# Patient Record
Sex: MALE
[De-identification: names, ages, dates, MRNs, and addresses within clinical notes are randomized per-mention and may not be internally consistent; named-entity substitution may affect disease eponyms.]

---

## 2017-08-07 ENCOUNTER — CHARTING TRANS (OUTPATIENT)
Dept: OTHER | Age: 67
End: 2017-08-07

## 2017-08-07 ENCOUNTER — LAB SERVICES (OUTPATIENT)
Dept: OTHER | Age: 67
End: 2017-08-07

## 2017-08-07 LAB — AT REST: NORMAL

## 2017-08-21 ENCOUNTER — OFFICE VISIT (OUTPATIENT)
Dept: OTOLARYNGOLOGY | Facility: CLINIC | Age: 67
End: 2017-08-21

## 2017-08-21 ENCOUNTER — OFFICE VISIT (OUTPATIENT)
Dept: AUDIOLOGY | Facility: CLINIC | Age: 67
End: 2017-08-21

## 2017-08-21 VITALS
HEIGHT: 68 IN | WEIGHT: 170 LBS | DIASTOLIC BLOOD PRESSURE: 84 MMHG | SYSTOLIC BLOOD PRESSURE: 168 MMHG | BODY MASS INDEX: 25.76 KG/M2 | TEMPERATURE: 97 F

## 2017-08-21 DIAGNOSIS — H93.12 TINNITUS OF LEFT EAR: Primary | ICD-10-CM

## 2017-08-21 DIAGNOSIS — H90.3 SENSORINEURAL HEARING LOSS, BILATERAL: Primary | ICD-10-CM

## 2017-08-21 PROCEDURE — 99212 OFFICE O/P EST SF 10 MIN: CPT | Performed by: OTOLARYNGOLOGY

## 2017-08-21 PROCEDURE — 99203 OFFICE O/P NEW LOW 30 MIN: CPT | Performed by: OTOLARYNGOLOGY

## 2017-08-21 PROCEDURE — 92557 COMPREHENSIVE HEARING TEST: CPT | Performed by: AUDIOLOGIST

## 2017-08-21 PROCEDURE — 92567 TYMPANOMETRY: CPT | Performed by: AUDIOLOGIST

## 2017-08-21 RX ORDER — ATORVASTATIN CALCIUM 10 MG/1
10 TABLET, FILM COATED ORAL NIGHTLY
COMMUNITY
End: 2019-01-03 | Stop reason: DRUGHIGH

## 2017-08-21 RX ORDER — VALSARTAN AND HYDROCHLOROTHIAZIDE 160; 12.5 MG/1; MG/1
1 TABLET, FILM COATED ORAL DAILY
COMMUNITY
End: 2019-01-03

## 2017-08-21 RX ORDER — METOPROLOL SUCCINATE 25 MG/1
25 TABLET, EXTENDED RELEASE ORAL DAILY
COMMUNITY
End: 2019-01-03 | Stop reason: DRUGHIGH

## 2017-08-21 NOTE — PROGRESS NOTES
AUDIOGRAM     Nate Rabago was referred for testing by Martha Rosales  [id number]      Otoscopic Inspection:  Right ear:  No cerumen  Left ear:  No cerumen    Audiometric Test Results:   Audiometric thresholds indicated a mild flat sensorineu

## 2017-08-22 NOTE — PATIENT INSTRUCTIONS
Tinnitus (Ringing in the Ears)     Treatment may include maskers and hearing aids. Tinnitus is the term for a noise in your ear not caused by an outside sound. The noise might be a ringing, clicking, hiss, or roar.  It can vary in pitch and may be sof © 8086-6997 [address], [address] [place]. All rights reserved. This information is not intended as a substitute for professional medical care. Always follow your healthcare professional's instructions.

## 2017-08-22 NOTE — PROGRESS NOTES
Yuliet Cordoba is a 78year old male.  Patient presents with:  Ear Problem: left ear tinnitis abbut month half on/off  Throat Problem: irritated had flu 3 weeks ago never got better     HPI:   He has been feeling a ringing intermittently in the left ear for Cranial nerves II through XII grossly intact. Neck Exam Normal Inspection - Normal. Palpation - Normal. Parotid gland - Normal. Thyroid gland - Normal.   Psychiatric Normal Orientation - Oriented to time, place, person & situation.  Appropriate mood and a

## 2018-11-03 VITALS
BODY MASS INDEX: 25.76 KG/M2 | SYSTOLIC BLOOD PRESSURE: 150 MMHG | TEMPERATURE: 99.3 F | HEART RATE: 76 BPM | RESPIRATION RATE: 20 BRPM | HEIGHT: 68 IN | WEIGHT: 170 LBS | DIASTOLIC BLOOD PRESSURE: 82 MMHG

## 2019-01-03 ENCOUNTER — OFFICE VISIT (OUTPATIENT)
Dept: INTERNAL MEDICINE CLINIC | Facility: CLINIC | Age: 69
End: 2019-01-03
Payer: MEDICARE

## 2019-01-03 VITALS
RESPIRATION RATE: 20 BRPM | WEIGHT: 185 LBS | HEIGHT: 66.25 IN | BODY MASS INDEX: 29.73 KG/M2 | HEART RATE: 58 BPM | DIASTOLIC BLOOD PRESSURE: 81 MMHG | SYSTOLIC BLOOD PRESSURE: 183 MMHG | TEMPERATURE: 98 F

## 2019-01-03 DIAGNOSIS — E78.5 HYPERLIPIDEMIA, UNSPECIFIED HYPERLIPIDEMIA TYPE: Primary | ICD-10-CM

## 2019-01-03 DIAGNOSIS — I10 ESSENTIAL HYPERTENSION: ICD-10-CM

## 2019-01-03 DIAGNOSIS — F32.A DEPRESSION, UNSPECIFIED DEPRESSION TYPE: ICD-10-CM

## 2019-01-03 DIAGNOSIS — F41.9 ANXIETY: ICD-10-CM

## 2019-01-03 DIAGNOSIS — Z95.1 S/P CABG X 5: ICD-10-CM

## 2019-01-03 DIAGNOSIS — R60.9 EDEMA, UNSPECIFIED TYPE: ICD-10-CM

## 2019-01-03 DIAGNOSIS — E11.9 TYPE 2 DIABETES MELLITUS WITHOUT COMPLICATION, WITHOUT LONG-TERM CURRENT USE OF INSULIN (HCC): ICD-10-CM

## 2019-01-03 PROBLEM — Z76.89 ENCOUNTER TO ESTABLISH CARE: Status: ACTIVE | Noted: 2019-01-03

## 2019-01-03 PROCEDURE — 99205 OFFICE O/P NEW HI 60 MIN: CPT | Performed by: INTERNAL MEDICINE

## 2019-01-03 PROCEDURE — G0463 HOSPITAL OUTPT CLINIC VISIT: HCPCS | Performed by: INTERNAL MEDICINE

## 2019-01-03 RX ORDER — ATORVASTATIN CALCIUM 40 MG/1
40 TABLET, FILM COATED ORAL NIGHTLY
COMMUNITY
End: 2019-06-13

## 2019-01-03 RX ORDER — ESCITALOPRAM OXALATE 10 MG/1
10 TABLET ORAL DAILY
COMMUNITY
End: 2019-01-03

## 2019-01-03 RX ORDER — METOPROLOL SUCCINATE 50 MG/1
50 TABLET, EXTENDED RELEASE ORAL DAILY
COMMUNITY
End: 2019-09-30

## 2019-01-03 RX ORDER — ESCITALOPRAM OXALATE 10 MG/1
10 TABLET ORAL DAILY
Qty: 90 TABLET | Refills: 1 | Status: SHIPPED | OUTPATIENT
Start: 2019-01-03 | End: 2019-05-28

## 2019-01-03 RX ORDER — LORAZEPAM 1 MG/1
1 TABLET ORAL NIGHTLY
COMMUNITY
End: 2019-01-03

## 2019-01-03 RX ORDER — OMEGA-3-ACID ETHYL ESTERS 1 G/1
1 CAPSULE, LIQUID FILLED ORAL 3 TIMES DAILY
COMMUNITY
End: 2019-01-29

## 2019-01-03 RX ORDER — LORAZEPAM 1 MG/1
1 TABLET ORAL NIGHTLY
Qty: 30 TABLET | Refills: 1 | Status: SHIPPED | OUTPATIENT
Start: 2019-01-03 | End: 2019-04-11

## 2019-01-03 RX ORDER — VALSARTAN AND HYDROCHLOROTHIAZIDE 160; 12.5 MG/1; MG/1
1 TABLET, FILM COATED ORAL DAILY
Qty: 90 TABLET | Refills: 1 | Status: CANCELLED | OUTPATIENT
Start: 2019-01-03

## 2019-01-03 RX ORDER — VALSARTAN AND HYDROCHLOROTHIAZIDE 160; 25 MG/1; MG/1
1 TABLET ORAL DAILY
Qty: 90 TABLET | Refills: 1 | Status: SHIPPED | OUTPATIENT
Start: 2019-01-03 | End: 2019-01-08

## 2019-01-03 RX ORDER — GLIMEPIRIDE 1 MG/1
1 TABLET ORAL 2 TIMES DAILY
COMMUNITY
End: 2019-04-15

## 2019-01-03 NOTE — PATIENT INSTRUCTIONS
Problem List Items Addressed This Visit        Unprioritized    Anxiety     Consider reducing the dose of lorazepam to 1 mg–half a tablet daily and will consider further reduction after the next office visit         Relevant Medications    LORazepam 1 MG O Other Relevant Orders    CARD ECHO 2D DOPPLER (CPT=93306)    Hyperlipidemia - Primary     Lipid panel and liver function tests have been stable on atorvastatin at 40 mg daily but is due for recheck labs which have been ordered today and will follow-up i

## 2019-01-03 NOTE — ASSESSMENT & PLAN NOTE
Blood pressure (!) 183/81, pulse 58, temperature 98 °F (36.7 °C), temperature source Oral, resp. rate 20, height 5' 6.25\" (1.683 m), weight 185 lb (83.9 kg). Blood pressure remains elevated even upon recheck.   Advised to increase valsartan hydrochlorothi

## 2019-01-03 NOTE — ASSESSMENT & PLAN NOTE
Patient has been on Lexapro at 10 mg 1 tablet daily which he has tolerated well. Continue the same dose of medications and follow-up for any further needs.

## 2019-01-03 NOTE — ASSESSMENT & PLAN NOTE
Blood sugars have been stable at home on metformin 500 mg 2 times daily and glimepiride 1 mg 2 times daily. He has tolerated medications without low sugar reactions.   He is due for recheck labs which have been ordered today and will follow-up after comple

## 2019-01-03 NOTE — ASSESSMENT & PLAN NOTE
Consider reducing the dose of lorazepam to 1 mg–half a tablet daily and will consider further reduction after the next office visit

## 2019-01-03 NOTE — PROGRESS NOTES
HPI:    Patient ID: Roberto Salazar is a 80year old male. New patient, transfer of care. Earlier physician-Dr. Karina Lee. Last seen by Dr. Cyn Braswell - 1 month back.     PMH    Status post 5 vessel CABG about 10 years back at [facility] per Dr. Fernanda Butts followed. I am advising him to stay and do a followup, and he can return to see me in approximately 8 weeks to see if his CBC has significantly changed. I will see him periodically and keep you posted. He has an issue about his insurance and doing CBC. night) since onset. The problem is controlled. Associated symptoms include anxiety. Pertinent negatives include no malaise/fatigue, orthopnea, palpitations, peripheral edema or shortness of breath. There are no associated agents to hypertension.  Risk facto by mouth nightly. Disp: 30 tablet Rfl: 1   Valsartan-Hydrochlorothiazide 160-25 MG Oral Tab Take 1 tablet by mouth daily. Disp: 90 tablet Rfl: 1     Allergies:No Known Allergies      01/03/19  1550   BP: (!) 183/81   Pulse: 58   Resp: 20   Temp: 98 °F (36. daily.  Heart rate is quite low and hence reduce the metoprolol to 25 mg once daily. Recheck labs prior to the next office visit. Follow-up in about 4 weeks. Reduce salt in the diet and drink plenty of fluids.          Relevant Medications    Metoprolol tolerated well. Continue the same dose of medications and follow-up for any further needs.          Relevant Medications    escitalopram 10 MG Oral Tab    S/P CABG x 5      Other Visit Diagnoses     Edema, unspecified type        Relevant Orders    CARD EC

## 2019-01-03 NOTE — ASSESSMENT & PLAN NOTE
New patient–establishment of care. Multiple medical issues associated with diabetes, history of CABG, hyperlipidemia, hypertension, depression and anxiety. Requested old records to be brought for the next office visit.   Labs completed from the earlier ph

## 2019-01-03 NOTE — ASSESSMENT & PLAN NOTE
Lipid panel and liver function tests have been stable on atorvastatin at 40 mg daily but is due for recheck labs which have been ordered today and will follow-up in the next 3-4 weeks.

## 2019-01-05 ENCOUNTER — LAB ENCOUNTER (OUTPATIENT)
Dept: LAB | Age: 69
End: 2019-01-05
Attending: INTERNAL MEDICINE
Payer: COMMERCIAL

## 2019-01-05 DIAGNOSIS — E11.9 TYPE 2 DIABETES MELLITUS WITHOUT COMPLICATION, WITHOUT LONG-TERM CURRENT USE OF INSULIN (HCC): ICD-10-CM

## 2019-01-05 LAB
ALBUMIN SERPL BCP-MCNC: 3.9 G/DL (ref 3.5–4.8)
ALBUMIN/GLOB SERPL: 1.4 {RATIO} (ref 1–2)
ALP SERPL-CCNC: 42 U/L (ref 32–100)
ALT SERPL-CCNC: 39 U/L (ref 17–63)
ANION GAP SERPL CALC-SCNC: 10 MMOL/L (ref 0–18)
AST SERPL-CCNC: 28 U/L (ref 15–41)
BACTERIA UR QL AUTO: NEGATIVE /HPF
BASOPHILS # BLD: 0 K/UL (ref 0–0.2)
BASOPHILS NFR BLD: 1 %
BILIRUB SERPL-MCNC: 1.1 MG/DL (ref 0.3–1.2)
BILIRUB UR QL: NEGATIVE
BUN SERPL-MCNC: 9 MG/DL (ref 8–20)
BUN/CREAT SERPL: 10.3 (ref 10–20)
CALCIUM SERPL-MCNC: 8.9 MG/DL (ref 8.5–10.5)
CHLORIDE SERPL-SCNC: 102 MMOL/L (ref 95–110)
CHOLEST SERPL-MCNC: 113 MG/DL (ref 110–200)
CLARITY UR: CLEAR
CO2 SERPL-SCNC: 25 MMOL/L (ref 22–32)
COLOR UR: YELLOW
CREAT SERPL-MCNC: 0.87 MG/DL (ref 0.5–1.5)
CREAT UR-MCNC: 179.8 MG/DL
EOSINOPHIL # BLD: 0.2 K/UL (ref 0–0.7)
EOSINOPHIL NFR BLD: 5 %
ERYTHROCYTE [DISTWIDTH] IN BLOOD BY AUTOMATED COUNT: 14.7 % (ref 11–15)
GLOBULIN PLAS-MCNC: 2.7 G/DL (ref 2.5–3.7)
GLUCOSE SERPL-MCNC: 128 MG/DL (ref 70–99)
GLUCOSE UR-MCNC: NEGATIVE MG/DL
HCT VFR BLD AUTO: 37.5 % (ref 41–52)
HDLC SERPL-MCNC: 45 MG/DL
HGB BLD-MCNC: 12.2 G/DL (ref 13.5–17.5)
KETONES UR-MCNC: NEGATIVE MG/DL
LDLC SERPL CALC-MCNC: 36 MG/DL (ref 0–99)
LEUKOCYTE ESTERASE UR QL STRIP.AUTO: NEGATIVE
LYMPHOCYTES # BLD: 1.6 K/UL (ref 1–4)
LYMPHOCYTES NFR BLD: 48 %
MCH RBC QN AUTO: 29.7 PG (ref 27–32)
MCHC RBC AUTO-ENTMCNC: 32.5 G/DL (ref 32–37)
MCV RBC AUTO: 91.3 FL (ref 80–100)
MICROALBUMIN UR-MCNC: 5.9 MG/DL (ref 0–1.8)
MICROALBUMIN/CREAT UR: 32.8 MG/G{CREAT} (ref 0–20)
MONOCYTES # BLD: 0.4 K/UL (ref 0–1)
MONOCYTES NFR BLD: 12 %
NEUTROPHILS # BLD AUTO: 1.2 K/UL (ref 1.8–7.7)
NEUTROPHILS NFR BLD: 35 %
NITRITE UR QL STRIP.AUTO: NEGATIVE
NONHDLC SERPL-MCNC: 68 MG/DL
OSMOLALITY UR CALC.SUM OF ELEC: 284 MOSM/KG (ref 275–295)
PATIENT FASTING: YES
PH UR: 6 [PH] (ref 5–8)
PLATELET # BLD AUTO: 139 K/UL (ref 140–400)
PMV BLD AUTO: 9.5 FL (ref 7.4–10.3)
POTASSIUM SERPL-SCNC: 3.8 MMOL/L (ref 3.3–5.1)
PROT SERPL-MCNC: 6.6 G/DL (ref 5.9–8.4)
PROT UR-MCNC: 30 MG/DL
RBC # BLD AUTO: 4.1 M/UL (ref 4.5–5.9)
RBC #/AREA URNS AUTO: 13 /HPF
SODIUM SERPL-SCNC: 137 MMOL/L (ref 136–144)
SP GR UR STRIP: 1.02 (ref 1–1.03)
TRIGL SERPL-MCNC: 159 MG/DL (ref 1–149)
TSH SERPL-ACNC: 1.85 UIU/ML (ref 0.45–5.33)
UROBILINOGEN UR STRIP-ACNC: <2
VIT B12 SERPL-MCNC: 520 PG/ML (ref 181–914)
VIT C UR-MCNC: 40 MG/DL
WBC # BLD AUTO: 3.4 K/UL (ref 4–11)
WBC #/AREA URNS AUTO: 1 /HPF

## 2019-01-05 PROCEDURE — 82043 UR ALBUMIN QUANTITATIVE: CPT

## 2019-01-05 PROCEDURE — 36415 COLL VENOUS BLD VENIPUNCTURE: CPT

## 2019-01-05 PROCEDURE — 81001 URINALYSIS AUTO W/SCOPE: CPT

## 2019-01-05 PROCEDURE — 80053 COMPREHEN METABOLIC PANEL: CPT

## 2019-01-05 PROCEDURE — 80061 LIPID PANEL: CPT

## 2019-01-05 PROCEDURE — 85025 COMPLETE CBC W/AUTO DIFF WBC: CPT

## 2019-01-05 PROCEDURE — 84443 ASSAY THYROID STIM HORMONE: CPT

## 2019-01-05 PROCEDURE — 82306 VITAMIN D 25 HYDROXY: CPT

## 2019-01-05 PROCEDURE — 82570 ASSAY OF URINE CREATININE: CPT

## 2019-01-05 PROCEDURE — 82607 VITAMIN B-12: CPT

## 2019-01-07 ENCOUNTER — TELEPHONE (OUTPATIENT)
Dept: INTERNAL MEDICINE CLINIC | Facility: CLINIC | Age: 69
End: 2019-01-07

## 2019-01-07 LAB — 25(OH)D3 SERPL-MCNC: 30.8 NG/ML (ref 30–100)

## 2019-01-07 NOTE — TELEPHONE ENCOUNTER
Pt's wife booked appt for 1/8/19 at 810am with PJ at the [facility]  Pt's BP has been getting in the 200 range  Pt was not with wife at the time of the call

## 2019-01-07 NOTE — TELEPHONE ENCOUNTER
Pt states his BP has been elevated, last reading 220/90. Pt has been checking his BP every few hours. Pt last OV 1/3/19. Pt states he has been taking Valsartan-HCTZ 160-25 mg and Metoprolol 25 mg as advised.  Pt states he doesn't add salt to his foods, advi

## 2019-01-08 ENCOUNTER — OFFICE VISIT (OUTPATIENT)
Dept: INTERNAL MEDICINE CLINIC | Facility: CLINIC | Age: 69
End: 2019-01-08
Payer: MEDICARE

## 2019-01-08 VITALS
SYSTOLIC BLOOD PRESSURE: 198 MMHG | DIASTOLIC BLOOD PRESSURE: 75 MMHG | TEMPERATURE: 98 F | HEIGHT: 66 IN | BODY MASS INDEX: 28.28 KG/M2 | HEART RATE: 63 BPM | WEIGHT: 176 LBS

## 2019-01-08 DIAGNOSIS — I10 ESSENTIAL HYPERTENSION: Primary | ICD-10-CM

## 2019-01-08 PROCEDURE — G0463 HOSPITAL OUTPT CLINIC VISIT: HCPCS | Performed by: INTERNAL MEDICINE

## 2019-01-08 PROCEDURE — 99214 OFFICE O/P EST MOD 30 MIN: CPT | Performed by: INTERNAL MEDICINE

## 2019-01-08 RX ORDER — AMLODIPINE BESYLATE 5 MG/1
5 TABLET ORAL DAILY
Qty: 90 TABLET | Refills: 0 | Status: SHIPPED | OUTPATIENT
Start: 2019-01-08 | End: 2019-03-09

## 2019-01-08 RX ORDER — VALSARTAN AND HYDROCHLOROTHIAZIDE 320; 25 MG/1; MG/1
1 TABLET, FILM COATED ORAL DAILY
Qty: 90 TABLET | Refills: 0 | Status: SHIPPED | OUTPATIENT
Start: 2019-01-08 | End: 2019-06-14

## 2019-01-08 NOTE — ASSESSMENT & PLAN NOTE
Blood pressure uncontrolled. Blood pressure on repeat-manual 200/94. He is asymptomatic. Reviewed labs from 1/3/2018. Kidney function normal.  No signs or symptoms of endorgan damage.   Cautioned the patient about the risks involved with uncontrolled se

## 2019-01-08 NOTE — PROGRESS NOTES
Chelo Beltrán is a 80year old male. Patient presents with:  Hypertension  Headache      HPI:     HPI   Patient is here for follow-up of hypertension. He was seen by PCP on 1/3/2019.   He has history of CABG 10 years ago, hypertension, hyperlipidemia, non Take 81 mg by mouth daily. Disp:  Rfl:    Multiple Vitamins-Minerals (MULTIVITAMIN ADULT OR) Take by mouth. Disp:  Rfl:    COENZYME Q-10 OR Take by mouth daily.  Disp:  Rfl:    MetFORMIN HCl 1000 MG Oral Tab Take 1 tablet (1,000 mg total) by mouth 2 (two) t Endo/Heme/Allergies: Negative for environmental allergies. Psychiatric/Behavioral: Positive for depression. The patient is not nervous/anxious and does not have insomnia.           EXAM:   BP (!) 198/75 (BP Location: Right arm)   Pulse 63   Temp 98.2 °F intake. Plan: Gradually and steadily to lower the blood pressure. Goal 20% reduction in blood pressure every 24 hours. Goal blood pressure less than 130/80 eventually  Adding amlodipine 5 mg.   Patient to contact the office if the blood pressure remains

## 2019-01-11 ENCOUNTER — HOSPITAL ENCOUNTER (OUTPATIENT)
Dept: CARDIOLOGY CLINIC | Age: 69
Discharge: HOME | End: 2019-01-11
Attending: INTERNAL MEDICINE
Payer: MEDICARE

## 2019-01-11 DIAGNOSIS — R60.9 EDEMA, UNSPECIFIED TYPE: ICD-10-CM

## 2019-01-11 DIAGNOSIS — I10 ESSENTIAL HYPERTENSION: ICD-10-CM

## 2019-01-11 DIAGNOSIS — E78.5 HYPERLIPIDEMIA, UNSPECIFIED HYPERLIPIDEMIA TYPE: ICD-10-CM

## 2019-01-11 PROCEDURE — 93306 TTE W/DOPPLER COMPLETE: CPT | Performed by: INTERNAL MEDICINE

## 2019-01-15 ENCOUNTER — TELEPHONE (OUTPATIENT)
Dept: INTERNAL MEDICINE CLINIC | Facility: CLINIC | Age: 69
End: 2019-01-15

## 2019-01-15 NOTE — TELEPHONE ENCOUNTER
Patient is looking for results of the echo cardiogram completed on 1/11/19  OK to send via [address]

## 2019-01-17 ENCOUNTER — TELEPHONE (OUTPATIENT)
Dept: OTHER | Age: 69
End: 2019-01-17

## 2019-01-17 NOTE — TELEPHONE ENCOUNTER
Spoke with patient who reports the insurance needs Dr. Ernie Robles to sign a form that verifies he is a diabetic. Patient confirmed he will drop off the form today 1/17/19 at the office. Message routed to provider and the staff for review.     Please respond to

## 2019-01-21 ENCOUNTER — TELEPHONE (OUTPATIENT)
Dept: INTERNAL MEDICINE CLINIC | Facility: CLINIC | Age: 69
End: 2019-01-21

## 2019-01-21 NOTE — TELEPHONE ENCOUNTER
Pt is calling to request a  Referral for a stress test  had told him to perform. Pt states he needs a referral to schedule an appy for test      Please advise thank you.

## 2019-01-25 ENCOUNTER — OFFICE VISIT (OUTPATIENT)
Dept: HEMATOLOGY/ONCOLOGY | Facility: HOSPITAL | Age: 69
End: 2019-01-25
Attending: INTERNAL MEDICINE
Payer: COMMERCIAL

## 2019-01-25 VITALS
DIASTOLIC BLOOD PRESSURE: 72 MMHG | OXYGEN SATURATION: 97 % | TEMPERATURE: 98 F | SYSTOLIC BLOOD PRESSURE: 169 MMHG | HEIGHT: 66 IN | RESPIRATION RATE: 16 BRPM | BODY MASS INDEX: 28.61 KG/M2 | WEIGHT: 178 LBS | HEART RATE: 59 BPM

## 2019-01-25 DIAGNOSIS — D70.8 OTHER NEUTROPENIA (HCC): Primary | ICD-10-CM

## 2019-01-25 DIAGNOSIS — D69.6 THROMBOCYTOPENIA (HCC): ICD-10-CM

## 2019-01-25 DIAGNOSIS — D64.9 ANEMIA, UNSPECIFIED TYPE: ICD-10-CM

## 2019-01-25 PROCEDURE — 99203 OFFICE O/P NEW LOW 30 MIN: CPT | Performed by: INTERNAL MEDICINE

## 2019-01-25 NOTE — PROGRESS NOTES
JUSTIN Bloom is a 80year old male here for evaluation of Other neutropenia (hcc)  (primary encounter diagnosis)  Thrombocytopenia (hcc)  Anemia, unspecified type    Patient has been noted to have persistent leukopenia since 2008 when he had a C Gastrointestinal: Negative for abdominal pain, blood in stool, diarrhea and nausea. Genitourinary: Negative for frequency, hematuria and urgency. Neurological: Negative for dizziness and light-headedness. Hematological: Negative for adenopathy.  Bru Highest education level: Not on file    Social Needs      Financial resource strain: Not on file      Food insecurity - worry: Not on file      Food insecurity - inability: Not on file      Transportation needs - medical: Not on file      Transportation is warm. Psychiatric: He has a normal mood and affect. Vitals reviewed.           ASSESSMENT/PLAN:   Other neutropenia (hcc)  (primary encounter diagnosis)  Thrombocytopenia (hcc)  Anemia, unspecified type    Patient with long-standing chronic mild panc hour(s)).      Component      Latest Ref Rng & Units 1/5/2019   WBC      4.0 - 11.0 K/UL 3.4 (L)   RBC      4.50 - 5.90 M/UL 4.10 (L)   Hemoglobin      13.5 - 17.5 g/dL 12.2 (L)   Hematocrit      41.0 - 52.0 % 37.5 (L)   MCV      80.0 - 100.0 fL 91.3   [facility] FERRITIN 16   Comment:        * * FERRITIN REFERENCE VALUES * *      ADULTS        GEOMETRIC    95% REFERENCE                    MEAN NG/ML   LIMITS NG/ML  NORMAL MALES           94           NORMAL FEMALES         46           IRON DEFICIENC However,  the dysplastic changes seen in megakaryocytes are  considered worrisome for a myelodysplastic syndrome.  A correlation with  pending cytogenetic study findings is recommended for a complete evaluation.   Moreover, reactive causes of dysplasia (e.g he agrees with the  interpretation.   .      Primary Pathologist: Felicia Mathew MD  **Electronically Signed Out**  Felicia Mathew MD    ADDENDUM     Date Ordered:     10/4/2011     Status:  Signed Out      Date Complete:     10/4/2011      Date R Comment                **Report Electronically Signed**  Felipe Ann MD          Operation  Bone marrow biopsy and aspiration of LPIC        Clinical History  Leukopenia, unspecified anemia, thrombopenia  .   HEMOGRAM:  WBC =     3.6  RBC =

## 2019-01-29 NOTE — TELEPHONE ENCOUNTER
Current Outpatient Medications:  FENOFIBRATE TABLET AND GEMFIBROZIL TABLET    Per pharmacy, on behalf of yor patient, we are requesting that you select the [facility] preferred lower cost alternative for the above medicines as an approved change from [address] [place] [address], provided that you deem this clinically appropriate. This change could potentially have a cost saving for your patient.

## 2019-01-30 RX ORDER — OMEGA-3-ACID ETHYL ESTERS 1 G/1
1 CAPSULE, LIQUID FILLED ORAL 3 TIMES DAILY
Qty: 270 CAPSULE | Refills: 1 | Status: SHIPPED | OUTPATIENT
Start: 2019-01-30 | End: 2019-11-26

## 2019-02-05 ENCOUNTER — OFFICE VISIT (OUTPATIENT)
Dept: INTERNAL MEDICINE CLINIC | Facility: CLINIC | Age: 69
End: 2019-02-05
Payer: MEDICARE

## 2019-02-05 VITALS
SYSTOLIC BLOOD PRESSURE: 140 MMHG | HEIGHT: 66 IN | HEART RATE: 63 BPM | RESPIRATION RATE: 16 BRPM | BODY MASS INDEX: 28.28 KG/M2 | WEIGHT: 176 LBS | DIASTOLIC BLOOD PRESSURE: 90 MMHG

## 2019-02-05 DIAGNOSIS — E78.5 HYPERLIPIDEMIA, UNSPECIFIED HYPERLIPIDEMIA TYPE: ICD-10-CM

## 2019-02-05 DIAGNOSIS — D61.818 PANCYTOPENIA (HCC): ICD-10-CM

## 2019-02-05 DIAGNOSIS — I10 ESSENTIAL HYPERTENSION: Primary | ICD-10-CM

## 2019-02-05 DIAGNOSIS — D70.8 OTHER NEUTROPENIA (HCC): ICD-10-CM

## 2019-02-05 PROCEDURE — G0463 HOSPITAL OUTPT CLINIC VISIT: HCPCS | Performed by: INTERNAL MEDICINE

## 2019-02-05 PROCEDURE — 99214 OFFICE O/P EST MOD 30 MIN: CPT | Performed by: INTERNAL MEDICINE

## 2019-02-06 NOTE — ASSESSMENT & PLAN NOTE
Blood pressure looks elevated even upon recheck–140/90. Current medications are valsartan hydrochlorothiazide 320/25 1 tablet daily, amlodipine 5 mg daily and metoprolol 50 mg daily. He has tolerated his medications well.   He does not watch the salt in h

## 2019-02-06 NOTE — PROGRESS NOTES
HPI:    Patient ID: Randy Mcgowan is a 80year old male. Study Conclusions  1. Left ventricle: The cavity size was normal. There was mild     hypertrophy of the septum. Systolic function was normal. The     estimated ejection fraction was 60-65%.  Wall m the both these agents that has been reported, particularly a pancytopenia with the Lexapro of incidence less than 1%. Lexapro dose was recently decreased.   Discussed with the patient that this medication could be the culprit, however, the patient is asymp Diabetes   He presents for his follow-up diabetic visit. He has type 2 diabetes mellitus. His disease course has been stable. Hypoglycemia symptoms include nervousness/anxiousness (depression stable). There are no diabetic associated symptoms.  There are Medications:  Omega-3-acid Ethyl Esters 1 g Oral Cap Take 1 capsule (1 g total) by mouth 3 (three) times daily. Disp: 270 capsule Rfl: 1   AmLODIPine Besylate 5 MG Oral Tab Take 1 tablet (5 mg total) by mouth daily.  Disp: 90 tablet Rfl: 0   Valsartan-Hydro Musculoskeletal: Normal range of motion. Left shoulder: He exhibits no tenderness, no bony tenderness, no pain and no spasm. Lymphadenopathy:     He has no cervical adenopathy. Neurological: He is alert and oriented to person, place, and time. Differential W Platelet [E]      Meds This Visit:  Requested Prescriptions      No prescriptions requested or ordered in this encounter       Imaging & Referrals:  CARDIO - INTERNAL  CARD TREADMILL STRESS, ADULT (HUJ=[id number])       QR#[id number]

## 2019-02-06 NOTE — ASSESSMENT & PLAN NOTE
Asymptomatic mild pancytopenia. Patient has been evaluated by hematology–no intervention at this time. Advised to abstain from all alcohol as this can cause worsening pancytopenia. Recheck labs in about 3-4 weeks. Follow-up appointment at that time .

## 2019-02-06 NOTE — ASSESSMENT & PLAN NOTE
Lipid panel and liver function test have been stable on atorvastatin–40 mg daily. Follow-up labs in about 3 months.

## 2019-02-06 NOTE — PATIENT INSTRUCTIONS
Problem List Items Addressed This Visit        Unprioritized    Essential hypertension - Primary     Blood pressure looks elevated even upon recheck–140/90.   Current medications are valsartan hydrochlorothiazide 320/25 1 tablet daily, amlodipine 5 mg daily

## 2019-02-18 ENCOUNTER — NURSE ONLY (OUTPATIENT)
Dept: INTERNAL MEDICINE CLINIC | Facility: CLINIC | Age: 69
End: 2019-02-18
Payer: MEDICARE

## 2019-02-18 VITALS — DIASTOLIC BLOOD PRESSURE: 74 MMHG | SYSTOLIC BLOOD PRESSURE: 139 MMHG | HEART RATE: 59 BPM

## 2019-02-18 DIAGNOSIS — I10 ESSENTIAL HYPERTENSION: Primary | ICD-10-CM

## 2019-02-18 PROCEDURE — G0463 HOSPITAL OUTPT CLINIC VISIT: HCPCS | Performed by: INTERNAL MEDICINE

## 2019-02-18 NOTE — PROGRESS NOTES
Patient is here for blood pressure check. Name and  verified. Blood pressure 139/74 P-59. Dr. Boubacar Ching notified.

## 2019-03-09 RX ORDER — AMLODIPINE BESYLATE 5 MG/1
5 TABLET ORAL DAILY
Qty: 90 TABLET | Refills: 0 | Status: SHIPPED | OUTPATIENT
Start: 2019-03-09 | End: 2019-07-02

## 2019-03-10 NOTE — TELEPHONE ENCOUNTER
Hypertensive Medications  Protocol Criteria:  · Appointment scheduled in the past 6 months or in the next 3 months  · BMP or CMP in the past 12 months  · Creatinine result < 2  Recent Outpatient Visits            2 weeks ago Essential hypertension    Elu Sig: Take 1 tablet (5 mg total) by mouth daily. Last Office Visit with PCP: 1/8/2019  Last Blood Pressures:  BP Readings from Last 2 Encounters:  02/18/19 : 139/74  02/05/19 : 140/90    LR 1-8-19 # 90      Med refilled per protocol.

## 2019-03-15 ENCOUNTER — TELEPHONE (OUTPATIENT)
Dept: CASE MANAGEMENT | Age: 69
End: 2019-03-15

## 2019-03-15 NOTE — TELEPHONE ENCOUNTER
Patient is eligible for a 2019 Annual Medicare Health Assessment. Left message to call back [phone].

## 2019-03-18 ENCOUNTER — HOSPITAL ENCOUNTER (OUTPATIENT)
Dept: CV DIAGNOSTICS | Facility: HOSPITAL | Age: 69
Discharge: HOME | End: 2019-03-18
Attending: INTERNAL MEDICINE
Payer: MEDICARE

## 2019-03-18 DIAGNOSIS — I10 ESSENTIAL HYPERTENSION: ICD-10-CM

## 2019-03-18 DIAGNOSIS — E78.5 HYPERLIPIDEMIA, UNSPECIFIED HYPERLIPIDEMIA TYPE: ICD-10-CM

## 2019-03-18 PROCEDURE — 93018 CV STRESS TEST I&R ONLY: CPT | Performed by: INTERNAL MEDICINE

## 2019-03-18 PROCEDURE — 93016 CV STRESS TEST SUPVJ ONLY: CPT | Performed by: INTERNAL MEDICINE

## 2019-03-18 PROCEDURE — 93017 CV STRESS TEST TRACING ONLY: CPT | Performed by: INTERNAL MEDICINE

## 2019-03-18 NOTE — IMAGING NOTE
Patient here for outpatient regular GXT ordered by Dr. Ester Oscar. Baseline ECG abnormal. Asymptomatic. No comparison ECGs. Baseline ECG reviewed by Dr. Cecille Wilson, cardiology go-to for today. Orders received to proceed with test as ordered.   Vadim Dumont RN

## 2019-03-18 NOTE — IMAGING NOTE
See previous nursing note. Patient here for regular exercise GXT ordered by Dr. Sanchez Stoner. Exercised 9minutes, ECG changes noted during test. Asymptomatic during test and remains asymptomatic.  Reviewed stress ECG with Dr. Puma Steinberg, orders received, patient may

## 2019-03-21 ENCOUNTER — HOSPITAL ENCOUNTER (OUTPATIENT)
Dept: CV DIAGNOSTICS | Facility: HOSPITAL | Age: 69
Discharge: HOME | End: 2019-03-21
Attending: INTERNAL MEDICINE
Payer: MEDICARE

## 2019-03-21 ENCOUNTER — HOSPITAL ENCOUNTER (OUTPATIENT)
Dept: NUCLEAR MEDICINE | Facility: HOSPITAL | Age: 69
Discharge: HOME | End: 2019-03-21
Attending: INTERNAL MEDICINE
Payer: MEDICARE

## 2019-03-21 DIAGNOSIS — R94.31 ABNORMAL ECG DURING EXERCISE STRESS TEST: ICD-10-CM

## 2019-03-21 PROCEDURE — 93017 CV STRESS TEST TRACING ONLY: CPT | Performed by: INTERNAL MEDICINE

## 2019-03-21 PROCEDURE — 93016 CV STRESS TEST SUPVJ ONLY: CPT | Performed by: INTERNAL MEDICINE

## 2019-03-21 PROCEDURE — 78452 HT MUSCLE IMAGE SPECT MULT: CPT | Performed by: INTERNAL MEDICINE

## 2019-03-21 PROCEDURE — 93018 CV STRESS TEST I&R ONLY: CPT | Performed by: INTERNAL MEDICINE

## 2019-03-21 RX ORDER — 0.9 % SODIUM CHLORIDE 0.9 %
VIAL (ML) INJECTION
Status: DISCONTINUED
Start: 2019-03-21 | End: 2019-03-21 | Stop reason: WASHOUT

## 2019-03-21 RX ORDER — 0.9 % SODIUM CHLORIDE 0.9 %
VIAL (ML) INJECTION
Status: COMPLETED
Start: 2019-03-21 | End: 2019-03-21

## 2019-03-21 RX ADMIN — 0.9 % SODIUM CHLORIDE 10 ML: 0.9 % VIAL (ML) INJECTION at 14:20:00

## 2019-03-28 ENCOUNTER — OFFICE VISIT (OUTPATIENT)
Dept: CARDIOLOGY CLINIC | Facility: CLINIC | Age: 69
End: 2019-03-28
Payer: MEDICARE

## 2019-03-28 VITALS
RESPIRATION RATE: 20 BRPM | SYSTOLIC BLOOD PRESSURE: 152 MMHG | HEART RATE: 64 BPM | DIASTOLIC BLOOD PRESSURE: 76 MMHG | BODY MASS INDEX: 29 KG/M2 | WEIGHT: 178 LBS

## 2019-03-28 DIAGNOSIS — E78.00 PURE HYPERCHOLESTEROLEMIA: ICD-10-CM

## 2019-03-28 DIAGNOSIS — I25.10 CORONARY ARTERY DISEASE INVOLVING NATIVE CORONARY ARTERY OF NATIVE HEART WITHOUT ANGINA PECTORIS: Primary | ICD-10-CM

## 2019-03-28 DIAGNOSIS — I10 ESSENTIAL HYPERTENSION: ICD-10-CM

## 2019-03-28 PROCEDURE — G0463 HOSPITAL OUTPT CLINIC VISIT: HCPCS | Performed by: INTERNAL MEDICINE

## 2019-03-28 PROCEDURE — 99204 OFFICE O/P NEW MOD 45 MIN: CPT | Performed by: INTERNAL MEDICINE

## 2019-03-28 NOTE — PATIENT INSTRUCTIONS
Monitor and record resting blood pressure and pulse after 5 minutes of rest for 1 week and call with results.   When check blood pressure check 3 readings and throw away the first 1 and average the last 2  Continue same medicines  Watch weight,diet and work

## 2019-03-28 NOTE — H&P
Hamilton Perera is a 77year old male. HPI:   This is a pleasant 77year old male with coronary disease and known bypass times 510 years ago who now presents for cardiac assessment and follow-up.   We are asked by  Take 1 tablet (10 mg total) by mouth daily. (Patient taking differently: Take 10 mg by mouth daily. As needed. ) Disp: 90 tablet Rfl: 1   LORazepam 1 MG Oral Tab Take 1 tablet (1 mg total) by mouth nightly.  Disp: 30 tablet Rfl: 1     Family History:  Famil history of coronary disease with bypass 10 years ago and is presently feeling well with no symptoms.   With normal Lexiscan stress test and no symptoms I believe his EKG findings are a false positive and will continue medical therapy and risk factor modific

## 2019-04-11 DIAGNOSIS — F41.9 ANXIETY: ICD-10-CM

## 2019-04-13 NOTE — TELEPHONE ENCOUNTER
Controlled medication pending for review. If approved needs to be called in or faxed by on-site staff.     Last Rx: 3/1/19  LOV: 2/5/19

## 2019-04-15 RX ORDER — GLIMEPIRIDE 1 MG/1
1 TABLET ORAL 2 TIMES DAILY
Qty: 180 TABLET | Refills: 1 | Status: SHIPPED | OUTPATIENT
Start: 2019-04-15 | End: 2019-10-28

## 2019-04-15 RX ORDER — LORAZEPAM 1 MG/1
TABLET ORAL
Qty: 90 TABLET | Refills: 1 | Status: SHIPPED | OUTPATIENT
Start: 2019-04-15 | End: 2019-10-16

## 2019-04-15 NOTE — TELEPHONE ENCOUNTER
Per pt, he needs also Glimepiride. Current Outpatient Medications:                    glimepiride 1 MG Oral Tab Take 1 mg by mouth 2 (two) times daily.  Disp:  Rfl:

## 2019-05-28 DIAGNOSIS — E11.9 TYPE 2 DIABETES MELLITUS WITHOUT COMPLICATION, WITHOUT LONG-TERM CURRENT USE OF INSULIN (HCC): ICD-10-CM

## 2019-05-28 DIAGNOSIS — F32.A DEPRESSION, UNSPECIFIED DEPRESSION TYPE: ICD-10-CM

## 2019-05-30 RX ORDER — VALSARTAN AND HYDROCHLOROTHIAZIDE 160; 25 MG/1; MG/1
TABLET ORAL
Qty: 90 TABLET | Refills: 1 | OUTPATIENT
Start: 2019-05-30

## 2019-05-30 RX ORDER — ESCITALOPRAM OXALATE 10 MG/1
TABLET ORAL
Qty: 90 TABLET | Refills: 1 | Status: SHIPPED | OUTPATIENT
Start: 2019-05-30 | End: 2019-07-23

## 2019-05-30 NOTE — TELEPHONE ENCOUNTER
Please review; protocol failed for Metformin. All other requests passed per triage protocol.     Hypertensive Medications  Protocol Criteria:  · Appointment scheduled in the past 6 months or in the next 3 months  · BMP or CMP in the past 12 months  · Cr

## 2019-06-13 NOTE — TELEPHONE ENCOUNTER
Pt is requesting refills. Valsartan-Hydrochlorothiazide 320-25 MG Oral Tab Take 1 tablet by mouth daily. Disp: 90 tablet Rfl: 0   atorvastatin 40 MG Oral Tab Take 40 mg by mouth nightly.  Disp:  Rfl:

## 2019-06-15 NOTE — TELEPHONE ENCOUNTER
Rx for atorvastatin listed as historical.   LR valsartan hctz 1-8-19 # 90         Hypertensive Medications  Protocol Criteria:  · Appointment scheduled in the past 6 months or in the next 3 months  · BMP or CMP in the past 12 months  · Creatinine result < coronary artery of native heart without angina pectoris    [facility] Cardiology Bee Marin MD    Office Visit    3 months ago Essential hypertension    [address] [facility] [place], [address]    Nurse Only    4 months ago [place]

## 2019-06-16 RX ORDER — ATORVASTATIN CALCIUM 40 MG/1
40 TABLET, FILM COATED ORAL NIGHTLY
Qty: 90 TABLET | Refills: 1 | Status: SHIPPED | OUTPATIENT
Start: 2019-06-16 | End: 2020-01-02

## 2019-06-16 RX ORDER — VALSARTAN AND HYDROCHLOROTHIAZIDE 320; 25 MG/1; MG/1
1 TABLET, FILM COATED ORAL DAILY
Qty: 90 TABLET | Refills: 1 | Status: SHIPPED | OUTPATIENT
Start: 2019-06-16 | End: 2020-01-13

## 2019-07-02 RX ORDER — AMLODIPINE BESYLATE 5 MG/1
5 TABLET ORAL DAILY
Qty: 90 TABLET | Refills: 1 | Status: SHIPPED | OUTPATIENT
Start: 2019-07-02 | End: 2019-07-23

## 2019-07-02 NOTE — TELEPHONE ENCOUNTER
Refill passed per [facility] protocol.   Hypertensive Medications  Protocol Criteria:  · Appointment scheduled in the past 6 months or in the next 3 months  · BMP or CMP in the past 12 months  · Creatinine result < 2  Recent Outpatient Visits

## 2019-07-16 ENCOUNTER — MA CHART PREP (OUTPATIENT)
Dept: FAMILY MEDICINE CLINIC | Facility: CLINIC | Age: 69
End: 2019-07-16

## 2019-07-16 PROBLEM — R80.9 MICROALBUMINURIA DUE TO TYPE 2 DIABETES MELLITUS (HCC): Status: ACTIVE | Noted: 2019-07-16

## 2019-07-16 PROBLEM — R80.9 MICROALBUMINURIA DUE TO TYPE 2 DIABETES MELLITUS  (HCC): Status: ACTIVE | Noted: 2019-07-16

## 2019-07-16 PROBLEM — E11.29 MICROALBUMINURIA DUE TO TYPE 2 DIABETES MELLITUS: Status: ACTIVE | Noted: 2019-07-16

## 2019-07-16 PROBLEM — E11.29 MICROALBUMINURIA DUE TO TYPE 2 DIABETES MELLITUS (HCC): Status: ACTIVE | Noted: 2019-07-16

## 2019-07-16 PROBLEM — I51.89 GRADE I DIASTOLIC DYSFUNCTION: Status: ACTIVE | Noted: 2019-07-16

## 2019-07-16 PROBLEM — E11.9 TYPE 2 DIABETES MELLITUS, WITHOUT LONG-TERM CURRENT USE OF INSULIN (HCC): Status: ACTIVE | Noted: 2019-01-03

## 2019-07-16 PROBLEM — Z87.891 FORMER TOBACCO USE: Status: ACTIVE | Noted: 2019-07-16

## 2019-07-16 PROBLEM — R80.9 MICROALBUMINURIA DUE TO TYPE 2 DIABETES MELLITUS: Status: ACTIVE | Noted: 2019-07-16

## 2019-07-16 PROBLEM — R94.39 ABNORMAL STRESS TEST: Status: ACTIVE | Noted: 2019-07-16

## 2019-07-16 PROBLEM — E11.29 MICROALBUMINURIA DUE TO TYPE 2 DIABETES MELLITUS  (HCC): Status: ACTIVE | Noted: 2019-07-16

## 2019-07-23 ENCOUNTER — OFFICE VISIT (OUTPATIENT)
Dept: INTERNAL MEDICINE CLINIC | Facility: CLINIC | Age: 69
End: 2019-07-23
Payer: MEDICARE

## 2019-07-23 VITALS
HEIGHT: 66 IN | WEIGHT: 183.5 LBS | HEART RATE: 61 BPM | DIASTOLIC BLOOD PRESSURE: 72 MMHG | RESPIRATION RATE: 16 BRPM | SYSTOLIC BLOOD PRESSURE: 168 MMHG | BODY MASS INDEX: 29.49 KG/M2

## 2019-07-23 DIAGNOSIS — Z00.00 MEDICARE ANNUAL WELLNESS VISIT, INITIAL: Primary | ICD-10-CM

## 2019-07-23 DIAGNOSIS — D70.8 OTHER NEUTROPENIA (HCC): ICD-10-CM

## 2019-07-23 DIAGNOSIS — E11.9 TYPE 2 DIABETES MELLITUS WITHOUT COMPLICATION, WITHOUT LONG-TERM CURRENT USE OF INSULIN (HCC): ICD-10-CM

## 2019-07-23 DIAGNOSIS — N50.89 SCROTAL FULLNESS: ICD-10-CM

## 2019-07-23 DIAGNOSIS — E78.00 PURE HYPERCHOLESTEROLEMIA: ICD-10-CM

## 2019-07-23 DIAGNOSIS — Z00.00 ENCOUNTER FOR ANNUAL HEALTH EXAMINATION: ICD-10-CM

## 2019-07-23 DIAGNOSIS — E11.29 MICROALBUMINURIA DUE TO TYPE 2 DIABETES MELLITUS (HCC): ICD-10-CM

## 2019-07-23 DIAGNOSIS — Z23 NEED FOR VACCINATION: ICD-10-CM

## 2019-07-23 DIAGNOSIS — I10 ESSENTIAL HYPERTENSION: ICD-10-CM

## 2019-07-23 DIAGNOSIS — D64.9 ANEMIA, UNSPECIFIED TYPE: ICD-10-CM

## 2019-07-23 DIAGNOSIS — Z95.1 S/P CABG X 5: ICD-10-CM

## 2019-07-23 DIAGNOSIS — D69.6 THROMBOCYTOPENIA (HCC): ICD-10-CM

## 2019-07-23 DIAGNOSIS — R80.9 MICROALBUMINURIA DUE TO TYPE 2 DIABETES MELLITUS (HCC): ICD-10-CM

## 2019-07-23 DIAGNOSIS — Z12.5 PROSTATE CANCER SCREENING: ICD-10-CM

## 2019-07-23 DIAGNOSIS — I51.89 GRADE I DIASTOLIC DYSFUNCTION: ICD-10-CM

## 2019-07-23 DIAGNOSIS — Z87.891 FORMER TOBACCO USE: ICD-10-CM

## 2019-07-23 DIAGNOSIS — F32.A MILD DEPRESSIVE DISORDER: ICD-10-CM

## 2019-07-23 DIAGNOSIS — H90.3 SENSORINEURAL HEARING LOSS, BILATERAL: ICD-10-CM

## 2019-07-23 PROBLEM — R94.39 ABNORMAL STRESS TEST: Status: RESOLVED | Noted: 2019-07-16 | Resolved: 2019-07-23

## 2019-07-23 PROBLEM — I25.10 CORONARY ARTERY DISEASE INVOLVING NATIVE CORONARY ARTERY OF NATIVE HEART WITHOUT ANGINA PECTORIS: Status: RESOLVED | Noted: 2019-03-28 | Resolved: 2019-07-23

## 2019-07-23 PROBLEM — F41.9 ANXIETY: Status: RESOLVED | Noted: 2019-01-03 | Resolved: 2019-07-23

## 2019-07-23 PROCEDURE — G0439 PPPS, SUBSEQ VISIT: HCPCS | Performed by: INTERNAL MEDICINE

## 2019-07-23 PROCEDURE — 96160 PT-FOCUSED HLTH RISK ASSMT: CPT | Performed by: INTERNAL MEDICINE

## 2019-07-23 PROCEDURE — G0009 ADMIN PNEUMOCOCCAL VACCINE: HCPCS | Performed by: INTERNAL MEDICINE

## 2019-07-23 PROCEDURE — 99397 PER PM REEVAL EST PAT 65+ YR: CPT | Performed by: INTERNAL MEDICINE

## 2019-07-23 PROCEDURE — 90670 PCV13 VACCINE IM: CPT | Performed by: INTERNAL MEDICINE

## 2019-07-23 RX ORDER — AMLODIPINE BESYLATE 10 MG/1
10 TABLET ORAL DAILY
Qty: 90 TABLET | Refills: 2 | Status: SHIPPED | OUTPATIENT
Start: 2019-07-23 | End: 2020-07-24

## 2019-07-23 RX ORDER — FLUOXETINE HYDROCHLORIDE 20 MG/1
20 CAPSULE ORAL DAILY
Qty: 90 CAPSULE | Refills: 1 | Status: SHIPPED | OUTPATIENT
Start: 2019-07-23 | End: 2021-04-29

## 2019-07-23 NOTE — ASSESSMENT & PLAN NOTE
Blood pressure (!) 168/72, pulse 61, resp. rate 16, height 5' 6\" (1.676 m), weight 183 lb 8 oz (83.2 kg). Patient is currently on amlodipine 5 mg once daily, valsartan hydrochlorothiazide 320/20 5-1 tablet daily and metoprolol 50 mg daily.   His echocardi

## 2019-07-23 NOTE — PROGRESS NOTES
HPI:   Ying Garcia is a 77year old male who presents for a Medicare Initial Annual Wellness visit (Once after 12 month Medicare anniversary) .     Annual Physical due on 07/23/2020        Fall/Risk Assessment   He has been screened for Falls and is low Patient Care Team: Patient Care Team:  Sue Mccoy MD as PCP - General (Internal Medicine)    Patient Active Problem List:     Sensorineural hearing loss, bilateral     Essential hypertension     Hyperlipidemia     Type 2 diabetes mellitus, without lo Oral Tab Take 1 tablet (1 mg total) by mouth 2 (two) times daily. Omega-3-acid Ethyl Esters 1 g Oral Cap Take 1 capsule (1 g total) by mouth 3 (three) times daily. Metoprolol Succinate ER 50 MG Oral Tablet 24 Hr Take 50 mg by mouth daily.    aspirin 81 encounter: 183 lb 8 oz (83.2 kg).     Medicare Hearing Assessment  (Required for AWV/SWV)    Hearing Screening    Screening Method:  Questionnaire  I have a problem hearing over the telephone:  No I have trouble following the conversations when two or more normal and breath sounds normal. No respiratory distress. He has no wheezes. He has no rales. Abdominal: Soft. Bowel sounds are normal. He exhibits no distension and no mass. There is no hepatosplenomegaly. There is no tenderness.  There is no rebound and weeks for a follow-up blood pressure check           Relevant Medications    amLODIPine Besylate 10 MG Oral Tab    Former tobacco use     1ppd x 35 years  CT low-dose screening study will be ordered         Relevant Orders    CT LUNG LD SCREENING(CPT= Dr. Martínez Mo. He has had a bone marrow biopsy in the past.  Change Lexapro to Prozac. Recheck labs as ordered and follow-up. He remains asymptomatic at this time.          S/P CABG x 5     Patient has been doing well after his coronary artery revascularizatio wellness visit, initial    Microalbuminuria due to type 2 diabetes mellitus (HCC)    Thrombocytopenia (HCC)    Other neutropenia (HCC)    S/P CABG x 5    Mild depressive disorder (HCC)    Type 2 diabetes mellitus without complication, without long-term cur provided for quick review of chart, separate sheet to patient  [address] Internal Lab or Procedure External Lab or Procedure   Diabetes Screening      HbgA1C   Annually No results found for: A1C    No flowsheet data fo TDaP Not covered by Medicare Part B) No vaccine history found This may be covered with your prescription benefits, but Medicare does not cover unless Medically needed    Zoster   Not covered by Medicare Part B No vaccine history found This may be covered w

## 2019-07-23 NOTE — ASSESSMENT & PLAN NOTE
Lipid panel and liver function test have been stable on atorvastatin 40 mg daily. He is advised to continue on the same dose of medication and recheck labs prior to the next office visit in 6 to 8 weeks.

## 2019-07-23 NOTE — ASSESSMENT & PLAN NOTE
Patient is on Lexapro at 10 mg daily and lorazepam which he takes occasionally for panic attacks. Given pancytopenia and evaluation per hematology recommendation was to change Lexapro if possible.   Will consider changing to Prozac 20 mg 1 tablet daily and

## 2019-07-23 NOTE — ASSESSMENT & PLAN NOTE
Enlarged scrotum with soft palpable. I am less which seems enlarged. Most likely hydrocele. Ultrasound of the scrotum has been requested and will follow-up after completed.

## 2019-07-23 NOTE — ASSESSMENT & PLAN NOTE
Sugars have been stable on metformin 1000 mg 2 times daily, glimepiride 1 mg 2 times daily. He has not had any significant low sugar reactions. He is overdue for labs which have been offered.   His blood pressures are elevated and he is already on valsart

## 2019-07-23 NOTE — ASSESSMENT & PLAN NOTE
Patient has been doing well after his coronary artery revascularization surgery, he has been tolerating exercise well. No shortness of breath or palpitation. No significant chest pains at this time. Patient has been followed up per cardiology.   Recent s

## 2019-07-23 NOTE — ASSESSMENT & PLAN NOTE
Chronic neutropenia, anemia and thrombocytopenia–pancytopenia off-and-on in varying degrees. Recently worse. Has been seen by Dr. Sharmila Daniel. He has had a bone marrow biopsy in the past.  Change Lexapro to Prozac. Recheck labs as ordered and follow-up.   He re

## 2019-07-23 NOTE — PATIENT INSTRUCTIONS
Problem List Items Addressed This Visit        Unprioritized    Anemia    Essential hypertension     Blood pressure (!) 168/72, pulse 61, resp. rate 16, height 5' 6\" (1.676 m), weight 183 lb 8 oz (83.2 kg).   Patient is currently on amlodipine 5 mg once da Microalbuminuria due to type 2 diabetes mellitus ([facility] [address]     Recheck labs have been ordered, follow-up after completion          Mild depressive disorder [facility])     Patient is on Lexapro at 10 mg daily and lorazepam which he takes occasionally for panic attac as directed. Follow-up evaluation after labs are completed. Eye exam has been completed per Dr. Coleman Kramer.   Foot exam was normal.         Relevant Orders    CBC WITH DIFFERENTIAL WITH PLATELET    COMP METABOLIC PANEL (14)    LIPID PANEL    ASSAY, THYROID STI Welcome to Medicare, and non-screening if indicated for medical reasons    Electrocardiogram date Routine EKG is not a screening covered service except at the Welcome to Medicare Visit    Abdominal aortic aneurysm screening (once between ages 73-68)  No re previous visit. Please get once after your 65th birthday    Hepatitis B for Moderate/High Risk No orders found for this or any previous visit.  Medium/high risk factors:   End-stage renal disease   Hemophiliacs who received Factor VIII or IX concentrates

## 2019-07-23 NOTE — ASSESSMENT & PLAN NOTE
Normal exam.  Labs as ordered. Skin check–normal, scattered nevi present, none abnormal at this time. No cervical, axillary, inguinal lymphadenopathy. Hernial orifices intact. Rectal exam normal,prostate normal to palpation. Small hemorrhoids present.

## 2019-07-30 ENCOUNTER — HOSPITAL ENCOUNTER (OUTPATIENT)
Dept: CT IMAGING | Facility: HOSPITAL | Age: 69
Discharge: HOME | End: 2019-07-30
Attending: INTERNAL MEDICINE
Payer: MEDICARE

## 2019-07-30 DIAGNOSIS — Z87.891 FORMER TOBACCO USE: ICD-10-CM

## 2019-08-10 ENCOUNTER — LAB ENCOUNTER (OUTPATIENT)
Dept: LAB | Age: 69
End: 2019-08-10
Attending: INTERNAL MEDICINE
Payer: MEDICARE

## 2019-08-10 DIAGNOSIS — Z12.5 PROSTATE CANCER SCREENING: ICD-10-CM

## 2019-08-10 DIAGNOSIS — D61.818 PANCYTOPENIA (HCC): ICD-10-CM

## 2019-08-10 DIAGNOSIS — E11.9 TYPE 2 DIABETES MELLITUS WITHOUT COMPLICATION, WITHOUT LONG-TERM CURRENT USE OF INSULIN (HCC): ICD-10-CM

## 2019-08-10 LAB
ALBUMIN SERPL-MCNC: 3.8 G/DL (ref 3.4–5)
ALBUMIN/GLOB SERPL: 1.2 {RATIO} (ref 1–2)
ALP LIVER SERPL-CCNC: 50 U/L (ref 45–117)
ALT SERPL-CCNC: 45 U/L (ref 16–61)
ANION GAP SERPL CALC-SCNC: 7 MMOL/L (ref 0–18)
AST SERPL-CCNC: 23 U/L (ref 15–37)
BACTERIA UR QL AUTO: NEGATIVE /HPF
BASOPHILS # BLD AUTO: 0.02 X10(3) UL (ref 0–0.2)
BASOPHILS NFR BLD AUTO: 0.5 %
BILIRUB SERPL-MCNC: 0.8 MG/DL (ref 0.1–2)
BILIRUB UR QL: NEGATIVE
BUN BLD-MCNC: 10 MG/DL (ref 7–18)
BUN/CREAT SERPL: 11.6 (ref 10–20)
CALCIUM BLD-MCNC: 8.7 MG/DL (ref 8.5–10.1)
CHLORIDE SERPL-SCNC: 104 MMOL/L (ref 98–112)
CHOLEST SMN-MCNC: 99 MG/DL (ref ?–200)
CLARITY UR: CLEAR
CO2 SERPL-SCNC: 29 MMOL/L (ref 21–32)
COLOR UR: YELLOW
COMPLEXED PSA SERPL-MCNC: 1.55 NG/ML (ref ?–4)
CREAT BLD-MCNC: 0.86 MG/DL (ref 0.7–1.3)
CREAT UR-SCNC: 281 MG/DL
DEPRECATED RDW RBC AUTO: 53.1 FL (ref 35.1–46.3)
EOSINOPHIL # BLD AUTO: 0.11 X10(3) UL (ref 0–0.7)
EOSINOPHIL NFR BLD AUTO: 2.7 %
ERYTHROCYTE [DISTWIDTH] IN BLOOD BY AUTOMATED COUNT: 15.7 % (ref 11–15)
EST. AVERAGE GLUCOSE BLD GHB EST-MCNC: 160 MG/DL (ref 68–126)
GLOBULIN PLAS-MCNC: 3.1 G/DL (ref 2.8–4.4)
GLUCOSE BLD-MCNC: 135 MG/DL (ref 70–99)
GLUCOSE UR-MCNC: NEGATIVE MG/DL
HBA1C MFR BLD HPLC: 7.2 % (ref ?–5.7)
HCT VFR BLD AUTO: 36.3 % (ref 39–53)
HDLC SERPL-MCNC: 41 MG/DL (ref 40–59)
HGB BLD-MCNC: 11.5 G/DL (ref 13–17.5)
IMM GRANULOCYTES # BLD AUTO: 0.01 X10(3) UL (ref 0–1)
IMM GRANULOCYTES NFR BLD: 0.2 %
KETONES UR-MCNC: NEGATIVE MG/DL
LDLC SERPL CALC-MCNC: 16 MG/DL (ref ?–100)
LEUKOCYTE ESTERASE UR QL STRIP.AUTO: NEGATIVE
LYMPHOCYTES # BLD AUTO: 1.4 X10(3) UL (ref 1–4)
LYMPHOCYTES NFR BLD AUTO: 34.7 %
M PROTEIN MFR SERPL ELPH: 6.9 G/DL (ref 6.4–8.2)
MCH RBC QN AUTO: 29.3 PG (ref 26–34)
MCHC RBC AUTO-ENTMCNC: 31.7 G/DL (ref 31–37)
MCV RBC AUTO: 92.4 FL (ref 80–100)
MICROALBUMIN UR-MCNC: 5.84 MG/DL
MICROALBUMIN/CREAT 24H UR-RTO: 20.8 UG/MG (ref ?–30)
MONOCYTES # BLD AUTO: 0.45 X10(3) UL (ref 0.1–1)
MONOCYTES NFR BLD AUTO: 11.2 %
NEUTROPHILS # BLD AUTO: 2.04 X10 (3) UL (ref 1.5–7.7)
NEUTROPHILS # BLD AUTO: 2.04 X10(3) UL (ref 1.5–7.7)
NEUTROPHILS NFR BLD AUTO: 50.7 %
NITRITE UR QL STRIP.AUTO: NEGATIVE
NONHDLC SERPL-MCNC: 58 MG/DL (ref ?–130)
OSMOLALITY SERPL CALC.SUM OF ELEC: 291 MOSM/KG (ref 275–295)
PATIENT FASTING: YES
PATIENT FASTING: YES
PH UR: 7 [PH] (ref 5–8)
PLATELET # BLD AUTO: 208 10(3)UL (ref 150–450)
POTASSIUM SERPL-SCNC: 4.1 MMOL/L (ref 3.5–5.1)
PROT UR-MCNC: 30 MG/DL
RBC # BLD AUTO: 3.93 X10(6)UL (ref 3.8–5.8)
RBC #/AREA URNS AUTO: 19 /HPF
SODIUM SERPL-SCNC: 140 MMOL/L (ref 136–145)
SP GR UR STRIP: 1.02 (ref 1–1.03)
TRIGL SERPL-MCNC: 208 MG/DL (ref 30–149)
TSI SER-ACNC: 1.63 MIU/ML (ref 0.36–3.74)
UROBILINOGEN UR STRIP-ACNC: <2
VIT B12 SERPL-MCNC: 395 PG/ML (ref 193–986)
VIT C UR-MCNC: NEGATIVE MG/DL
VLDLC SERPL CALC-MCNC: 42 MG/DL (ref 0–30)
WBC # BLD AUTO: 4 X10(3) UL (ref 4–11)
WBC #/AREA URNS AUTO: 1 /HPF

## 2019-08-10 PROCEDURE — 82043 UR ALBUMIN QUANTITATIVE: CPT

## 2019-08-10 PROCEDURE — 85025 COMPLETE CBC W/AUTO DIFF WBC: CPT

## 2019-08-10 PROCEDURE — 80061 LIPID PANEL: CPT

## 2019-08-10 PROCEDURE — 81001 URINALYSIS AUTO W/SCOPE: CPT

## 2019-08-10 PROCEDURE — 83036 HEMOGLOBIN GLYCOSYLATED A1C: CPT

## 2019-08-10 PROCEDURE — 82570 ASSAY OF URINE CREATININE: CPT

## 2019-08-10 PROCEDURE — 82607 VITAMIN B-12: CPT

## 2019-08-10 PROCEDURE — 84443 ASSAY THYROID STIM HORMONE: CPT

## 2019-08-10 PROCEDURE — 80053 COMPREHEN METABOLIC PANEL: CPT

## 2019-08-10 PROCEDURE — 36415 COLL VENOUS BLD VENIPUNCTURE: CPT

## 2019-08-14 ENCOUNTER — OFFICE VISIT (OUTPATIENT)
Dept: INTERNAL MEDICINE CLINIC | Facility: CLINIC | Age: 69
End: 2019-08-14
Payer: MEDICARE

## 2019-08-14 VITALS
RESPIRATION RATE: 16 BRPM | WEIGHT: 180 LBS | DIASTOLIC BLOOD PRESSURE: 74 MMHG | HEART RATE: 56 BPM | BODY MASS INDEX: 28.93 KG/M2 | HEIGHT: 66 IN | SYSTOLIC BLOOD PRESSURE: 157 MMHG

## 2019-08-14 DIAGNOSIS — D64.9 ANEMIA, UNSPECIFIED TYPE: Primary | ICD-10-CM

## 2019-08-14 PROCEDURE — 99214 OFFICE O/P EST MOD 30 MIN: CPT | Performed by: INTERNAL MEDICINE

## 2019-08-15 NOTE — PATIENT INSTRUCTIONS
Problem List Items Addressed This Visit        Unprioritized    Anemia - Primary     Normal [email or web address] yrs back. Family hx negetive for colon or stomach cancer.     No abdominal pain,appetite has been normal.  Constipation, diarrhea, blood in stools or

## 2019-08-15 NOTE — ASSESSMENT & PLAN NOTE
Normal Zach@i[employer]imple yrs back. Family hx negetive for colon or stomach cancer. No abdominal pain,appetite has been normal.  Constipation, diarrhea, blood in stools or black stools.     Wt Readings from Last 6 Encounters:  08/14/19 : 180 lb (81.6 kg)  07

## 2019-08-15 NOTE — PROGRESS NOTES
HPI:    Patient ID: Madina Leiva is a 77year old male. Per hematology -bm bx in 2011    Findings are as follows: A bone marrow aspiration and biopsy were done at left iliac crest on 09/22/2011.  The marrow findings show hypercellular marrow with mild d hematology in the past.  His workup was negative.   At that time it was recommended that the patient will be monitored periodically.     Given that this started after having his CABG, on review of the patient's medications, the medications that were started on 8/14/2019  9:16 AM   Diabetes test-hemoglobin A1c look stable at 7.2. The kidney functions, liver functions and electrolytes look normal.   The blood counts show persistent anemia-slightly lower than in the past, down from 12.2-11.5.    We will need to MEALS Disp: 180 tablet Rfl: 1   LORAZEPAM 1 MG Oral Tab TAKE 1 TABLET EVERY NIGHT Disp: 90 tablet Rfl: 1   glimepiride 1 MG Oral Tab Take 1 tablet (1 mg total) by mouth 2 (two) times daily.  Disp: 180 tablet Rfl: 1   Omega-3-acid Ethyl Esters 1 g Oral Cap T pain,appetite has been normal.  Constipation, diarrhea, blood in stools or black stools.     Wt Readings from Last 6 Encounters:  08/14/19 : 180 lb (81.6 kg)  07/23/19 : 183 lb 8 oz (83.2 kg)  03/28/19 : 178 lb (80.7 kg)  02/05/19 : 176 lb (79.8 kg)  01/25/

## 2019-08-20 ENCOUNTER — LAB ENCOUNTER (OUTPATIENT)
Dept: LAB | Age: 69
End: 2019-08-20
Attending: INTERNAL MEDICINE
Payer: MEDICARE

## 2019-08-20 DIAGNOSIS — D64.9 ANEMIA, UNSPECIFIED TYPE: ICD-10-CM

## 2019-08-20 LAB
BASOPHILS # BLD AUTO: 0.04 X10(3) UL (ref 0–0.2)
BASOPHILS NFR BLD AUTO: 1 %
DEPRECATED RDW RBC AUTO: 52.9 FL (ref 35.1–46.3)
EOSINOPHIL # BLD AUTO: 0.12 X10(3) UL (ref 0–0.7)
EOSINOPHIL NFR BLD AUTO: 3.1 %
ERYTHROCYTE [DISTWIDTH] IN BLOOD BY AUTOMATED COUNT: 15.7 % (ref 11–15)
HCT VFR BLD AUTO: 33.7 % (ref 39–53)
HGB BLD-MCNC: 10.6 G/DL (ref 13–17.5)
HGB RETIC QN AUTO: 29.2 PG (ref 28.2–36.6)
IMM GRANULOCYTES # BLD AUTO: 0.01 X10(3) UL (ref 0–1)
IMM GRANULOCYTES NFR BLD: 0.3 %
IMM RETICS NFR: 0.31 RATIO (ref 0.1–0.3)
IRON SERPL-MCNC: 101 UG/DL (ref 65–175)
LYMPHOCYTES # BLD AUTO: 1.82 X10(3) UL (ref 1–4)
LYMPHOCYTES NFR BLD AUTO: 46.3 %
MCH RBC QN AUTO: 29.2 PG (ref 26–34)
MCHC RBC AUTO-ENTMCNC: 31.5 G/DL (ref 31–37)
MCV RBC AUTO: 92.8 FL (ref 80–100)
MONOCYTES # BLD AUTO: 0.41 X10(3) UL (ref 0.1–1)
MONOCYTES NFR BLD AUTO: 10.4 %
NEUTROPHILS # BLD AUTO: 1.53 X10 (3) UL (ref 1.5–7.7)
NEUTROPHILS # BLD AUTO: 1.53 X10(3) UL (ref 1.5–7.7)
NEUTROPHILS NFR BLD AUTO: 38.9 %
PLATELET # BLD AUTO: 139 10(3)UL (ref 150–450)
RBC # BLD AUTO: 3.63 X10(6)UL (ref 3.8–5.8)
RETICS # AUTO: 90 X10(3) UL (ref 22.5–147.5)
RETICS/RBC NFR AUTO: 2.5 % (ref 0.5–2.5)
WBC # BLD AUTO: 3.9 X10(3) UL (ref 4–11)

## 2019-08-20 PROCEDURE — 85045 AUTOMATED RETICULOCYTE COUNT: CPT

## 2019-08-20 PROCEDURE — 85025 COMPLETE CBC W/AUTO DIFF WBC: CPT

## 2019-08-20 PROCEDURE — 83540 ASSAY OF IRON: CPT

## 2019-08-20 PROCEDURE — 36415 COLL VENOUS BLD VENIPUNCTURE: CPT

## 2019-08-26 NOTE — H&P
[facility], [facility] - Gastroenterology                                                                                                  Clinic History and Physical other day    Drug use: No       Medications (Active prior to today's visit):    Current Outpatient Medications:  amLODIPine Besylate 10 MG Oral Tab Take 1 tablet (10 mg total) by mouth daily.  Disp: 90 tablet Rfl: 2   FLUoxetine HCl 20 MG Oral Cap Take 1 ca Chelo Beltrán is a 77year old man with history of BMI 29, hypertension,  Hyperlipidemia, diabetes, prior tobacco use, coronary artery disease s/p CABG on aspirin here regarding anemia     The patient has a hemoglobin of 10.6 8/20/19, 11.5 8/10/19, and 1 discussed the risks, benefits, and alternatives (including CT colonography and stool testing like cologaurd) to colonoscopy with the patient [who demonstrated understanding], including but not limited to the risks of bleeding, infection, pain, perforation,

## 2019-08-27 ENCOUNTER — OFFICE VISIT (OUTPATIENT)
Dept: GASTROENTEROLOGY | Facility: CLINIC | Age: 69
End: 2019-08-27
Payer: MEDICARE

## 2019-08-27 ENCOUNTER — TELEPHONE (OUTPATIENT)
Dept: GASTROENTEROLOGY | Facility: CLINIC | Age: 69
End: 2019-08-27

## 2019-08-27 VITALS
WEIGHT: 178 LBS | DIASTOLIC BLOOD PRESSURE: 81 MMHG | SYSTOLIC BLOOD PRESSURE: 149 MMHG | HEIGHT: 66 IN | HEART RATE: 59 BPM | BODY MASS INDEX: 28.61 KG/M2

## 2019-08-27 DIAGNOSIS — Z12.12 SCREENING FOR COLORECTAL CANCER: ICD-10-CM

## 2019-08-27 DIAGNOSIS — K21.9 GASTROESOPHAGEAL REFLUX DISEASE, ESOPHAGITIS PRESENCE NOT SPECIFIED: ICD-10-CM

## 2019-08-27 DIAGNOSIS — D64.9 ANEMIA, UNSPECIFIED TYPE: ICD-10-CM

## 2019-08-27 DIAGNOSIS — D64.9 ANEMIA, UNSPECIFIED TYPE: Primary | ICD-10-CM

## 2019-08-27 DIAGNOSIS — Z12.11 SCREEN FOR COLON CANCER: Primary | ICD-10-CM

## 2019-08-27 DIAGNOSIS — Z12.11 SCREENING FOR COLORECTAL CANCER: ICD-10-CM

## 2019-08-27 PROCEDURE — 99204 OFFICE O/P NEW MOD 45 MIN: CPT | Performed by: INTERNAL MEDICINE

## 2019-08-27 NOTE — TELEPHONE ENCOUNTER
Scheduled for:  Colonoscpy 09370 EGD 19496  Provider Name:   Date:  10/28/19  Location:  [facility]  Sedation:  MAC  Time:  915am pt told to arrive at 815am  Prep:suprep  Meds/Allergies Reconciled?:  Physician reviewed  Diagnosis with codes:  Screen Z12.11;

## 2019-08-27 NOTE — PATIENT INSTRUCTIONS
1. Schedule colonoscopy and upper endoscopy with monitored anesthesia care (MAC) with Dr. Melany Sexton    2.  bowel prep from pharmacy ([facility])    3. Medication adjustment:       A.  Day BEFORE colonoscopy: HOLD metformin and glimepiride     B. Da

## 2019-09-13 NOTE — TELEPHONE ENCOUNTER
Pt needs a refill on Metoprolol Succinate ER 25 MG Oral Tablet 24 Hr. Pt stated that Dr told him to cut his medication. (50mg)    Thank you.

## 2019-09-15 RX ORDER — METOPROLOL SUCCINATE 25 MG/1
25 TABLET, EXTENDED RELEASE ORAL DAILY
Qty: 90 TABLET | Refills: 1 | Status: SHIPPED | OUTPATIENT
Start: 2019-09-15 | End: 2020-04-03

## 2019-09-30 ENCOUNTER — OFFICE VISIT (OUTPATIENT)
Dept: INTERNAL MEDICINE CLINIC | Facility: CLINIC | Age: 69
End: 2019-09-30
Payer: MEDICARE

## 2019-09-30 VITALS
DIASTOLIC BLOOD PRESSURE: 60 MMHG | HEART RATE: 68 BPM | HEIGHT: 66 IN | BODY MASS INDEX: 29.11 KG/M2 | WEIGHT: 181.13 LBS | SYSTOLIC BLOOD PRESSURE: 146 MMHG

## 2019-09-30 DIAGNOSIS — M25.561 ACUTE PAIN OF RIGHT KNEE: Primary | ICD-10-CM

## 2019-09-30 PROCEDURE — 99213 OFFICE O/P EST LOW 20 MIN: CPT | Performed by: INTERNAL MEDICINE

## 2019-09-30 RX ORDER — NAPROXEN 500 MG/1
500 TABLET ORAL 2 TIMES DAILY WITH MEALS
Qty: 15 TABLET | Refills: 0 | Status: SHIPPED | OUTPATIENT
Start: 2019-09-30 | End: 2019-11-07 | Stop reason: ALTCHOICE

## 2019-09-30 NOTE — PROGRESS NOTES
Sandra Ware is a 77year old male. Patient presents with:  Knee Pain: right knee    HPI:   For the past 1 week he has had persistent intermittent pain along his right medial knee.   Pain occurs when he stands up from a seated position, and also when he tu Medical History:   Diagnosis Date   • Anxiety    • Depression    • Diabetes [facility])    • Essential hypertension    • Hyperlipidemia      Past Surgical History:   Procedure Laterality Date   • BYPASS SURGERY  05/2009   • COLONOSCOPY     • OTHER SURGICAL HISTO

## 2019-09-30 NOTE — PATIENT INSTRUCTIONS
Please rest and apply ice to your right knee 3 times daily. Take naproxen 500 mg twice daily with meals. Call or return if not soon better. Follow-up with Dr. Juvenal Vincent soon.

## 2019-10-16 DIAGNOSIS — F41.9 ANXIETY: ICD-10-CM

## 2019-10-16 NOTE — TELEPHONE ENCOUNTER
Patient is requesting refill of medication LORAZEPAM 1 MG Oral Tab. Patient states he has a week left of medication. Please advise.

## 2019-10-17 NOTE — TELEPHONE ENCOUNTER
Controlled medication pending for review. Please change to phone in, fax, or print script if not being sent electronically.     Last Rx: 04/15/19  LOV: 09/30/19,08/14/19

## 2019-10-18 RX ORDER — LORAZEPAM 1 MG/1
TABLET ORAL
Qty: 90 TABLET | Refills: 1 | Status: SHIPPED | OUTPATIENT
Start: 2019-10-18 | End: 2020-02-17

## 2019-10-19 ENCOUNTER — MED REC SCAN ONLY (OUTPATIENT)
Dept: INTERNAL MEDICINE CLINIC | Facility: CLINIC | Age: 69
End: 2019-10-19

## 2019-10-28 ENCOUNTER — ANESTHESIA EVENT (OUTPATIENT)
Dept: ENDOSCOPY | Facility: HOSPITAL | Age: 69
End: 2019-10-28
Payer: MEDICARE

## 2019-10-28 ENCOUNTER — HOSPITAL ENCOUNTER (OUTPATIENT)
Facility: HOSPITAL | Age: 69
Setting detail: HOSPITAL OUTPATIENT SURGERY
Discharge: HOME | End: 2019-10-28
Attending: INTERNAL MEDICINE | Admitting: INTERNAL MEDICINE
Payer: MEDICARE

## 2019-10-28 ENCOUNTER — ANESTHESIA (OUTPATIENT)
Dept: ENDOSCOPY | Facility: HOSPITAL | Age: 69
End: 2019-10-28
Payer: MEDICARE

## 2019-10-28 VITALS
RESPIRATION RATE: 16 BRPM | WEIGHT: 177 LBS | OXYGEN SATURATION: 100 % | DIASTOLIC BLOOD PRESSURE: 62 MMHG | BODY MASS INDEX: 26.83 KG/M2 | HEART RATE: 68 BPM | HEIGHT: 68 IN | TEMPERATURE: 98 F | SYSTOLIC BLOOD PRESSURE: 99 MMHG

## 2019-10-28 DIAGNOSIS — K21.9 GASTROESOPHAGEAL REFLUX DISEASE, ESOPHAGITIS PRESENCE NOT SPECIFIED: ICD-10-CM

## 2019-10-28 DIAGNOSIS — Z12.11 SCREEN FOR COLON CANCER: ICD-10-CM

## 2019-10-28 DIAGNOSIS — D64.9 ANEMIA, UNSPECIFIED TYPE: ICD-10-CM

## 2019-10-28 PROCEDURE — 45380 COLONOSCOPY AND BIOPSY: CPT | Performed by: INTERNAL MEDICINE

## 2019-10-28 PROCEDURE — 0DBN8ZX EXCISION OF SIGMOID COLON, VIA NATURAL OR ARTIFICIAL OPENING ENDOSCOPIC, DIAGNOSTIC: ICD-10-PCS | Performed by: INTERNAL MEDICINE

## 2019-10-28 PROCEDURE — 0DJ08ZZ INSPECTION OF UPPER INTESTINAL TRACT, VIA NATURAL OR ARTIFICIAL OPENING ENDOSCOPIC: ICD-10-PCS | Performed by: INTERNAL MEDICINE

## 2019-10-28 PROCEDURE — 0DBM8ZX EXCISION OF DESCENDING COLON, VIA NATURAL OR ARTIFICIAL OPENING ENDOSCOPIC, DIAGNOSTIC: ICD-10-PCS | Performed by: INTERNAL MEDICINE

## 2019-10-28 PROCEDURE — 43235 EGD DIAGNOSTIC BRUSH WASH: CPT | Performed by: INTERNAL MEDICINE

## 2019-10-28 PROCEDURE — 0DBH8ZX EXCISION OF CECUM, VIA NATURAL OR ARTIFICIAL OPENING ENDOSCOPIC, DIAGNOSTIC: ICD-10-PCS | Performed by: INTERNAL MEDICINE

## 2019-10-28 PROCEDURE — 0DBP8ZX EXCISION OF RECTUM, VIA NATURAL OR ARTIFICIAL OPENING ENDOSCOPIC, DIAGNOSTIC: ICD-10-PCS | Performed by: INTERNAL MEDICINE

## 2019-10-28 PROCEDURE — 0DBL8ZX EXCISION OF TRANSVERSE COLON, VIA NATURAL OR ARTIFICIAL OPENING ENDOSCOPIC, DIAGNOSTIC: ICD-10-PCS | Performed by: INTERNAL MEDICINE

## 2019-10-28 PROCEDURE — 45385 COLONOSCOPY W/LESION REMOVAL: CPT | Performed by: INTERNAL MEDICINE

## 2019-10-28 RX ORDER — DEXTROSE MONOHYDRATE 25 G/50ML
50 INJECTION, SOLUTION INTRAVENOUS
Status: DISCONTINUED | OUTPATIENT
Start: 2019-10-28 | End: 2019-10-28

## 2019-10-28 RX ORDER — NALOXONE HYDROCHLORIDE 0.4 MG/ML
80 INJECTION, SOLUTION INTRAMUSCULAR; INTRAVENOUS; SUBCUTANEOUS AS NEEDED
Status: DISCONTINUED | OUTPATIENT
Start: 2019-10-28 | End: 2019-10-28

## 2019-10-28 RX ORDER — SODIUM CHLORIDE, SODIUM LACTATE, POTASSIUM CHLORIDE, CALCIUM CHLORIDE 600; 310; 30; 20 MG/100ML; MG/100ML; MG/100ML; MG/100ML
INJECTION, SOLUTION INTRAVENOUS CONTINUOUS
Status: DISCONTINUED | OUTPATIENT
Start: 2019-10-28 | End: 2019-10-28

## 2019-10-28 RX ORDER — LIDOCAINE HYDROCHLORIDE 10 MG/ML
INJECTION, SOLUTION EPIDURAL; INFILTRATION; INTRACAUDAL; PERINEURAL AS NEEDED
Status: DISCONTINUED | OUTPATIENT
Start: 2019-10-28 | End: 2019-10-28 | Stop reason: SURG

## 2019-10-28 RX ADMIN — SODIUM CHLORIDE, SODIUM LACTATE, POTASSIUM CHLORIDE, CALCIUM CHLORIDE: 600; 310; 30; 20 INJECTION, SOLUTION INTRAVENOUS at 09:33:00

## 2019-10-28 RX ADMIN — LIDOCAINE HYDROCHLORIDE 40 MG: 10 INJECTION, SOLUTION EPIDURAL; INFILTRATION; INTRACAUDAL; PERINEURAL at 08:46:00

## 2019-10-28 RX ADMIN — LIDOCAINE HYDROCHLORIDE 50 MG: 10 INJECTION, SOLUTION EPIDURAL; INFILTRATION; INTRACAUDAL; PERINEURAL at 08:45:00

## 2019-10-28 RX ADMIN — LIDOCAINE HYDROCHLORIDE 30 MG: 10 INJECTION, SOLUTION EPIDURAL; INFILTRATION; INTRACAUDAL; PERINEURAL at 08:47:00

## 2019-10-28 RX ADMIN — LIDOCAINE HYDROCHLORIDE 50 MG: 10 INJECTION, SOLUTION EPIDURAL; INFILTRATION; INTRACAUDAL; PERINEURAL at 08:43:00

## 2019-10-28 NOTE — ANESTHESIA PREPROCEDURE EVALUATION
Anesthesia PreOp Note    HPI:     Humble Ugarte is a 77year old male who presents for preoperative consultation requested by: Seema Jiménez MD    Date of Surgery: 10/28/2019    Procedure(s):  COLONOSCOPY  ESOPHAGOGASTRODUODENOSCOPY (EGD)  Indication SURGICAL HISTORY      deviated septum repair       LORazepam 1 MG Oral Tab, TAKE 1 TABLET EVERY NIGHT, Disp: 90 tablet, Rfl: 1  naproxen 500 MG Oral Tab, Take 1 tablet (500 mg total) by mouth 2 (two) times daily with meals. , Disp: 15 tablet, Rfl: 0  Metopr Occupational History      Not on file    Social Needs      Financial resource strain: Not on file      Food insecurity:        Worry: Not on file        Inability: Not on file      Transportation needs:        Medical: Not on file        Non-medical: Not o Vital Signs: Body mass index is 26.91 kg/m². height is 1.727 m (5' 8\") and weight is 80.3 kg (177 lb).     10/25/19  1257   Weight: 80.3 kg (177 lb)   Height: 1.727 m (5' 8\")        Anesthesia Evaluation     Patient summary reviewed and Nursing not

## 2019-10-28 NOTE — OPERATIVE REPORT
Esophagogastroduodenoscopy (EGD) & Colonoscopy Report    Rock Mossaudra VO 3/18/1950 Age 77year old   PCP Abby Kendrick MD Endoscopist Teri Fajardo MD     Date of procedure: 10/28/19    Procedure: EGD & Colonoscopy w/ biopsy and snare polypectomy used for insufflation). The cecum was identified by localizing the trifold, the appendix and the ileocecal valve. Withdrawal was begun with thorough washing and careful examination of the colonic walls and folds.  The ascending colon was viewed twice in the removed by cold snare polypectomy. Otherwise, normal mucosa and vascular pattern. Impression:  1. There were 9 small colon and rectal polyps removed  2. 2 mm non-bleeding ascending colon avm  3. Small non-bleeding hemorrhoids  4.  Otherwise, unremarkable

## 2019-10-28 NOTE — ANESTHESIA POSTPROCEDURE EVALUATION
Patient: Jazmine Bloom    Procedure Summary     Date:  10/28/19 Room / Location:  [address] [place] [address] ENDOSCOPY 01 / [address] [place] [address] ENDOSCOPY    Anesthesia Start:  5937 Anesthesia Stop:  4122    Procedures:       COLONOSCOPY (N/A )      ESOPHAGOGASTRODUODENOSCOPY (EGD) (N/A ) John Gama

## 2019-10-29 ENCOUNTER — TELEPHONE (OUTPATIENT)
Dept: GASTROENTEROLOGY | Facility: CLINIC | Age: 69
End: 2019-10-29

## 2019-10-29 ENCOUNTER — TELEPHONE (OUTPATIENT)
Dept: INTERNAL MEDICINE CLINIC | Facility: CLINIC | Age: 69
End: 2019-10-29

## 2019-10-29 DIAGNOSIS — M25.561 ACUTE PAIN OF RIGHT KNEE: Primary | ICD-10-CM

## 2019-10-29 NOTE — TELEPHONE ENCOUNTER
Called patient because left message on referral line. Spoke with patient and he stated waiting on referral for orthopedic surgeon    Informed him still pending approval and will call him when approved    He verbally understood.     Informed him take 3-5 bus

## 2019-10-29 NOTE — TELEPHONE ENCOUNTER
I mailed out colonoscopy/EGD results letter to pt  Updated health maintenance  Entered into 3 yr CLN recall  Recall colon in 3 years per.  Colon/EGD done 10/28/19    GI staff: please place recall for colonoscopy in 3 years

## 2019-10-29 NOTE — TELEPHONE ENCOUNTER
Patient calling stating he is scheduled to see Dr. Reny Bradley per Dr. Dunham Call recommendations on 11/7/19. Per patient, he was told by Dr. Sue Grace office that he needs a referral.     Patient requesting call back once referral is completed.      Dr. Jono Bob

## 2019-10-30 RX ORDER — GLIMEPIRIDE 1 MG/1
1 TABLET ORAL 2 TIMES DAILY
Qty: 180 TABLET | Refills: 1 | OUTPATIENT
Start: 2019-10-30

## 2019-10-30 RX ORDER — GLIMEPIRIDE 1 MG/1
TABLET ORAL
Qty: 180 TABLET | Refills: 1 | Status: SHIPPED | OUTPATIENT
Start: 2019-10-30 | End: 2020-04-22

## 2019-10-31 NOTE — TELEPHONE ENCOUNTER
Refill passed per [facility], [facility] protocol.   Diabetes Medications  Protocol Criteria:  · Appointment scheduled in the past 6 months or the next 3 months  · A1C < 7.5 in the past 6 months  · Creatinine in the past 12 months  · Creatinine result < 1.5   Rece

## 2019-11-02 NOTE — TELEPHONE ENCOUNTER
Patient has acknowledged [employer] message    Referral   Message [phone]   From  Evita Schulteident To  Javier Mijares and Delivered  11/1/2019 11:29 AM   Last Read in [employer]  11/1/2019  4:58 PM by Danyel Horton

## 2019-11-07 ENCOUNTER — OFFICE VISIT (OUTPATIENT)
Dept: ORTHOPEDICS CLINIC | Facility: CLINIC | Age: 69
End: 2019-11-07
Payer: MEDICARE

## 2019-11-07 ENCOUNTER — HOSPITAL ENCOUNTER (OUTPATIENT)
Dept: GENERAL RADIOLOGY | Facility: HOSPITAL | Age: 69
Discharge: HOME | End: 2019-11-07
Attending: ORTHOPAEDIC SURGERY
Payer: MEDICARE

## 2019-11-07 VITALS
DIASTOLIC BLOOD PRESSURE: 77 MMHG | SYSTOLIC BLOOD PRESSURE: 149 MMHG | HEART RATE: 60 BPM | HEIGHT: 68 IN | BODY MASS INDEX: 26.98 KG/M2 | WEIGHT: 178 LBS

## 2019-11-07 DIAGNOSIS — M25.561 RIGHT KNEE PAIN, UNSPECIFIED CHRONICITY: ICD-10-CM

## 2019-11-07 DIAGNOSIS — M17.11 PRIMARY OSTEOARTHRITIS OF RIGHT KNEE: Primary | ICD-10-CM

## 2019-11-07 PROCEDURE — 73564 X-RAY EXAM KNEE 4 OR MORE: CPT | Performed by: ORTHOPAEDIC SURGERY

## 2019-11-07 PROCEDURE — 20610 DRAIN/INJ JOINT/BURSA W/O US: CPT | Performed by: ORTHOPAEDIC SURGERY

## 2019-11-07 PROCEDURE — 99204 OFFICE O/P NEW MOD 45 MIN: CPT | Performed by: ORTHOPAEDIC SURGERY

## 2019-11-07 RX ORDER — TRIAMCINOLONE ACETONIDE 40 MG/ML
40 INJECTION, SUSPENSION INTRA-ARTICULAR; INTRAMUSCULAR ONCE
Status: COMPLETED | OUTPATIENT
Start: 2019-11-07 | End: 2019-11-07

## 2019-11-07 RX ORDER — NAPROXEN 500 MG/1
500 TABLET ORAL 2 TIMES DAILY WITH MEALS
Qty: 40 TABLET | Refills: 0 | Status: SHIPPED | OUTPATIENT
Start: 2019-11-07 | End: 2019-11-27

## 2019-11-07 NOTE — PROGRESS NOTES
Verbal order given by Dr. Nancy Potter to draw up 3 cc 0.5% marcaine, 2 cc 1% lidocaine, and 1 cc kenalog 40 for a right knee injection.

## 2019-11-12 ENCOUNTER — OFFICE VISIT (OUTPATIENT)
Dept: PHYSICAL THERAPY | Age: 69
End: 2019-11-12
Attending: ORTHOPAEDIC SURGERY
Payer: MEDICARE

## 2019-11-12 DIAGNOSIS — M17.11 PRIMARY OSTEOARTHRITIS OF RIGHT KNEE: ICD-10-CM

## 2019-11-12 PROCEDURE — 97161 PT EVAL LOW COMPLEX 20 MIN: CPT

## 2019-11-12 PROCEDURE — 97110 THERAPEUTIC EXERCISES: CPT

## 2019-11-12 NOTE — PROGRESS NOTES
P.T. EVALUATION:   Referring Physician: Dr. Jose Jefferson  Diagnosis: Primary osteoarthritis of right knee (M17.11)      Date of Onset: October 2019 Date of Service: 11/12/2019     PATIENT SUMMARY   Ray Brewer is a 77year old y/o male who presents to thera seconds, L 10 seconds    Gait: pt ambulates independently without an AD; slight decreased R hip and knee flexion noted    Today’s Treatment and Response:  Patient education provided on PT eval findings, treatment plan, goals, HEP  Patient received there ex

## 2019-11-13 ENCOUNTER — OFFICE VISIT (OUTPATIENT)
Dept: PHYSICAL THERAPY | Age: 69
End: 2019-11-13
Attending: ORTHOPAEDIC SURGERY
Payer: MEDICARE

## 2019-11-13 PROCEDURE — 97110 THERAPEUTIC EXERCISES: CPT

## 2019-11-13 NOTE — PROGRESS NOTES
Diagnosis:  Primary osteoarthritis of right knee (M17.11)        Next MD visit: none scheduled  Fall Risk: standard         Precautions: n/a          Medication Changes since last visit?: No    Subjective:  Pt reports 5/10 knee pain today.       Objective:

## 2019-11-19 ENCOUNTER — OFFICE VISIT (OUTPATIENT)
Dept: PHYSICAL THERAPY | Age: 69
End: 2019-11-19
Attending: ORTHOPAEDIC SURGERY
Payer: MEDICARE

## 2019-11-19 PROCEDURE — 97110 THERAPEUTIC EXERCISES: CPT

## 2019-11-19 NOTE — PROGRESS NOTES
Diagnosis:  Primary osteoarthritis of right knee (M17.11)        Next MD visit: 12/12/19  Fall Risk: standard         Precautions: n/a          Medication Changes since last visit?: No    Subjective:  Pt reports 5/10 medial knee pain today.       Objective: 1- Pt will be I with maintenance and progression of HEP  2- Pt will demo increase in R knee ROM to equal L to ease ability for pt to ambulate community distances  3- Pt will demo increase in RLE strength to 5/5 to ease ability for pt to negotiate stairs

## 2019-11-21 ENCOUNTER — OFFICE VISIT (OUTPATIENT)
Dept: PHYSICAL THERAPY | Age: 69
End: 2019-11-21
Attending: ORTHOPAEDIC SURGERY
Payer: MEDICARE

## 2019-11-21 PROCEDURE — 97110 THERAPEUTIC EXERCISES: CPT

## 2019-11-21 NOTE — PROGRESS NOTES
Diagnosis:  Primary osteoarthritis of right knee (M17.11)        Next MD visit: 12/12/19  Fall Risk: standard         Precautions: n/a          Medication Changes since last visit?: No    Subjective:  Pt reports 4/10 medial knee pain today.       Objective: holds  - supine R SLR with RTB above knees 2x10  - supine R SLR with 1.5# 2x10  - supine R heel slides 10x  - sidelying R/L clams with hips at 45 deg, at 90 deg with RTB above knees 1x15 ea  - sidelying R hip abduction with 1.5# 2x10  - supine bridges 2x10

## 2019-11-26 ENCOUNTER — OFFICE VISIT (OUTPATIENT)
Dept: INTERNAL MEDICINE CLINIC | Facility: CLINIC | Age: 69
End: 2019-11-26
Payer: MEDICARE

## 2019-11-26 VITALS
TEMPERATURE: 99 F | HEIGHT: 68 IN | SYSTOLIC BLOOD PRESSURE: 134 MMHG | RESPIRATION RATE: 20 BRPM | HEART RATE: 79 BPM | OXYGEN SATURATION: 97 % | BODY MASS INDEX: 26.39 KG/M2 | WEIGHT: 174.13 LBS | DIASTOLIC BLOOD PRESSURE: 78 MMHG

## 2019-11-26 DIAGNOSIS — D64.9 ANEMIA, UNSPECIFIED TYPE: ICD-10-CM

## 2019-11-26 DIAGNOSIS — D12.6 TUBULAR ADENOMA OF COLON: ICD-10-CM

## 2019-11-26 DIAGNOSIS — R91.1 LUNG NODULE < 6CM ON CT: ICD-10-CM

## 2019-11-26 DIAGNOSIS — J01.00 ACUTE NON-RECURRENT MAXILLARY SINUSITIS: ICD-10-CM

## 2019-11-26 DIAGNOSIS — E11.9 TYPE 2 DIABETES MELLITUS WITHOUT COMPLICATION, WITHOUT LONG-TERM CURRENT USE OF INSULIN (HCC): Primary | ICD-10-CM

## 2019-11-26 PROCEDURE — 99214 OFFICE O/P EST MOD 30 MIN: CPT | Performed by: INTERNAL MEDICINE

## 2019-11-26 RX ORDER — OMEGA-3-ACID ETHYL ESTERS 1 G/1
1 CAPSULE, LIQUID FILLED ORAL 3 TIMES DAILY
Qty: 270 CAPSULE | Refills: 1 | Status: SHIPPED | OUTPATIENT
Start: 2019-11-26 | End: 2020-04-30

## 2019-11-26 RX ORDER — AZITHROMYCIN 250 MG/1
TABLET, FILM COATED ORAL
Qty: 6 TABLET | Refills: 0 | Status: SHIPPED | OUTPATIENT
Start: 2019-11-26 | End: 2019-12-12 | Stop reason: ALTCHOICE

## 2019-11-27 ENCOUNTER — OFFICE VISIT (OUTPATIENT)
Dept: PHYSICAL THERAPY | Age: 69
End: 2019-11-27
Attending: ORTHOPAEDIC SURGERY
Payer: MEDICARE

## 2019-11-27 PROCEDURE — 97110 THERAPEUTIC EXERCISES: CPT

## 2019-11-27 NOTE — PROGRESS NOTES
Diagnosis:  Primary osteoarthritis of right knee (M17.11)        Next MD visit: 12/12/19  Fall Risk: standard         Precautions: n/a          Medication Changes since last visit?: No    Subjective:  Pt reports 5/10 right medial knee pain with prolonged d each  - shuttle machine 6 bands B knee extension with RTB above knee 3 x 10 (setting 2)  - shuttle machine 4 bands R knee extension with RTB above knee 3 x 10  - seated R knee flex with blue t-band 2 x 10 for 5 second holds   - standing B heel raises with

## 2019-12-04 ENCOUNTER — OFFICE VISIT (OUTPATIENT)
Dept: PHYSICAL THERAPY | Age: 69
End: 2019-12-04
Attending: ORTHOPAEDIC SURGERY
Payer: MEDICARE

## 2019-12-04 PROCEDURE — 97110 THERAPEUTIC EXERCISES: CPT

## 2019-12-04 NOTE — PROGRESS NOTES
Diagnosis:  Primary osteoarthritis of right knee (M17.11)        Next MD visit: 12/12/19  Fall Risk: standard         Precautions: n/a          Medication Changes since last visit?: No  Subjective:  Pt reports 2/10 right medial knee pain currently.  He stat above knees 5 second holds 2 x 10 ea  - shuttle machine 6 bands B knee extension with RTB above knee 3 x 10 (setting 2)  - shuttle machine 4 bands R knee extension with RTB above knee 3 x 10  - seated R knee flex with blue t-band 2 x 10 for 5 second holds with RTB above knees 1x15 ea  - sidelying R hip abduction with 1.5# 2x10  - supine bridges 2x10 5 sec holds  - supine SB bridges 2x10  - shuttle machine 6 bands B knee extension 2x10  - shuttle machine 4 bands R/L knee extension 1x10 each   Manual Therapy

## 2019-12-09 NOTE — ASSESSMENT & PLAN NOTE
Multiple tubular adenomas noted in the colon. No other abnormalities. Follow-up colonoscopy as per gastroenterology.

## 2019-12-09 NOTE — ASSESSMENT & PLAN NOTE
Nasal congestion and sinus pressure. Started on Claritin over-the-counter or Zyrtec 10 mg daily. Flonase 1 puff each nostril 2 times daily. Z-Esa if symptoms do not improve over the next few days.

## 2019-12-09 NOTE — ASSESSMENT & PLAN NOTE
Stable blood sugars on metformin 1000 mg twice daily, glimepiride 1 mg twice daily. No significant low sugar reactions. Blood pressures look stable at this time. Continue on valsartan with hydrochlorothiazide, amlodipine and metoprolol.   Cholesterol pan

## 2019-12-09 NOTE — PROGRESS NOTES
HPI:    Patient ID: Juliana Ashley is a 77year old male. Labs discussed    Status post colonoscopy, reports as follows. Final Diagnosis:     A. Transverse colon polyps (x2):   · Tubular adenoma, two fragments.     B.  Cecal polyps (x3):   · Tubular ad tablet 1   • LORazepam 1 MG Oral Tab TAKE 1 TABLET EVERY NIGHT 90 tablet 1   • Metoprolol Succinate ER 25 MG Oral Tablet 24 Hr Take 1 tablet (25 mg total) by mouth daily.  90 tablet 1   • amLODIPine Besylate 10 MG Oral Tab Take 1 tablet (10 mg total) by emily ASSESSMENT/PLAN:     Problem List Items Addressed This Visit        Unprioritized    Type 2 diabetes mellitus, without long-term current use of insulin ([facility] - Primary     Stable blood sugars on metformin 1000 mg twice daily, glimepiride 1 mg twice daily Esters 1 g Oral Cap 270 capsule 1     Sig: Take 1 capsule (1 g total) by mouth 3 (three) times daily. • azithromycin (ZITHROMAX Z-ERNESTINE) 250 MG Oral Tab 6 tablet 0     Sig: Take two tablets by mouth today, then one tablet daily.        Imaging & Referrals:

## 2019-12-09 NOTE — ASSESSMENT & PLAN NOTE
Patient has had a colonoscopy already. No blood in stools or black stools. Hemoglobin is stable. Follow-up labs have been ordered.

## 2019-12-11 ENCOUNTER — OFFICE VISIT (OUTPATIENT)
Dept: PHYSICAL THERAPY | Age: 69
End: 2019-12-11
Attending: ORTHOPAEDIC SURGERY
Payer: MEDICARE

## 2019-12-11 PROCEDURE — 97110 THERAPEUTIC EXERCISES: CPT

## 2019-12-11 NOTE — PROGRESS NOTES
Diagnosis:  Primary osteoarthritis of right knee (M17.11)        Next MD visit: 12/12/19  Fall Risk: standard         Precautions: n/a          Medication Changes since last visit?: No     Subjective:  Pt reports 2/10 right medial knee pain currently. holds  - supine SB bridges 2x10  - supine R/L SL bridge 1 x 10 ea  - supine R SLR with GTB above knees 3 x 10  - sidelying R/L clams with hips at 45 deg, at 90 deg with GTB above knees 5 second holds 2 x 10 ea  - shuttle machine 6 bands with RTB above knee knee 3 x 10  - seated R knee flex with blue t-band 2 x 10 for 5 second holds   - standing B heel raises with slantboard (level 3) 3 x 10   - sidestepping with RTB @ forefoot 2 x 25 feet each - reassess strength x 5 minutes   - supine R QS 2 x 10 for 5 sec min  Total Treatment Time: 45 min

## 2019-12-12 ENCOUNTER — OFFICE VISIT (OUTPATIENT)
Dept: ORTHOPEDICS CLINIC | Facility: CLINIC | Age: 69
End: 2019-12-12
Payer: MEDICARE

## 2019-12-12 VITALS — BODY MASS INDEX: 26.37 KG/M2 | WEIGHT: 174 LBS | HEIGHT: 68 IN

## 2019-12-12 DIAGNOSIS — S83.241D TEAR OF MEDIAL MENISCUS OF RIGHT KNEE, CURRENT, UNSPECIFIED TEAR TYPE, SUBSEQUENT ENCOUNTER: Primary | ICD-10-CM

## 2019-12-12 PROCEDURE — 99213 OFFICE O/P EST LOW 20 MIN: CPT | Performed by: ORTHOPAEDIC SURGERY

## 2019-12-12 NOTE — PROGRESS NOTES
NURSING INTAKE COMMENTS: Patient presents with:  Knee Pain: F/u right knee pain. Rec'd cortisone injection on 11/7 with some relief. Denies any swelling or numbness. Has been going to PT twice per week and has seen some improvement.       HPI: This 77 ye tablet 1   • atorvastatin 40 MG Oral Tab Take 1 tablet (40 mg total) by mouth nightly. 90 tablet 1   • METFORMIN HCL 1000 MG Oral Tab TAKE 1 TABLET TWICE DAILY WITH MEALS 180 tablet 1   • aspirin 81 MG Oral Tab Take 81 mg by mouth daily.      • Multiple Vit transfusion  ENDOCRINE: denies autoimmune disease, thyroid issues, or diabetes  ALLERGY: denies asthma, seasonal allergies    Physical Examination:    Ht 5' 8\" (1.727 m)   Wt 174 lb (78.9 kg)   BMI 26.46 kg/m²   Constitutional: appears well hydrated, aler

## 2019-12-16 ENCOUNTER — OFFICE VISIT (OUTPATIENT)
Dept: PHYSICAL THERAPY | Age: 69
End: 2019-12-16
Attending: ORTHOPAEDIC SURGERY
Payer: MEDICARE

## 2019-12-16 PROCEDURE — 97110 THERAPEUTIC EXERCISES: CPT

## 2019-12-16 NOTE — PROGRESS NOTES
Diagnosis:  Primary osteoarthritis of right knee (M17.11)        Next MD visit:  none  Fall Risk: standard         Precautions: n/a          Medication Changes since last visit?: No     Subjective:  Pt reports 2/10 right medial knee pain currently.  He stat bridges with hip adduction squeeze of ball 3 x 10 for 5 sec holds  - hooklying hip adduction with fitness [place] 3 x 10  - supine R/L SL bridge 2 x 10 ea  - supine R SLR with IR with GTB above knees 3 x 10  - supine R ankle EV with GTB 3 x 10 reps   - seat knee 3 x 10  - seated R knee flex with blue t-band 2 x 10 for 5 second holds   - standing B heel raises with slantboard (level 3) 3 x 10   - standing B gastroc stretch on slant board level 3 3x 10 sec holds  - standing R hip abduction/hip extension with GT 2x10  - shuttle machine 4 bands R/L knee extension 1x10 each   Manual Therapy         -    Therapeutic Activity         -   Modalities         - ice x 10 minutes at end of session        Assessment: Pt with no c/o increased pain during session.      Goals:

## 2019-12-18 ENCOUNTER — OFFICE VISIT (OUTPATIENT)
Dept: PHYSICAL THERAPY | Age: 69
End: 2019-12-18
Attending: ORTHOPAEDIC SURGERY
Payer: MEDICARE

## 2019-12-18 ENCOUNTER — TELEPHONE (OUTPATIENT)
Dept: ORTHOPEDICS CLINIC | Facility: CLINIC | Age: 69
End: 2019-12-18

## 2019-12-18 PROCEDURE — 97110 THERAPEUTIC EXERCISES: CPT

## 2019-12-18 NOTE — TELEPHONE ENCOUNTER
S/w pt. Let him know referral was pending approval with his insurance company. Let him know we would inform him once his insurance company reached a decision.

## 2019-12-18 NOTE — PROGRESS NOTES
Physical Therapy Discharge Summary  Diagnosis:  Primary osteoarthritis of right knee (M17.11)        Next MD visit:  none  Fall Risk: standard         Precautions: n/a          Medication Changes since last visit?: No   Assessment: Patient has been seen fo stretch on wall 5x 5 sec hold  - standing R heel raises 2x10  - standing R hip extension/flexion/hip abduction with GTB at ankles 3 x 10 ea - long sitting QS 10x 10 sec holds  - supine R SLR with IR 3 x 10 with 3# @ ankle  - supine bridges with RTB above k machine 6 bands with RTB above knees 3 x 10 (setting 2)  - shuttle machine 4 bands R knee extension 3 x 10  - seated R knee flex with blue t-band 2 x 10 for 5 second holds   - standing B heel raises with slantboard (level 3) 3 x 10   - standing B gastroc s minutes   - supine R QS 2 x 10 for 5 sec holds  - supine R SLR 2 x 10 each with hip neutral/IR/ER with 1.5# @ ankle  - supine bridges with GTB above knees 2 x 10 for 3 sec holds  - supine R SLR with RTB @ ankle 2 x 10 for 3 second holds   - sidelying R cla

## 2019-12-21 ENCOUNTER — LAB ENCOUNTER (OUTPATIENT)
Dept: LAB | Age: 69
End: 2019-12-21
Attending: INTERNAL MEDICINE
Payer: MEDICARE

## 2019-12-21 DIAGNOSIS — E11.9 TYPE 2 DIABETES MELLITUS WITHOUT COMPLICATION, WITHOUT LONG-TERM CURRENT USE OF INSULIN (HCC): ICD-10-CM

## 2019-12-21 PROCEDURE — 36415 COLL VENOUS BLD VENIPUNCTURE: CPT

## 2019-12-21 PROCEDURE — 84443 ASSAY THYROID STIM HORMONE: CPT

## 2019-12-21 PROCEDURE — 83036 HEMOGLOBIN GLYCOSYLATED A1C: CPT

## 2019-12-21 PROCEDURE — 85025 COMPLETE CBC W/AUTO DIFF WBC: CPT

## 2019-12-21 PROCEDURE — 80061 LIPID PANEL: CPT

## 2019-12-21 PROCEDURE — 82043 UR ALBUMIN QUANTITATIVE: CPT

## 2019-12-21 PROCEDURE — 82570 ASSAY OF URINE CREATININE: CPT

## 2019-12-21 PROCEDURE — 81001 URINALYSIS AUTO W/SCOPE: CPT

## 2019-12-21 PROCEDURE — 80053 COMPREHEN METABOLIC PANEL: CPT

## 2019-12-27 ENCOUNTER — HOSPITAL ENCOUNTER (OUTPATIENT)
Dept: MRI IMAGING | Age: 69
Discharge: HOME | End: 2019-12-27
Attending: ORTHOPAEDIC SURGERY
Payer: MEDICARE

## 2019-12-27 DIAGNOSIS — S83.241D TEAR OF MEDIAL MENISCUS OF RIGHT KNEE, CURRENT, UNSPECIFIED TEAR TYPE, SUBSEQUENT ENCOUNTER: ICD-10-CM

## 2019-12-27 PROCEDURE — 73721 MRI JNT OF LWR EXTRE W/O DYE: CPT | Performed by: ORTHOPAEDIC SURGERY

## 2020-01-02 ENCOUNTER — OFFICE VISIT (OUTPATIENT)
Dept: ORTHOPEDICS CLINIC | Facility: CLINIC | Age: 70
End: 2020-01-02
Payer: MEDICARE

## 2020-01-02 VITALS — HEIGHT: 68 IN | WEIGHT: 174 LBS | BODY MASS INDEX: 26.37 KG/M2

## 2020-01-02 DIAGNOSIS — S83.241D TEAR OF MEDIAL MENISCUS OF RIGHT KNEE, CURRENT, UNSPECIFIED TEAR TYPE, SUBSEQUENT ENCOUNTER: Primary | ICD-10-CM

## 2020-01-02 PROCEDURE — 99213 OFFICE O/P EST LOW 20 MIN: CPT | Performed by: ORTHOPAEDIC SURGERY

## 2020-01-02 RX ORDER — ATORVASTATIN CALCIUM 40 MG/1
40 TABLET, FILM COATED ORAL NIGHTLY
Qty: 90 TABLET | Refills: 1 | Status: SHIPPED | OUTPATIENT
Start: 2020-01-02 | End: 2020-07-02

## 2020-01-02 NOTE — PROGRESS NOTES
NURSING INTAKE COMMENTS: Patient presents with:  Test Results: Here to discuss MRI right knee results completed on 12/27/19      HPI: This 77year old male presents today with complaints of right knee MRI results.   He has ongoing pain over the medial knee Vitamins-Minerals (MULTIVITAMIN ADULT OR) Take by mouth. • COENZYME Q-10 OR Take by mouth daily.        No Known Allergies  Family History   Problem Relation Age of Onset   • Cancer Father 59        Lymph node CA   • Heart Disorder Mother         MI   • alert and responsive, no acute distress noted  Extremities: Right knee tender over the mid medial joint line as well as over the medial tibial plateau.   Neurological: Normal    Imaging:   Mri Knee, Right ([id number])    Result Date: 12/27/2019  PROCEDURE: M plateau edema. Edema in the superficial soft tissues with varicose veins. Varicose veins communicating with popliteal vein and anterior tibial veins. CONCLUSION:  1. Complex degenerative tear medial meniscus.  2. Medial compartment osteoarthritis procedure, unicompartmental arthroplasty may be indicated. Follow Up: Return for follow-up visit one week after surgery.     Lai Stark MD

## 2020-01-03 NOTE — TELEPHONE ENCOUNTER
Refill passed per [facility], [facility] protocol.   Cholesterol Medications  Protocol Criteria:  · Appointment scheduled in the past 12 months or in the next 3 months  · ALT & LDL on file in the past 12 months  · ALT result < 80  · LDL result <130   Recent Outpat

## 2020-01-13 RX ORDER — VALSARTAN AND HYDROCHLOROTHIAZIDE 320; 25 MG/1; MG/1
1 TABLET, FILM COATED ORAL DAILY
Qty: 90 TABLET | Refills: 1 | Status: SHIPPED | OUTPATIENT
Start: 2020-01-13 | End: 2020-10-12

## 2020-01-14 ENCOUNTER — TELEPHONE (OUTPATIENT)
Dept: OTHER | Age: 70
End: 2020-01-14

## 2020-01-14 DIAGNOSIS — Z01.00 ROUTINE EYE EXAM: Primary | ICD-10-CM

## 2020-01-14 NOTE — TELEPHONE ENCOUNTER
Refill passed per [facility] protocol.   Hypertensive Medications  Protocol Criteria:  · Appointment scheduled in the past 6 months or in the next 3 months  · BMP or CMP in the past 12 months  · Creatinine result < 2  Recent Outpatient Visits

## 2020-01-14 NOTE — TELEPHONE ENCOUNTER
Patient requesting a referral to Dr Svetlana Bearden Ophthalmologist for routine eye exam.  Please advise.      Please reply to pool: EM TRIAGE SUPPORT

## 2020-01-16 NOTE — TELEPHONE ENCOUNTER
Delivery Read From Message Type Attachments Subject   1/15/2020 12:51 PM JOHN Smith [facility] Patient Medical Advice Request  Referral

## 2020-01-20 ENCOUNTER — TELEPHONE (OUTPATIENT)
Dept: OTHER | Age: 70
End: 2020-01-20

## 2020-01-20 NOTE — TELEPHONE ENCOUNTER
Fax Failed called Dr. Fabián Greer office given alternate fax number [phone]  Fax confirmation received.

## 2020-01-20 NOTE — TELEPHONE ENCOUNTER
Dr. Luzma James office calling patient need copy of authorized referral.  Referral faxed to [phone]

## 2020-01-24 ENCOUNTER — TELEPHONE (OUTPATIENT)
Dept: INTERNAL MEDICINE CLINIC | Facility: CLINIC | Age: 70
End: 2020-01-24

## 2020-01-24 DIAGNOSIS — Z01.818 PREOP EXAMINATION: Primary | ICD-10-CM

## 2020-01-24 NOTE — TELEPHONE ENCOUNTER
Please advise on message. Should we just change to pre-op? I also pended EKG. Please sign and pt. Will get done before appmt.

## 2020-01-24 NOTE — TELEPHONE ENCOUNTER
Dr Corbin Coto, please advise. Patient is scheduled for his knee surgery 2/24/2020. He has appointment with you 2/17/2020 1:30 pm--does he need to see you for a separate pre-op appt or will the 2/17/20 appt work as pre-op?     He said the orthopedist wants hi

## 2020-01-25 NOTE — TELEPHONE ENCOUNTER
Please change 2/17 appointment to a preop appointment, I will do any other follow-up necessary. Okay to order all labs.   Please let patient know if labs and tests needed prior to the office visit

## 2020-01-27 NOTE — TELEPHONE ENCOUNTER
I called and pt. Was at work but wife knew about it and I told her to tell him about the labs and EKG.   She will let him know

## 2020-01-28 ENCOUNTER — APPOINTMENT (OUTPATIENT)
Dept: HEMATOLOGY/ONCOLOGY | Facility: HOSPITAL | Age: 70
End: 2020-01-28
Attending: INTERNAL MEDICINE
Payer: MEDICARE

## 2020-02-05 ENCOUNTER — LAB ENCOUNTER (OUTPATIENT)
Dept: LAB | Age: 70
End: 2020-02-05
Attending: INTERNAL MEDICINE
Payer: MEDICARE

## 2020-02-05 DIAGNOSIS — Z01.818 PREOP EXAMINATION: ICD-10-CM

## 2020-02-05 LAB
ALBUMIN SERPL-MCNC: 3.9 G/DL (ref 3.4–5)
ALBUMIN/GLOB SERPL: 1.2 {RATIO} (ref 1–2)
ALP LIVER SERPL-CCNC: 47 U/L (ref 45–117)
ALT SERPL-CCNC: 39 U/L (ref 16–61)
ANION GAP SERPL CALC-SCNC: 4 MMOL/L (ref 0–18)
AST SERPL-CCNC: 24 U/L (ref 15–37)
BASOPHILS # BLD AUTO: 0.04 X10(3) UL (ref 0–0.2)
BASOPHILS NFR BLD AUTO: 0.9 %
BILIRUB SERPL-MCNC: 0.5 MG/DL (ref 0.1–2)
BUN BLD-MCNC: 15 MG/DL (ref 7–18)
BUN/CREAT SERPL: 14.3 (ref 10–20)
CALCIUM BLD-MCNC: 9.3 MG/DL (ref 8.5–10.1)
CHLORIDE SERPL-SCNC: 100 MMOL/L (ref 98–112)
CO2 SERPL-SCNC: 32 MMOL/L (ref 21–32)
CREAT BLD-MCNC: 1.05 MG/DL (ref 0.7–1.3)
DEPRECATED RDW RBC AUTO: 53.1 FL (ref 35.1–46.3)
EOSINOPHIL # BLD AUTO: 0.09 X10(3) UL (ref 0–0.7)
EOSINOPHIL NFR BLD AUTO: 2 %
ERYTHROCYTE [DISTWIDTH] IN BLOOD BY AUTOMATED COUNT: 16.9 % (ref 11–15)
GLOBULIN PLAS-MCNC: 3.2 G/DL (ref 2.8–4.4)
GLUCOSE BLD-MCNC: 201 MG/DL (ref 70–99)
HCT VFR BLD AUTO: 28.3 % (ref 39–53)
HGB BLD-MCNC: 8.7 G/DL (ref 13–17.5)
IMM GRANULOCYTES # BLD AUTO: 0 X10(3) UL (ref 0–1)
IMM GRANULOCYTES NFR BLD: 0 %
LYMPHOCYTES # BLD AUTO: 2 X10(3) UL (ref 1–4)
LYMPHOCYTES NFR BLD AUTO: 45 %
M PROTEIN MFR SERPL ELPH: 7.1 G/DL (ref 6.4–8.2)
MCH RBC QN AUTO: 26.9 PG (ref 26–34)
MCHC RBC AUTO-ENTMCNC: 30.7 G/DL (ref 31–37)
MCV RBC AUTO: 87.6 FL (ref 80–100)
MONOCYTES # BLD AUTO: 0.43 X10(3) UL (ref 0.1–1)
MONOCYTES NFR BLD AUTO: 9.7 %
NEUTROPHILS # BLD AUTO: 1.88 X10 (3) UL (ref 1.5–7.7)
NEUTROPHILS # BLD AUTO: 1.88 X10(3) UL (ref 1.5–7.7)
NEUTROPHILS NFR BLD AUTO: 42.4 %
OSMOLALITY SERPL CALC.SUM OF ELEC: 289 MOSM/KG (ref 275–295)
PATIENT FASTING Y/N/NP: NO
PLATELET # BLD AUTO: 315 10(3)UL (ref 150–450)
POTASSIUM SERPL-SCNC: 3.8 MMOL/L (ref 3.5–5.1)
RBC # BLD AUTO: 3.23 X10(6)UL (ref 3.8–5.8)
SODIUM SERPL-SCNC: 136 MMOL/L (ref 136–145)
WBC # BLD AUTO: 4.4 X10(3) UL (ref 4–11)

## 2020-02-05 PROCEDURE — 85025 COMPLETE CBC W/AUTO DIFF WBC: CPT

## 2020-02-05 PROCEDURE — 80053 COMPREHEN METABOLIC PANEL: CPT

## 2020-02-05 PROCEDURE — 36415 COLL VENOUS BLD VENIPUNCTURE: CPT

## 2020-02-06 ENCOUNTER — LAB ENCOUNTER (OUTPATIENT)
Dept: LAB | Age: 70
End: 2020-02-06
Attending: INTERNAL MEDICINE
Payer: MEDICARE

## 2020-02-06 DIAGNOSIS — Z00.00 ROUTINE GENERAL MEDICAL EXAMINATION AT A HEALTH CARE FACILITY: Primary | ICD-10-CM

## 2020-02-06 PROCEDURE — 93010 ELECTROCARDIOGRAM REPORT: CPT | Performed by: INTERNAL MEDICINE

## 2020-02-06 PROCEDURE — 93005 ELECTROCARDIOGRAM TRACING: CPT

## 2020-02-12 ENCOUNTER — MED REC SCAN ONLY (OUTPATIENT)
Dept: ORTHOPEDICS CLINIC | Facility: CLINIC | Age: 70
End: 2020-02-12

## 2020-02-17 ENCOUNTER — LAB ENCOUNTER (OUTPATIENT)
Dept: LAB | Age: 70
End: 2020-02-17
Attending: INTERNAL MEDICINE
Payer: MEDICARE

## 2020-02-17 ENCOUNTER — OFFICE VISIT (OUTPATIENT)
Dept: INTERNAL MEDICINE CLINIC | Facility: CLINIC | Age: 70
End: 2020-02-17
Payer: MEDICARE

## 2020-02-17 VITALS
BODY MASS INDEX: 27.58 KG/M2 | WEIGHT: 182 LBS | HEART RATE: 61 BPM | DIASTOLIC BLOOD PRESSURE: 73 MMHG | SYSTOLIC BLOOD PRESSURE: 135 MMHG | RESPIRATION RATE: 18 BRPM | HEIGHT: 68 IN

## 2020-02-17 DIAGNOSIS — F41.9 ANXIETY: ICD-10-CM

## 2020-02-17 DIAGNOSIS — E11.9 TYPE 2 DIABETES MELLITUS WITHOUT COMPLICATION, WITHOUT LONG-TERM CURRENT USE OF INSULIN (HCC): ICD-10-CM

## 2020-02-17 DIAGNOSIS — Z01.818 PREOP GENERAL PHYSICAL EXAM: ICD-10-CM

## 2020-02-17 DIAGNOSIS — D64.9 ANEMIA, UNSPECIFIED TYPE: ICD-10-CM

## 2020-02-17 DIAGNOSIS — D12.6 TUBULAR ADENOMA OF COLON: Primary | ICD-10-CM

## 2020-02-17 LAB
BASOPHILS # BLD AUTO: 0.04 X10(3) UL (ref 0–0.2)
BASOPHILS NFR BLD AUTO: 0.9 %
DEPRECATED RDW RBC AUTO: 51 FL (ref 35.1–46.3)
EOSINOPHIL # BLD AUTO: 0.08 X10(3) UL (ref 0–0.7)
EOSINOPHIL NFR BLD AUTO: 1.8 %
ERYTHROCYTE [DISTWIDTH] IN BLOOD BY AUTOMATED COUNT: 16.7 % (ref 11–15)
HCT VFR BLD AUTO: 28.4 % (ref 39–53)
HGB BLD-MCNC: 8.8 G/DL (ref 13–17.5)
HGB RETIC QN AUTO: 23.9 PG (ref 28.2–36.6)
IMM GRANULOCYTES # BLD AUTO: 0.01 X10(3) UL (ref 0–1)
IMM GRANULOCYTES NFR BLD: 0.2 %
IMM RETICS NFR: 0.39 RATIO (ref 0.1–0.3)
IRON SATURATION: 6 % (ref 20–50)
IRON SERPL-MCNC: 35 UG/DL (ref 65–175)
LYMPHOCYTES # BLD AUTO: 2.01 X10(3) UL (ref 1–4)
LYMPHOCYTES NFR BLD AUTO: 46.3 %
MCH RBC QN AUTO: 26.4 PG (ref 26–34)
MCHC RBC AUTO-ENTMCNC: 31 G/DL (ref 31–37)
MCV RBC AUTO: 85.3 FL (ref 80–100)
MONOCYTES # BLD AUTO: 0.52 X10(3) UL (ref 0.1–1)
MONOCYTES NFR BLD AUTO: 12 %
NEUTROPHILS # BLD AUTO: 1.68 X10 (3) UL (ref 1.5–7.7)
NEUTROPHILS # BLD AUTO: 1.68 X10(3) UL (ref 1.5–7.7)
NEUTROPHILS NFR BLD AUTO: 38.8 %
PLATELET # BLD AUTO: 296 10(3)UL (ref 150–450)
PLATELET MORPHOLOGY: NORMAL
RBC # BLD AUTO: 3.33 X10(6)UL (ref 3.8–5.8)
RETICS # AUTO: 116.9 X10(3) UL (ref 22.5–147.5)
RETICS/RBC NFR AUTO: 3.5 % (ref 0.5–2.5)
TOTAL IRON BINDING CAPACITY: 627 UG/DL (ref 240–450)
TRANSFERRIN SERPL-MCNC: 421 MG/DL (ref 200–360)
WBC # BLD AUTO: 4.3 X10(3) UL (ref 4–11)

## 2020-02-17 PROCEDURE — 84466 ASSAY OF TRANSFERRIN: CPT

## 2020-02-17 PROCEDURE — 83540 ASSAY OF IRON: CPT

## 2020-02-17 PROCEDURE — 36415 COLL VENOUS BLD VENIPUNCTURE: CPT

## 2020-02-17 PROCEDURE — 99214 OFFICE O/P EST MOD 30 MIN: CPT | Performed by: INTERNAL MEDICINE

## 2020-02-17 PROCEDURE — 85025 COMPLETE CBC W/AUTO DIFF WBC: CPT

## 2020-02-17 PROCEDURE — 85045 AUTOMATED RETICULOCYTE COUNT: CPT

## 2020-02-17 RX ORDER — LORAZEPAM 1 MG/1
TABLET ORAL
Qty: 90 TABLET | Refills: 1 | Status: SHIPPED | OUTPATIENT
Start: 2020-02-17 | End: 2020-04-20

## 2020-02-17 NOTE — PATIENT INSTRUCTIONS
Problem List Items Addressed This Visit        Unprioritized    Anemia    Relevant Orders    RETICULOCYTE COUNT    IRON AND TIBC    CBC WITH DIFFERENTIAL WITH PLATELET    Tubular adenoma of colon - Primary    Type 2 diabetes mellitus, without long-term cur

## 2020-02-17 NOTE — ASSESSMENT & PLAN NOTE
Normal exam.    Patient is medically cleared for surgery     Labs, EKG–normal excepting for anemia which will be monitored and further work-up has been initiated.     Stop all ibuprofen, aspirin, over-the-counter cough and cold medications, vitamins as well

## 2020-02-17 NOTE — PROGRESS NOTES
HPI:    Patient ID: Rock Segura is a 77year old male.     Last egd /colonoscopy 10/2019    Date of procedure: 10/28/19     Procedure: EGD & Colonoscopy w/ biopsy and snare polypectomy     Pre-operative diagnosis: GERD, anemia, colorectal cancer screening trifold, the appendix and the ileocecal valve. Withdrawal was begun with thorough washing and careful examination of the colonic walls and folds. The ascending colon was viewed twice in the forward view. Photodocumentation was obtained.  The bowel prep was mucosa and vascular pattern.     Impression:  1. There were 9 small colon and rectal polyps removed  2. 2 mm non-bleeding ascending colon avm  3. Small non-bleeding hemorrhoids  4. Otherwise, unremarkable upper endoscopy and colonoscopy     Recommend:  1. gradually improving (stable on metoprolol and valsartan/hctz,tolerated well. hx of cad but no chest pain or son,no le swelling,does snore at night) since onset. The problem is controlled. Associated symptoms include anxiety.  Pertinent negatives include no m (valsartan). He does not see a podiatrist.Eye exam is not current. Review of Systems   Constitutional: Positive for fatigue. Negative for malaise/fatigue. HENT: Negative. Eyes: Negative. Respiratory: Negative.   Negative for shortness of breat External ear normal.   Left Ear: External ear normal.   Nose: Nose normal.   Mouth/Throat: Oropharynx is clear and moist. No oropharyngeal exudate. Eyes: Pupils are equal, round, and reactive to light.  Conjunctivae and EOM are normal.   Neck: Normal rang

## 2020-02-17 NOTE — PROGRESS NOTES
Humble Ugarte is a 77year old male who presents for a pre-operative physical exam.   Humble Ugarte is scheduled for a right knee arthroscopy, debridement, partial medial meniscectomy and sub-chondroplasty of the tibia procedure at [facility] 1 tablet by mouth daily. 90 tablet 1   • atorvastatin 40 MG Oral Tab Take 1 tablet (40 mg total) by mouth nightly. 90 tablet 1   • Omega-3-acid Ethyl Esters 1 g Oral Cap Take 1 capsule (1 g total) by mouth 3 (three) times daily.  270 capsule 1   • GLIMEPIRI Pulmonary:      Effort: Pulmonary effort is normal.      Breath sounds: Normal breath sounds. Abdominal:      General: Bowel sounds are normal.      Palpations: Abdomen is soft. Musculoskeletal: Normal range of motion.          General: Swelling (righ CBC WITH DIFFERENTIAL WITH PLATELET    HEMOGLOBIN A1C    LIPID PANEL    MICROALB/CREAT RATIO, RANDOM URINE    URINALYSIS, ROUTINE      Other Visit Diagnoses     Anxiety        Relevant Medications    LORazepam 1 MG Oral Tab            Plan   Orders Placed

## 2020-02-18 ENCOUNTER — TELEPHONE (OUTPATIENT)
Dept: ORTHOPEDICS CLINIC | Facility: CLINIC | Age: 70
End: 2020-02-18

## 2020-02-18 NOTE — TELEPHONE ENCOUNTER
Spoke to pts wife. ANITA verified on file. Informed her that pt would need to make a 1 week post op visit. She verbalized understanding and will have pt call back to schedule. *when pt calls back pls schedule him a 1 week post op visit.   Sx on 2/24

## 2020-02-19 NOTE — H&P
ORTHO SURGERY H&P  Juliana Ashley is a 77year old male. MRN is [id number]. CC: Right knee pain    HPI: Mr. Jarod Rodriguez is a 77year old male who presents to the office complaining of right knee pain. He has had right knee pain since October of 2019.  There wa Not on file    Lifestyle      Physical activity:        Days per week: Not on file        Minutes per session: Not on file      Stress: Not on file    Relationships      Social connections:        Talks on phone: Not on file        Gets together: Not on fi Multiple Vitamins-Minerals (MULTIVITAMIN ADULT OR) Take by mouth. • COENZYME Q-10 OR Take by mouth daily. Review Of Systems:     Musculoskeletal: See HPI. All other systems reviewed and negative.  Denies chest pain, shortness of br CARTILAGE:         Mild articular cartilage degeneration without a discrete defect. SUBCHONDRAL BONE:            Intact. PATELLOFEMORAL COMPARTMENT ARTICULAR CARTILAGE     Mild irregular articular cartilage thinning medial femoral trochlea.   A superficial Leydi Campbell MD on 12/27/2019 at 15:01         X-rays of the right knee were obtained in the office on 11/7/19. They demonstrate mild degenerative change of the medial compartment and moderate degenerative change of the patellofemoral joint.  There is calcificat

## 2020-02-21 ENCOUNTER — TELEPHONE (OUTPATIENT)
Dept: INTERNAL MEDICINE CLINIC | Facility: CLINIC | Age: 70
End: 2020-02-21

## 2020-02-21 NOTE — TELEPHONE ENCOUNTER
Called Dr Sean Garcia office and spoke with [facility] KIARA MARRUFO, states that she did not see any Clearance letter but only the note from Dr Ignacio Solis from last visit 2/17/20 that patient is good surgical candidate.     Dr Romero=your note from 2/17/20, is that consider pre

## 2020-02-21 NOTE — TELEPHONE ENCOUNTER
Patient is asking if he is ok to do the procedure on Monday 2/24/20. Advised that will check with the office and will let him know. This nurse cannot find if surgery clearance already approve or generated.   [facility] message to Zana Bell LPN to check the

## 2020-02-24 ENCOUNTER — ANESTHESIA (OUTPATIENT)
Dept: SURGERY | Facility: HOSPITAL | Age: 70
End: 2020-02-24
Payer: MEDICARE

## 2020-02-24 ENCOUNTER — HOSPITAL ENCOUNTER (OUTPATIENT)
Facility: HOSPITAL | Age: 70
Setting detail: HOSPITAL OUTPATIENT SURGERY
Discharge: HOME | End: 2020-02-24
Attending: ORTHOPAEDIC SURGERY | Admitting: ORTHOPAEDIC SURGERY
Payer: MEDICARE

## 2020-02-24 ENCOUNTER — ANESTHESIA EVENT (OUTPATIENT)
Dept: SURGERY | Facility: HOSPITAL | Age: 70
End: 2020-02-24
Payer: MEDICARE

## 2020-02-24 ENCOUNTER — APPOINTMENT (OUTPATIENT)
Dept: GENERAL RADIOLOGY | Facility: HOSPITAL | Age: 70
End: 2020-02-24
Attending: ORTHOPAEDIC SURGERY
Payer: MEDICARE

## 2020-02-24 VITALS
RESPIRATION RATE: 14 BRPM | OXYGEN SATURATION: 100 % | TEMPERATURE: 97 F | SYSTOLIC BLOOD PRESSURE: 132 MMHG | BODY MASS INDEX: 26.73 KG/M2 | WEIGHT: 176.38 LBS | HEART RATE: 65 BPM | HEIGHT: 68 IN | DIASTOLIC BLOOD PRESSURE: 63 MMHG

## 2020-02-24 PROCEDURE — 0QSG4ZZ REPOSITION RIGHT TIBIA, PERCUTANEOUS ENDOSCOPIC APPROACH: ICD-10-PCS | Performed by: ORTHOPAEDIC SURGERY

## 2020-02-24 PROCEDURE — 76000 FLUOROSCOPY <1 HR PHYS/QHP: CPT | Performed by: ORTHOPAEDIC SURGERY

## 2020-02-24 PROCEDURE — 0QUG4KZ SUPPLEMENT RIGHT TIBIA WITH NONAUTOLOGOUS TISSUE SUBSTITUTE, PERCUTANEOUS ENDOSCOPIC APPROACH: ICD-10-PCS | Performed by: ORTHOPAEDIC SURGERY

## 2020-02-24 PROCEDURE — 82962 GLUCOSE BLOOD TEST: CPT

## 2020-02-24 PROCEDURE — 0SBC4ZZ EXCISION OF RIGHT KNEE JOINT, PERCUTANEOUS ENDOSCOPIC APPROACH: ICD-10-PCS | Performed by: ORTHOPAEDIC SURGERY

## 2020-02-24 DEVICE — IMPLANTABLE DEVICE: Type: IMPLANTABLE DEVICE | Site: KNEE | Status: FUNCTIONAL

## 2020-02-24 RX ORDER — PROCHLORPERAZINE EDISYLATE 5 MG/ML
5 INJECTION INTRAMUSCULAR; INTRAVENOUS ONCE AS NEEDED
Status: DISCONTINUED | OUTPATIENT
Start: 2020-02-24 | End: 2020-02-24

## 2020-02-24 RX ORDER — SODIUM CHLORIDE, SODIUM LACTATE, POTASSIUM CHLORIDE, CALCIUM CHLORIDE 600; 310; 30; 20 MG/100ML; MG/100ML; MG/100ML; MG/100ML
INJECTION, SOLUTION INTRAVENOUS CONTINUOUS
Status: DISCONTINUED | OUTPATIENT
Start: 2020-02-24 | End: 2020-02-24

## 2020-02-24 RX ORDER — HYDROCODONE BITARTRATE AND ACETAMINOPHEN 5; 325 MG/1; MG/1
1 TABLET ORAL EVERY 4 HOURS PRN
Status: DISCONTINUED | OUTPATIENT
Start: 2020-02-24 | End: 2020-02-24

## 2020-02-24 RX ORDER — LIDOCAINE HYDROCHLORIDE 10 MG/ML
INJECTION, SOLUTION EPIDURAL; INFILTRATION; INTRACAUDAL; PERINEURAL AS NEEDED
Status: DISCONTINUED | OUTPATIENT
Start: 2020-02-24 | End: 2020-02-24 | Stop reason: SURG

## 2020-02-24 RX ORDER — HYDROMORPHONE HYDROCHLORIDE 1 MG/ML
0.4 INJECTION, SOLUTION INTRAMUSCULAR; INTRAVENOUS; SUBCUTANEOUS EVERY 5 MIN PRN
Status: DISCONTINUED | OUTPATIENT
Start: 2020-02-24 | End: 2020-02-24

## 2020-02-24 RX ORDER — CEFAZOLIN SODIUM/WATER 2 G/20 ML
2 SYRINGE (ML) INTRAVENOUS ONCE
Status: COMPLETED | OUTPATIENT
Start: 2020-02-24 | End: 2020-02-24

## 2020-02-24 RX ORDER — FAMOTIDINE 20 MG/1
20 TABLET ORAL ONCE
Status: DISCONTINUED | OUTPATIENT
Start: 2020-02-24 | End: 2020-02-24 | Stop reason: HOSPADM

## 2020-02-24 RX ORDER — ONDANSETRON 2 MG/ML
INJECTION INTRAMUSCULAR; INTRAVENOUS AS NEEDED
Status: DISCONTINUED | OUTPATIENT
Start: 2020-02-24 | End: 2020-02-24 | Stop reason: SURG

## 2020-02-24 RX ORDER — HALOPERIDOL 5 MG/ML
0.25 INJECTION INTRAMUSCULAR ONCE AS NEEDED
Status: DISCONTINUED | OUTPATIENT
Start: 2020-02-24 | End: 2020-02-24

## 2020-02-24 RX ORDER — HYDROMORPHONE HYDROCHLORIDE 1 MG/ML
0.6 INJECTION, SOLUTION INTRAMUSCULAR; INTRAVENOUS; SUBCUTANEOUS EVERY 5 MIN PRN
Status: DISCONTINUED | OUTPATIENT
Start: 2020-02-24 | End: 2020-02-24

## 2020-02-24 RX ORDER — MIDAZOLAM HYDROCHLORIDE 1 MG/ML
INJECTION INTRAMUSCULAR; INTRAVENOUS AS NEEDED
Status: DISCONTINUED | OUTPATIENT
Start: 2020-02-24 | End: 2020-02-24 | Stop reason: SURG

## 2020-02-24 RX ORDER — NALOXONE HYDROCHLORIDE 0.4 MG/ML
80 INJECTION, SOLUTION INTRAMUSCULAR; INTRAVENOUS; SUBCUTANEOUS AS NEEDED
Status: DISCONTINUED | OUTPATIENT
Start: 2020-02-24 | End: 2020-02-24

## 2020-02-24 RX ORDER — DEXTROSE MONOHYDRATE 25 G/50ML
50 INJECTION, SOLUTION INTRAVENOUS
Status: DISCONTINUED | OUTPATIENT
Start: 2020-02-24 | End: 2020-02-24

## 2020-02-24 RX ORDER — ONDANSETRON 2 MG/ML
4 INJECTION INTRAMUSCULAR; INTRAVENOUS ONCE AS NEEDED
Status: DISCONTINUED | OUTPATIENT
Start: 2020-02-24 | End: 2020-02-24

## 2020-02-24 RX ORDER — HYDROCODONE BITARTRATE AND ACETAMINOPHEN 5; 325 MG/1; MG/1
1 TABLET ORAL AS NEEDED
Status: DISCONTINUED | OUTPATIENT
Start: 2020-02-24 | End: 2020-02-24

## 2020-02-24 RX ORDER — MORPHINE SULFATE 4 MG/ML
2 INJECTION, SOLUTION INTRAMUSCULAR; INTRAVENOUS EVERY 10 MIN PRN
Status: DISCONTINUED | OUTPATIENT
Start: 2020-02-24 | End: 2020-02-24

## 2020-02-24 RX ORDER — ACETAMINOPHEN 500 MG
1000 TABLET ORAL ONCE
Status: COMPLETED | OUTPATIENT
Start: 2020-02-24 | End: 2020-02-24

## 2020-02-24 RX ORDER — METOPROLOL TARTRATE 5 MG/5ML
2.5 INJECTION INTRAVENOUS ONCE
Status: DISCONTINUED | OUTPATIENT
Start: 2020-02-24 | End: 2020-02-24

## 2020-02-24 RX ORDER — HYDROMORPHONE HYDROCHLORIDE 1 MG/ML
0.2 INJECTION, SOLUTION INTRAMUSCULAR; INTRAVENOUS; SUBCUTANEOUS EVERY 5 MIN PRN
Status: DISCONTINUED | OUTPATIENT
Start: 2020-02-24 | End: 2020-02-24

## 2020-02-24 RX ORDER — OXYCODONE HCL 10 MG/1
10 TABLET, FILM COATED, EXTENDED RELEASE ORAL ONCE
Status: COMPLETED | OUTPATIENT
Start: 2020-02-24 | End: 2020-02-24

## 2020-02-24 RX ORDER — HYDROCODONE BITARTRATE AND ACETAMINOPHEN 5; 325 MG/1; MG/1
2 TABLET ORAL AS NEEDED
Status: DISCONTINUED | OUTPATIENT
Start: 2020-02-24 | End: 2020-02-24

## 2020-02-24 RX ORDER — MORPHINE SULFATE 4 MG/ML
4 INJECTION, SOLUTION INTRAMUSCULAR; INTRAVENOUS EVERY 10 MIN PRN
Status: DISCONTINUED | OUTPATIENT
Start: 2020-02-24 | End: 2020-02-24

## 2020-02-24 RX ORDER — MORPHINE SULFATE 10 MG/ML
6 INJECTION, SOLUTION INTRAMUSCULAR; INTRAVENOUS EVERY 10 MIN PRN
Status: DISCONTINUED | OUTPATIENT
Start: 2020-02-24 | End: 2020-02-24

## 2020-02-24 RX ORDER — HYDROCODONE BITARTRATE AND ACETAMINOPHEN 5; 325 MG/1; MG/1
1 TABLET ORAL EVERY 4 HOURS PRN
Qty: 20 TABLET | Refills: 0 | Status: SHIPPED | OUTPATIENT
Start: 2020-02-24 | End: 2020-03-26 | Stop reason: ALTCHOICE

## 2020-02-24 RX ORDER — BUPIVACAINE HYDROCHLORIDE AND EPINEPHRINE 5; 5 MG/ML; UG/ML
INJECTION, SOLUTION PERINEURAL AS NEEDED
Status: DISCONTINUED | OUTPATIENT
Start: 2020-02-24 | End: 2020-02-24 | Stop reason: HOSPADM

## 2020-02-24 RX ADMIN — LIDOCAINE HYDROCHLORIDE 50 MG: 10 INJECTION, SOLUTION EPIDURAL; INFILTRATION; INTRACAUDAL; PERINEURAL at 07:35:00

## 2020-02-24 RX ADMIN — SODIUM CHLORIDE, SODIUM LACTATE, POTASSIUM CHLORIDE, CALCIUM CHLORIDE: 600; 310; 30; 20 INJECTION, SOLUTION INTRAVENOUS at 08:46:00

## 2020-02-24 RX ADMIN — ONDANSETRON 4 MG: 2 INJECTION INTRAMUSCULAR; INTRAVENOUS at 08:11:00

## 2020-02-24 RX ADMIN — CEFAZOLIN SODIUM/WATER 2 G: 2 G/20 ML SYRINGE (ML) INTRAVENOUS at 07:37:00

## 2020-02-24 RX ADMIN — MIDAZOLAM HYDROCHLORIDE 2 MG: 1 INJECTION INTRAMUSCULAR; INTRAVENOUS at 07:31:00

## 2020-02-24 NOTE — ANESTHESIA POSTPROCEDURE EVALUATION
Patient: Yuliet Huntley    Procedure Summary     Date:  02/24/20 Room / Location:  [facility] OR  / [facility] OR    Anesthesia Start:  0249 Anesthesia Stop:  2985    Procedure:  KNEE ARTHROSCOPY (Right Knee) Diagnosis:  (right knee medial meniscus tear, medi

## 2020-02-24 NOTE — OPERATIVE REPORT
Operative Note    Patient Name: Yelena Pole    Preoperative Diagnosis: right knee medial meniscus tear, medial tibial plateau bone marrow lesion    Postoperative Diagnosis: right knee medial meniscus tear, medial tibial plateau bone marrow lesion, chondr

## 2020-02-24 NOTE — ANESTHESIA PROCEDURE NOTES
Airway  Date/Time: 2/24/2020 7:35 AM  Urgency: Elective    Airway not difficult    General Information and Staff    Patient location during procedure: OR  Anesthesiologist: Ammy Santoyo MD  Resident/CRNA: Yvette Schulz CRNA  Performed: MARLEEN Quintero

## 2020-02-24 NOTE — ANESTHESIA PREPROCEDURE EVALUATION
Anesthesia PreOp Note    HPI:     Randy Mcgowan is a 77year old male who presents for preoperative consultation requested by: Yohannes James MD    Date of Surgery: 2/24/2020    Procedure(s):  KNEE ARTHROSCOPY  Indication: right knee medial meniscus te reflux    • Essential hypertension    • High blood pressure    • High cholesterol    • History of blood transfusion     12 YRS AGO, NO REACTIONS   • Hyperlipidemia    • Visual impairment        Past Surgical History:   Procedure Laterality Date   • BYPASS Take by mouth daily. , Disp: , Rfl: , 2/17/2020 at Unknown time      ceFAZolin sodium (ANCEF/KEFZOL) 2 GM/20ML premix IV syringe 2 g, 2 g, Intravenous, Once, Adeline Mcclelland [place]  oxyCODONE HCl ER (OXYCONTIN) 12 hr tab 10 mg, 10 mg, Oral, Once, Jackqulyn Bumpers, file        Gets together: Not on file        Attends Congregation service: Not on file        Active member of club or organization: Not on file        Attends meetings of clubs or organizations: Not on file        Relationship status: Not on file      Intim Anesthesia Plan:   ASA:  3  Plan:   General  Airway:  LMA  Post-op Pain Management: IV analgesics      I have informed Jazmine Suarezer and/or legal guardian or family member of the nature of the anesthetic plan, benefits, risks including possible dental da

## 2020-02-24 NOTE — OPERATIVE REPORT
[facility]    PATIENT'S NAME: Ivania Toddbrandon   ATTENDING PHYSICIAN: Narendra Krishnan MD   OPERATING PHYSICIAN: Narendra Krishnan MD   PATIENT ACCOUNT#:   [id number]    LOCATION:  [address]  MEDICAL RECORD #:   [id number]       DATE tracking was central.  2.   Notch. The ACL and PCL were unremarkable. 3.   Medial compartment. The medial femoral condyle had diffuse grade 2 degenerative change with fibrillation throughout the weightbearing surface.   The medial tibial plateau had dege then used to cauterize the free edge of the resection to prevent propagation of the tear. The radiofrequency wand and motorized shaver were also used to debride fibrillated articular cartilage from the medial femoral condyle.   The knee was suction irrigat

## 2020-03-05 ENCOUNTER — OFFICE VISIT (OUTPATIENT)
Dept: ORTHOPEDICS CLINIC | Facility: CLINIC | Age: 70
End: 2020-03-05
Payer: MEDICARE

## 2020-03-05 DIAGNOSIS — Z98.890 POST-OPERATIVE STATE: Primary | ICD-10-CM

## 2020-03-05 DIAGNOSIS — M23.8X1 CHONDRAL DEFECT OF CONDYLE OF RIGHT FEMUR: ICD-10-CM

## 2020-03-05 DIAGNOSIS — S83.241D OTHER TEAR OF MEDIAL MENISCUS OF RIGHT KNEE, UNSPECIFIED WHETHER OLD OR CURRENT TEAR, SUBSEQUENT ENCOUNTER: ICD-10-CM

## 2020-03-05 PROCEDURE — 99024 POSTOP FOLLOW-UP VISIT: CPT | Performed by: ORTHOPAEDIC SURGERY

## 2020-03-05 NOTE — PROGRESS NOTES
NURSING INTAKE COMMENTS: Patient presents with:  Post-Op: s/p right knee partial  MMT -- Sx on 02/24/20. Ambulating with cane. Rates pain 3/10. Denies any fever or calf  pain.      HPI: Mr. Phoenix is a 29-year-old male who presents to the office today for a tablet 1   • metFORMIN HCl 1000 MG Oral Tab TAKE 1 TABLET TWICE DAILY WITH MEALS 180 tablet 2   • Valsartan-hydroCHLOROthiazide 320-25 MG Oral Tab Take 1 tablet by mouth daily.  90 tablet 1   • atorvastatin 40 MG Oral Tab Take 1 tablet (40 mg total) by mout Denies fevers, chills, and night sweats   RESPIRATORY: Denies shortness of breath. CARDIOVASCULAR: Denies chest pain. MUSCULOSKELETAL: See HPI. NEURO: Denies numbness.     Physical Examination:      Physical exam demonstrates a well-nourished male in no continued ice treatments to the right knee for pain and swelling control. He may continue with use of the Tylenol as needed for pain control. I suggested use of Advil once a day for swelling control.  I recommended outpatient physical therapy for the right

## 2020-03-06 ENCOUNTER — TELEPHONE (OUTPATIENT)
Dept: CASE MANAGEMENT | Age: 70
End: 2020-03-06

## 2020-03-06 NOTE — TELEPHONE ENCOUNTER
Patient is eligible for a 2020 Medicare Advantage Supervisit. Left message to call back [phone].

## 2020-03-12 ENCOUNTER — TELEPHONE (OUTPATIENT)
Dept: GASTROENTEROLOGY | Facility: CLINIC | Age: 70
End: 2020-03-12

## 2020-03-12 ENCOUNTER — OFFICE VISIT (OUTPATIENT)
Dept: PHYSICAL THERAPY | Age: 70
End: 2020-03-12
Attending: ORTHOPAEDIC SURGERY
Payer: MEDICARE

## 2020-03-12 DIAGNOSIS — D50.9 IRON DEFICIENCY ANEMIA, UNSPECIFIED IRON DEFICIENCY ANEMIA TYPE: Primary | ICD-10-CM

## 2020-03-12 PROCEDURE — 97110 THERAPEUTIC EXERCISES: CPT

## 2020-03-12 PROCEDURE — 97161 PT EVAL LOW COMPLEX 20 MIN: CPT

## 2020-03-12 NOTE — TELEPHONE ENCOUNTER
Please place this patient on my schedule for March 24 @ 11 AM with arrival at 10:45 AM. If melena/hematochezia/acute GI issues, please let us know. Thank you - will d/w him about capsule.     Will order celiac serologies - he can complete these before the OV thanks

## 2020-03-12 NOTE — PROGRESS NOTES
LOWER EXTREMITY EVALUATION:   Referring Physician: Dr. Mireille Serrano   Diagnosis: Post-operative state (G14.138)    Date of Service: 3/12/2020     PATIENT SUMMARY   Osvaldo Greenberg is a 77year old y/o male who presents to therapy today with complaints of R me (postoperative nausea and vomiting), Pseudocholinesterase deficiency, Renal disorder, Seizure disorder ([address] Sleep apnea, Stroke ([address] or Type 1 diabetes mellitus ([address]       Aguila Garcia presents to physical therapy evaluation with primary c/o R wide GLENNA ( R knee varus) decrease step length and stance phase, loss heel-toe pattem   Balance: SLS: R 6 seconds     Today’s Treatment and Response:   Pt education was provided on exam findings, treatment diagnosis, treatment plan, expectations, and progno or sign and return this letter via fax as soon as possible to [phone].  If you have any questions, please contact me at Dept: [phone]    Sincerely,  Electronically signed by therapist: Kristen Morris, PT,DPT,OCS  Physician's certification requi

## 2020-03-12 NOTE — TELEPHONE ENCOUNTER
Since there is iron deficiency and from my review of the priro procedures at the end of October, it would seem that the next study he should have is a capsule. I'm not sure from the chart why he needs a \"repeat upper gi study\" unless there is some symptoms that I'm not seeing. It might be best to see him non-urgently to review any new symptoms and discuss any sources of potential blood loss and then if none set up a capsule at that appointment. If you don't have any appointments in the next 2-3 weeks, I can probably fit him in somewhere. Thanks for your help!     Kim Sheehan MD  [address] - Gastroenterology  3/12/2020  9:14 AM

## 2020-03-12 NOTE — TELEPHONE ENCOUNTER
Discussed below with patient: Confirmed appointment with patient and given detailed directions to the [facility]. Patient voiced understanding that he is to get lab work prior to appointment and aware that orders in system for these labs. Patient aware that he is to arrive by 1045am on March 24 for appointment.

## 2020-03-18 ENCOUNTER — TELEPHONE (OUTPATIENT)
Dept: PHYSICAL THERAPY | Age: 70
End: 2020-03-18

## 2020-03-18 NOTE — TELEPHONE ENCOUNTER
Contacted pt to discuss department's initiative to protect all non-essential and high risk patients from COVID-19 exposure. Pt unavailable, left information with the patient's wife. Explained that the clinic is cancelling visits for the next two weeks.  At

## 2020-03-19 ENCOUNTER — APPOINTMENT (OUTPATIENT)
Dept: PHYSICAL THERAPY | Age: 70
End: 2020-03-19
Attending: ORTHOPAEDIC SURGERY
Payer: MEDICARE

## 2020-03-20 ENCOUNTER — APPOINTMENT (OUTPATIENT)
Dept: PHYSICAL THERAPY | Age: 70
End: 2020-03-20
Attending: ORTHOPAEDIC SURGERY
Payer: MEDICARE

## 2020-03-21 ENCOUNTER — LAB ENCOUNTER (OUTPATIENT)
Dept: LAB | Age: 70
End: 2020-03-21
Attending: INTERNAL MEDICINE
Payer: MEDICARE

## 2020-03-21 DIAGNOSIS — E11.9 TYPE 2 DIABETES MELLITUS WITHOUT COMPLICATION, WITHOUT LONG-TERM CURRENT USE OF INSULIN (HCC): ICD-10-CM

## 2020-03-21 DIAGNOSIS — D50.9 IRON DEFICIENCY ANEMIA, UNSPECIFIED IRON DEFICIENCY ANEMIA TYPE: ICD-10-CM

## 2020-03-21 LAB
ALBUMIN SERPL-MCNC: 3.8 G/DL (ref 3.4–5)
ALBUMIN/GLOB SERPL: 1.1 {RATIO} (ref 1–2)
ALP LIVER SERPL-CCNC: 59 U/L (ref 45–117)
ALT SERPL-CCNC: 39 U/L (ref 16–61)
ANION GAP SERPL CALC-SCNC: 5 MMOL/L (ref 0–18)
AST SERPL-CCNC: 21 U/L (ref 15–37)
BACTERIA UR QL AUTO: NEGATIVE /HPF
BASOPHILS # BLD AUTO: 0.04 X10(3) UL (ref 0–0.2)
BASOPHILS NFR BLD AUTO: 0.9 %
BILIRUB SERPL-MCNC: 0.6 MG/DL (ref 0.1–2)
BILIRUB UR QL: NEGATIVE
BUN BLD-MCNC: 10 MG/DL (ref 7–18)
BUN/CREAT SERPL: 10.6 (ref 10–20)
CALCIUM BLD-MCNC: 9.2 MG/DL (ref 8.5–10.1)
CHLORIDE SERPL-SCNC: 104 MMOL/L (ref 98–112)
CHOLEST SMN-MCNC: 139 MG/DL (ref ?–200)
CLARITY UR: CLEAR
CO2 SERPL-SCNC: 29 MMOL/L (ref 21–32)
COLOR UR: YELLOW
CREAT BLD-MCNC: 0.94 MG/DL (ref 0.7–1.3)
CREAT UR-SCNC: 360 MG/DL
DEPRECATED RDW RBC AUTO: 52.4 FL (ref 35.1–46.3)
EOSINOPHIL # BLD AUTO: 0.11 X10(3) UL (ref 0–0.7)
EOSINOPHIL NFR BLD AUTO: 2.6 %
ERYTHROCYTE [DISTWIDTH] IN BLOOD BY AUTOMATED COUNT: 17.4 % (ref 11–15)
EST. AVERAGE GLUCOSE BLD GHB EST-MCNC: 169 MG/DL (ref 68–126)
GLOBULIN PLAS-MCNC: 3.5 G/DL (ref 2.8–4.4)
GLUCOSE BLD-MCNC: 144 MG/DL (ref 70–99)
GLUCOSE UR-MCNC: NEGATIVE MG/DL
HBA1C MFR BLD HPLC: 7.5 % (ref ?–5.7)
HCT VFR BLD AUTO: 30.3 % (ref 39–53)
HDLC SERPL-MCNC: 52 MG/DL (ref 40–59)
HGB BLD-MCNC: 9.2 G/DL (ref 13–17.5)
IGA SERPL-MCNC: 180 MG/DL (ref 70–312)
IMM GRANULOCYTES # BLD AUTO: 0.01 X10(3) UL (ref 0–1)
IMM GRANULOCYTES NFR BLD: 0.2 %
KETONES UR-MCNC: NEGATIVE MG/DL
LDLC SERPL CALC-MCNC: 55 MG/DL (ref ?–100)
LEUKOCYTE ESTERASE UR QL STRIP.AUTO: NEGATIVE
LYMPHOCYTES # BLD AUTO: 1.79 X10(3) UL (ref 1–4)
LYMPHOCYTES NFR BLD AUTO: 41.8 %
M PROTEIN MFR SERPL ELPH: 7.3 G/DL (ref 6.4–8.2)
MCH RBC QN AUTO: 25.4 PG (ref 26–34)
MCHC RBC AUTO-ENTMCNC: 30.4 G/DL (ref 31–37)
MCV RBC AUTO: 83.7 FL (ref 80–100)
MICROALBUMIN UR-MCNC: 12.6 MG/DL
MICROALBUMIN/CREAT 24H UR-RTO: 35 UG/MG (ref ?–30)
MONOCYTES # BLD AUTO: 0.48 X10(3) UL (ref 0.1–1)
MONOCYTES NFR BLD AUTO: 11.2 %
NEUTROPHILS # BLD AUTO: 1.85 X10 (3) UL (ref 1.5–7.7)
NEUTROPHILS # BLD AUTO: 1.85 X10(3) UL (ref 1.5–7.7)
NEUTROPHILS NFR BLD AUTO: 43.3 %
NITRITE UR QL STRIP.AUTO: NEGATIVE
NONHDLC SERPL-MCNC: 87 MG/DL (ref ?–130)
OSMOLALITY SERPL CALC.SUM OF ELEC: 288 MOSM/KG (ref 275–295)
PATIENT FASTING Y/N/NP: YES
PATIENT FASTING Y/N/NP: YES
PH UR: 6 [PH] (ref 5–8)
PLATELET # BLD AUTO: 268 10(3)UL (ref 150–450)
POTASSIUM SERPL-SCNC: 4.5 MMOL/L (ref 3.5–5.1)
PROT UR-MCNC: 30 MG/DL
RBC # BLD AUTO: 3.62 X10(6)UL (ref 3.8–5.8)
RBC #/AREA URNS AUTO: 14 /HPF
SODIUM SERPL-SCNC: 138 MMOL/L (ref 136–145)
SP GR UR STRIP: 1.02 (ref 1–1.03)
TRIGL SERPL-MCNC: 158 MG/DL (ref 30–149)
TSI SER-ACNC: 2.29 MIU/ML (ref 0.36–3.74)
UROBILINOGEN UR STRIP-ACNC: <2
VLDLC SERPL CALC-MCNC: 32 MG/DL (ref 0–30)
WBC # BLD AUTO: 4.3 X10(3) UL (ref 4–11)
WBC #/AREA URNS AUTO: 1 /HPF

## 2020-03-21 PROCEDURE — 82570 ASSAY OF URINE CREATININE: CPT

## 2020-03-21 PROCEDURE — 84443 ASSAY THYROID STIM HORMONE: CPT

## 2020-03-21 PROCEDURE — 83516 IMMUNOASSAY NONANTIBODY: CPT

## 2020-03-21 PROCEDURE — 80053 COMPREHEN METABOLIC PANEL: CPT

## 2020-03-21 PROCEDURE — 83036 HEMOGLOBIN GLYCOSYLATED A1C: CPT

## 2020-03-21 PROCEDURE — 85025 COMPLETE CBC W/AUTO DIFF WBC: CPT

## 2020-03-21 PROCEDURE — 81001 URINALYSIS AUTO W/SCOPE: CPT

## 2020-03-21 PROCEDURE — 82043 UR ALBUMIN QUANTITATIVE: CPT

## 2020-03-21 PROCEDURE — 82784 ASSAY IGA/IGD/IGG/IGM EACH: CPT

## 2020-03-21 PROCEDURE — 80061 LIPID PANEL: CPT

## 2020-03-21 PROCEDURE — 36415 COLL VENOUS BLD VENIPUNCTURE: CPT

## 2020-03-23 ENCOUNTER — TELEPHONE (OUTPATIENT)
Dept: GASTROENTEROLOGY | Facility: CLINIC | Age: 70
End: 2020-03-23

## 2020-03-23 DIAGNOSIS — D50.9 IRON DEFICIENCY ANEMIA, UNSPECIFIED IRON DEFICIENCY ANEMIA TYPE: Primary | ICD-10-CM

## 2020-03-23 LAB — TTG IGA SER-ACNC: 0.3 U/ML (ref ?–7)

## 2020-03-23 PROCEDURE — 99442 PHONE E/M BY PHYS 11-20 MIN: CPT | Performed by: PHYSICIAN ASSISTANT

## 2020-03-23 NOTE — TELEPHONE ENCOUNTER
Noted, patient's appt tomorrow with Heraclio Matter has now been cancelled. Routed to GI scheduling for procedure mentioned below.

## 2020-03-23 NOTE — TELEPHONE ENCOUNTER
Virtual/Telephone Check-In    Valiant Read verbally consents to a Virtual/Telephone Check-In service on 03/23/20. Patient understands and accepts financial responsibility for any deductible, co-insurance and/or co-pays associated with this service.

## 2020-03-24 NOTE — TELEPHONE ENCOUNTER
CBLM to schedule procedure.    (5/8/20 at 7:30 am is available - ok per Lola Pereyra)    Please transfer to Marta Fish at ext [phone] for scheduling. Transfer to ext [phone] on 3/24/20 only!

## 2020-03-25 ENCOUNTER — TELEPHONE (OUTPATIENT)
Dept: PHYSICAL THERAPY | Age: 70
End: 2020-03-25

## 2020-03-25 NOTE — TELEPHONE ENCOUNTER
Contacted pt to discuss department's initiative to protect all non-essential and high risk patients from COVID-19 exposure. Pt unavailable, LM. Informed patient that clinic closed until 4/13/20, cancelling his next 4 appointments.  Pt will be rescheduled in

## 2020-03-26 ENCOUNTER — OFFICE VISIT (OUTPATIENT)
Dept: ORTHOPEDICS CLINIC | Facility: CLINIC | Age: 70
End: 2020-03-26
Payer: MEDICARE

## 2020-03-26 DIAGNOSIS — S83.241D OTHER TEAR OF MEDIAL MENISCUS OF RIGHT KNEE, UNSPECIFIED WHETHER OLD OR CURRENT TEAR, SUBSEQUENT ENCOUNTER: Primary | ICD-10-CM

## 2020-03-26 PROCEDURE — 99024 POSTOP FOLLOW-UP VISIT: CPT | Performed by: ORTHOPAEDIC SURGERY

## 2020-03-26 NOTE — PROGRESS NOTES
NURSING INTAKE COMMENTS: Patient presents with:  Post-Op: 2nd post op visit from right knee arthroscopy. Has only gone to one physical therapy session b/c they cancelled his other session due to the coronavirus.   But he has been doing home exercises that Valsartan-hydroCHLOROthiazide 320-25 MG Oral Tab Take 1 tablet by mouth daily. 90 tablet 1   • atorvastatin 40 MG Oral Tab Take 1 tablet (40 mg total) by mouth nightly.  90 tablet 1   • Omega-3-acid Ethyl Esters 1 g Oral Cap Take 1 capsule (1 g total) by mo nausea, vomiting, diarrhea, constipation, hematochezia, worsening heartburn or stomach ulcers  : denies dysuria, hematuria, incontinence, increased frequency, urgency, difficulty urinating  MUSCULOSKELETAL: denies musculoskeletal complaints other than in being.    Follow Up: Return in about 4 weeks (around 4/23/2020).     Yulissa Koo MD

## 2020-03-27 ENCOUNTER — APPOINTMENT (OUTPATIENT)
Dept: PHYSICAL THERAPY | Age: 70
End: 2020-03-27
Attending: ORTHOPAEDIC SURGERY
Payer: MEDICARE

## 2020-04-01 ENCOUNTER — APPOINTMENT (OUTPATIENT)
Dept: PHYSICAL THERAPY | Age: 70
End: 2020-04-01
Attending: ORTHOPAEDIC SURGERY
Payer: MEDICARE

## 2020-04-02 ENCOUNTER — APPOINTMENT (OUTPATIENT)
Dept: PHYSICAL THERAPY | Age: 70
End: 2020-04-02
Attending: ORTHOPAEDIC SURGERY
Payer: MEDICARE

## 2020-04-03 ENCOUNTER — APPOINTMENT (OUTPATIENT)
Dept: PHYSICAL THERAPY | Age: 70
End: 2020-04-03
Attending: ORTHOPAEDIC SURGERY
Payer: MEDICARE

## 2020-04-03 RX ORDER — METOPROLOL SUCCINATE 25 MG/1
25 TABLET, EXTENDED RELEASE ORAL DAILY
Qty: 90 TABLET | Refills: 1 | Status: SHIPPED | OUTPATIENT
Start: 2020-04-03 | End: 2020-10-05

## 2020-04-07 ENCOUNTER — TELEPHONE (OUTPATIENT)
Dept: PHYSICAL THERAPY | Age: 70
End: 2020-04-07

## 2020-04-07 NOTE — TELEPHONE ENCOUNTER
Pt unavailable, LM. Contacted pt to explain that due to continued COVID-19 outbreak, non-essential outpatient rehab patient care has been delayed until 5/4/20. Confirmed next visit to be 5/6/20. Rescheduled the patient for the rest of May 2020.  Pt informed

## 2020-04-08 ENCOUNTER — APPOINTMENT (OUTPATIENT)
Dept: PHYSICAL THERAPY | Age: 70
End: 2020-04-08
Attending: ORTHOPAEDIC SURGERY
Payer: MEDICARE

## 2020-04-09 NOTE — TELEPHONE ENCOUNTER
Spoke with Jimena Rubio regarding scheduling pt's VCE. She stated that only \"essential\" cases are being added on right now. She has a meeting tomorrow with some physicians regarding essential vs non-essential procedures/surgeries.     She stated that

## 2020-04-10 ENCOUNTER — APPOINTMENT (OUTPATIENT)
Dept: PHYSICAL THERAPY | Age: 70
End: 2020-04-10
Attending: ORTHOPAEDIC SURGERY
Payer: MEDICARE

## 2020-04-15 ENCOUNTER — APPOINTMENT (OUTPATIENT)
Dept: PHYSICAL THERAPY | Age: 70
End: 2020-04-15
Attending: ORTHOPAEDIC SURGERY
Payer: MEDICARE

## 2020-04-17 ENCOUNTER — APPOINTMENT (OUTPATIENT)
Dept: PHYSICAL THERAPY | Age: 70
End: 2020-04-17
Attending: ORTHOPAEDIC SURGERY
Payer: MEDICARE

## 2020-04-20 ENCOUNTER — TELEPHONE (OUTPATIENT)
Dept: INTERNAL MEDICINE CLINIC | Facility: CLINIC | Age: 70
End: 2020-04-20

## 2020-04-20 DIAGNOSIS — F41.9 ANXIETY: ICD-10-CM

## 2020-04-20 RX ORDER — LORAZEPAM 1 MG/1
TABLET ORAL
Qty: 90 TABLET | Refills: 1 | Status: SHIPPED | OUTPATIENT
Start: 2020-04-20 | End: 2020-08-31

## 2020-04-20 NOTE — TELEPHONE ENCOUNTER
Prescription refill request:    The current prescription for your patient has no refills remaining or is about to .   If you want to continue therapy for your patient,as indicated above, please prepare and fax a valid signed written prescription to th

## 2020-04-22 ENCOUNTER — APPOINTMENT (OUTPATIENT)
Dept: PHYSICAL THERAPY | Age: 70
End: 2020-04-22
Attending: ORTHOPAEDIC SURGERY
Payer: MEDICARE

## 2020-04-22 RX ORDER — GLIMEPIRIDE 1 MG/1
TABLET ORAL
Qty: 180 TABLET | Refills: 1 | Status: SHIPPED | OUTPATIENT
Start: 2020-04-22 | End: 2020-10-26

## 2020-04-23 ENCOUNTER — VIRTUAL PHONE E/M (OUTPATIENT)
Dept: ORTHOPEDICS CLINIC | Facility: CLINIC | Age: 70
End: 2020-04-23
Payer: MEDICARE

## 2020-04-23 DIAGNOSIS — S83.241D OTHER TEAR OF MEDIAL MENISCUS OF RIGHT KNEE, UNSPECIFIED WHETHER OLD OR CURRENT TEAR, SUBSEQUENT ENCOUNTER: Primary | ICD-10-CM

## 2020-04-23 PROCEDURE — 99024 POSTOP FOLLOW-UP VISIT: CPT | Performed by: ORTHOPAEDIC SURGERY

## 2020-04-23 NOTE — PROGRESS NOTES
Virtual Telephone Check-In    Danyel Horton verbally consents to a Virtual/Telephone Check-In visit on 04/23/20. Patient understands and accepts financial responsibility for any deductible, co-insurance and/or co-pays associated with this service.     Du

## 2020-04-24 ENCOUNTER — APPOINTMENT (OUTPATIENT)
Dept: PHYSICAL THERAPY | Age: 70
End: 2020-04-24
Attending: ORTHOPAEDIC SURGERY
Payer: MEDICARE

## 2020-04-29 ENCOUNTER — APPOINTMENT (OUTPATIENT)
Dept: PHYSICAL THERAPY | Age: 70
End: 2020-04-29
Attending: ORTHOPAEDIC SURGERY
Payer: MEDICARE

## 2020-05-01 ENCOUNTER — TELEPHONE (OUTPATIENT)
Dept: PHYSICAL THERAPY | Age: 70
End: 2020-05-01

## 2020-05-01 ENCOUNTER — APPOINTMENT (OUTPATIENT)
Dept: PHYSICAL THERAPY | Age: 70
End: 2020-05-01
Attending: ORTHOPAEDIC SURGERY
Payer: MEDICARE

## 2020-05-01 RX ORDER — OMEGA-3-ACID ETHYL ESTERS 1 G/1
1 CAPSULE, LIQUID FILLED ORAL 3 TIMES DAILY
Qty: 270 CAPSULE | Refills: 1 | Status: SHIPPED | OUTPATIENT
Start: 2020-05-01 | End: 2021-01-29

## 2020-05-01 NOTE — TELEPHONE ENCOUNTER
Pt unavailable, LM. Informed patient clinic reopening mid May. Called to confirm cancelling all appts until May 20, to return for treatment.  Requested the patient return call to confirm receipt and POC

## 2020-05-05 ENCOUNTER — TELEPHONE (OUTPATIENT)
Dept: GASTROENTEROLOGY | Facility: CLINIC | Age: 70
End: 2020-05-05

## 2020-05-05 NOTE — TELEPHONE ENCOUNTER
Overdue reminder letter mailed.      Labs:   CBC WITH DIFFERENTIAL WITH PLATELET    FERRITIN    IRON AND TIBC

## 2020-05-06 ENCOUNTER — APPOINTMENT (OUTPATIENT)
Dept: PHYSICAL THERAPY | Age: 70
End: 2020-05-06
Attending: INTERNAL MEDICINE
Payer: MEDICARE

## 2020-05-08 ENCOUNTER — APPOINTMENT (OUTPATIENT)
Dept: PHYSICAL THERAPY | Age: 70
End: 2020-05-08
Attending: INTERNAL MEDICINE
Payer: MEDICARE

## 2020-05-11 ENCOUNTER — TELEPHONE (OUTPATIENT)
Dept: INTERNAL MEDICINE CLINIC | Facility: CLINIC | Age: 70
End: 2020-05-11

## 2020-05-11 ENCOUNTER — VIRTUAL PHONE E/M (OUTPATIENT)
Dept: INTERNAL MEDICINE CLINIC | Facility: CLINIC | Age: 70
End: 2020-05-11

## 2020-05-11 VITALS
DIASTOLIC BLOOD PRESSURE: 79 MMHG | BODY MASS INDEX: 26 KG/M2 | SYSTOLIC BLOOD PRESSURE: 137 MMHG | HEART RATE: 68 BPM | WEIGHT: 174 LBS

## 2020-05-11 DIAGNOSIS — E11.9 TYPE 2 DIABETES MELLITUS WITHOUT COMPLICATION, WITHOUT LONG-TERM CURRENT USE OF INSULIN (HCC): ICD-10-CM

## 2020-05-11 DIAGNOSIS — E78.00 PURE HYPERCHOLESTEROLEMIA: ICD-10-CM

## 2020-05-11 DIAGNOSIS — R80.9 MICROALBUMINURIA DUE TO TYPE 2 DIABETES MELLITUS (HCC): Primary | ICD-10-CM

## 2020-05-11 DIAGNOSIS — I10 ESSENTIAL HYPERTENSION: ICD-10-CM

## 2020-05-11 DIAGNOSIS — D64.9 ANEMIA, UNSPECIFIED TYPE: ICD-10-CM

## 2020-05-11 DIAGNOSIS — E11.29 MICROALBUMINURIA DUE TO TYPE 2 DIABETES MELLITUS (HCC): Primary | ICD-10-CM

## 2020-05-11 PROCEDURE — 99214 OFFICE O/P EST MOD 30 MIN: CPT | Performed by: INTERNAL MEDICINE

## 2020-05-11 NOTE — TELEPHONE ENCOUNTER
Spoke with the patient who reports he had a pending telephone visit scheduled for today 5/11/20 at 1pm, but he never received a call. Message routed to provider for review.

## 2020-05-11 NOTE — ASSESSMENT & PLAN NOTE
Patient has had ongoing anemia. Follow-up labs ordered but not completed. Reiterated the need to complete this ASAP. No blood in stools or black stools. He was referred to gastroenterology, appointments have been postponed due to the [place] pandemic.

## 2020-05-11 NOTE — PROGRESS NOTES
HPI:    Patient ID: Mari Chao is a 72year old male. Written by Melany Velazquez MD on 4/7/2020  8:42 PM   Test for diabetes looks much improved-hemoglobin A1c is at 7.5.    Thyroid function test look normal.   The urine test including the protein in t treatments include angiotensin blockers, diuretics, beta blockers and lifestyle changes. The current treatment provides moderate improvement. Compliance problems include exercise and diet. There is no history of kidney disease, heart failure or PVD.    Azucena aggravates the symptoms. He has tried nothing for the symptoms. The treatment provided no relief. Review of Systems   Constitutional: Negative. Negative for malaise/fatigue. HENT: Negative. Eyes: Negative. Respiratory: Negative.   Negative fo well-nourished. No distress. HENT:   Nose: Mucosal edema and rhinorrhea present. Right sinus exhibits no maxillary sinus tenderness and no frontal sinus tenderness. Left sinus exhibits no maxillary sinus tenderness and no frontal sinus tenderness.    Eyes hydrochlorothiazide, amlodipine and metoprolol. Lipid panel has been stable on atorvastatin. Renal functions look stable. Eye exam has been completed per Dr. Alejandro Dong.   Foot exam in the office has been normal.  He does not have any calluses, skin breakdown

## 2020-05-11 NOTE — ASSESSMENT & PLAN NOTE
Blood sugars have been stable on metformin thousand milligrams twice daily, glimepiride 1 mg twice daily. He has not had any significant low sugar reactions. Blood pressures are stable on valsartan hydrochlorothiazide, amlodipine and metoprolol.   Lipid p

## 2020-05-11 NOTE — PATIENT INSTRUCTIONS
Problem List Items Addressed This Visit        Unprioritized    Anemia     Patient has had ongoing anemia. Follow-up labs ordered but not completed. Reiterated the need to complete this ASAP. No blood in stools or black stools.   He was referred to gastr MICROALB/CREAT RATIO, RANDOM URINE    URINALYSIS, ROUTINE

## 2020-05-11 NOTE — ASSESSMENT & PLAN NOTE
Lipid panel and liver function test have been stable on atorvastatin at 40 mg daily. He is advised to continue on the same dose of medication and recheck labs as directed.

## 2020-05-11 NOTE — ASSESSMENT & PLAN NOTE
Blood pressure 137/79, pulse 68, weight 174 lb (78.9 kg). Blood pressures per home monitor look stable. Continue on amlodipine 5 mg daily, valsartan hydrochlorothiazide 320/20 5-1 tablet daily, metoprolol 50 mg daily.   He does have mild grade 1 diasto

## 2020-05-13 ENCOUNTER — APPOINTMENT (OUTPATIENT)
Dept: PHYSICAL THERAPY | Age: 70
End: 2020-05-13
Attending: INTERNAL MEDICINE
Payer: MEDICARE

## 2020-05-15 ENCOUNTER — APPOINTMENT (OUTPATIENT)
Dept: PHYSICAL THERAPY | Age: 70
End: 2020-05-15
Attending: ORTHOPAEDIC SURGERY
Payer: MEDICARE

## 2020-05-16 ENCOUNTER — LAB ENCOUNTER (OUTPATIENT)
Dept: LAB | Age: 70
End: 2020-05-16
Attending: PHYSICIAN ASSISTANT
Payer: MEDICARE

## 2020-05-16 DIAGNOSIS — D50.9 IRON DEFICIENCY ANEMIA, UNSPECIFIED IRON DEFICIENCY ANEMIA TYPE: ICD-10-CM

## 2020-05-16 PROCEDURE — 83540 ASSAY OF IRON: CPT

## 2020-05-16 PROCEDURE — 36415 COLL VENOUS BLD VENIPUNCTURE: CPT

## 2020-05-16 PROCEDURE — 82728 ASSAY OF FERRITIN: CPT

## 2020-05-16 PROCEDURE — 85025 COMPLETE CBC W/AUTO DIFF WBC: CPT

## 2020-05-16 PROCEDURE — 84466 ASSAY OF TRANSFERRIN: CPT

## 2020-05-18 ENCOUNTER — HOSPITAL ENCOUNTER (OUTPATIENT)
Dept: CT IMAGING | Facility: HOSPITAL | Age: 70
Discharge: HOME | End: 2020-05-18
Attending: INTERNAL MEDICINE
Payer: MEDICARE

## 2020-05-18 ENCOUNTER — TELEPHONE (OUTPATIENT)
Dept: GASTROENTEROLOGY | Facility: CLINIC | Age: 70
End: 2020-05-18

## 2020-05-18 DIAGNOSIS — R91.1 LUNG NODULE < 6CM ON CT: ICD-10-CM

## 2020-05-18 DIAGNOSIS — D50.9 IRON DEFICIENCY ANEMIA, UNSPECIFIED IRON DEFICIENCY ANEMIA TYPE: Primary | ICD-10-CM

## 2020-05-18 PROCEDURE — 71250 CT THORAX DX C-: CPT | Performed by: INTERNAL MEDICINE

## 2020-05-20 ENCOUNTER — OFFICE VISIT (OUTPATIENT)
Dept: PHYSICAL THERAPY | Age: 70
End: 2020-05-20
Attending: ORTHOPAEDIC SURGERY
Payer: MEDICARE

## 2020-05-20 PROCEDURE — 97110 THERAPEUTIC EXERCISES: CPT

## 2020-05-20 NOTE — PROGRESS NOTES
Dx: Post-operative state (Z98.890)          Insurance (Authorized # of Visits):  Jostin OMALLEY O           Authorizing Physician: Dr. Hubert Brown  Next MD visit: none scheduled  Fall Risk: standard         Precautions: n/a             Subjective: Pt reports ex:  Reassessment  Bridging with his add 10 x 5 cts  Sdly hip abd 2 x 10  Sdly clam shell 2 x 10  Prone knee bends 20x  Prone hip extension 20x  Standing SL kicks RTB (flex/abd/ext) 20x  SciFit Distance 8 x 10 min                                  HEP: (rev

## 2020-05-22 ENCOUNTER — OFFICE VISIT (OUTPATIENT)
Dept: PHYSICAL THERAPY | Age: 70
End: 2020-05-22
Attending: ORTHOPAEDIC SURGERY
Payer: MEDICARE

## 2020-05-22 PROCEDURE — 97110 THERAPEUTIC EXERCISES: CPT

## 2020-05-22 NOTE — PROGRESS NOTES
Dx: Post-operative state (Z98.890)          Insurance (Authorized # of Visits):  Jostin OMALLEY O           Authorizing Physician: Dr. Melonie Krishnan  Next MD visit: none scheduled  Fall Risk: standard         Precautions: n/a             Subjective: Pt reports 20x  Prone hip extension 20x  Gastroc stretch incline 5 x 20 cts  R/L lunges 10x 5 cts  Standing SL kicks RTB (flex/abd/ext) 20x  SciFit Distance 8 x 10 min                            HEP: (review); bridging, sdly hip abd; clam shell; prone hip ext; SL kic

## 2020-05-27 ENCOUNTER — OFFICE VISIT (OUTPATIENT)
Dept: ORTHOPEDICS CLINIC | Facility: CLINIC | Age: 70
End: 2020-05-27
Payer: MEDICARE

## 2020-05-27 ENCOUNTER — OFFICE VISIT (OUTPATIENT)
Dept: PHYSICAL THERAPY | Age: 70
End: 2020-05-27
Attending: ORTHOPAEDIC SURGERY
Payer: MEDICARE

## 2020-05-27 DIAGNOSIS — S83.241D OTHER TEAR OF MEDIAL MENISCUS OF RIGHT KNEE, UNSPECIFIED WHETHER OLD OR CURRENT TEAR, SUBSEQUENT ENCOUNTER: Primary | ICD-10-CM

## 2020-05-27 PROCEDURE — 99213 OFFICE O/P EST LOW 20 MIN: CPT | Performed by: ORTHOPAEDIC SURGERY

## 2020-05-27 PROCEDURE — 97110 THERAPEUTIC EXERCISES: CPT

## 2020-05-27 NOTE — PROGRESS NOTES
NURSING INTAKE COMMENTS: Patient presents with:   Follow - Up: regarding right knee arthroscopy, partial medial meniscectomy on 2/24/2020, c/o 3/10 right knee pain, pt is doing physical therapy, pt denies swelling, numbness, tingling, fever, or chills DAILY 180 tablet 1   • LORazepam 1 MG Oral Tab TAKE 1 TABLET EVERY NIGHT 90 tablet 1   • Metoprolol Succinate ER 25 MG Oral Tablet 24 Hr Take 1 tablet (25 mg total) by mouth daily.  90 tablet 1   • metFORMIN HCl 1000 MG Oral Tab TAKE 1 TABLET TWICE DAILY WI abdominal pain, nausea, vomiting, diarrhea, constipation, hematochezia, worsening heartburn or stomach ulcers  : denies dysuria, hematuria, incontinence, increased frequency, urgency, difficulty urinating  MUSCULOSKELETAL: denies musculoskeletal complain coronary artery bypass. Aortic valve calcification. MEDIASTINUM/JAY: Normal.  No mass or adenopathy. PULMONARY ARTERIES: Normal caliber main pulmonary artery. AORTA: No aneurysm. Atherosclerotic calcification.  LUNGS/PLEURA: Stable ground-glass attenuat meniscus of right knee, unspecified whether old or current tear, subsequent encounter        Assessment: Right knee degenerative tear medial meniscus with bone marrow lesion medial tibial plateau, improving    Plan: I advised continued ice and oral anti-in

## 2020-05-27 NOTE — PROGRESS NOTES
Dx: Post-operative state (Z98.890)          Insurance (Authorized # of Visits):  Jostin OMALLEY O           Authorizing Physician: Dr. Naya Jeter MD visit: 5/27/20  Fall Risk: standard         Precautions: n/a             Subjective: Pt reports doing balance, endurance, and functional mechanics   Date: 5/20/2020  TX#: 2 Date: 5/22/20                TX#: 3 Date: 5/27/20                 TX#: 4 Date:                 TX#: 5/ Date:    Tx#: 6/   There ex:  Reassessment  Bridging with his add 10 x 5 cts  Sdly

## 2020-05-29 ENCOUNTER — OFFICE VISIT (OUTPATIENT)
Dept: PHYSICAL THERAPY | Age: 70
End: 2020-05-29
Attending: ORTHOPAEDIC SURGERY
Payer: MEDICARE

## 2020-05-29 PROCEDURE — 97110 THERAPEUTIC EXERCISES: CPT

## 2020-05-29 NOTE — PROGRESS NOTES
DISCHARGE SUMMARY  Dx: Post-operative state (Z98.890)          Insurance (Authorized # of Visits):  Jostin OMALLEY O           Authorizing Physician: Dr. Elana Jeter MD visit: 5/27/20  Fall Risk: standard         Precautions: n/a             Subjective Plan: review HEP and d/c   Date: 5/20/2020  TX#: 2 Date: 5/22/20                TX#: 3 Date: 5/27/20                 TX#: 4 Date: 5/29/20                 TX#: 5   There ex:  Reassessment  Bridging with his add 10 x 5 cts  Sdly hip abd 2 x 10  Sdly clam

## 2020-06-24 NOTE — TELEPHONE ENCOUNTER
[address] [facility]! Spoke with pt to schedule his VCE. He states that he does not want to reschedule at this time. He is seeing his PCP next month Jose Kunz) & will discuss with her. He will call back when he is ready to reschedule. TE closed.

## 2020-06-25 NOTE — TELEPHONE ENCOUNTER
Dr. Romy Genao. .. See below for information regarding this patients VCE. Patient does not want to pursue the procedure at this time.  Thank you

## 2020-07-03 RX ORDER — ATORVASTATIN CALCIUM 40 MG/1
40 TABLET, FILM COATED ORAL NIGHTLY
Qty: 90 TABLET | Refills: 1 | Status: SHIPPED | OUTPATIENT
Start: 2020-07-03 | End: 2020-10-05

## 2020-07-19 ENCOUNTER — MA CHART PREP (OUTPATIENT)
Dept: FAMILY MEDICINE CLINIC | Facility: CLINIC | Age: 70
End: 2020-07-19

## 2020-07-19 PROBLEM — I35.9 AORTIC VALVE CALCIFICATION: Status: ACTIVE | Noted: 2020-07-19

## 2020-07-19 PROBLEM — I70.0 ARTERIOSCLEROSIS OF AORTA: Status: ACTIVE | Noted: 2020-07-19

## 2020-07-19 PROBLEM — I70.0 ARTERIOSCLEROSIS OF AORTA (HCC): Status: ACTIVE | Noted: 2020-07-19

## 2020-07-22 ENCOUNTER — LAB ENCOUNTER (OUTPATIENT)
Dept: LAB | Age: 70
End: 2020-07-22
Attending: INTERNAL MEDICINE
Payer: MEDICARE

## 2020-07-22 DIAGNOSIS — D50.9 IRON DEFICIENCY ANEMIA, UNSPECIFIED IRON DEFICIENCY ANEMIA TYPE: ICD-10-CM

## 2020-07-22 DIAGNOSIS — E11.9 TYPE 2 DIABETES MELLITUS WITHOUT COMPLICATION, WITHOUT LONG-TERM CURRENT USE OF INSULIN (HCC): ICD-10-CM

## 2020-07-22 LAB
ALBUMIN SERPL-MCNC: 3.6 G/DL (ref 3.4–5)
ALBUMIN/GLOB SERPL: 1 {RATIO} (ref 1–2)
ALP LIVER SERPL-CCNC: 54 U/L (ref 45–117)
ALT SERPL-CCNC: 53 U/L (ref 16–61)
ANION GAP SERPL CALC-SCNC: 6 MMOL/L (ref 0–18)
AST SERPL-CCNC: 26 U/L (ref 15–37)
BACTERIA UR QL AUTO: NEGATIVE /HPF
BASOPHILS # BLD AUTO: 0.03 X10(3) UL (ref 0–0.2)
BASOPHILS NFR BLD AUTO: 0.9 %
BILIRUB SERPL-MCNC: 0.9 MG/DL (ref 0.1–2)
BILIRUB UR QL: NEGATIVE
BUN BLD-MCNC: 10 MG/DL (ref 7–18)
BUN/CREAT SERPL: 11.5 (ref 10–20)
CALCIUM BLD-MCNC: 8.9 MG/DL (ref 8.5–10.1)
CHLORIDE SERPL-SCNC: 102 MMOL/L (ref 98–112)
CHOLEST SMN-MCNC: 108 MG/DL (ref ?–200)
CLARITY UR: CLEAR
CO2 SERPL-SCNC: 30 MMOL/L (ref 21–32)
COLOR UR: YELLOW
CREAT BLD-MCNC: 0.87 MG/DL (ref 0.7–1.3)
CREAT UR-SCNC: 296 MG/DL
DEPRECATED HBV CORE AB SER IA-ACNC: 35.2 NG/ML (ref 30–530)
DEPRECATED RDW RBC AUTO: 48.2 FL (ref 35.1–46.3)
EOSINOPHIL # BLD AUTO: 0.1 X10(3) UL (ref 0–0.7)
EOSINOPHIL NFR BLD AUTO: 3.1 %
ERYTHROCYTE [DISTWIDTH] IN BLOOD BY AUTOMATED COUNT: 15.2 % (ref 11–15)
EST. AVERAGE GLUCOSE BLD GHB EST-MCNC: 183 MG/DL (ref 68–126)
GLOBULIN PLAS-MCNC: 3.5 G/DL (ref 2.8–4.4)
GLUCOSE BLD-MCNC: 142 MG/DL (ref 70–99)
GLUCOSE UR-MCNC: NEGATIVE MG/DL
HBA1C MFR BLD HPLC: 8 % (ref ?–5.7)
HCT VFR BLD AUTO: 36.9 % (ref 39–53)
HDLC SERPL-MCNC: 45 MG/DL (ref 40–59)
HGB BLD-MCNC: 12 G/DL (ref 13–17.5)
IMM GRANULOCYTES # BLD AUTO: 0 X10(3) UL (ref 0–1)
IMM GRANULOCYTES NFR BLD: 0 %
IRON SERPL-MCNC: 132 UG/DL (ref 65–175)
KETONES UR-MCNC: NEGATIVE MG/DL
LDLC SERPL CALC-MCNC: 30 MG/DL (ref ?–100)
LEUKOCYTE ESTERASE UR QL STRIP.AUTO: NEGATIVE
LYMPHOCYTES # BLD AUTO: 1.68 X10(3) UL (ref 1–4)
LYMPHOCYTES NFR BLD AUTO: 52.5 %
M PROTEIN MFR SERPL ELPH: 7.1 G/DL (ref 6.4–8.2)
MCH RBC QN AUTO: 28.6 PG (ref 26–34)
MCHC RBC AUTO-ENTMCNC: 32.5 G/DL (ref 31–37)
MCV RBC AUTO: 88.1 FL (ref 80–100)
MICROALBUMIN UR-MCNC: 7.21 MG/DL
MICROALBUMIN/CREAT 24H UR-RTO: 24.4 UG/MG (ref ?–30)
MONOCYTES # BLD AUTO: 0.41 X10(3) UL (ref 0.1–1)
MONOCYTES NFR BLD AUTO: 12.8 %
NEUTROPHILS # BLD AUTO: 0.98 X10 (3) UL (ref 1.5–7.7)
NEUTROPHILS # BLD AUTO: 0.98 X10(3) UL (ref 1.5–7.7)
NEUTROPHILS NFR BLD AUTO: 30.7 %
NITRITE UR QL STRIP.AUTO: NEGATIVE
NONHDLC SERPL-MCNC: 63 MG/DL (ref ?–130)
OSMOLALITY SERPL CALC.SUM OF ELEC: 287 MOSM/KG (ref 275–295)
PATIENT FASTING Y/N/NP: YES
PATIENT FASTING Y/N/NP: YES
PH UR: 6 [PH] (ref 5–8)
PLATELET # BLD AUTO: 191 10(3)UL (ref 150–450)
POTASSIUM SERPL-SCNC: 3.7 MMOL/L (ref 3.5–5.1)
PROT UR-MCNC: 100 MG/DL
RBC # BLD AUTO: 4.19 X10(6)UL (ref 3.8–5.8)
RBC #/AREA URNS AUTO: 7 /HPF
SODIUM SERPL-SCNC: 138 MMOL/L (ref 136–145)
SP GR UR STRIP: 1.02 (ref 1–1.03)
TRIGL SERPL-MCNC: 163 MG/DL (ref 30–149)
UROBILINOGEN UR STRIP-ACNC: <2
VLDLC SERPL CALC-MCNC: 33 MG/DL (ref 0–30)
WBC # BLD AUTO: 3.2 X10(3) UL (ref 4–11)
WBC #/AREA URNS AUTO: 1 /HPF

## 2020-07-22 PROCEDURE — 81001 URINALYSIS AUTO W/SCOPE: CPT

## 2020-07-22 PROCEDURE — 80053 COMPREHEN METABOLIC PANEL: CPT

## 2020-07-22 PROCEDURE — 85025 COMPLETE CBC W/AUTO DIFF WBC: CPT

## 2020-07-22 PROCEDURE — 83540 ASSAY OF IRON: CPT

## 2020-07-22 PROCEDURE — 80061 LIPID PANEL: CPT

## 2020-07-22 PROCEDURE — 83036 HEMOGLOBIN GLYCOSYLATED A1C: CPT

## 2020-07-22 PROCEDURE — 85060 BLOOD SMEAR INTERPRETATION: CPT

## 2020-07-22 PROCEDURE — 82043 UR ALBUMIN QUANTITATIVE: CPT

## 2020-07-22 PROCEDURE — 82570 ASSAY OF URINE CREATININE: CPT

## 2020-07-22 PROCEDURE — 82728 ASSAY OF FERRITIN: CPT

## 2020-07-22 PROCEDURE — 36415 COLL VENOUS BLD VENIPUNCTURE: CPT

## 2020-07-25 RX ORDER — AMLODIPINE BESYLATE 10 MG/1
10 TABLET ORAL DAILY
Qty: 90 TABLET | Refills: 2 | Status: SHIPPED | OUTPATIENT
Start: 2020-07-25 | End: 2021-04-29

## 2020-07-30 ENCOUNTER — OFFICE VISIT (OUTPATIENT)
Dept: INTERNAL MEDICINE CLINIC | Facility: CLINIC | Age: 70
End: 2020-07-30
Payer: MEDICARE

## 2020-07-30 VITALS
RESPIRATION RATE: 16 BRPM | HEART RATE: 58 BPM | BODY MASS INDEX: 27.28 KG/M2 | WEIGHT: 180 LBS | DIASTOLIC BLOOD PRESSURE: 68 MMHG | SYSTOLIC BLOOD PRESSURE: 134 MMHG | HEIGHT: 68 IN

## 2020-07-30 DIAGNOSIS — Z11.59 NEED FOR HEPATITIS C SCREENING TEST: ICD-10-CM

## 2020-07-30 DIAGNOSIS — E11.29 MICROALBUMINURIA DUE TO TYPE 2 DIABETES MELLITUS (HCC): ICD-10-CM

## 2020-07-30 DIAGNOSIS — I70.0 ARTERIOSCLEROSIS OF AORTA (HCC): Primary | ICD-10-CM

## 2020-07-30 DIAGNOSIS — D61.818 PANCYTOPENIA (HCC): ICD-10-CM

## 2020-07-30 DIAGNOSIS — E78.00 PURE HYPERCHOLESTEROLEMIA: ICD-10-CM

## 2020-07-30 DIAGNOSIS — E11.9 TYPE 2 DIABETES MELLITUS WITHOUT COMPLICATION, WITHOUT LONG-TERM CURRENT USE OF INSULIN (HCC): ICD-10-CM

## 2020-07-30 DIAGNOSIS — I51.89 GRADE I DIASTOLIC DYSFUNCTION: ICD-10-CM

## 2020-07-30 DIAGNOSIS — R80.9 MICROALBUMINURIA DUE TO TYPE 2 DIABETES MELLITUS (HCC): ICD-10-CM

## 2020-07-30 DIAGNOSIS — D12.6 TUBULAR ADENOMA OF COLON: ICD-10-CM

## 2020-07-30 DIAGNOSIS — Z00.00 ENCOUNTER FOR ANNUAL HEALTH EXAMINATION: ICD-10-CM

## 2020-07-30 DIAGNOSIS — F32.A MILD DEPRESSIVE DISORDER: ICD-10-CM

## 2020-07-30 DIAGNOSIS — Z87.891 FORMER TOBACCO USE: ICD-10-CM

## 2020-07-30 DIAGNOSIS — Z12.5 PROSTATE CANCER SCREENING: ICD-10-CM

## 2020-07-30 DIAGNOSIS — D69.6 THROMBOCYTOPENIA (HCC): ICD-10-CM

## 2020-07-30 DIAGNOSIS — K21.9 GASTROESOPHAGEAL REFLUX DISEASE WITHOUT ESOPHAGITIS: ICD-10-CM

## 2020-07-30 DIAGNOSIS — H90.3 SENSORINEURAL HEARING LOSS, BILATERAL: ICD-10-CM

## 2020-07-30 DIAGNOSIS — Z23 NEED FOR VACCINATION: ICD-10-CM

## 2020-07-30 DIAGNOSIS — I10 ESSENTIAL HYPERTENSION: ICD-10-CM

## 2020-07-30 DIAGNOSIS — Z00.00 MEDICARE ANNUAL WELLNESS VISIT, INITIAL: ICD-10-CM

## 2020-07-30 DIAGNOSIS — Z95.1 S/P CABG X 5: ICD-10-CM

## 2020-07-30 PROBLEM — Z01.818 PREOP GENERAL PHYSICAL EXAM: Status: RESOLVED | Noted: 2020-02-17 | Resolved: 2020-07-30

## 2020-07-30 PROBLEM — N50.89 SCROTAL FULLNESS: Status: RESOLVED | Noted: 2019-07-23 | Resolved: 2020-07-30

## 2020-07-30 PROBLEM — J01.00 ACUTE NON-RECURRENT MAXILLARY SINUSITIS: Status: RESOLVED | Noted: 2019-11-26 | Resolved: 2020-07-30

## 2020-07-30 PROCEDURE — 3008F BODY MASS INDEX DOCD: CPT | Performed by: INTERNAL MEDICINE

## 2020-07-30 PROCEDURE — 96160 PT-FOCUSED HLTH RISK ASSMT: CPT | Performed by: INTERNAL MEDICINE

## 2020-07-30 PROCEDURE — G0439 PPPS, SUBSEQ VISIT: HCPCS | Performed by: INTERNAL MEDICINE

## 2020-07-30 PROCEDURE — 3078F DIAST BP <80 MM HG: CPT | Performed by: INTERNAL MEDICINE

## 2020-07-30 PROCEDURE — 90732 PPSV23 VACC 2 YRS+ SUBQ/IM: CPT | Performed by: INTERNAL MEDICINE

## 2020-07-30 PROCEDURE — G0009 ADMIN PNEUMOCOCCAL VACCINE: HCPCS | Performed by: INTERNAL MEDICINE

## 2020-07-30 PROCEDURE — 3075F SYST BP GE 130 - 139MM HG: CPT | Performed by: INTERNAL MEDICINE

## 2020-07-30 PROCEDURE — 99397 PER PM REEVAL EST PAT 65+ YR: CPT | Performed by: INTERNAL MEDICINE

## 2020-07-30 NOTE — ASSESSMENT & PLAN NOTE
Blood sugars have been stable on metformin 1000 mg twice daily and glimepiride 1 mg twice daily. His hemoglobin A1c however is higher and hence he is advised to increase the glimepiride to 1 mg - 1-1/2 tablets in the morning and 1 mg in the evening.   New

## 2020-07-30 NOTE — ASSESSMENT & PLAN NOTE
Lipid panel and liver function test have been stable on atorvastatin 40 mg daily. He is doing well at this time.   Continue on the same dose of medication, recheck labs and follow-up

## 2020-07-30 NOTE — ASSESSMENT & PLAN NOTE
Asymptomatic longstanding intermittent pancytopenia with bearing declines in the neutrophil counts, platelets as well as his hemoglobin. He has been seen by Dr. Beau Andrews as well as gastroenterology. He has had upper and lower GI evaluations completed. .  Capsu

## 2020-07-30 NOTE — ASSESSMENT & PLAN NOTE
Normal exam.  Labs as ordered. Skin check–normal  No cervical, axillary, inguinal lymphadenopathy. Hernial orifices intact. Rectal exam normal,prostate normal to palpation.   PSA due on 08/10/2021    Small hemorrhoids present.  guaic negetive brown stool

## 2020-07-30 NOTE — ASSESSMENT & PLAN NOTE
Asymptomatic grade 1 diastolic dysfunction mild aortic stenosis. Lower extremity edema at this time hence repeat echocardiogram as ordered.

## 2020-07-30 NOTE — ASSESSMENT & PLAN NOTE
Colonoscopy due on 10/28/2022  Last colonoscopy with multiple tubular adenomas. Follow-up as directed. Rectal exam today with brown stools, guaiac negative. No change in bowel movements, abdominal pain, nausea or vomiting at this time.

## 2020-07-30 NOTE — ASSESSMENT & PLAN NOTE
Atherosclerosis of the aorta–asymptomatic. No aneurysms or dissection noted. Cholesterol panel and blood pressures have been well controlled. Continue to monitor.   He has quit smoking

## 2020-07-30 NOTE — ASSESSMENT & PLAN NOTE
Blood pressure 134/68, pulse 58, resp. rate 16, height 5' 8\" (1.727 m), weight 180 lb (81.6 kg). Blood pressures tend to fluctuate. He does have a grade 1 diastolic dysfunction with mild to moderate aortic regurgitation on his echo.   Follow-up echo has

## 2020-07-30 NOTE — ASSESSMENT & PLAN NOTE
Patient has been doing well since his CABG. His last regadenoson stress test was normal in 2019. No chest pain or shortness of breath but has had some chronic lower extremity edema.   Reviewed his echocardiogram.  Mild aortic stenosis and diastolic dysfun

## 2020-07-30 NOTE — ASSESSMENT & PLAN NOTE
Gastroesophageal reflux disease. Much improved on diet control. Has had EGD and colonoscopy–no significant abnormalities.

## 2020-07-30 NOTE — ASSESSMENT & PLAN NOTE
Intermittent thrombocytopenia/pancytopenia. Currently improved. No bleeding complications at this time. Continue to monitor.

## 2020-07-30 NOTE — ASSESSMENT & PLAN NOTE
35-pack-year smoking history. CT low-dose screening study completed in May 2020 looks stable. Repeat in 1 year.    =====  CONCLUSION:   1.  Persistent predominantly ground-glass attenuation nodules in right greater than left lungs with the largest in the

## 2020-07-30 NOTE — PATIENT INSTRUCTIONS
Problem List Items Addressed This Visit        Unprioritized    Arteriosclerosis of aorta ([facility] [address] - Primary     Atherosclerosis of the aorta–asymptomatic. No aneurysms or dissection noted. Cholesterol panel and blood pressures have been well controlled.   C stable on atorvastatin 40 mg daily. He is doing well at this time. Continue on the same dose of medication, recheck labs and follow-up         Medicare annual wellness visit, initial     Normal exam.  Labs as ordered.   Skin check–normal  No cervical, axi Reviewed his echocardiogram.  Mild aortic stenosis and diastolic dysfunction. Will consider repeating an echocardiogram at this time. Sensorineural hearing loss, bilateral     Chronic hearing loss, doing well at this time.   Continue to monitor Encounter for annual health examination        Need for vaccination        Relevant Orders    PNEUMOCOCCAL IMM (PNEUMOVAX) (Completed)    Need for hepatitis C screening test        Relevant Orders    HCV ANTIBODY    Prostate cancer screening        Relevan ages 73-68)  No results found for this or any previous visit.  Limited to patients who meet one of the following criteria:   • Men who are 73-68 years old and have smoked more than 100 cigarettes in their lifetime   • Anyone with a family history    Colorec Hemophiliacs who received Factor VIII or IX concentrates   Clients of institutions for the mentally retarded   Persons who live in the same house as a HepB virus carrier   Homosexual men   Illicit injectable drug abusers     Tetanus Toxoid- Only covered

## 2020-07-30 NOTE — PROGRESS NOTES
HPI:   Davion Phan is a 72year old male who presents for a Medicare Subsequent Annual Wellness visit (Pt already had Initial Annual Wellness).     Annual Physical due on 07/30/2021      Total white cell count remains low at 3.2 and total neutrophil cou advance directives standard forms performed Face to Face with patient and Family/surrogate (if present), and forms available to patient in AVS  16+ minutes was spent with all Advanced Care Planning elements today (up to 30 minutes for CPT 49476) 07/22/2020    TSH 2.290 03/21/2020    CREATSERUM 0.87 07/22/2020     (H) 07/22/2020        CBC  (most recent labs)   Lab Results   Component Value Date    WBC 3.2 (L) 07/22/2020    HGB 12.0 (L) 07/22/2020    .0 07/22/2020        ALLERGIES: smoking history. He has never used smokeless tobacco. He reports current alcohol use. He reports that he does not use drugs.      REVIEW OF SYSTEMS:   Review of Systems   GENERAL: feels well otherwise  SKIN: denies any unusual skin lesions  EYES: denies enid clearly):  No People get annoyed because I misunderstand what they say:  No   I misunderstand what others are saying and make inappropriate responses:  No I avoid social activities because I cannot hear well and fear I will reply improperly:  No   Family m Psychiatric: He has a normal mood and affect.  His behavior is normal. Judgment and thought content normal.         Vaccination History     Immunization History   Administered Date(s) Administered   • FLU VACC High Dose 72 YRS & Older PRSV Free (76678) 71 Mild hepatic steatosis without significant change. 3. Post coronary artery bypass. 4. Aortic valve calcification. Gastroesophageal reflux disease     Gastroesophageal reflux disease. Much improved on diet control.   Has had EGD and longstanding intermittent pancytopenia with bearing declines in the neutrophil counts, platelets as well as his hemoglobin. He has been seen by Dr. Zuly Tolbert as well as gastroenterology. He has had upper and lower GI evaluations completed. .  Capsule evaluation have any chest pain, palpitations, shortness of breath or kidney dysfunction at this time. He does have some lower extremity edema however, no pain redness noted. Eye exam has been completed per Dr. Mallory Bowman.   Foot exam in the office normal–no calluses, ski (CPT=93306); Future    Sensorineural hearing loss, bilateral    Prostate cancer screening  -     PSA SCREEN; Future         Diet assessment: good     PLAN:  The patient indicates understanding of these issues and agrees to the plan.   Reinforced healthy die AA>50, > 65 Data entered on: 2/17/2020   Last Dilated Eye Exam 2/12/2020       Prostate Cancer Screening      PSA  Annually PSA due on 08/10/2021  Update Health Maintenance if applicable     Immunizations (Update Immunization Activity if applicable 07/22/2020 24.4        LDL  Annually LDL Cholesterol (mg/dL)   Date Value   07/22/2020 30    No flowsheet data found. Dilated Eye exam  Annually Data entered on: 2/17/2020   Last Dilated Eye Exam 2/12/2020     No flowsheet data found.

## 2020-07-30 NOTE — ASSESSMENT & PLAN NOTE
Patient's Lexapro was changed to Prozac at 20 mg daily which he has tolerated well. Concerns raised by hematology about pancytopenia relating to his Lexapro.   Will advised to continue on the same dose of medication as he has done well with it and follow-u

## 2020-07-31 ENCOUNTER — TELEPHONE (OUTPATIENT)
Dept: GASTROENTEROLOGY | Facility: CLINIC | Age: 70
End: 2020-07-31

## 2020-07-31 DIAGNOSIS — D50.9 IRON DEFICIENCY ANEMIA, UNSPECIFIED IRON DEFICIENCY ANEMIA TYPE: Primary | ICD-10-CM

## 2020-07-31 NOTE — TELEPHONE ENCOUNTER
Dr. Hebert Chick-    Please refer to PB TE from 3/23/2020. Pt is requesting to schedule VCE. Is it ok to proceed w/ scheduling? Please advise, thank you.

## 2020-07-31 NOTE — TELEPHONE ENCOUNTER
Yes. Can schedule capsule as Pirya indicated for iron deficiency anemia.     Thanks    Ghada Carrasco MD  [facility], [facility] - Gastroenterology  7/31/2020  1:49 PM

## 2020-08-01 NOTE — TELEPHONE ENCOUNTER
Yumiko--    This patient is scheduled which I found that he takes Metformin and Glimepiride and will be on clear liquids starting at 1700 the day before.  Please advise on DM orders, thank you

## 2020-08-01 NOTE — TELEPHONE ENCOUNTER
Scheduled for:  Video Capsule [id number]  Provider Name:  Dr. Melany Sexton  Date:  8/6/20  Location:    [facility]  Sedation:  None  Time:  0730 (pt is aware to arrive at 0630)   Prep:  Magnesium Citrate, sent via [facility]/Allergies Reconciled?:  Physician reviewed

## 2020-08-04 NOTE — TELEPHONE ENCOUNTER
Pt contacted and reviewed Dr. Pugh Call DM med orders below. Pt repeated to demonstrate understanding and did not have further questions or concerns at this time.

## 2020-08-04 NOTE — TELEPHONE ENCOUNTER
Appreciate clarifying the orders. I have been out of the office since last week, please check provider availability for high priorities, [place]. Thank you    No further orders on my behalf. Thanks for taking care of this, Elsy Malik.

## 2020-08-04 NOTE — TELEPHONE ENCOUNTER
I would recommend he hold the oral diabetic pills the day before the procedure and the morning of the procedure.      Sim Collins MD  [facility], [facility] - Gastroenterology  8/4/2020  9:01 AM

## 2020-08-04 NOTE — TELEPHONE ENCOUNTER
Dr. Amauri Lorenzo-    Please advise on  message below. OK to inform pt to hold metformin and glimepiride the day of the procedure? Or would you before hold the day before and the day of? Thank you.

## 2020-08-06 ENCOUNTER — HOSPITAL ENCOUNTER (OUTPATIENT)
Facility: HOSPITAL | Age: 70
Setting detail: HOSPITAL OUTPATIENT SURGERY
Discharge: HOME | End: 2020-08-06
Attending: INTERNAL MEDICINE | Admitting: INTERNAL MEDICINE
Payer: MEDICARE

## 2020-08-06 ENCOUNTER — TELEPHONE (OUTPATIENT)
Dept: GASTROENTEROLOGY | Facility: CLINIC | Age: 70
End: 2020-08-06

## 2020-08-06 VITALS
HEART RATE: 60 BPM | WEIGHT: 170 LBS | RESPIRATION RATE: 14 BRPM | OXYGEN SATURATION: 97 % | HEIGHT: 68 IN | SYSTOLIC BLOOD PRESSURE: 132 MMHG | DIASTOLIC BLOOD PRESSURE: 74 MMHG | BODY MASS INDEX: 25.76 KG/M2 | TEMPERATURE: 98 F

## 2020-08-06 DIAGNOSIS — D50.9 IRON DEFICIENCY ANEMIA, UNSPECIFIED IRON DEFICIENCY ANEMIA TYPE: ICD-10-CM

## 2020-08-06 LAB — GLUCOSE BLDC GLUCOMTR-MCNC: 161 MG/DL (ref 70–99)

## 2020-08-06 PROCEDURE — 0DJ07ZZ INSPECTION OF UPPER INTESTINAL TRACT, VIA NATURAL OR ARTIFICIAL OPENING: ICD-10-PCS | Performed by: INTERNAL MEDICINE

## 2020-08-06 RX ORDER — MELATONIN
325
COMMUNITY

## 2020-08-06 NOTE — TELEPHONE ENCOUNTER
Spoke to Nickolas ([phone]) and provided additional clinical information. He provided authorization for VCE CPT 54753, authorization number: [id number], valid from 8/4/2020-9/5/2020. Referral updated.

## 2020-08-06 NOTE — TELEPHONE ENCOUNTER
Floyd/Phillip PAYAN dept requesting additional clinical information regarding PA for capsule endoscopy.

## 2020-08-12 PROCEDURE — 91110 GI TRC IMG INTRAL ESOPH-ILE: CPT | Performed by: INTERNAL MEDICINE

## 2020-08-12 NOTE — OPERATIVE REPORT
VIDEO CAPSULE ENDOSCOPY REPORT    Patient: Andrea Resendez   : 3/18/1950    Procedure: small bowel capsule endoscopy    Procedure date: 2020    Pre-operative diagnosis: iron deficiency anemia    Post-operative diagnosis: small intestinal angiectasias

## 2020-08-31 DIAGNOSIS — F41.9 ANXIETY: ICD-10-CM

## 2020-09-01 RX ORDER — LORAZEPAM 1 MG/1
TABLET ORAL
Qty: 90 TABLET | Refills: 1 | Status: SHIPPED | OUTPATIENT
Start: 2020-09-01 | End: 2020-12-23

## 2020-10-07 RX ORDER — METOPROLOL SUCCINATE 25 MG/1
25 TABLET, EXTENDED RELEASE ORAL DAILY
Qty: 90 TABLET | Refills: 1 | Status: SHIPPED | OUTPATIENT
Start: 2020-10-07 | End: 2021-04-08

## 2020-10-07 RX ORDER — ATORVASTATIN CALCIUM 40 MG/1
40 TABLET, FILM COATED ORAL NIGHTLY
Qty: 90 TABLET | Refills: 1 | Status: SHIPPED | OUTPATIENT
Start: 2020-10-07 | End: 2020-12-27

## 2020-10-08 ENCOUNTER — HOSPITAL ENCOUNTER (OUTPATIENT)
Dept: CV DIAGNOSTICS | Facility: HOSPITAL | Age: 70
Discharge: HOME | End: 2020-10-08
Attending: INTERNAL MEDICINE
Payer: MEDICARE

## 2020-10-08 DIAGNOSIS — I10 ESSENTIAL HYPERTENSION: ICD-10-CM

## 2020-10-08 DIAGNOSIS — I51.89 GRADE I DIASTOLIC DYSFUNCTION: ICD-10-CM

## 2020-10-08 PROCEDURE — 93306 TTE W/DOPPLER COMPLETE: CPT | Performed by: INTERNAL MEDICINE

## 2020-10-13 RX ORDER — VALSARTAN AND HYDROCHLOROTHIAZIDE 320; 25 MG/1; MG/1
1 TABLET, FILM COATED ORAL DAILY
Qty: 90 TABLET | Refills: 1 | Status: SHIPPED | OUTPATIENT
Start: 2020-10-13 | End: 2021-04-17

## 2020-10-30 RX ORDER — GLIMEPIRIDE 1 MG/1
1 TABLET ORAL 2 TIMES DAILY
Qty: 180 TABLET | Refills: 1 | Status: SHIPPED | OUTPATIENT
Start: 2020-10-30 | End: 2020-11-03

## 2020-11-03 RX ORDER — GLIMEPIRIDE 1 MG/1
1 TABLET ORAL 2 TIMES DAILY
Qty: 180 TABLET | Refills: 1 | OUTPATIENT
Start: 2020-11-03

## 2020-11-03 RX ORDER — GLIMEPIRIDE 1 MG/1
1 TABLET ORAL 2 TIMES DAILY
Qty: 180 TABLET | Refills: 1 | Status: SHIPPED | OUTPATIENT
Start: 2020-11-03 | End: 2020-12-02

## 2020-11-13 ENCOUNTER — LAB ENCOUNTER (OUTPATIENT)
Dept: LAB | Age: 70
End: 2020-11-13
Attending: INTERNAL MEDICINE
Payer: MEDICARE

## 2020-11-13 DIAGNOSIS — E11.9 TYPE 2 DIABETES MELLITUS WITHOUT COMPLICATION, WITHOUT LONG-TERM CURRENT USE OF INSULIN (HCC): ICD-10-CM

## 2020-11-13 DIAGNOSIS — Z12.5 PROSTATE CANCER SCREENING: ICD-10-CM

## 2020-11-13 DIAGNOSIS — Z11.59 NEED FOR HEPATITIS C SCREENING TEST: ICD-10-CM

## 2020-11-13 PROCEDURE — 86803 HEPATITIS C AB TEST: CPT

## 2020-11-13 PROCEDURE — 85025 COMPLETE CBC W/AUTO DIFF WBC: CPT

## 2020-11-13 PROCEDURE — 82043 UR ALBUMIN QUANTITATIVE: CPT

## 2020-11-13 PROCEDURE — 83036 HEMOGLOBIN GLYCOSYLATED A1C: CPT

## 2020-11-13 PROCEDURE — 82570 ASSAY OF URINE CREATININE: CPT

## 2020-11-13 PROCEDURE — 36415 COLL VENOUS BLD VENIPUNCTURE: CPT

## 2020-11-13 PROCEDURE — 80061 LIPID PANEL: CPT

## 2020-11-13 PROCEDURE — 81001 URINALYSIS AUTO W/SCOPE: CPT

## 2020-11-13 PROCEDURE — 80053 COMPREHEN METABOLIC PANEL: CPT

## 2020-12-02 ENCOUNTER — OFFICE VISIT (OUTPATIENT)
Dept: INTERNAL MEDICINE CLINIC | Facility: CLINIC | Age: 70
End: 2020-12-02
Payer: MEDICARE

## 2020-12-02 VITALS
TEMPERATURE: 97 F | RESPIRATION RATE: 18 BRPM | WEIGHT: 181.69 LBS | HEIGHT: 68 IN | SYSTOLIC BLOOD PRESSURE: 131 MMHG | HEART RATE: 64 BPM | DIASTOLIC BLOOD PRESSURE: 73 MMHG | BODY MASS INDEX: 27.54 KG/M2

## 2020-12-02 DIAGNOSIS — E11.9 TYPE 2 DIABETES MELLITUS WITHOUT COMPLICATION, WITHOUT LONG-TERM CURRENT USE OF INSULIN (HCC): Primary | ICD-10-CM

## 2020-12-02 DIAGNOSIS — I10 ESSENTIAL HYPERTENSION: ICD-10-CM

## 2020-12-02 DIAGNOSIS — E78.00 PURE HYPERCHOLESTEROLEMIA: ICD-10-CM

## 2020-12-02 DIAGNOSIS — D64.9 ANEMIA, UNSPECIFIED TYPE: ICD-10-CM

## 2020-12-02 DIAGNOSIS — I35.1 NONRHEUMATIC AORTIC VALVE INSUFFICIENCY: ICD-10-CM

## 2020-12-02 DIAGNOSIS — M17.11 PRIMARY OSTEOARTHRITIS OF RIGHT KNEE: ICD-10-CM

## 2020-12-02 PROBLEM — I35.9 AORTIC VALVE CALCIFICATION: Status: RESOLVED | Noted: 2020-07-19 | Resolved: 2020-12-02

## 2020-12-02 PROCEDURE — 99214 OFFICE O/P EST MOD 30 MIN: CPT | Performed by: INTERNAL MEDICINE

## 2020-12-02 PROCEDURE — 3008F BODY MASS INDEX DOCD: CPT | Performed by: INTERNAL MEDICINE

## 2020-12-02 PROCEDURE — 3075F SYST BP GE 130 - 139MM HG: CPT | Performed by: INTERNAL MEDICINE

## 2020-12-02 PROCEDURE — 3078F DIAST BP <80 MM HG: CPT | Performed by: INTERNAL MEDICINE

## 2020-12-02 RX ORDER — GLIMEPIRIDE 1 MG/1
TABLET ORAL
Qty: 225 TABLET | Refills: 1 | Status: SHIPPED | OUTPATIENT
Start: 2020-12-02 | End: 2021-04-17

## 2020-12-02 NOTE — ASSESSMENT & PLAN NOTE
Lipid panel and liver functions are stable on atorvastatin at 40 mg daily. Continue on the same dose of medication and follow-up with labs as directed in 4 months.

## 2020-12-02 NOTE — ASSESSMENT & PLAN NOTE
Echocardiogram–October 2020 discussed. Left ventricular diastolic dysfunction persists. Mild to moderate aortic regurgitation but pulmonary pressures look normal.  Blood pressures are well controlled. No lightheadedness or dizziness. Repeat in 1 year.

## 2020-12-02 NOTE — ASSESSMENT & PLAN NOTE
History of chronic anemia. Has completed GI work-up as well as hematology evaluation. Currently on iron supplements and hemoglobin seems improved. Platelet counts look normal.  Persistent mild neutropenia which will be monitored.   Recheck labs as direct

## 2020-12-02 NOTE — PATIENT INSTRUCTIONS
Problem List Items Addressed This Visit        Unprioritized    Anemia     History of chronic anemia. Has completed GI work-up as well as hematology evaluation. Currently on iron supplements and hemoglobin seems improved.   Platelet counts look normal.  P tablet with dinner and continue on Metformin 1000 mg twice daily. Blood pressures have been stable on valsartan hydrochlorothiazide, and amlodipine and metoprolol. Renal functions look stable. Minimal proteinuria persists.   Lipid panel stable on atorvas

## 2020-12-02 NOTE — PROGRESS NOTES
HPI:    Patient ID: Trang Knutson is a 72year old male. Echo    Study Conclusions   1. Left ventricle: The cavity size was normal. Wall thickness was      increased in a pattern of mild LVH.  Systolic function was      normal. The estimated ejection fra type 2 diabetes mellitus. His disease course has been stable. Hypoglycemia symptoms include nervousness/anxiousness (depression stable). There are no diabetic associated symptoms. There are no hypoglycemic complications. Symptoms are stable.  There are no d for palpitations and orthopnea. Gastrointestinal: Negative. Negative for abdominal pain and anorexia. Genitourinary: Negative. Musculoskeletal: Negative. Negative for arthralgias. Skin: Negative. Neurological: Negative.     Psychiatric/Behavio clear and moist. No oropharyngeal exudate. Eyes: Pupils are equal, round, and reactive to light. Conjunctivae and EOM are normal.   Neck: Normal range of motion. Neck supple. No JVD present. No thyromegaly present.    Cardiovascular: Normal rate, regular persists. Lipid panel stable on atorvastatin. Liver function tests look stable. Foot exam is normal.  Eye exam has been up-to-date.            Relevant Medications    glimepiride 1 MG Oral Tab    Other Relevant Orders    TSH W REFLEX TO FREE T4    COMP M & Referrals:  ORTHOPEDIC - INTERNAL  OPHTHALMOLOGY - INTERNAL       RQ#[id number]

## 2020-12-02 NOTE — ASSESSMENT & PLAN NOTE
Blood sugars are marginally improved. Continue on glimepiride but increase the dose to 1-1/2 tablet in a.m. and 1 tablet with dinner and continue on Metformin 1000 mg twice daily.   Blood pressures have been stable on valsartan hydrochlorothiazide, and aml

## 2020-12-02 NOTE — ASSESSMENT & PLAN NOTE
Patient is status post arthroscopic surgery with improvement in pain but recently worsening pain and stiffness. May take Tylenol 500 mg 2-3 times a day. Follow-up with orthopedics as directed.   Dr. Lisa Morris, [phone] for an evaluation

## 2020-12-02 NOTE — ASSESSMENT & PLAN NOTE
Blood pressure 131/73, pulse 64, temperature 97.1 °F (36.2 °C), temperature source Tympanic, resp. rate 18, height 5' 8\" (1.727 m), weight 181 lb 11.2 oz (82.4 kg). Blood pressures seem stable.   Mild to moderate aortic regurgitation noted on his echoc

## 2020-12-23 DIAGNOSIS — F41.9 ANXIETY: ICD-10-CM

## 2020-12-24 RX ORDER — LORAZEPAM 1 MG/1
TABLET ORAL
Qty: 90 TABLET | Refills: 1 | Status: SHIPPED | OUTPATIENT
Start: 2020-12-24 | End: 2021-04-29

## 2020-12-27 DIAGNOSIS — E11.9 TYPE 2 DIABETES MELLITUS WITHOUT COMPLICATION, WITHOUT LONG-TERM CURRENT USE OF INSULIN (HCC): ICD-10-CM

## 2020-12-28 RX ORDER — ATORVASTATIN CALCIUM 40 MG/1
40 TABLET, FILM COATED ORAL NIGHTLY
Qty: 90 TABLET | Refills: 1 | Status: SHIPPED | OUTPATIENT
Start: 2020-12-28 | End: 2021-06-11

## 2021-01-29 RX ORDER — OMEGA-3-ACID ETHYL ESTERS 1 G/1
1 CAPSULE, LIQUID FILLED ORAL 3 TIMES DAILY
Qty: 270 CAPSULE | Refills: 1 | Status: SHIPPED | OUTPATIENT
Start: 2021-01-29 | End: 2021-06-04

## 2021-02-03 DIAGNOSIS — Z23 NEED FOR VACCINATION: ICD-10-CM

## 2021-02-04 ENCOUNTER — OFFICE VISIT (OUTPATIENT)
Dept: ORTHOPEDICS CLINIC | Facility: CLINIC | Age: 71
End: 2021-02-04
Payer: MEDICARE

## 2021-02-04 ENCOUNTER — HOSPITAL ENCOUNTER (OUTPATIENT)
Dept: GENERAL RADIOLOGY | Facility: HOSPITAL | Age: 71
Discharge: HOME | End: 2021-02-04
Attending: ORTHOPAEDIC SURGERY
Payer: MEDICARE

## 2021-02-04 VITALS — DIASTOLIC BLOOD PRESSURE: 68 MMHG | SYSTOLIC BLOOD PRESSURE: 135 MMHG | HEART RATE: 53 BPM

## 2021-02-04 DIAGNOSIS — M25.561 RIGHT KNEE PAIN, UNSPECIFIED CHRONICITY: ICD-10-CM

## 2021-02-04 DIAGNOSIS — M25.561 RIGHT KNEE PAIN, UNSPECIFIED CHRONICITY: Primary | ICD-10-CM

## 2021-02-04 DIAGNOSIS — M17.11 PRIMARY OSTEOARTHRITIS OF RIGHT KNEE: ICD-10-CM

## 2021-02-04 PROCEDURE — 3078F DIAST BP <80 MM HG: CPT | Performed by: PHYSICIAN ASSISTANT

## 2021-02-04 PROCEDURE — 20610 DRAIN/INJ JOINT/BURSA W/O US: CPT | Performed by: ORTHOPAEDIC SURGERY

## 2021-02-04 PROCEDURE — 99213 OFFICE O/P EST LOW 20 MIN: CPT | Performed by: ORTHOPAEDIC SURGERY

## 2021-02-04 PROCEDURE — 73564 X-RAY EXAM KNEE 4 OR MORE: CPT | Performed by: ORTHOPAEDIC SURGERY

## 2021-02-04 PROCEDURE — 3075F SYST BP GE 130 - 139MM HG: CPT | Performed by: PHYSICIAN ASSISTANT

## 2021-02-04 RX ORDER — TRIAMCINOLONE ACETONIDE 40 MG/ML
40 INJECTION, SUSPENSION INTRA-ARTICULAR; INTRAMUSCULAR ONCE
Status: COMPLETED | OUTPATIENT
Start: 2021-02-04 | End: 2021-02-04

## 2021-02-04 NOTE — PROGRESS NOTES
NURSING INTAKE COMMENTS: Patient presents with: Injection: Patient would like to have a cortisone injection in the right knee. Patient states pain 7/10, patient does notice some numbness, no tingling and no fevers.  Patient did receive his first Covid vacc WITH MEALS 180 tablet 2   • atorvastatin 40 MG Oral Tab Take 1 tablet (40 mg total) by mouth nightly.  90 tablet 1   • LORazepam 1 MG Oral Tab TAKE 1 TABLET EVERY NIGHT 90 tablet 1   • glimepiride 1 MG Oral Tab 1 1/2 tabs with breakfast and 1 tab with dinne new shortness of breath, cough, asthma, wheezing  CARDIOVASCULAR: denies chest pain, leg cramps with exertion, palpitations, leg swelling  GI: denies abdominal pain, nausea, vomiting, diarrhea, constipation, hematochezia, worsening heartburn or stomach ulc Results   Component Value Date     (H) 11/13/2020    BUN 13 11/13/2020    CREATSERUM 0.95 11/13/2020    GFRNAA 81 11/13/2020    GFRAA 94 11/13/2020        Assessment and Plan:  Diagnoses and all orders for this visit:    Right knee pain, unspecified

## 2021-03-11 ENCOUNTER — TELEPHONE (OUTPATIENT)
Dept: CASE MANAGEMENT | Age: 71
End: 2021-03-11

## 2021-03-11 NOTE — TELEPHONE ENCOUNTER
Patient is eligible for a 2021 Medicare Annual Wellness visit. Left message to call back [phone].

## 2021-04-09 ENCOUNTER — LAB ENCOUNTER (OUTPATIENT)
Dept: LAB | Age: 71
End: 2021-04-09
Attending: FAMILY MEDICINE
Payer: MEDICARE

## 2021-04-09 DIAGNOSIS — E11.9 TYPE 2 DIABETES MELLITUS WITHOUT COMPLICATION, WITHOUT LONG-TERM CURRENT USE OF INSULIN (HCC): ICD-10-CM

## 2021-04-09 PROCEDURE — 36415 COLL VENOUS BLD VENIPUNCTURE: CPT

## 2021-04-09 PROCEDURE — 3061F NEG MICROALBUMINURIA REV: CPT | Performed by: INTERNAL MEDICINE

## 2021-04-09 PROCEDURE — 83036 HEMOGLOBIN GLYCOSYLATED A1C: CPT

## 2021-04-09 PROCEDURE — 82043 UR ALBUMIN QUANTITATIVE: CPT

## 2021-04-09 PROCEDURE — 82570 ASSAY OF URINE CREATININE: CPT

## 2021-04-09 PROCEDURE — 84443 ASSAY THYROID STIM HORMONE: CPT

## 2021-04-09 PROCEDURE — 81001 URINALYSIS AUTO W/SCOPE: CPT

## 2021-04-09 PROCEDURE — 85025 COMPLETE CBC W/AUTO DIFF WBC: CPT

## 2021-04-09 PROCEDURE — 80061 LIPID PANEL: CPT

## 2021-04-09 PROCEDURE — 80053 COMPREHEN METABOLIC PANEL: CPT

## 2021-04-09 PROCEDURE — 3052F HG A1C>EQUAL 8.0%<EQUAL 9.0%: CPT | Performed by: INTERNAL MEDICINE

## 2021-04-10 RX ORDER — METOPROLOL SUCCINATE 25 MG/1
25 TABLET, EXTENDED RELEASE ORAL DAILY
Qty: 90 TABLET | Refills: 1 | Status: SHIPPED | OUTPATIENT
Start: 2021-04-10 | End: 2021-10-09

## 2021-04-13 ENCOUNTER — OFFICE VISIT (OUTPATIENT)
Dept: OPHTHALMOLOGY | Facility: CLINIC | Age: 71
End: 2021-04-13
Payer: MEDICARE

## 2021-04-13 DIAGNOSIS — H25.13 AGE-RELATED NUCLEAR CATARACT OF BOTH EYES: ICD-10-CM

## 2021-04-13 DIAGNOSIS — H43.392 FLOATER, VITREOUS, LEFT: ICD-10-CM

## 2021-04-13 DIAGNOSIS — E11.9 TYPE 2 DIABETES MELLITUS WITHOUT RETINOPATHY (HCC): Primary | ICD-10-CM

## 2021-04-13 PROCEDURE — 92015 DETERMINE REFRACTIVE STATE: CPT | Performed by: OPHTHALMOLOGY

## 2021-04-13 PROCEDURE — 92004 COMPRE OPH EXAM NEW PT 1/>: CPT | Performed by: OPHTHALMOLOGY

## 2021-04-13 NOTE — PROGRESS NOTES
Madina Leiva is a 80year old male.     HPI:     HPI     Consult      Additional comments: Consult per Dr. Jeri Ford              Diabetic Eye Exam      Additional comments: Pt has been a diabetic for 12 years       Pt's diabetes is currently controlled by pi Alcohol/week: 3.0 standard drinks      Types: 3 Standard drinks or equivalent per week      Comment: 2-3 drinks/week    Drug use: No      Medications:  Current Outpatient Medications   Medication Sig Dispense Refill   • Metoprolol Succinate ER 25 MG Oral T Visual Fields       Left Right     Full Full          Extraocular Movement       Right Left     Full, Ortho Full, Ortho          Neuro/Psych     Oriented x3: Yes          Dilation     Both eyes: 1.0% Mydriacyl and 2.5% Keyon Synephrine @ 3:36 PM of both eyes  Discussed mild cataracts in both eyes that are not affecting vision and are not surgical at this time. New glasses today; update as needed. Discussed that new prescription is only a slight change.        Floater, vitreous, left   There is

## 2021-04-13 NOTE — PATIENT INSTRUCTIONS
Type 2 diabetes mellitus without retinopathy ([facility] [address]  Diabetes type II: no background of retinopathy, no signs of neovascularization noted. Discussed ocular and systemic benefits of blood sugar control.   Diagnosis and treatment discussed in detail with elyssa [id number] The [facility] 4037. All rights reserved. This information is not intended as a substitute for professional medical care. Always follow your healthcare professional's instructions. What Are Flashes and Floaters?   Have you ever seen f the retina. Old blood vessels can leak and cause swelling. These vessels can damage parts of the retina. This can cause blurry, distorted vision.     What causes diabetic retinopathy? Diabetes is the cause of this eye disease.  Over time, diabetes weakens

## 2021-04-17 DIAGNOSIS — E11.9 TYPE 2 DIABETES MELLITUS WITHOUT COMPLICATION, WITHOUT LONG-TERM CURRENT USE OF INSULIN (HCC): ICD-10-CM

## 2021-04-17 RX ORDER — VALSARTAN AND HYDROCHLOROTHIAZIDE 320; 25 MG/1; MG/1
1 TABLET, FILM COATED ORAL DAILY
Qty: 90 TABLET | Refills: 1 | Status: SHIPPED | OUTPATIENT
Start: 2021-04-17 | End: 2021-10-09

## 2021-04-17 RX ORDER — GLIMEPIRIDE 1 MG/1
TABLET ORAL
Qty: 225 TABLET | Refills: 1 | Status: SHIPPED | OUTPATIENT
Start: 2021-04-17

## 2021-04-29 ENCOUNTER — OFFICE VISIT (OUTPATIENT)
Dept: INTERNAL MEDICINE CLINIC | Facility: CLINIC | Age: 71
End: 2021-04-29
Payer: MEDICARE

## 2021-04-29 VITALS
DIASTOLIC BLOOD PRESSURE: 74 MMHG | WEIGHT: 178 LBS | SYSTOLIC BLOOD PRESSURE: 138 MMHG | TEMPERATURE: 98 F | HEART RATE: 58 BPM | HEIGHT: 68 IN | BODY MASS INDEX: 26.98 KG/M2 | RESPIRATION RATE: 16 BRPM

## 2021-04-29 DIAGNOSIS — D61.818 PANCYTOPENIA (HCC): ICD-10-CM

## 2021-04-29 DIAGNOSIS — Z00.00 MEDICARE ANNUAL WELLNESS VISIT, INITIAL: ICD-10-CM

## 2021-04-29 DIAGNOSIS — R91.1 LUNG NODULE SEEN ON IMAGING STUDY: ICD-10-CM

## 2021-04-29 DIAGNOSIS — E11.65 TYPE 2 DIABETES MELLITUS WITH HYPERGLYCEMIA, WITHOUT LONG-TERM CURRENT USE OF INSULIN (HCC): Chronic | ICD-10-CM

## 2021-04-29 DIAGNOSIS — I70.0 ARTERIOSCLEROSIS OF AORTA (HCC): ICD-10-CM

## 2021-04-29 DIAGNOSIS — F41.9 ANXIETY: ICD-10-CM

## 2021-04-29 DIAGNOSIS — H90.3 SENSORINEURAL HEARING LOSS, BILATERAL: ICD-10-CM

## 2021-04-29 DIAGNOSIS — Z95.1 S/P CABG X 5: ICD-10-CM

## 2021-04-29 DIAGNOSIS — M17.11 PRIMARY OSTEOARTHRITIS OF RIGHT KNEE: ICD-10-CM

## 2021-04-29 DIAGNOSIS — I51.89 GRADE I DIASTOLIC DYSFUNCTION: ICD-10-CM

## 2021-04-29 DIAGNOSIS — E78.00 PURE HYPERCHOLESTEROLEMIA: ICD-10-CM

## 2021-04-29 DIAGNOSIS — K21.9 GASTROESOPHAGEAL REFLUX DISEASE WITHOUT ESOPHAGITIS: ICD-10-CM

## 2021-04-29 DIAGNOSIS — D12.6 TUBULAR ADENOMA OF COLON: ICD-10-CM

## 2021-04-29 DIAGNOSIS — D69.6 THROMBOCYTOPENIA (HCC): ICD-10-CM

## 2021-04-29 DIAGNOSIS — I35.1 NONRHEUMATIC AORTIC VALVE INSUFFICIENCY: ICD-10-CM

## 2021-04-29 DIAGNOSIS — E11.29 MICROALBUMINURIA DUE TO TYPE 2 DIABETES MELLITUS (HCC): ICD-10-CM

## 2021-04-29 DIAGNOSIS — R80.9 MICROALBUMINURIA DUE TO TYPE 2 DIABETES MELLITUS (HCC): ICD-10-CM

## 2021-04-29 DIAGNOSIS — F32.A MILD DEPRESSIVE DISORDER: ICD-10-CM

## 2021-04-29 DIAGNOSIS — Z87.891 FORMER TOBACCO USE: ICD-10-CM

## 2021-04-29 DIAGNOSIS — Z00.00 ENCOUNTER FOR ANNUAL HEALTH EXAMINATION: Primary | ICD-10-CM

## 2021-04-29 DIAGNOSIS — I10 ESSENTIAL HYPERTENSION: ICD-10-CM

## 2021-04-29 PROBLEM — D70.8 OTHER NEUTROPENIA: Status: RESOLVED | Noted: 2019-01-25 | Resolved: 2021-04-29

## 2021-04-29 PROBLEM — D70.8 OTHER NEUTROPENIA (HCC): Status: RESOLVED | Noted: 2019-01-25 | Resolved: 2021-04-29

## 2021-04-29 PROCEDURE — 99397 PER PM REEVAL EST PAT 65+ YR: CPT | Performed by: INTERNAL MEDICINE

## 2021-04-29 PROCEDURE — 96160 PT-FOCUSED HLTH RISK ASSMT: CPT | Performed by: INTERNAL MEDICINE

## 2021-04-29 PROCEDURE — G0439 PPPS, SUBSEQ VISIT: HCPCS | Performed by: INTERNAL MEDICINE

## 2021-04-29 PROCEDURE — 3078F DIAST BP <80 MM HG: CPT | Performed by: INTERNAL MEDICINE

## 2021-04-29 PROCEDURE — 3008F BODY MASS INDEX DOCD: CPT | Performed by: INTERNAL MEDICINE

## 2021-04-29 PROCEDURE — 3075F SYST BP GE 130 - 139MM HG: CPT | Performed by: INTERNAL MEDICINE

## 2021-04-29 NOTE — ASSESSMENT & PLAN NOTE
History of anemia as well as multiple tubular adenomas. Last colonoscopy about 2 years back. Repeat colonoscopy due in October 2022. Rectal exam today–brown stools, guaiac negative.   He has not noticed any blood in his stools, change in bowel habits or

## 2021-04-29 NOTE — ASSESSMENT & PLAN NOTE
Asymptomatic atherosclerosis of the aorta without dissection or aneurysms. Lipid panel and blood pressures have been stable. He has quit smoking at this time. Continue to monitor.

## 2021-04-29 NOTE — ASSESSMENT & PLAN NOTE
Persistent anemia, intermittent thrombocytopenia and neutropenia. He has not had any bleeding complications. GI work-up has been negative. He has seen Dr. Silva Celaya for follow-up. 1 year follow-up evaluation has been recommended.   He is advised to

## 2021-04-29 NOTE — ASSESSMENT & PLAN NOTE
Patient has been doing well since his CABG x5 vessel about 10 years back. Last prednisone stress test–normal in 2019. He is asymptomatic at this time, no chest pain, shortness of breath or palpitations.   We will follow-up with his echocardiogram which di

## 2021-04-29 NOTE — ASSESSMENT & PLAN NOTE
Last echocardiogram in October 2020 with left ventricular diastolic dysfunction, mild to moderate aortic regurgitation. Pulmonary pressures were normal , otherwise asymptomatic. Repeat echocardiogram has been requested, to complete in October 2021.

## 2021-04-29 NOTE — ASSESSMENT & PLAN NOTE
35-pack-year smoking history. Last CT scan in May 2020 showed a lung nodule that needs to be followed up in 1 year. Repeat CT scan has been requested.

## 2021-04-29 NOTE — ASSESSMENT & PLAN NOTE
Persistent anemia, intermittent thrombocytopenia and neutropenia. Patient remains asymptomatic. He has not had any bleeding complications. GI work-up has been negative. He has seen Dr. Shelby Rubin for follow-up.   1 year follow-up evaluation has been

## 2021-04-29 NOTE — ASSESSMENT & PLAN NOTE
Blood pressure 138/74, pulse 58, temperature 97.7 °F (36.5 °C), temperature source Tympanic, resp. rate 16, height 5' 8\" (1.727 m), weight 178 lb (80.7 kg).   Stable blood pressure, well controlled at this time on amlodipine 5 mg daily, valsartan hydrochlo

## 2021-04-29 NOTE — ASSESSMENT & PLAN NOTE
Patient status post arthroscopic surgery for worsening pain and stiffness. He is on Tylenol which he takes as needed. Due to worsening pain–may consider a steroid injection.   Consider follow-up with orthopedics–Dr. José Antonio Marvin if [id number]–[phone] for an

## 2021-04-29 NOTE — PROGRESS NOTES
HPI:   Osvaldo Greenberg is a 80year old male who presents for a Medicare Subsequent Annual Wellness visit (Pt already had Initial Annual Wellness).       Annual Physical due on 04/29/2022        Fall/Risk Assessment abnormal    He has been screened for Fall elements today (up to 30 minutes for CPT [phone])       He smoked tobacco in the past but quit greater than 12 months ago.   Social History    Tobacco Use      Smoking status: Former Smoker        Packs/day: 0.50        Years: 25.00        Pack years: 12.5 recent labs)   Lab Results   Component Value Date    WBC 3.4 (L) 04/09/2021    HGB 11.8 (L) 04/09/2021    .0 04/09/2021        ALLERGIES:   He has No Known Allergies.     CURRENT MEDICATIONS:   empagliflozin (JARDIANCE) 10 MG Oral Tab, Take 1 tablet smoking history. He has never used smokeless tobacco. He reports current alcohol use of about 3.0 standard drinks of alcohol per week. He reports that he does not use drugs.      REVIEW OF SYSTEMS:   Review of Systems   GENERAL: feels well otherwise  SKIN: place of Yazdanism: No   Many people I talk to seem to mumble (or don't speak clearly): No People get annoyed because I misunderstand what they say: No   I misunderstand what others are saying and make inappropriate responses: No I avoid social activities be motion and neck supple. Right lower leg: No edema. Left lower leg: No edema. Lymphadenopathy:      Cervical: No cervical adenopathy. Skin:     General: Skin is warm and dry. Findings: No rash.    Neurological:      General: No focal defic hydrochlorothiazide 320/25-1 tablet daily and metoprolol 50 mg daily. Kidney functions look stable. He does not have any chest pain, palpitations, shortness of breath or lower extremity edema. Renal functions look stable.   Recheck labs and follow-up as apart.           RESOLVED: Microalbuminuria due to type 2 diabetes mellitus (HCC)    Relevant Medications    empagliflozin (JARDIANCE) 10 MG Oral Tab    Mild depressive disorder (HCC)     Mild depressive disorder–stable on Prozac.   Refills have been provid complications. GI work-up has been negative. He has seen Dr. Raj Donato for follow-up. 1 year follow-up evaluation has been recommended. He is advised to schedule an appointment–referral provided.          Tubular adenoma of colon     History of ane hearing loss, bilateral    S/P CABG x 5    Essential hypertension  -     CARD ECHO 2D DOPPLER (CPT=93306);  Future    Gastroesophageal reflux disease without esophagitis    Former tobacco use    Grade I diastolic dysfunction  -     CARD ECHO 2D DOPPLER (CPT Date Value   04/09/2021 158 (H)          Cardiovascular Disease Screening     LDL Annually LDL Cholesterol (mg/dL)   Date Value   04/09/2021 30        EKG - w/ Initial Preventative Physical Exam only, or if medically necessary Electrocardiogram date02/06 Internal Lab or Procedure External Lab or Procedure      Annual Monitoring of Persistent     Medications (ACE/ARB, digoxin diuretics, anticonvulsants.)    Potassium  Annually Potassium (mmol/L)   Date Value   04/09/2021 3.5    No flowsheet data found.     C

## 2021-04-29 NOTE — PATIENT INSTRUCTIONS
Problem List Items Addressed This Visit        Unprioritized    Arteriosclerosis of aorta ([facility] [address]     Asymptomatic atherosclerosis of the aorta without dissection or aneurysms. Lipid panel and blood pressures have been stable.   He has quit smoking at this t brown stools. PSA due on 11/13/2022  Colonoscopy due on 10/28/2022      Immunizations–.   Immunization History   Administered Date(s) Administered   • Covid-19 Vaccine Pfizer 30 mcg/0.3 ml 02/03/2021, 02/24/2021   • FLU VAC High Dose 65 YRS & Older PRSV Fr years back. Last prednisone stress test–normal in 2019. He is asymptomatic at this time, no chest pain, shortness of breath or palpitations. We will follow-up with his echocardiogram which did show aortic regurgitation and diastolic dysfunction. empagliflozin (JARDIANCE) 10 MG Oral Tab      Other Visit Diagnoses     Encounter for annual health examination    -  Primary    Lung nodule seen on imaging study        Relevant Orders    CT CHEST(CONTRAST ONLY) (CPT=71260)         Jenice Mcburney [facility] Limited to patients who meet one of the following criteria:   • Men who are 73-68 years old and have smoked more than 100 cigarettes in their lifetime   • Anyone with a family history    Colorectal Cancer Screening Covered up to Age 76     Colonoscopy Scre Clients of institutions for the mentally retarded   Persons who live in the same house as a HepB virus carrier   Homosexual men   Illicit injectable drug abusers     Tetanus Toxoid- Only covered with a cut with metal- TD and TDaP Not covered by [facility]

## 2021-04-29 NOTE — ASSESSMENT & PLAN NOTE
Chronic mild hearing loss, he does not want to hearing aid at this time. Continue to monitor. No tinnitus or vertigo.

## 2021-04-29 NOTE — ASSESSMENT & PLAN NOTE
Lipid panel and liver function test have been stable on atorvastatin 40 mg daily. Continue on same dose of medication and recheck labs as noted.

## 2021-04-29 NOTE — ASSESSMENT & PLAN NOTE
Asymptomatic grade 1 diastolic dysfunction with mild to moderate aortic regurgitation. Follow-up echocardiogram has been ordered. He is asymptomatic at this time.

## 2021-04-29 NOTE — ASSESSMENT & PLAN NOTE
Normal exam.  Labs as ordered. Skin check–normal  No cervical, axillary, inguinal lymphadenopathy. Hernial orifices intact. Rectal exam normal,prostate normal to palpation. Small hemorrhoids present.  guaic negetive brown stools.   PSA due on 11/13/2022

## 2021-04-29 NOTE — ASSESSMENT & PLAN NOTE
Blood sugars are elevated, hemoglobin A1c is higher than what it was at 8.6. Currently on glimepiride 1 mg 1-1/2 tablet in a.m. and 1 tablet with dinner and continue on Metformin 1000 mg twice daily. Patient is advised to start on Jardiance 10 mg daily.

## 2021-05-02 RX ORDER — AMLODIPINE BESYLATE 10 MG/1
10 TABLET ORAL DAILY
Qty: 90 TABLET | Refills: 2 | Status: SHIPPED | OUTPATIENT
Start: 2021-05-02 | End: 2021-12-30

## 2021-05-02 RX ORDER — LORAZEPAM 1 MG/1
TABLET ORAL
Qty: 90 TABLET | Refills: 1 | Status: SHIPPED | OUTPATIENT
Start: 2021-05-02 | End: 2021-11-05

## 2021-06-02 ENCOUNTER — OFFICE VISIT (OUTPATIENT)
Dept: INTERNAL MEDICINE CLINIC | Facility: CLINIC | Age: 71
End: 2021-06-02
Payer: MEDICARE

## 2021-06-02 VITALS
SYSTOLIC BLOOD PRESSURE: 134 MMHG | BODY MASS INDEX: 27.58 KG/M2 | HEIGHT: 68 IN | DIASTOLIC BLOOD PRESSURE: 68 MMHG | RESPIRATION RATE: 16 BRPM | HEART RATE: 63 BPM | WEIGHT: 182 LBS

## 2021-06-02 DIAGNOSIS — E11.65 TYPE 2 DIABETES MELLITUS WITH HYPERGLYCEMIA, WITHOUT LONG-TERM CURRENT USE OF INSULIN (HCC): Primary | Chronic | ICD-10-CM

## 2021-06-02 PROCEDURE — 99214 OFFICE O/P EST MOD 30 MIN: CPT | Performed by: INTERNAL MEDICINE

## 2021-06-02 PROCEDURE — 3008F BODY MASS INDEX DOCD: CPT | Performed by: INTERNAL MEDICINE

## 2021-06-02 PROCEDURE — 3078F DIAST BP <80 MM HG: CPT | Performed by: INTERNAL MEDICINE

## 2021-06-02 PROCEDURE — 3075F SYST BP GE 130 - 139MM HG: CPT | Performed by: INTERNAL MEDICINE

## 2021-06-02 NOTE — PROGRESS NOTES
HPI:    Patient ID: Davion Phan is a 80year old male. Patient was started on Jardiance at his last office visit. He could not tolerate the medication due to urinary discomfort and penile swelling and hence discontinued it.   He took it up till May 25 since onset. The problem is controlled. Associated symptoms include anxiety. Pertinent negatives include no malaise/fatigue, orthopnea, palpitations or peripheral edema. There are no associated agents to hypertension.  Risk factors for coronary artery disea 325 mg by mouth daily with breakfast.     • aspirin 81 MG Oral Tab Take 81 mg by mouth daily. • Multiple Vitamins-Minerals (MULTIVITAMIN ADULT OR) Take by mouth. • COENZYME Q-10 OR Take by mouth daily.      • atorvastatin 40 MG Oral Tab Take 1 table last visit on April 29, 2021, his hemoglobin was at 8.6 and he was on glimepiride 1 mg 1-1/2 tablet with breakfast and 1 tablet with dinner and Metformin 1000 mg twice daily.   Also started on Jardiance 10 mg daily but he could not tolerate it after about 3

## 2021-06-02 NOTE — ASSESSMENT & PLAN NOTE
At patient's last visit on April 29, 2021, his hemoglobin was at 8.6 and he was on glimepiride 1 mg 1-1/2 tablet with breakfast and 1 tablet with dinner and Metformin 1000 mg twice daily.   Also started on Jardiance 10 mg daily but he could not tolerate it

## 2021-06-02 NOTE — PATIENT INSTRUCTIONS
Problem List Items Addressed This Visit        Unprioritized    Type 2 diabetes mellitus with hyperglycemia, without long-term current use of insulin ([facility] [address] - Primary (Chronic)     At patient's last visit on April 29, 2021, his hemoglobin was at 8.6 and he

## 2021-06-04 ENCOUNTER — HOSPITAL ENCOUNTER (OUTPATIENT)
Dept: CT IMAGING | Facility: HOSPITAL | Age: 71
Discharge: HOME | End: 2021-06-04
Attending: INTERNAL MEDICINE
Payer: MEDICARE

## 2021-06-04 DIAGNOSIS — R91.1 LUNG NODULE SEEN ON IMAGING STUDY: ICD-10-CM

## 2021-06-04 PROCEDURE — 82565 ASSAY OF CREATININE: CPT

## 2021-06-04 PROCEDURE — 71260 CT THORAX DX C+: CPT | Performed by: INTERNAL MEDICINE

## 2021-06-04 RX ORDER — OMEGA-3-ACID ETHYL ESTERS 1 G/1
1 CAPSULE, LIQUID FILLED ORAL 3 TIMES DAILY
Qty: 270 CAPSULE | Refills: 1 | Status: SHIPPED | OUTPATIENT
Start: 2021-06-04

## 2021-06-11 RX ORDER — ATORVASTATIN CALCIUM 40 MG/1
40 TABLET, FILM COATED ORAL NIGHTLY
Qty: 90 TABLET | Refills: 1 | Status: SHIPPED | OUTPATIENT
Start: 2021-06-11 | End: 2021-12-30

## 2021-06-15 ENCOUNTER — HOSPITAL ENCOUNTER (OUTPATIENT)
Dept: CV DIAGNOSTICS | Age: 71
Discharge: HOME | End: 2021-06-15
Attending: INTERNAL MEDICINE
Payer: MEDICARE

## 2021-06-15 DIAGNOSIS — I35.1 NONRHEUMATIC AORTIC VALVE INSUFFICIENCY: ICD-10-CM

## 2021-06-15 DIAGNOSIS — I51.89 GRADE I DIASTOLIC DYSFUNCTION: ICD-10-CM

## 2021-06-15 DIAGNOSIS — I10 ESSENTIAL HYPERTENSION: ICD-10-CM

## 2021-06-15 PROCEDURE — 93306 TTE W/DOPPLER COMPLETE: CPT | Performed by: INTERNAL MEDICINE

## 2021-09-01 ENCOUNTER — LAB ENCOUNTER (OUTPATIENT)
Dept: LAB | Age: 71
End: 2021-09-01
Attending: INTERNAL MEDICINE
Payer: MEDICARE

## 2021-09-01 DIAGNOSIS — E11.65 TYPE 2 DIABETES MELLITUS WITH HYPERGLYCEMIA, WITHOUT LONG-TERM CURRENT USE OF INSULIN (HCC): Chronic | ICD-10-CM

## 2021-09-01 LAB
ALBUMIN SERPL-MCNC: 3.7 G/DL (ref 3.4–5)
ALBUMIN/GLOB SERPL: 1.1 {RATIO} (ref 1–2)
ALP LIVER SERPL-CCNC: 42 U/L
ALT SERPL-CCNC: 59 U/L
ANION GAP SERPL CALC-SCNC: 6 MMOL/L (ref 0–18)
AST SERPL-CCNC: 23 U/L (ref 15–37)
BILIRUB SERPL-MCNC: 0.9 MG/DL (ref 0.1–2)
BUN BLD-MCNC: 11 MG/DL (ref 7–18)
BUN/CREAT SERPL: 14.1 (ref 10–20)
CALCIUM BLD-MCNC: 9.3 MG/DL (ref 8.5–10.1)
CHLORIDE SERPL-SCNC: 100 MMOL/L (ref 98–112)
CHOLEST SMN-MCNC: 114 MG/DL (ref ?–200)
CO2 SERPL-SCNC: 30 MMOL/L (ref 21–32)
CREAT BLD-MCNC: 0.78 MG/DL
EST. AVERAGE GLUCOSE BLD GHB EST-MCNC: 157 MG/DL (ref 68–126)
GLOBULIN PLAS-MCNC: 3.3 G/DL (ref 2.8–4.4)
GLUCOSE BLD-MCNC: 135 MG/DL (ref 70–99)
HBA1C MFR BLD HPLC: 7.1 % (ref ?–5.7)
HDLC SERPL-MCNC: 53 MG/DL (ref 40–59)
LDLC SERPL CALC-MCNC: 35 MG/DL (ref ?–100)
M PROTEIN MFR SERPL ELPH: 7 G/DL (ref 6.4–8.2)
NONHDLC SERPL-MCNC: 61 MG/DL (ref ?–130)
OSMOLALITY SERPL CALC.SUM OF ELEC: 283 MOSM/KG (ref 275–295)
PATIENT FASTING Y/N/NP: YES
PATIENT FASTING Y/N/NP: YES
POTASSIUM SERPL-SCNC: 3.8 MMOL/L (ref 3.5–5.1)
SODIUM SERPL-SCNC: 136 MMOL/L (ref 136–145)
TRIGL SERPL-MCNC: 156 MG/DL (ref 30–149)
VLDLC SERPL CALC-MCNC: 21 MG/DL (ref 0–30)

## 2021-09-01 PROCEDURE — 83036 HEMOGLOBIN GLYCOSYLATED A1C: CPT

## 2021-09-01 PROCEDURE — 80061 LIPID PANEL: CPT

## 2021-09-01 PROCEDURE — 36415 COLL VENOUS BLD VENIPUNCTURE: CPT

## 2021-09-01 PROCEDURE — 80053 COMPREHEN METABOLIC PANEL: CPT

## 2021-09-01 PROCEDURE — 3051F HG A1C>EQUAL 7.0%<8.0%: CPT | Performed by: INTERNAL MEDICINE

## 2021-09-15 ENCOUNTER — OFFICE VISIT (OUTPATIENT)
Dept: INTERNAL MEDICINE CLINIC | Facility: CLINIC | Age: 71
End: 2021-09-15
Payer: MEDICARE

## 2021-09-15 VITALS
HEART RATE: 61 BPM | WEIGHT: 181 LBS | HEIGHT: 68 IN | SYSTOLIC BLOOD PRESSURE: 110 MMHG | RESPIRATION RATE: 16 BRPM | BODY MASS INDEX: 27.43 KG/M2 | DIASTOLIC BLOOD PRESSURE: 60 MMHG | TEMPERATURE: 98 F

## 2021-09-15 DIAGNOSIS — I35.1 NONRHEUMATIC AORTIC VALVE INSUFFICIENCY: ICD-10-CM

## 2021-09-15 DIAGNOSIS — E78.00 PURE HYPERCHOLESTEROLEMIA: ICD-10-CM

## 2021-09-15 DIAGNOSIS — E11.65 TYPE 2 DIABETES MELLITUS WITH HYPERGLYCEMIA, WITHOUT LONG-TERM CURRENT USE OF INSULIN (HCC): Primary | Chronic | ICD-10-CM

## 2021-09-15 DIAGNOSIS — D61.818 PANCYTOPENIA (HCC): ICD-10-CM

## 2021-09-15 DIAGNOSIS — Z12.5 PROSTATE CANCER SCREENING: ICD-10-CM

## 2021-09-15 PROCEDURE — 3008F BODY MASS INDEX DOCD: CPT | Performed by: INTERNAL MEDICINE

## 2021-09-15 PROCEDURE — 3074F SYST BP LT 130 MM HG: CPT | Performed by: INTERNAL MEDICINE

## 2021-09-15 PROCEDURE — 3078F DIAST BP <80 MM HG: CPT | Performed by: INTERNAL MEDICINE

## 2021-09-15 PROCEDURE — 99214 OFFICE O/P EST MOD 30 MIN: CPT | Performed by: INTERNAL MEDICINE

## 2021-09-15 NOTE — PATIENT INSTRUCTIONS
Problem List Items Addressed This Visit        Unprioritized    Hyperlipidemia     Lipid panel and liver function test have been stable on atorvastatin at 40 mg daily. He has tolerated his medications well. Recheck labs as ordered.          Nonrheumatic a

## 2021-09-15 NOTE — ASSESSMENT & PLAN NOTE
Lipid panel and liver function test have been stable on atorvastatin at 40 mg daily. He has tolerated his medications well. Recheck labs as ordered.

## 2021-09-15 NOTE — ASSESSMENT & PLAN NOTE
Patient with persistent mild anemia, neutropenia. He has been asymptomatic. He was followed up by hematology and has been advised to follow-up in 1 year. Advised to repeat labs prior to the next office visit.

## 2021-09-15 NOTE — PROGRESS NOTES
HPI:    Patient ID: Brielle Mckinney is a 80year old male. Test for diabetes-hemoglobin A1c looks great at 7.1.  This is a significant improvement from the past.  Cholesterol panel-much improved.   Kidney function test, liver function test, electrolytes, c malaise/fatigue, orthopnea, palpitations or peripheral edema. There are no associated agents to hypertension. Risk factors for coronary artery disease include sedentary lifestyle, obesity, smoking/tobacco exposure and dyslipidemia.  Past treatments include (65 FE) MG Oral Tab EC Take 325 mg by mouth daily with breakfast.     • aspirin 81 MG Oral Tab Take 81 mg by mouth daily. • Multiple Vitamins-Minerals (MULTIVITAMIN ADULT OR) Take by mouth. • COENZYME Q-10 OR Take by mouth daily.        Allergies:No focal deficit present. Mental Status: He is alert and oriented to person, place, and time. Cranial Nerves: No cranial nerve deficit. Sensory: No sensory deficit. Motor: No weakness.       Coordination: Coordination normal.      Gait: Desi Kick completed. Foot exam has been normal.  No skin breakdown or calluses.            Relevant Orders    COMP METABOLIC PANEL (14)    HEMOGLOBIN A1C    LIPID PANEL    MICROALB/CREAT RATIO, RANDOM URINE    URINALYSIS, ROUTINE    TSH W REFLEX TO FREE T4      Othe

## 2021-09-15 NOTE — ASSESSMENT & PLAN NOTE
Diabetes much improved. Hemoglobin A1c is at 7.1. He is currently on glimepiride, Metformin, Januvia. He was given a trial of Jardiance but had to discontinue due to penile swelling, discomfort. Lipid panel looks great.   Well-controlled on atorvastatin

## 2021-09-15 NOTE — ASSESSMENT & PLAN NOTE
Diabetes much improved. Hemoglobin A1c is at 7.1. He is currently on glimepiride, Metformin, Januvia. He was given a trial of Jardiance but had to discontinue due to penile swelling, discomfort. Lipid panel looks great.   Blood pressures well controlled

## 2021-09-15 NOTE — ASSESSMENT & PLAN NOTE
2D echo Doppler of the heart completed in June 2021 showed moderate aortic regurgitation with pulmonary pressures about 25. Patient is asymptomatic. Blood pressures are well controlled.   No chest pain, palpitations, shortness of breath or lower extremity

## 2021-10-09 DIAGNOSIS — E11.9 TYPE 2 DIABETES MELLITUS WITHOUT COMPLICATION, WITHOUT LONG-TERM CURRENT USE OF INSULIN (HCC): ICD-10-CM

## 2021-10-09 RX ORDER — METOPROLOL SUCCINATE 25 MG/1
25 TABLET, EXTENDED RELEASE ORAL DAILY
Qty: 90 TABLET | Refills: 1 | Status: SHIPPED | OUTPATIENT
Start: 2021-10-09 | End: 2022-03-28

## 2021-10-09 RX ORDER — VALSARTAN AND HYDROCHLOROTHIAZIDE 320; 25 MG/1; MG/1
1 TABLET, FILM COATED ORAL DAILY
Qty: 90 TABLET | Refills: 1 | Status: SHIPPED | OUTPATIENT
Start: 2021-10-09 | End: 2022-03-02

## 2021-10-09 NOTE — TELEPHONE ENCOUNTER
Refill passed per [employer] [facility] protocol. Requested Prescriptions   Pending Prescriptions Disp Refills    Valsartan-hydroCHLOROthiazide 320-25 MG Oral Tab 90 tablet 1     Sig: Take 1 tablet by mouth daily.         Hypertensive Medications Protocol Passed - 10/9/2021 11:16 AM        Passed - CMP or BMP in past 12 months        Passed - Appointment in past 6 or next 3 months        Passed - GFR Non- > 50     Lab Results   Component Value Date    GFRNAA 91 09/01/2021                       Future Appointments         Provider Department Appt Notes    In 3 months Mohini Doe MD [employer] [facility], [address] blood test result            Recent Outpatient Visits              3 weeks ago Type 2 diabetes mellitus with hyperglycemia, without long-term current use of insulin [facility])    Pedro Farooq MD    Office Visit    4 months ago Type 2 diabetes mellitus with hyperglycemia, without long-term current use of insulin [facility])    [facility], [address], Pedro Flower MD    Office Visit    5 months ago Encounter for annual health examination    Pedro Farooq MD    Office Visit    5 months ago Type 2 diabetes mellitus without retinopathy [facility])    [facility] Nabila Washington MD    Office Visit    8 months ago Right knee pain, unspecified chronicity    [facility] for Margarita Nielson, Verónica Swain MD    Office Visit

## 2021-10-09 NOTE — TELEPHONE ENCOUNTER
Refill passed per [employer] [facility] protocol.     Requested Prescriptions   Pending Prescriptions Disp Refills    metFORMIN HCl 1000 MG Oral Tab 180 tablet 2     Sig: TAKE 1 TABLET TWICE DAILY WITH MEALS        Diabetes Medication Protocol Passed - 10/9/2021  9:02 AM        Passed - Last A1C < 7.5 and within past 6 months     Lab Results   Component Value Date    A1C 7.1 (H) 09/01/2021               Passed - Appointment in past 6 or next 3 months        Passed - GFR Non- > 50     Lab Results   Component Value Date    GFRNAA 91 09/01/2021                 Passed - GFR in the past 12 months              Future Appointments         Provider Department Appt Notes    In 3 months Daniel iWlder MD [place] [employer] [place], [facility], [address] blood test result            Recent Outpatient Visits              3 weeks ago Type 2 diabetes mellitus with hyperglycemia, without long-term current use of insulin [facility])    [address], Nishant Bass MD    Office Visit    4 months ago Type 2 diabetes mellitus with hyperglycemia, without long-term current use of insulin [facility])    [facility], [address] [place] [address],[address], Nishant Bass MD    Office Visit    5 months ago Encounter for annual health examination    [address] [facility] [address], Nishant Bass MD    Office Visit    5 months ago Type 2 diabetes mellitus without retinopathy [facility])    [facility] Belle Roberts MD    Office Visit    8 months ago Right knee pain, unspecified chronicity    [facility] for Emmanuel Oseguera, Guy Lubin MD    Office Visit

## 2021-10-09 NOTE — TELEPHONE ENCOUNTER
Refill passed per [place] [employer] [facility] protocol. Requested Prescriptions   Pending Prescriptions Disp Refills    metoprolol succinate 25 MG Oral Tablet 24 Hr 90 tablet 1     Sig: Take 1 tablet (25 mg total) by mouth daily.         Hypertensive Medications Protocol Passed - 10/9/2021  9:01 AM        Passed - CMP or BMP in past 12 months        Passed - Appointment in past 6 or next 3 months        Passed - GFR Non- > 50     Lab Results   Component Value Date    GFRNAA 91 09/01/2021                       Future Appointments         Provider Department Appt Notes    In 3 months Delroy Machado MD [facility], [address] blood test result            Recent Outpatient Visits              3 weeks ago Type 2 diabetes mellitus with hyperglycemia, without long-term current use of insulin [facility])    [address] Jag Bernal MD    Office Visit    4 months ago Type 2 diabetes mellitus with hyperglycemia, without long-term current use of insulin [facility])    [facility], [place], Jag Jackson MD    Office Visit    5 months ago Encounter for annual health examination    [address]al MD    Office Visit    5 months ago Type 2 diabetes mellitus without retinopathy [facility])    [facility] Braxton Escalante MD    Office Visit    8 months ago Right knee pain, unspecified chronicity    [facility] for Landy Stringer MD    Office Visit

## 2021-11-04 DIAGNOSIS — F41.9 ANXIETY: ICD-10-CM

## 2021-11-05 DIAGNOSIS — F41.9 ANXIETY: ICD-10-CM

## 2021-11-05 RX ORDER — LORAZEPAM 1 MG/1
TABLET ORAL
Qty: 90 TABLET | Refills: 0 | Status: SHIPPED | OUTPATIENT
Start: 2021-11-05 | End: 2022-01-27

## 2021-11-05 RX ORDER — LORAZEPAM 1 MG/1
TABLET ORAL
Qty: 90 TABLET | Refills: 1 | OUTPATIENT
Start: 2021-11-05

## 2021-11-06 RX ORDER — LORAZEPAM 1 MG/1
TABLET ORAL
Qty: 90 TABLET | Refills: 0 | OUTPATIENT
Start: 2021-11-06

## 2021-11-10 NOTE — TELEPHONE ENCOUNTER
Refill passed per [place] [employer] [facility] protocol. Requested Prescriptions   Pending Prescriptions Disp Refills    SITagliptin Phosphate 100 MG Oral Tab 30 tablet 5     Sig: Take 1 tablet (100 mg total) by mouth daily.         Diabetes Medication Protocol Passed - 11/10/2021  8:40 AM        Passed - Last A1C < 7.5 and within past 6 months     Lab Results   Component Value Date    A1C 7.1 (H) 09/01/2021               Passed - Appointment in past 6 or next 3 months        Passed - GFR Non- > 50     Lab Results   Component Value Date    GFRNAA 91 09/01/2021                 Passed - GFR in the past 12 months               Future Appointments         Provider Department Appt Notes    In 2 months Cirilo Horne MD [facility], [facility], [address] blood test result            Recent Outpatient Visits              1 month ago Type 2 diabetes mellitus with hyperglycemia, without long-term current use of insulin [facility])    Gregg Monsivais MD    Office Visit    5 months ago Type 2 diabetes mellitus with hyperglycemia, without long-term current use of insulin [facility])    [facility], [address] [place] [address] Rd,[address], Gregg Dacosta MD    Office Visit    6 months ago Encounter for annual health examination    Gregg Monsivais MD    Office Visit    7 months ago Type 2 diabetes mellitus without retinopathy [facility])    [facility] Karson Crawley MD    Office Visit    9 months ago Right knee pain, unspecified chronicity    [facility] for Jimena Barber, Jerald Castellano MD    Office Visit

## 2021-12-28 RX ORDER — LANCETS 33 GAUGE
1 EACH MISCELLANEOUS 2 TIMES DAILY
COMMUNITY
End: 2021-12-28

## 2021-12-28 RX ORDER — BLOOD-GLUCOSE CONTROL, LOW
1 EACH MISCELLANEOUS AS NEEDED
COMMUNITY
End: 2021-12-28

## 2021-12-28 NOTE — TELEPHONE ENCOUNTER
Patient requesting new meter and testing supplies . I called the patient and confirmed he did need this , the pharmacy contacted us .   While speaking to the patient he requested a refill on Atorvastatin 40 mg #90 x 1 to be sent to the mail order pharmacy a

## 2021-12-28 NOTE — TELEPHONE ENCOUNTER
Pharmacy requesting diabetic supplies    TRUE METRIX BLOOD GLUCOSE METER    [employer] TRUE METRIX TEST STRIPS    TRUEPLUS 33G LANCETS     TRUE METRIX LEVEL 1 CONTROL SOLUTION

## 2021-12-30 RX ORDER — ATORVASTATIN CALCIUM 40 MG/1
40 TABLET, FILM COATED ORAL NIGHTLY
Qty: 90 TABLET | Refills: 1 | Status: SHIPPED | OUTPATIENT
Start: 2021-12-30

## 2021-12-30 RX ORDER — AMLODIPINE BESYLATE 10 MG/1
10 TABLET ORAL DAILY
Qty: 90 TABLET | Refills: 2 | Status: SHIPPED | OUTPATIENT
Start: 2021-12-30

## 2021-12-30 RX ORDER — BLOOD-GLUCOSE CONTROL, LOW
1 EACH MISCELLANEOUS AS NEEDED
Qty: 1 EACH | Refills: 0 | Status: SHIPPED | OUTPATIENT
Start: 2021-12-30

## 2021-12-30 RX ORDER — ATORVASTATIN CALCIUM 40 MG/1
40 TABLET, FILM COATED ORAL NIGHTLY
Qty: 90 TABLET | Refills: 1 | OUTPATIENT
Start: 2021-12-30

## 2021-12-30 RX ORDER — LANCETS 33 GAUGE
1 EACH MISCELLANEOUS 2 TIMES DAILY
Qty: 200 EACH | Refills: 3 | Status: SHIPPED | OUTPATIENT
Start: 2021-12-30

## 2022-01-15 ENCOUNTER — LAB ENCOUNTER (OUTPATIENT)
Dept: LAB | Age: 72
End: 2022-01-15
Attending: INTERNAL MEDICINE
Payer: MEDICARE

## 2022-01-15 DIAGNOSIS — Z12.5 PROSTATE CANCER SCREENING: ICD-10-CM

## 2022-01-15 DIAGNOSIS — D61.818 PANCYTOPENIA (HCC): ICD-10-CM

## 2022-01-15 DIAGNOSIS — E11.65 TYPE 2 DIABETES MELLITUS WITH HYPERGLYCEMIA, WITHOUT LONG-TERM CURRENT USE OF INSULIN (HCC): Chronic | ICD-10-CM

## 2022-01-15 LAB
ALBUMIN SERPL-MCNC: 4.1 G/DL (ref 3.4–5)
ALBUMIN/GLOB SERPL: 1.5 {RATIO} (ref 1–2)
ALP LIVER SERPL-CCNC: 47 U/L
ALT SERPL-CCNC: 65 U/L
ANION GAP SERPL CALC-SCNC: 4 MMOL/L (ref 0–18)
AST SERPL-CCNC: 30 U/L (ref 15–37)
BASOPHILS # BLD AUTO: 0.04 X10(3) UL (ref 0–0.2)
BASOPHILS NFR BLD AUTO: 1 %
BILIRUB SERPL-MCNC: 0.9 MG/DL (ref 0.1–2)
BILIRUB UR QL: NEGATIVE
BUN BLD-MCNC: 12 MG/DL (ref 7–18)
BUN/CREAT SERPL: 14.3 (ref 10–20)
CALCIUM BLD-MCNC: 9.6 MG/DL (ref 8.5–10.1)
CHLORIDE SERPL-SCNC: 99 MMOL/L (ref 98–112)
CHOLEST SERPL-MCNC: 105 MG/DL (ref ?–200)
CLARITY UR: CLEAR
CO2 SERPL-SCNC: 32 MMOL/L (ref 21–32)
COLOR UR: YELLOW
COMPLEXED PSA SERPL-MCNC: 2.92 NG/ML (ref ?–4)
CREAT BLD-MCNC: 0.84 MG/DL
CREAT UR-SCNC: 265 MG/DL
DEPRECATED RDW RBC AUTO: 49 FL (ref 35.1–46.3)
EOSINOPHIL # BLD AUTO: 0.16 X10(3) UL (ref 0–0.7)
EOSINOPHIL NFR BLD AUTO: 4 %
ERYTHROCYTE [DISTWIDTH] IN BLOOD BY AUTOMATED COUNT: 14.6 % (ref 11–15)
EST. AVERAGE GLUCOSE BLD GHB EST-MCNC: 157 MG/DL (ref 68–126)
FASTING PATIENT LIPID ANSWER: YES
FASTING STATUS PATIENT QL REPORTED: YES
GLOBULIN PLAS-MCNC: 2.8 G/DL (ref 2.8–4.4)
GLUCOSE BLD-MCNC: 132 MG/DL (ref 70–99)
GLUCOSE UR-MCNC: NEGATIVE MG/DL
HBA1C MFR BLD: 7.1 % (ref ?–5.7)
HCT VFR BLD AUTO: 37.4 %
HDLC SERPL-MCNC: 42 MG/DL (ref 40–59)
HGB BLD-MCNC: 11.8 G/DL
IMM GRANULOCYTES # BLD AUTO: 0.01 X10(3) UL (ref 0–1)
IMM GRANULOCYTES NFR BLD: 0.3 %
KETONES UR-MCNC: NEGATIVE MG/DL
LDLC SERPL CALC-MCNC: 34 MG/DL (ref ?–100)
LEUKOCYTE ESTERASE UR QL STRIP.AUTO: NEGATIVE
LYMPHOCYTES # BLD AUTO: 1.89 X10(3) UL (ref 1–4)
LYMPHOCYTES NFR BLD AUTO: 47.5 %
MCH RBC QN AUTO: 28.9 PG (ref 26–34)
MCHC RBC AUTO-ENTMCNC: 31.6 G/DL (ref 31–37)
MCV RBC AUTO: 91.7 FL
MICROALBUMIN UR-MCNC: 4.15 MG/DL
MICROALBUMIN/CREAT 24H UR-RTO: 15.7 UG/MG (ref ?–30)
MONOCYTES # BLD AUTO: 0.43 X10(3) UL (ref 0.1–1)
MONOCYTES NFR BLD AUTO: 10.8 %
NEUTROPHILS # BLD AUTO: 1.45 X10 (3) UL (ref 1.5–7.7)
NEUTROPHILS # BLD AUTO: 1.45 X10(3) UL (ref 1.5–7.7)
NEUTROPHILS NFR BLD AUTO: 36.4 %
NITRITE UR QL STRIP.AUTO: NEGATIVE
NONHDLC SERPL-MCNC: 63 MG/DL (ref ?–130)
OSMOLALITY SERPL CALC.SUM OF ELEC: 282 MOSM/KG (ref 275–295)
PH UR: 6 [PH] (ref 5–8)
PLATELET # BLD AUTO: 193 10(3)UL (ref 150–450)
POTASSIUM SERPL-SCNC: 3.7 MMOL/L (ref 3.5–5.1)
PROT SERPL-MCNC: 6.9 G/DL (ref 6.4–8.2)
PROT UR-MCNC: 30 MG/DL
RBC # BLD AUTO: 4.08 X10(6)UL
RBC #/AREA URNS AUTO: >10 /HPF
SODIUM SERPL-SCNC: 135 MMOL/L (ref 136–145)
SP GR UR STRIP: 1.02 (ref 1–1.03)
TRIGL SERPL-MCNC: 180 MG/DL (ref 30–149)
TSI SER-ACNC: 1.58 MIU/ML (ref 0.36–3.74)
UROBILINOGEN UR STRIP-ACNC: <2
VLDLC SERPL CALC-MCNC: 24 MG/DL (ref 0–30)
WBC # BLD AUTO: 4 X10(3) UL (ref 4–11)

## 2022-01-15 PROCEDURE — 82570 ASSAY OF URINE CREATININE: CPT

## 2022-01-15 PROCEDURE — 36415 COLL VENOUS BLD VENIPUNCTURE: CPT

## 2022-01-15 PROCEDURE — 81001 URINALYSIS AUTO W/SCOPE: CPT

## 2022-01-15 PROCEDURE — 82043 UR ALBUMIN QUANTITATIVE: CPT

## 2022-01-15 PROCEDURE — 3051F HG A1C>EQUAL 7.0%<8.0%: CPT | Performed by: INTERNAL MEDICINE

## 2022-01-15 PROCEDURE — 3061F NEG MICROALBUMINURIA REV: CPT | Performed by: INTERNAL MEDICINE

## 2022-01-15 PROCEDURE — 83036 HEMOGLOBIN GLYCOSYLATED A1C: CPT

## 2022-01-15 PROCEDURE — 80061 LIPID PANEL: CPT

## 2022-01-15 PROCEDURE — 85025 COMPLETE CBC W/AUTO DIFF WBC: CPT

## 2022-01-15 PROCEDURE — 84443 ASSAY THYROID STIM HORMONE: CPT

## 2022-01-15 PROCEDURE — 80053 COMPREHEN METABOLIC PANEL: CPT

## 2022-01-22 ENCOUNTER — HOSPITAL ENCOUNTER (OUTPATIENT)
Dept: CT IMAGING | Facility: HOSPITAL | Age: 72
Discharge: HOME | End: 2022-01-22
Attending: INTERNAL MEDICINE
Payer: MEDICARE

## 2022-01-22 DIAGNOSIS — R31.29 MICROSCOPIC HEMATURIA: ICD-10-CM

## 2022-01-22 PROCEDURE — 74176 CT ABD & PELVIS W/O CONTRAST: CPT | Performed by: INTERNAL MEDICINE

## 2022-01-26 ENCOUNTER — OFFICE VISIT (OUTPATIENT)
Dept: INTERNAL MEDICINE CLINIC | Facility: CLINIC | Age: 72
End: 2022-01-26
Payer: MEDICARE

## 2022-01-26 VITALS
RESPIRATION RATE: 16 BRPM | TEMPERATURE: 97 F | BODY MASS INDEX: 26.98 KG/M2 | HEIGHT: 68 IN | WEIGHT: 178 LBS | SYSTOLIC BLOOD PRESSURE: 124 MMHG | HEART RATE: 66 BPM | DIASTOLIC BLOOD PRESSURE: 62 MMHG

## 2022-01-26 DIAGNOSIS — R31.29 MICROSCOPIC HEMATURIA: ICD-10-CM

## 2022-01-26 DIAGNOSIS — E11.65 TYPE 2 DIABETES MELLITUS WITH HYPERGLYCEMIA, WITHOUT LONG-TERM CURRENT USE OF INSULIN (HCC): Primary | Chronic | ICD-10-CM

## 2022-01-26 DIAGNOSIS — K76.89 LIVER DYSFUNCTION: ICD-10-CM

## 2022-01-26 DIAGNOSIS — D61.818 PANCYTOPENIA (HCC): ICD-10-CM

## 2022-01-26 DIAGNOSIS — I10 ESSENTIAL HYPERTENSION: ICD-10-CM

## 2022-01-26 DIAGNOSIS — E78.00 PURE HYPERCHOLESTEROLEMIA: ICD-10-CM

## 2022-01-26 PROCEDURE — 3078F DIAST BP <80 MM HG: CPT | Performed by: INTERNAL MEDICINE

## 2022-01-26 PROCEDURE — 3074F SYST BP LT 130 MM HG: CPT | Performed by: INTERNAL MEDICINE

## 2022-01-26 PROCEDURE — 99214 OFFICE O/P EST MOD 30 MIN: CPT | Performed by: INTERNAL MEDICINE

## 2022-01-26 PROCEDURE — 3008F BODY MASS INDEX DOCD: CPT | Performed by: INTERNAL MEDICINE

## 2022-01-26 NOTE — ASSESSMENT & PLAN NOTE
Blood pressure 124/62, pulse 66, temperature 96.7 °F (35.9 °C), temperature source Temporal, resp. rate 16, height 5' 8\" (1.727 m), weight 178 lb (80.7 kg).   Blood pressures look stable, well controlled on amlodipine 5 mg daily, valsartan hydrochlorothiaz

## 2022-01-26 NOTE — PATIENT INSTRUCTIONS
Problem List Items Addressed This Visit        Unprioritized    Essential hypertension     Blood pressure 124/62, pulse 66, temperature 96.7 °F (35.9 °C), temperature source Temporal, resp. rate 16, height 5' 8\" (1.727 m), weight 178 lb (80.7 kg).   Blood much improved, hemoglobin A1c at 7.1. Continue on glimepiride, Metformin, Januvia. Unable to tolerate Jardiance due to penile swelling and discomfort and hence discontinued. Lipid panel has been stable on atorvastatin.   Liver function tests have been mo

## 2022-01-26 NOTE — ASSESSMENT & PLAN NOTE
Diabetes much improved, hemoglobin A1c at 7.1. Continue on glimepiride, Metformin, Januvia. Unable to tolerate Jardiance due to penile swelling and discomfort and hence discontinued. Lipid panel has been stable on atorvastatin.   Liver function tests hav

## 2022-01-26 NOTE — ASSESSMENT & PLAN NOTE
Patient with persistent mild anemia, neutropenia. Asymptomatic at this time. Counts have improved. He has been followed up per hematology as well as gastroenterology.   Colonoscopy due on 10/28/2022  He did not have any abdominal pain, constipation or di

## 2022-01-26 NOTE — ASSESSMENT & PLAN NOTE
Microscopic hematuria without any abdominal pain, renal angle tenderness or history of kidney stone. CT kidney stone protocol shows a enlarged prostate with bladder changes associated. No palpable abdominal tenderness today.       Follow-up with urology f

## 2022-01-26 NOTE — PROGRESS NOTES
HPI:    Patient ID: Margarita Lala is a 80year old male.     CT abd and pelvis    Impression  CONCLUSION:       No radiopaque renal calculus or hydronephrosis.       Bladder wall thickening likely secondary from an enlarged prostate.       Hepatic steatosis hypertension, sedentary lifestyle, family history, dyslipidemia and tobacco exposure (quit smoking 10 yrs back-1/2 ppd x 40 yrs). Current diabetic treatment includes diet and oral agent (dual therapy) (Metformin and glimepiride. ).  He is following a diabeti Genitourinary: Negative. Musculoskeletal: Negative. Skin: Negative. Allergic/Immunologic: Negative. Neurological: Negative. Hematological: Negative. Psychiatric/Behavioral: Negative. Nervous/anxious: depression stable.              Franklin Jauregui is 27.06 kg/m². PHYSICAL EXAM:   Physical Exam  Vitals and nursing note reviewed. Constitutional:       General: He is not in acute distress. Appearance: Normal appearance. He is well-developed and normal weight. He is not ill-appearing.    HENT: ASSESSMENT/PLAN:     Problem List Items Addressed This Visit        Unprioritized    Essential hypertension     Blood pressure 124/62, pulse 66, temperature 96.7 °F (35.9 °C), temperature source Temporal, resp.  rate 16, height 5' 8\" (1.727 m), weight 17 metoprolol. Eye exam is up-to-date. Foot exam is normal without any or Neuropathy. Microscopic hematuria     Microscopic hematuria without any abdominal pain, renal angle tenderness or history of kidney stone.   CT kidney stone protocol shows a

## 2022-01-26 NOTE — ASSESSMENT & PLAN NOTE
Lipid panel has been stable on atorvastatin at 40 mg daily. Liver function testing slightly deranged. He has been advised to continue to hold all alcohol. He usually drinks about 2-3 times a week about 2 to 3 packs/day.   Will reassess labs in about 4 we

## 2022-01-27 DIAGNOSIS — F41.9 ANXIETY: ICD-10-CM

## 2022-01-27 RX ORDER — LORAZEPAM 1 MG/1
TABLET ORAL
Qty: 90 TABLET | Refills: 0 | Status: SHIPPED | OUTPATIENT
Start: 2022-01-27

## 2022-01-27 NOTE — TELEPHONE ENCOUNTER
Please review; protocol failed/no protocol    Requested Prescriptions   Pending Prescriptions Disp Refills    LORazepam 1 MG Oral Tab 90 tablet 0     Sig: TAKE 1 TABLET BY MOUTH EVERY NIGHT        There is no refill protocol information for this order

## 2022-02-10 ENCOUNTER — TELEPHONE (OUTPATIENT)
Dept: INTERNAL MEDICINE CLINIC | Facility: CLINIC | Age: 72
End: 2022-02-10

## 2022-02-10 NOTE — TELEPHONE ENCOUNTER
Dr Ivet Vasquez- patient called states that he was supposed to be prescribed triamcinolone cream for rash on his leg, that he discussed with you at 1/26/22 office visit. States cream was not sent in, pended order. Please sign if correct. Thank you.

## 2022-03-02 RX ORDER — OMEGA-3-ACID ETHYL ESTERS 1 G/1
1 CAPSULE, LIQUID FILLED ORAL 3 TIMES DAILY
Qty: 270 CAPSULE | Refills: 1 | Status: SHIPPED | OUTPATIENT
Start: 2022-03-02

## 2022-03-02 RX ORDER — VALSARTAN AND HYDROCHLOROTHIAZIDE 320; 25 MG/1; MG/1
1 TABLET, FILM COATED ORAL DAILY
Qty: 90 TABLET | Refills: 1 | Status: SHIPPED | OUTPATIENT
Start: 2022-03-02 | End: 2023-02-25

## 2022-03-02 NOTE — TELEPHONE ENCOUNTER
Please review; protocol failed/No Protcol    Requested Prescriptions   Pending Prescriptions Disp Refills    omega-3-acid ethyl esters 1 g Oral Cap 270 capsule 1     Sig: Take 1 capsule (1 g total) by mouth 3 (three) times daily. Cholesterol Medication Protocol Passed - 3/2/2022  8:21 AM        Passed - ALT in past 12 months        Passed - LDL in past 12 months        Passed - Last ALT < 80       Lab Results   Component Value Date    ALT 65 (H) 01/15/2022             Passed - Last LDL < 130     Lab Results   Component Value Date    LDL 34 01/15/2022               Passed - Appointment in past 12 or next 3 months          Signed Prescriptions Disp Refills    Valsartan-hydroCHLOROthiazide 320-25 MG Oral Tab 90 tablet 1     Sig: Take 1 tablet by mouth daily.         Hypertensive Medications Protocol Passed - 3/2/2022  8:21 AM        Passed - CMP or BMP in past 12 months        Passed - Appointment in past 6 or next 3 months        Passed - GFR Non- > 50     Lab Results   Component Value Date    GFRNAA 89 01/15/2022                    metFORMIN HCl 1000 MG Oral Tab 180 tablet 1     Sig: TAKE 1 TABLET TWICE DAILY WITH MEALS        Diabetes Medication Protocol Passed - 3/2/2022  8:21 AM        Passed - Last A1C < 7.5 and within past 6 months     Lab Results   Component Value Date    A1C 7.1 (H) 01/15/2022               Passed - Appointment in past 6 or next 3 months        Passed - GFR Non- > 50     Lab Results   Component Value Date    GFRNAA 89 01/15/2022                 Passed - GFR in the past 12 months              Recent Outpatient Visits              1 month ago Type 2 diabetes mellitus with hyperglycemia, without long-term current use of insulin [facility])    [place] [employer], [facility], [address], Ericka Garcia MD    Office Visit    5 months ago Type 2 diabetes mellitus with hyperglycemia, without long-term current use of insulin [facility])    [place] [employer], [facility], [address] Alessandra Camp MD    Office Visit    9 months ago Type 2 diabetes mellitus with hyperglycemia, without long-term current use of insulin [facility])    [facility], [address], Valentina Rasmussen MD    Office Visit    10 months ago Encounter for annual health examination    Severa Schmidt, [employer], MD    Office Visit    10 months ago Type 2 diabetes mellitus without retinopathy [facility])    [facility] Flavia Gale MD    Office Visit            Future Appointments         Provider Department Appt Notes    In 5 days Chad Valdez MD [facility], [address], [place] Microscopic hematuria, need ref, policy informed    In 1 week Alessandra Camp MD [facility], [facility], [address], [facility] [address] [place] [address] [phone]

## 2022-03-02 NOTE — TELEPHONE ENCOUNTER
Refill passed per [employer] protocol. Requested Prescriptions   Pending Prescriptions Disp Refills    omega-3-acid ethyl esters 1 g Oral Cap 270 capsule 1     Sig: Take 1 capsule (1 g total) by mouth 3 (three) times daily. Cholesterol Medication Protocol Passed - 3/2/2022  8:21 AM        Passed - ALT in past 12 months        Passed - LDL in past 12 months        Passed - Last ALT < 80       Lab Results   Component Value Date    ALT 65 (H) 01/15/2022             Passed - Last LDL < 130     Lab Results   Component Value Date    LDL 34 01/15/2022               Passed - Appointment in past 12 or next 3 months           Valsartan-hydroCHLOROthiazide 320-25 MG Oral Tab 90 tablet 1     Sig: Take 1 tablet by mouth daily.         Hypertensive Medications Protocol Passed - 3/2/2022  8:21 AM        Passed - CMP or BMP in past 12 months        Passed - Appointment in past 6 or next 3 months        Passed - GFR Non- > 50     Lab Results   Component Value Date    GFRNAA 89 01/15/2022                    metFORMIN HCl 1000 MG Oral Tab 180 tablet 1     Sig: TAKE 1 TABLET TWICE DAILY WITH MEALS        Diabetes Medication Protocol Passed - 3/2/2022  8:21 AM        Passed - Last A1C < 7.5 and within past 6 months     Lab Results   Component Value Date    A1C 7.1 (H) 01/15/2022               Passed - Appointment in past 6 or next 3 months        Passed - GFR Non- > 50     Lab Results   Component Value Date    GFRNAA 89 01/15/2022                 Passed - GFR in the past 12 months              Future Appointments         Provider Department Appt Notes    In 5 days Mario Jordan MD [facility], [address], [place] Microscopic hematuria, need ref, policy informed    In 1 week Ginette Ren MD [employer], [facility], [address], [facility] [address] [place] [address] [phone]            Recent Outpatient Visits              1 month ago Type 2 diabetes mellitus with hyperglycemia, without long-term current use of insulin [facility])    [address], Kate Buchanan MD    Office Visit    5 months ago Type 2 diabetes mellitus with hyperglycemia, without long-term current use of insulin [facility])    [address], [address], Kate Buchanan MD    Office Visit    9 months ago Type 2 diabetes mellitus with hyperglycemia, without long-term current use of insulin [facility])    [address], [address], Kate Buchnaan MD    Office Visit    10 months ago Encounter for [employer], [address], Kate Buchanan MD    Office Visit    10 months ago Type 2 diabetes mellitus without retinopathy [facility])    [facility] Rigoberto Whittington MD    Office Visit

## 2022-03-03 ENCOUNTER — LAB ENCOUNTER (OUTPATIENT)
Dept: LAB | Age: 72
End: 2022-03-03
Attending: INTERNAL MEDICINE
Payer: MEDICARE

## 2022-03-03 DIAGNOSIS — K76.89 LIVER DYSFUNCTION: ICD-10-CM

## 2022-03-03 LAB
ALBUMIN SERPL-MCNC: 3.6 G/DL (ref 3.4–5)
ALP LIVER SERPL-CCNC: 52 U/L
ALT SERPL-CCNC: 54 U/L
AST SERPL-CCNC: 26 U/L (ref 15–37)
BILIRUB DIRECT SERPL-MCNC: 0.2 MG/DL (ref 0–0.2)
BILIRUB SERPL-MCNC: 0.4 MG/DL (ref 0.1–2)
PROT SERPL-MCNC: 6.8 G/DL (ref 6.4–8.2)

## 2022-03-03 PROCEDURE — 80076 HEPATIC FUNCTION PANEL: CPT

## 2022-03-03 PROCEDURE — 36415 COLL VENOUS BLD VENIPUNCTURE: CPT

## 2022-03-07 ENCOUNTER — OFFICE VISIT (OUTPATIENT)
Dept: SURGERY | Facility: CLINIC | Age: 72
End: 2022-03-07
Payer: MEDICARE

## 2022-03-07 VITALS
WEIGHT: 178 LBS | SYSTOLIC BLOOD PRESSURE: 127 MMHG | BODY MASS INDEX: 27 KG/M2 | HEART RATE: 61 BPM | DIASTOLIC BLOOD PRESSURE: 77 MMHG

## 2022-03-07 DIAGNOSIS — R31.29 MICROSCOPIC HEMATURIA: Primary | ICD-10-CM

## 2022-03-07 LAB
APPEARANCE: CLEAR
BILIRUBIN: NEGATIVE
GLUCOSE (URINE DIPSTICK): NEGATIVE MG/DL
KETONES (URINE DIPSTICK): NEGATIVE MG/DL
LEUKOCYTES: NEGATIVE
MULTISTIX LOT#: ABNORMAL NUMERIC
NITRITE, URINE: NEGATIVE
PH, URINE: 6 (ref 4.5–8)
PROTEIN (URINE DIPSTICK): NEGATIVE MG/DL
SPECIFIC GRAVITY: 1.01 (ref 1–1.03)
URINE-COLOR: YELLOW
UROBILINOGEN,SEMI-QN: 0.2 MG/DL (ref 0–1.9)

## 2022-03-07 PROCEDURE — 81003 URINALYSIS AUTO W/O SCOPE: CPT | Performed by: UROLOGY

## 2022-03-07 PROCEDURE — 3074F SYST BP LT 130 MM HG: CPT | Performed by: UROLOGY

## 2022-03-07 PROCEDURE — 3078F DIAST BP <80 MM HG: CPT | Performed by: UROLOGY

## 2022-03-07 PROCEDURE — 99204 OFFICE O/P NEW MOD 45 MIN: CPT | Performed by: UROLOGY

## 2022-03-08 LAB — NON GYNE INTERPRETATION: NEGATIVE

## 2022-03-15 ENCOUNTER — HOSPITAL ENCOUNTER (OUTPATIENT)
Dept: CT IMAGING | Facility: HOSPITAL | Age: 72
Discharge: HOME | End: 2022-03-15
Attending: UROLOGY
Payer: MEDICARE

## 2022-03-15 DIAGNOSIS — R31.29 MICROSCOPIC HEMATURIA: ICD-10-CM

## 2022-03-15 LAB — CREAT BLD-MCNC: 0.8 MG/DL

## 2022-03-15 PROCEDURE — 74178 CT ABD&PLV WO CNTR FLWD CNTR: CPT | Performed by: UROLOGY

## 2022-03-15 PROCEDURE — 82565 ASSAY OF CREATININE: CPT

## 2022-03-15 PROCEDURE — 76377 3D RENDER W/INTRP POSTPROCES: CPT | Performed by: UROLOGY

## 2022-03-28 PROBLEM — R31.21 ASYMPTOMATIC MICROSCOPIC HEMATURIA: Status: ACTIVE | Noted: 2022-01-26

## 2022-04-08 ENCOUNTER — TELEPHONE (OUTPATIENT)
Dept: SURGERY | Facility: CLINIC | Age: 72
End: 2022-04-08

## 2022-04-14 ENCOUNTER — PROCEDURE (OUTPATIENT)
Dept: SURGERY | Facility: CLINIC | Age: 72
End: 2022-04-14
Payer: MEDICARE

## 2022-04-14 VITALS
HEART RATE: 56 BPM | DIASTOLIC BLOOD PRESSURE: 78 MMHG | BODY MASS INDEX: 27 KG/M2 | SYSTOLIC BLOOD PRESSURE: 166 MMHG | WEIGHT: 175 LBS

## 2022-04-14 DIAGNOSIS — R31.21 ASYMPTOMATIC MICROSCOPIC HEMATURIA: Primary | ICD-10-CM

## 2022-04-14 PROCEDURE — 3074F SYST BP LT 130 MM HG: CPT | Performed by: UROLOGY

## 2022-04-14 PROCEDURE — 99213 OFFICE O/P EST LOW 20 MIN: CPT | Performed by: UROLOGY

## 2022-04-14 PROCEDURE — 52000 CYSTOURETHROSCOPY: CPT | Performed by: UROLOGY

## 2022-04-14 PROCEDURE — 3078F DIAST BP <80 MM HG: CPT | Performed by: UROLOGY

## 2022-04-14 PROCEDURE — 3077F SYST BP >= 140 MM HG: CPT | Performed by: UROLOGY

## 2022-04-14 RX ORDER — CIPROFLOXACIN 500 MG/1
500 TABLET, FILM COATED ORAL ONCE
Status: COMPLETED | OUTPATIENT
Start: 2022-04-14 | End: 2022-04-14

## 2022-04-14 RX ADMIN — CIPROFLOXACIN 500 MG: 500 TABLET, FILM COATED ORAL at 15:38:00

## 2022-05-04 ENCOUNTER — APPOINTMENT (OUTPATIENT)
Dept: NUCLEAR MEDICINE | Facility: HOSPITAL | Age: 72
End: 2022-05-04
Attending: STUDENT IN AN ORGANIZED HEALTH CARE EDUCATION/TRAINING PROGRAM
Payer: MEDICARE

## 2022-05-04 ENCOUNTER — APPOINTMENT (OUTPATIENT)
Dept: GENERAL RADIOLOGY | Facility: HOSPITAL | Age: 72
End: 2022-05-04
Attending: EMERGENCY MEDICINE
Payer: MEDICARE

## 2022-05-04 ENCOUNTER — APPOINTMENT (OUTPATIENT)
Dept: CV DIAGNOSTICS | Facility: HOSPITAL | Age: 72
End: 2022-05-04
Attending: STUDENT IN AN ORGANIZED HEALTH CARE EDUCATION/TRAINING PROGRAM
Payer: MEDICARE

## 2022-05-04 ENCOUNTER — HOSPITAL ENCOUNTER (OUTPATIENT)
Facility: HOSPITAL | Age: 72
Setting detail: OBSERVATION
Discharge: HOME | End: 2022-05-04
Attending: EMERGENCY MEDICINE | Admitting: HOSPITALIST
Payer: MEDICARE

## 2022-05-04 VITALS
SYSTOLIC BLOOD PRESSURE: 149 MMHG | RESPIRATION RATE: 20 BRPM | HEART RATE: 64 BPM | DIASTOLIC BLOOD PRESSURE: 77 MMHG | TEMPERATURE: 99 F | OXYGEN SATURATION: 97 % | WEIGHT: 177.19 LBS | HEIGHT: 68 IN | BODY MASS INDEX: 26.85 KG/M2

## 2022-05-04 DIAGNOSIS — R07.9 CHEST PAIN WITH MODERATE RISK FOR CARDIAC ETIOLOGY: Primary | ICD-10-CM

## 2022-05-04 LAB
ANION GAP SERPL CALC-SCNC: 8 MMOL/L (ref 0–18)
BASOPHILS # BLD AUTO: 0.04 X10(3) UL (ref 0–0.2)
BASOPHILS NFR BLD AUTO: 0.9 %
BUN BLD-MCNC: 11 MG/DL (ref 7–18)
BUN/CREAT SERPL: 11.7 (ref 10–20)
CALCIUM BLD-MCNC: 9.1 MG/DL (ref 8.5–10.1)
CHLORIDE SERPL-SCNC: 94 MMOL/L (ref 98–112)
CHOLEST SERPL-MCNC: 89 MG/DL (ref ?–200)
CO2 SERPL-SCNC: 30 MMOL/L (ref 21–32)
CREAT BLD-MCNC: 0.94 MG/DL
D DIMER PPP FEU-MCNC: 0.39 UG/ML FEU (ref ?–0.71)
DEPRECATED RDW RBC AUTO: 50.9 FL (ref 35.1–46.3)
EOSINOPHIL # BLD AUTO: 0.12 X10(3) UL (ref 0–0.7)
EOSINOPHIL NFR BLD AUTO: 2.6 %
ERYTHROCYTE [DISTWIDTH] IN BLOOD BY AUTOMATED COUNT: 15.3 % (ref 11–15)
EST. AVERAGE GLUCOSE BLD GHB EST-MCNC: 160 MG/DL (ref 68–126)
GLUCOSE BLD-MCNC: 206 MG/DL (ref 70–99)
GLUCOSE BLDC GLUCOMTR-MCNC: 124 MG/DL (ref 70–99)
GLUCOSE BLDC GLUCOMTR-MCNC: 175 MG/DL (ref 70–99)
HBA1C MFR BLD: 7.2 % (ref ?–5.7)
HCT VFR BLD AUTO: 37.4 %
HDLC SERPL-MCNC: 45 MG/DL (ref 40–59)
HGB BLD-MCNC: 11.8 G/DL
IMM GRANULOCYTES # BLD AUTO: 0.01 X10(3) UL (ref 0–1)
IMM GRANULOCYTES NFR BLD: 0.2 %
LDLC SERPL CALC-MCNC: 26 MG/DL (ref ?–100)
LYMPHOCYTES # BLD AUTO: 1.52 X10(3) UL (ref 1–4)
LYMPHOCYTES NFR BLD AUTO: 33 %
MAGNESIUM SERPL-MCNC: 2 MG/DL (ref 1.6–2.6)
MCH RBC QN AUTO: 28.9 PG (ref 26–34)
MCHC RBC AUTO-ENTMCNC: 31.6 G/DL (ref 31–37)
MCV RBC AUTO: 91.7 FL
MONOCYTES # BLD AUTO: 0.5 X10(3) UL (ref 0.1–1)
MONOCYTES NFR BLD AUTO: 10.9 %
NEUTROPHILS # BLD AUTO: 2.41 X10 (3) UL (ref 1.5–7.7)
NEUTROPHILS # BLD AUTO: 2.41 X10(3) UL (ref 1.5–7.7)
NEUTROPHILS NFR BLD AUTO: 52.4 %
NONHDLC SERPL-MCNC: 44 MG/DL (ref ?–130)
OSMOLALITY SERPL CALC.SUM OF ELEC: 279 MOSM/KG (ref 275–295)
PLATELET # BLD AUTO: 168 10(3)UL (ref 150–450)
POTASSIUM SERPL-SCNC: 3.6 MMOL/L (ref 3.5–5.1)
RBC # BLD AUTO: 4.08 X10(6)UL
SARS-COV-2 RNA RESP QL NAA+PROBE: NOT DETECTED
SODIUM SERPL-SCNC: 132 MMOL/L (ref 136–145)
TRIGL SERPL-MCNC: 94 MG/DL (ref 30–149)
TROPONIN I HIGH SENSITIVITY: 21 NG/L
TROPONIN I HIGH SENSITIVITY: 24 NG/L
VLDLC SERPL CALC-MCNC: 12 MG/DL (ref 0–30)
WBC # BLD AUTO: 4.6 X10(3) UL (ref 4–11)

## 2022-05-04 PROCEDURE — 80061 LIPID PANEL: CPT | Performed by: HOSPITALIST

## 2022-05-04 PROCEDURE — 93017 CV STRESS TEST TRACING ONLY: CPT | Performed by: STUDENT IN AN ORGANIZED HEALTH CARE EDUCATION/TRAINING PROGRAM

## 2022-05-04 PROCEDURE — 93005 ELECTROCARDIOGRAM TRACING: CPT

## 2022-05-04 PROCEDURE — 85025 COMPLETE CBC W/AUTO DIFF WBC: CPT | Performed by: EMERGENCY MEDICINE

## 2022-05-04 PROCEDURE — 36415 COLL VENOUS BLD VENIPUNCTURE: CPT

## 2022-05-04 PROCEDURE — 93010 ELECTROCARDIOGRAM REPORT: CPT | Performed by: EMERGENCY MEDICINE

## 2022-05-04 PROCEDURE — 78452 HT MUSCLE IMAGE SPECT MULT: CPT | Performed by: STUDENT IN AN ORGANIZED HEALTH CARE EDUCATION/TRAINING PROGRAM

## 2022-05-04 PROCEDURE — 93018 CV STRESS TEST I&R ONLY: CPT | Performed by: NURSE PRACTITIONER

## 2022-05-04 PROCEDURE — 82962 GLUCOSE BLOOD TEST: CPT

## 2022-05-04 PROCEDURE — 84484 ASSAY OF TROPONIN QUANT: CPT | Performed by: EMERGENCY MEDICINE

## 2022-05-04 PROCEDURE — 99285 EMERGENCY DEPT VISIT HI MDM: CPT

## 2022-05-04 PROCEDURE — 3051F HG A1C>EQUAL 7.0%<8.0%: CPT | Performed by: INTERNAL MEDICINE

## 2022-05-04 PROCEDURE — 83735 ASSAY OF MAGNESIUM: CPT | Performed by: EMERGENCY MEDICINE

## 2022-05-04 PROCEDURE — 83036 HEMOGLOBIN GLYCOSYLATED A1C: CPT | Performed by: HOSPITALIST

## 2022-05-04 PROCEDURE — 71045 X-RAY EXAM CHEST 1 VIEW: CPT | Performed by: EMERGENCY MEDICINE

## 2022-05-04 PROCEDURE — 93016 CV STRESS TEST SUPVJ ONLY: CPT | Performed by: NURSE PRACTITIONER

## 2022-05-04 PROCEDURE — 80048 BASIC METABOLIC PNL TOTAL CA: CPT | Performed by: EMERGENCY MEDICINE

## 2022-05-04 PROCEDURE — 85379 FIBRIN DEGRADATION QUANT: CPT | Performed by: EMERGENCY MEDICINE

## 2022-05-04 RX ORDER — ACETAMINOPHEN 325 MG/1
650 TABLET ORAL EVERY 6 HOURS PRN
Status: DISCONTINUED | OUTPATIENT
Start: 2022-05-04 | End: 2022-05-04

## 2022-05-04 RX ORDER — METOPROLOL SUCCINATE 25 MG/1
25 TABLET, EXTENDED RELEASE ORAL DAILY
Status: DISCONTINUED | OUTPATIENT
Start: 2022-05-05 | End: 2022-05-04

## 2022-05-04 RX ORDER — ASPIRIN 81 MG/1
81 TABLET ORAL DAILY
Status: DISCONTINUED | OUTPATIENT
Start: 2022-05-04 | End: 2022-05-04

## 2022-05-04 RX ORDER — LORAZEPAM 1 MG/1
1 TABLET ORAL NIGHTLY PRN
Status: DISCONTINUED | OUTPATIENT
Start: 2022-05-04 | End: 2022-05-04

## 2022-05-04 RX ORDER — AMLODIPINE BESYLATE 10 MG/1
10 TABLET ORAL DAILY
Status: DISCONTINUED | OUTPATIENT
Start: 2022-05-04 | End: 2022-05-04

## 2022-05-04 RX ORDER — BISACODYL 10 MG
10 SUPPOSITORY, RECTAL RECTAL
Status: DISCONTINUED | OUTPATIENT
Start: 2022-05-04 | End: 2022-05-04

## 2022-05-04 RX ORDER — ASPIRIN 81 MG/1
324 TABLET, CHEWABLE ORAL ONCE
Status: COMPLETED | OUTPATIENT
Start: 2022-05-04 | End: 2022-05-04

## 2022-05-04 RX ORDER — NICOTINE POLACRILEX 4 MG
15 LOZENGE BUCCAL
Status: DISCONTINUED | OUTPATIENT
Start: 2022-05-04 | End: 2022-05-04

## 2022-05-04 RX ORDER — SENNOSIDES 8.6 MG
17.2 TABLET ORAL NIGHTLY PRN
Status: DISCONTINUED | OUTPATIENT
Start: 2022-05-04 | End: 2022-05-04

## 2022-05-04 RX ORDER — DEXTROSE MONOHYDRATE 25 G/50ML
50 INJECTION, SOLUTION INTRAVENOUS
Status: DISCONTINUED | OUTPATIENT
Start: 2022-05-04 | End: 2022-05-04

## 2022-05-04 RX ORDER — NICOTINE POLACRILEX 4 MG
30 LOZENGE BUCCAL
Status: DISCONTINUED | OUTPATIENT
Start: 2022-05-04 | End: 2022-05-04

## 2022-05-04 RX ORDER — POLYETHYLENE GLYCOL 3350 17 G/17G
17 POWDER, FOR SOLUTION ORAL DAILY PRN
Status: DISCONTINUED | OUTPATIENT
Start: 2022-05-04 | End: 2022-05-04

## 2022-05-04 RX ORDER — NITROGLYCERIN 0.4 MG/1
0.4 TABLET SUBLINGUAL ONCE
Status: COMPLETED | OUTPATIENT
Start: 2022-05-04 | End: 2022-05-04

## 2022-05-04 RX ORDER — ATORVASTATIN CALCIUM 40 MG/1
40 TABLET, FILM COATED ORAL NIGHTLY
Status: DISCONTINUED | OUTPATIENT
Start: 2022-05-04 | End: 2022-05-04

## 2022-05-04 NOTE — PLAN OF CARE
Problem: Patient Centered Care  Goal: Patient preferences are identified and integrated in the patient's plan of care  Description: Interventions:  - What would you like us to know as we care for you?   - Provide timely, complete, and accurate information to patient/family  - Incorporate patient and family knowledge, values, beliefs, and cultural backgrounds into the planning and delivery of care  - Encourage patient/family to participate in care and decision-making at the level they choose  - Honor patient and family perspectives and choices  Outcome: Adequate for Discharge     Problem: Patient/Family Goals  Goal: Patient/Family Long Term Goal  Description: Patient's Long Term Goal:     Interventions:    - See additional Care Plan goals for specific interventions  Outcome: Adequate for Discharge  Goal: Patient/Family Short Term Goal  Description: Patient's Short Term Goal: to return home. Interventions:   - Nuclear stress test  -trend troponin  - See additional Care Plan goals for specific interventions  Outcome: Adequate for Discharge     Problem: CARDIOVASCULAR - ADULT  Goal: Maintains optimal cardiac output and hemodynamic stability  Description: INTERVENTIONS:  - Monitor vital signs, rhythm, and trends  - Monitor for bleeding, hypotension and signs of decreased cardiac output  - Evaluate effectiveness of vasoactive medications to optimize hemodynamic stability  - Monitor arterial and/or venous puncture sites for bleeding and/or hematoma  - Assess quality of pulses, skin color and temperature  - Assess for signs of decreased coronary artery perfusion - ex.  Angina  - Evaluate fluid balance, assess for edema, trend weights  Outcome: Adequate for Discharge  Goal: Absence of cardiac arrhythmias or at baseline  Description: INTERVENTIONS:  - Continuous cardiac monitoring, monitor vital signs, obtain 12 lead EKG if indicated  - Evaluate effectiveness of antiarrhythmic and heart rate control medications as ordered  - Initiate emergency measures for life threatening arrhythmias  - Monitor electrolytes and administer replacement therapy as ordered  Outcome: Adequate for Discharge   Patient had a normal nuclear stress test today. Will follow-up with cardiology as an outpatient.

## 2022-05-04 NOTE — PLAN OF CARE
Stress test reviewed. Discussed with Dr. Karen Marsh, patient may discharge home and follow-up with Dr. Jannice Osler in office.

## 2022-05-04 NOTE — ED QUICK NOTES
Orders for admission, patient is aware of plan and ready to go upstairs. Any questions, please call ED RN kenyon at extension [phone].      Patient Covid vaccination status: Fully vaccinated     COVID Test Ordered in ED: Rapid SARS-CoV-2 by PCR    COVID Suspicion at Admission: Low clinical suspicion for COVID    Running Infusions:  None    Mental Status/LOC at time of transport: AXOX4    Other pertinent information:   CIWA score: N/A   NIH score:  N/A

## 2022-07-13 ENCOUNTER — OFFICE VISIT (OUTPATIENT)
Dept: INTERNAL MEDICINE CLINIC | Facility: CLINIC | Age: 72
End: 2022-07-13
Payer: MEDICARE

## 2022-07-13 VITALS
HEART RATE: 57 BPM | SYSTOLIC BLOOD PRESSURE: 129 MMHG | DIASTOLIC BLOOD PRESSURE: 64 MMHG | WEIGHT: 179 LBS | HEIGHT: 68 IN | BODY MASS INDEX: 27.13 KG/M2

## 2022-07-13 DIAGNOSIS — F32.9 REACTIVE DEPRESSION: ICD-10-CM

## 2022-07-13 DIAGNOSIS — I10 ESSENTIAL HYPERTENSION: ICD-10-CM

## 2022-07-13 DIAGNOSIS — R91.8 LUNG NODULE, MULTIPLE: ICD-10-CM

## 2022-07-13 DIAGNOSIS — E11.65 TYPE 2 DIABETES MELLITUS WITH HYPERGLYCEMIA, WITHOUT LONG-TERM CURRENT USE OF INSULIN (HCC): Primary | ICD-10-CM

## 2022-07-13 DIAGNOSIS — Z87.891 HISTORY OF CIGARETTE SMOKING: ICD-10-CM

## 2022-07-13 DIAGNOSIS — F17.200 TOBACCO DEPENDENCE: ICD-10-CM

## 2022-07-13 DIAGNOSIS — F41.9 ANXIETY: ICD-10-CM

## 2022-07-13 PROBLEM — F13.20 SEDATIVE, HYPNOTIC OR ANXIOLYTIC DEPENDENCE, UNCOMPLICATED (HCC): Status: ACTIVE | Noted: 2022-07-13

## 2022-07-13 PROCEDURE — 3008F BODY MASS INDEX DOCD: CPT | Performed by: INTERNAL MEDICINE

## 2022-07-13 PROCEDURE — 3078F DIAST BP <80 MM HG: CPT | Performed by: INTERNAL MEDICINE

## 2022-07-13 PROCEDURE — 3074F SYST BP LT 130 MM HG: CPT | Performed by: INTERNAL MEDICINE

## 2022-07-13 PROCEDURE — 99214 OFFICE O/P EST MOD 30 MIN: CPT | Performed by: INTERNAL MEDICINE

## 2022-07-13 RX ORDER — LORAZEPAM 1 MG/1
TABLET ORAL
Qty: 90 TABLET | Refills: 3 | Status: SHIPPED | OUTPATIENT
Start: 2022-07-13

## 2022-07-13 RX ORDER — CELECOXIB 200 MG/1
200 CAPSULE ORAL DAILY
COMMUNITY
Start: 2022-06-08 | End: 2022-07-13 | Stop reason: ALTCHOICE

## 2022-07-13 RX ORDER — CELECOXIB 200 MG/1
CAPSULE ORAL
COMMUNITY
Start: 2022-06-08 | End: 2022-07-13

## 2022-07-13 RX ORDER — METOPROLOL SUCCINATE 25 MG/1
25 TABLET, EXTENDED RELEASE ORAL DAILY
Qty: 90 TABLET | Refills: 3 | Status: SHIPPED | OUTPATIENT
Start: 2022-07-13

## 2022-07-13 RX ORDER — ESCITALOPRAM OXALATE 5 MG/1
5 TABLET ORAL DAILY
Qty: 90 TABLET | Refills: 3 | Status: SHIPPED | OUTPATIENT
Start: 2022-07-13 | End: 2023-07-08

## 2022-07-20 ENCOUNTER — HOSPITAL ENCOUNTER (OUTPATIENT)
Dept: CV DIAGNOSTICS | Facility: HOSPITAL | Age: 72
Discharge: HOME | End: 2022-07-20
Attending: INTERNAL MEDICINE
Payer: MEDICARE

## 2022-07-20 DIAGNOSIS — I10 ESSENTIAL HYPERTENSION: ICD-10-CM

## 2022-07-20 DIAGNOSIS — F17.200 TOBACCO DEPENDENCE: ICD-10-CM

## 2022-07-20 PROCEDURE — 93306 TTE W/DOPPLER COMPLETE: CPT | Performed by: INTERNAL MEDICINE

## 2022-07-29 ENCOUNTER — HOSPITAL ENCOUNTER (OUTPATIENT)
Dept: CT IMAGING | Facility: HOSPITAL | Age: 72
Discharge: HOME | End: 2022-07-29
Attending: INTERNAL MEDICINE
Payer: MEDICARE

## 2022-07-29 DIAGNOSIS — Z87.891 HISTORY OF CIGARETTE SMOKING: ICD-10-CM

## 2022-07-29 DIAGNOSIS — R91.8 LUNG NODULE, MULTIPLE: ICD-10-CM

## 2022-07-29 DIAGNOSIS — F17.200 TOBACCO DEPENDENCE: ICD-10-CM

## 2022-07-29 PROCEDURE — 71250 CT THORAX DX C-: CPT | Performed by: INTERNAL MEDICINE

## 2022-08-02 DIAGNOSIS — R91.8 GROUND GLASS OPACITY PRESENT ON IMAGING OF LUNG: Primary | ICD-10-CM

## 2022-08-02 DIAGNOSIS — R91.1 LUNG NODULE: ICD-10-CM

## 2022-08-10 DIAGNOSIS — E11.9 TYPE 2 DIABETES MELLITUS WITHOUT COMPLICATION, WITHOUT LONG-TERM CURRENT USE OF INSULIN (HCC): ICD-10-CM

## 2022-08-10 NOTE — TELEPHONE ENCOUNTER
Patient is requesting a refill for atorvastatin 40 MG Oral Tab and metFORMIN HCl 1000 MG Oral Tab. Please advise.      [address] Mail Delivery (Now [address] Mail Delivery) - [place], [facility] [facility] [address]

## 2022-08-11 RX ORDER — ATORVASTATIN CALCIUM 40 MG/1
40 TABLET, FILM COATED ORAL NIGHTLY
Qty: 90 TABLET | Refills: 1 | Status: SHIPPED | OUTPATIENT
Start: 2022-08-11

## 2022-09-09 ENCOUNTER — HOSPITAL ENCOUNTER (OUTPATIENT)
Age: 72
Discharge: HOME | End: 2022-09-09
Payer: MEDICARE

## 2022-09-09 VITALS
RESPIRATION RATE: 20 BRPM | SYSTOLIC BLOOD PRESSURE: 139 MMHG | OXYGEN SATURATION: 98 % | TEMPERATURE: 98 F | DIASTOLIC BLOOD PRESSURE: 70 MMHG | HEART RATE: 63 BPM

## 2022-09-09 DIAGNOSIS — H66.91 RIGHT OTITIS MEDIA, UNSPECIFIED OTITIS MEDIA TYPE: ICD-10-CM

## 2022-09-09 DIAGNOSIS — Z20.822 ENCOUNTER FOR LABORATORY TESTING FOR COVID-19 VIRUS: Primary | ICD-10-CM

## 2022-09-09 LAB — SARS-COV-2 RNA RESP QL NAA+PROBE: NOT DETECTED

## 2022-09-09 RX ORDER — AMOXICILLIN 500 MG/1
500 TABLET, FILM COATED ORAL 3 TIMES DAILY
Qty: 30 TABLET | Refills: 0 | Status: SHIPPED | OUTPATIENT
Start: 2022-09-09 | End: 2022-09-19

## 2022-09-26 ENCOUNTER — OFFICE VISIT (OUTPATIENT)
Dept: INTERNAL MEDICINE CLINIC | Facility: CLINIC | Age: 72
End: 2022-09-26

## 2022-09-26 VITALS
RESPIRATION RATE: 16 BRPM | BODY MASS INDEX: 27.16 KG/M2 | HEIGHT: 68 IN | WEIGHT: 179.19 LBS | HEART RATE: 60 BPM | SYSTOLIC BLOOD PRESSURE: 126 MMHG | DIASTOLIC BLOOD PRESSURE: 66 MMHG

## 2022-09-26 DIAGNOSIS — K21.9 GASTROESOPHAGEAL REFLUX DISEASE WITHOUT ESOPHAGITIS: ICD-10-CM

## 2022-09-26 DIAGNOSIS — F32.0 MAJOR DEPRESSIVE DISORDER, SINGLE EPISODE, MILD (HCC): ICD-10-CM

## 2022-09-26 DIAGNOSIS — H25.13 AGE-RELATED NUCLEAR CATARACT OF BOTH EYES: ICD-10-CM

## 2022-09-26 DIAGNOSIS — D69.6 THROMBOCYTOPENIA (HCC): ICD-10-CM

## 2022-09-26 DIAGNOSIS — M17.11 PRIMARY OSTEOARTHRITIS OF RIGHT KNEE: ICD-10-CM

## 2022-09-26 DIAGNOSIS — E11.65 TYPE 2 DIABETES MELLITUS WITH HYPERGLYCEMIA, WITHOUT LONG-TERM CURRENT USE OF INSULIN (HCC): Chronic | ICD-10-CM

## 2022-09-26 DIAGNOSIS — I70.0 ARTERIOSCLEROSIS OF AORTA (HCC): ICD-10-CM

## 2022-09-26 DIAGNOSIS — I51.89 GRADE I DIASTOLIC DYSFUNCTION: ICD-10-CM

## 2022-09-26 DIAGNOSIS — E11.9 TYPE 2 DIABETES MELLITUS WITHOUT RETINOPATHY (HCC): ICD-10-CM

## 2022-09-26 DIAGNOSIS — R31.21 ASYMPTOMATIC MICROSCOPIC HEMATURIA: ICD-10-CM

## 2022-09-26 DIAGNOSIS — R74.8 ELEVATED LIVER ENZYMES: ICD-10-CM

## 2022-09-26 DIAGNOSIS — I35.1 NONRHEUMATIC AORTIC VALVE INSUFFICIENCY: ICD-10-CM

## 2022-09-26 DIAGNOSIS — Z00.00 ENCOUNTER FOR ANNUAL HEALTH EXAMINATION: Primary | ICD-10-CM

## 2022-09-26 DIAGNOSIS — R91.8 LUNG NODULE, MULTIPLE: ICD-10-CM

## 2022-09-26 DIAGNOSIS — E78.00 PURE HYPERCHOLESTEROLEMIA: ICD-10-CM

## 2022-09-26 DIAGNOSIS — F13.20 SEDATIVE, HYPNOTIC OR ANXIOLYTIC DEPENDENCE, UNCOMPLICATED (HCC): ICD-10-CM

## 2022-09-26 DIAGNOSIS — H43.392 FLOATER, VITREOUS, LEFT: ICD-10-CM

## 2022-09-26 DIAGNOSIS — Z87.891 HISTORY OF CIGARETTE SMOKING: ICD-10-CM

## 2022-09-26 DIAGNOSIS — I10 ESSENTIAL HYPERTENSION: ICD-10-CM

## 2022-09-26 PROBLEM — D64.9 ANEMIA: Status: RESOLVED | Noted: 2019-01-25 | Resolved: 2022-09-26

## 2022-09-26 PROBLEM — F32.A MILD DEPRESSIVE DISORDER: Status: RESOLVED | Noted: 2019-01-03 | Resolved: 2022-09-26

## 2022-09-26 PROBLEM — D12.6 TUBULAR ADENOMA OF COLON: Status: RESOLVED | Noted: 2019-11-26 | Resolved: 2022-09-26

## 2022-09-26 PROBLEM — E11.22 TYPE 2 DIABETES MELLITUS WITH DIABETIC CHRONIC KIDNEY DISEASE (HCC): Status: ACTIVE | Noted: 2022-09-26

## 2022-09-26 PROBLEM — R07.9 CHEST PAIN WITH MODERATE RISK FOR CARDIAC ETIOLOGY: Status: RESOLVED | Noted: 2022-05-04 | Resolved: 2022-09-26

## 2022-09-26 PROBLEM — D61.818 PANCYTOPENIA (HCC): Status: RESOLVED | Noted: 2019-02-05 | Resolved: 2022-09-26

## 2022-09-26 PROBLEM — Z95.1 S/P CABG X 5: Status: RESOLVED | Noted: 2019-01-03 | Resolved: 2022-09-26

## 2022-09-26 PROBLEM — E11.22 TYPE 2 DIABETES MELLITUS WITH DIABETIC CHRONIC KIDNEY DISEASE (HCC): Status: RESOLVED | Noted: 2022-09-26 | Resolved: 2022-09-26

## 2022-09-26 PROBLEM — Z76.89 ENCOUNTER TO ESTABLISH CARE: Status: RESOLVED | Noted: 2019-01-03 | Resolved: 2022-09-26

## 2022-09-26 PROBLEM — H90.3 SENSORINEURAL HEARING LOSS, BILATERAL: Status: RESOLVED | Noted: 2017-08-21 | Resolved: 2022-09-26

## 2022-09-26 PROBLEM — F32.9 REACTIVE DEPRESSION: Status: RESOLVED | Noted: 2022-07-13 | Resolved: 2022-09-26

## 2022-09-26 RX ORDER — VALSARTAN AND HYDROCHLOROTHIAZIDE 320; 25 MG/1; MG/1
1 TABLET, FILM COATED ORAL DAILY
Qty: 90 TABLET | Refills: 3 | Status: SHIPPED | OUTPATIENT
Start: 2022-09-26 | End: 2023-09-21

## 2022-09-26 RX ORDER — AMLODIPINE BESYLATE 10 MG/1
10 TABLET ORAL DAILY
Qty: 90 TABLET | Refills: 2 | Status: SHIPPED | OUTPATIENT
Start: 2022-09-26

## 2022-11-03 ENCOUNTER — HOSPITAL ENCOUNTER (OUTPATIENT)
Dept: GENERAL RADIOLOGY | Facility: HOSPITAL | Age: 72
Discharge: HOME | End: 2022-11-03
Attending: ORTHOPAEDIC SURGERY
Payer: MEDICARE

## 2022-11-03 ENCOUNTER — OFFICE VISIT (OUTPATIENT)
Dept: ORTHOPEDICS CLINIC | Facility: CLINIC | Age: 72
End: 2022-11-03
Payer: MEDICARE

## 2022-11-03 DIAGNOSIS — M17.12 PRIMARY OSTEOARTHRITIS OF LEFT KNEE: Primary | ICD-10-CM

## 2022-11-03 DIAGNOSIS — M25.562 PAIN IN BOTH KNEES, UNSPECIFIED CHRONICITY: ICD-10-CM

## 2022-11-03 DIAGNOSIS — M17.11 PRIMARY OSTEOARTHRITIS OF RIGHT KNEE: ICD-10-CM

## 2022-11-03 DIAGNOSIS — M25.561 PAIN IN BOTH KNEES, UNSPECIFIED CHRONICITY: ICD-10-CM

## 2022-11-03 PROCEDURE — 99213 OFFICE O/P EST LOW 20 MIN: CPT | Performed by: ORTHOPAEDIC SURGERY

## 2022-11-03 PROCEDURE — 73562 X-RAY EXAM OF KNEE 3: CPT | Performed by: ORTHOPAEDIC SURGERY

## 2022-11-03 PROCEDURE — 1126F AMNT PAIN NOTED NONE PRSNT: CPT | Performed by: ORTHOPAEDIC SURGERY

## 2022-11-09 ENCOUNTER — TELEPHONE (OUTPATIENT)
Dept: ORTHOPEDICS CLINIC | Facility: CLINIC | Age: 72
End: 2022-11-09

## 2022-11-09 NOTE — TELEPHONE ENCOUNTER
Left message to call back. Phone room- please assist with scheduling patient for Durolane injection. Thank you!

## 2022-11-09 NOTE — TELEPHONE ENCOUNTER
Bilateral Durolane injections have been approved via [employer] online. Auth # [id number] is valid until 12/31/2022 for two injections. Okay to schedule the patient.

## 2022-11-11 ENCOUNTER — LAB ENCOUNTER (OUTPATIENT)
Dept: LAB | Age: 72
End: 2022-11-11
Attending: INTERNAL MEDICINE
Payer: MEDICARE

## 2022-11-11 DIAGNOSIS — E11.65 TYPE 2 DIABETES MELLITUS WITH HYPERGLYCEMIA, WITHOUT LONG-TERM CURRENT USE OF INSULIN (HCC): ICD-10-CM

## 2022-11-11 DIAGNOSIS — R74.8 ELEVATED LIVER ENZYMES: ICD-10-CM

## 2022-11-11 DIAGNOSIS — I10 ESSENTIAL HYPERTENSION: ICD-10-CM

## 2022-11-11 LAB
ALBUMIN SERPL-MCNC: 3.7 G/DL (ref 3.4–5)
ALBUMIN/GLOB SERPL: 1.2 {RATIO} (ref 1–2)
ALP LIVER SERPL-CCNC: 42 U/L
ALT SERPL-CCNC: 42 U/L
ANION GAP SERPL CALC-SCNC: 9 MMOL/L (ref 0–18)
AST SERPL-CCNC: 25 U/L (ref 15–37)
BASOPHILS # BLD AUTO: 0.03 X10(3) UL (ref 0–0.2)
BASOPHILS NFR BLD AUTO: 1 %
BILIRUB SERPL-MCNC: 0.8 MG/DL (ref 0.1–2)
BUN BLD-MCNC: 10 MG/DL (ref 7–18)
BUN/CREAT SERPL: 13.5 (ref 10–20)
CALCIUM BLD-MCNC: 8.8 MG/DL (ref 8.5–10.1)
CHLORIDE SERPL-SCNC: 98 MMOL/L (ref 98–112)
CO2 SERPL-SCNC: 31 MMOL/L (ref 21–32)
CREAT BLD-MCNC: 0.74 MG/DL
DEPRECATED RDW RBC AUTO: 54.4 FL (ref 35.1–46.3)
EOSINOPHIL # BLD AUTO: 0.19 X10(3) UL (ref 0–0.7)
EOSINOPHIL NFR BLD AUTO: 6.2 %
ERYTHROCYTE [DISTWIDTH] IN BLOOD BY AUTOMATED COUNT: 15.8 % (ref 11–15)
EST. AVERAGE GLUCOSE BLD GHB EST-MCNC: 134 MG/DL (ref 68–126)
FASTING STATUS PATIENT QL REPORTED: YES
GFR SERPLBLD BASED ON 1.73 SQ M-ARVRAT: 97 ML/MIN/1.73M2 (ref 60–?)
GLOBULIN PLAS-MCNC: 3 G/DL (ref 2.8–4.4)
GLUCOSE BLD-MCNC: 118 MG/DL (ref 70–99)
HBA1C MFR BLD: 6.3 % (ref ?–5.7)
HCT VFR BLD AUTO: 35.9 %
HGB BLD-MCNC: 10.9 G/DL
IMM GRANULOCYTES # BLD AUTO: 0 X10(3) UL (ref 0–1)
IMM GRANULOCYTES NFR BLD: 0 %
LYMPHOCYTES # BLD AUTO: 1.36 X10(3) UL (ref 1–4)
LYMPHOCYTES NFR BLD AUTO: 44.2 %
MCH RBC QN AUTO: 28.7 PG (ref 26–34)
MCHC RBC AUTO-ENTMCNC: 30.4 G/DL (ref 31–37)
MCV RBC AUTO: 94.5 FL
MONOCYTES # BLD AUTO: 0.4 X10(3) UL (ref 0.1–1)
MONOCYTES NFR BLD AUTO: 13 %
NEUTROPHILS # BLD AUTO: 1.1 X10 (3) UL (ref 1.5–7.7)
NEUTROPHILS # BLD AUTO: 1.1 X10(3) UL (ref 1.5–7.7)
NEUTROPHILS NFR BLD AUTO: 35.6 %
OSMOLALITY SERPL CALC.SUM OF ELEC: 286 MOSM/KG (ref 275–295)
PLATELET # BLD AUTO: 153 10(3)UL (ref 150–450)
POTASSIUM SERPL-SCNC: 3.6 MMOL/L (ref 3.5–5.1)
PROT SERPL-MCNC: 6.7 G/DL (ref 6.4–8.2)
RBC # BLD AUTO: 3.8 X10(6)UL
SODIUM SERPL-SCNC: 138 MMOL/L (ref 136–145)
WBC # BLD AUTO: 3.1 X10(3) UL (ref 4–11)

## 2022-11-11 PROCEDURE — 80053 COMPREHEN METABOLIC PANEL: CPT

## 2022-11-11 PROCEDURE — 83036 HEMOGLOBIN GLYCOSYLATED A1C: CPT

## 2022-11-11 PROCEDURE — 85025 COMPLETE CBC W/AUTO DIFF WBC: CPT

## 2022-11-11 PROCEDURE — 36415 COLL VENOUS BLD VENIPUNCTURE: CPT

## 2022-11-11 PROCEDURE — 3044F HG A1C LEVEL LT 7.0%: CPT | Performed by: INTERNAL MEDICINE

## 2022-11-19 DIAGNOSIS — Z12.11 COLON CANCER SCREENING: Primary | ICD-10-CM

## 2022-11-22 ENCOUNTER — LAB ENCOUNTER (OUTPATIENT)
Dept: LAB | Age: 72
End: 2022-11-22
Attending: INTERNAL MEDICINE
Payer: MEDICARE

## 2022-11-22 ENCOUNTER — TELEPHONE (OUTPATIENT)
Dept: GASTROENTEROLOGY | Facility: CLINIC | Age: 72
End: 2022-11-22

## 2022-11-22 DIAGNOSIS — Z12.11 COLON CANCER SCREENING: ICD-10-CM

## 2022-11-22 DIAGNOSIS — Z12.11 SCREENING FOR COLON CANCER: Primary | ICD-10-CM

## 2022-11-22 LAB
DEPRECATED HBV CORE AB SER IA-ACNC: 16.5 NG/ML
IRON SATN MFR SERPL: 28 %
IRON SERPL-MCNC: 131 UG/DL
TIBC SERPL-MCNC: 462 UG/DL (ref 240–450)
TRANSFERRIN SERPL-MCNC: 310 MG/DL (ref 200–360)

## 2022-11-22 PROCEDURE — 83540 ASSAY OF IRON: CPT

## 2022-11-22 PROCEDURE — 36415 COLL VENOUS BLD VENIPUNCTURE: CPT

## 2022-11-22 PROCEDURE — 82728 ASSAY OF FERRITIN: CPT

## 2022-11-22 PROCEDURE — 84466 ASSAY OF TRANSFERRIN: CPT

## 2022-11-22 NOTE — TELEPHONE ENCOUNTER
Last Procedure, Date, MD: Colonoscopy, 10/28/19, Dr. Tremayne Lyons   Last Diagnosis: tubular adenoma   Recalled for (mth/yrs): 3 years  Sedation used previously: MAC    Last Prep Used (if known): n/a   Quality of prep (if known): good  Anticoagulants: n/a  Diabetic Meds: metformin, glimepiride, januvia  BP meds(Ace inhibitors/ARB's): valsartan-hydrochlorothizaide  Weight loss meds (phentermine/vyvanse): n/a  Iron supplement (RX/OTC): yes  Height & Weight/BMI: 5'8\"/171lbs/26.0  Hx of Cardiac/CVA issues/(MI/Stroke): n/a  Devices Pacemaker/Defibrillator/Stents: n/a  Resp. Issues/Oxygen Use/MILENA/COPD: n/a  Issues w/Anesthesia: n/a    Symptoms (Y/N): N  Symptoms Details: n/a     Special comments/notes: verified allergies, medications, phamracy    Please advise on orders and prep, thank you.

## 2022-11-22 NOTE — TELEPHONE ENCOUNTER
Pt states he is due for a recall CLN he declined the phone appt,  I do not see a letter yet please call

## 2022-11-22 NOTE — TELEPHONE ENCOUNTER
Ok to schedule colonoscopy with MAC with split golytely for colorectal cancer screening and history of adenomatous colon polyps at [facility] or eosc    Hold metformin, glimepiride and januvia the day before and day of the procedure    Thanks    Ninfa Barton MD  [facility] - Gastroenterology

## 2022-11-30 ENCOUNTER — OFFICE VISIT (OUTPATIENT)
Dept: OPHTHALMOLOGY | Facility: CLINIC | Age: 72
End: 2022-11-30
Payer: MEDICARE

## 2022-11-30 DIAGNOSIS — E11.9 TYPE 2 DIABETES MELLITUS WITHOUT RETINOPATHY (HCC): Primary | ICD-10-CM

## 2022-11-30 DIAGNOSIS — H25.13 AGE-RELATED NUCLEAR CATARACT OF BOTH EYES: ICD-10-CM

## 2022-11-30 DIAGNOSIS — H43.393 FLOATERS IN VISUAL FIELD, BILATERAL: ICD-10-CM

## 2022-11-30 PROCEDURE — 2023F DILAT RTA XM W/O RTNOPTHY: CPT | Performed by: OPHTHALMOLOGY

## 2022-11-30 PROCEDURE — 1126F AMNT PAIN NOTED NONE PRSNT: CPT | Performed by: OPHTHALMOLOGY

## 2022-11-30 PROCEDURE — 92014 COMPRE OPH EXAM EST PT 1/>: CPT | Performed by: OPHTHALMOLOGY

## 2022-11-30 NOTE — PATIENT INSTRUCTIONS
Age-related nuclear cataract of both eyes  Discussed mild cataracts in both eyes that are not affecting vision and are not surgical at this time. Type 2 diabetes mellitus without retinopathy ([address]  Diabetes type II: no background of retinopathy, no signs of neovascularization noted. Discussed ocular and systemic benefits of blood sugar control. Diagnosis and treatment discussed in detail with patient. Floaters in visual field, bilateral   There is no evidence of retinal pathology. All signs and symptoms of retinal detachment/tears explained in detail. Patient instructed to call the office if they experience increase in floaters, increase in flashes of light, loss of vision or curtain or veil effect.

## 2022-12-05 DIAGNOSIS — E11.9 TYPE 2 DIABETES MELLITUS WITHOUT COMPLICATION, WITHOUT LONG-TERM CURRENT USE OF INSULIN (HCC): ICD-10-CM

## 2022-12-05 RX ORDER — GLIMEPIRIDE 1 MG/1
TABLET ORAL
Qty: 225 TABLET | Refills: 1 | Status: SHIPPED | OUTPATIENT
Start: 2022-12-05

## 2022-12-05 NOTE — TELEPHONE ENCOUNTER
Patient requesting refill on medication below, running low on med. . Please refill to [facility] in [place] [phone]    glimepiride 1 MG Oral Tab
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
## 2022-12-14 ENCOUNTER — OFFICE VISIT (OUTPATIENT)
Dept: ORTHOPEDICS CLINIC | Facility: CLINIC | Age: 72
End: 2022-12-14
Payer: MEDICARE

## 2022-12-14 VITALS — HEART RATE: 65 BPM | SYSTOLIC BLOOD PRESSURE: 135 MMHG | DIASTOLIC BLOOD PRESSURE: 73 MMHG

## 2022-12-14 DIAGNOSIS — M17.11 PRIMARY OSTEOARTHRITIS OF RIGHT KNEE: Primary | ICD-10-CM

## 2022-12-14 PROCEDURE — 20610 DRAIN/INJ JOINT/BURSA W/O US: CPT | Performed by: ORTHOPAEDIC SURGERY

## 2022-12-14 PROCEDURE — 3078F DIAST BP <80 MM HG: CPT | Performed by: ORTHOPAEDIC SURGERY

## 2022-12-14 PROCEDURE — 1125F AMNT PAIN NOTED PAIN PRSNT: CPT | Performed by: ORTHOPAEDIC SURGERY

## 2022-12-14 PROCEDURE — 3075F SYST BP GE 130 - 139MM HG: CPT | Performed by: ORTHOPAEDIC SURGERY

## 2022-12-14 PROCEDURE — 99213 OFFICE O/P EST LOW 20 MIN: CPT | Performed by: ORTHOPAEDIC SURGERY

## 2022-12-20 NOTE — TELEPHONE ENCOUNTER
Scheduled for:  Colonoscopy 36564  Provider Name:  Melisa Dotson  Date:  3/21/2023  Location: [facility]   Sedation:  MAC  Time: 2:00 pm, (pt is aware to arrive at 1:00 pm)   Prep:  Golytely  Meds/Allergies Reconciled?:  Physician reviewed  Diagnosis with codes:  Screening for Colon Cancer Z12.11, Hx of Adenomatous Colon Polyps Z86.010  Was patient informed to call insurance with codes (Y/N):  Yes  Referral sent?:  Referral was sent at the time of electronic surgical scheduling. [address] [place] [address] or [address] [place] [address] notified?:  I sent an electronic request to Endo Scheduling and received a confirmation today. Medication Orders:  Hold Metformin, Glimepiride, and Januvia day before and day of procedure. Misc Orders:  N/A     Further instructions given by staff: I discussed the prep instructions with the patient which he verbally understood and is aware that I will send prep instructions via [employer] today.

## 2022-12-22 ENCOUNTER — OFFICE VISIT (OUTPATIENT)
Dept: INTERNAL MEDICINE CLINIC | Facility: CLINIC | Age: 72
End: 2022-12-22
Payer: MEDICARE

## 2022-12-22 VITALS
BODY MASS INDEX: 27.43 KG/M2 | RESPIRATION RATE: 18 BRPM | WEIGHT: 181 LBS | HEART RATE: 64 BPM | HEIGHT: 68 IN | DIASTOLIC BLOOD PRESSURE: 78 MMHG | SYSTOLIC BLOOD PRESSURE: 136 MMHG

## 2022-12-22 DIAGNOSIS — G89.29 CHRONIC PAIN OF BOTH KNEES: Primary | ICD-10-CM

## 2022-12-22 DIAGNOSIS — M25.561 CHRONIC PAIN OF BOTH KNEES: Primary | ICD-10-CM

## 2022-12-22 DIAGNOSIS — I10 ESSENTIAL HYPERTENSION: ICD-10-CM

## 2022-12-22 DIAGNOSIS — M25.562 CHRONIC PAIN OF BOTH KNEES: Primary | ICD-10-CM

## 2022-12-22 DIAGNOSIS — E11.65 TYPE 2 DIABETES MELLITUS WITH HYPERGLYCEMIA, WITHOUT LONG-TERM CURRENT USE OF INSULIN (HCC): Chronic | ICD-10-CM

## 2022-12-22 DIAGNOSIS — E78.00 PURE HYPERCHOLESTEROLEMIA: ICD-10-CM

## 2022-12-22 PROCEDURE — 99214 OFFICE O/P EST MOD 30 MIN: CPT | Performed by: INTERNAL MEDICINE

## 2022-12-22 PROCEDURE — 3008F BODY MASS INDEX DOCD: CPT | Performed by: INTERNAL MEDICINE

## 2022-12-22 PROCEDURE — 1126F AMNT PAIN NOTED NONE PRSNT: CPT | Performed by: INTERNAL MEDICINE

## 2022-12-22 PROCEDURE — 3075F SYST BP GE 130 - 139MM HG: CPT | Performed by: INTERNAL MEDICINE

## 2022-12-22 PROCEDURE — 3078F DIAST BP <80 MM HG: CPT | Performed by: INTERNAL MEDICINE

## 2022-12-22 RX ORDER — OMEGA-3-ACID ETHYL ESTERS 1 G/1
1 CAPSULE, LIQUID FILLED ORAL 3 TIMES DAILY
Qty: 270 CAPSULE | Refills: 3 | Status: SHIPPED | OUTPATIENT
Start: 2022-12-22

## 2022-12-22 RX ORDER — TRIAMCINOLONE ACETONIDE 1 MG/G
1 CREAM TOPICAL 2 TIMES DAILY
COMMUNITY
Start: 2022-11-14

## 2023-01-04 ENCOUNTER — MED REC SCAN ONLY (OUTPATIENT)
Dept: INTERNAL MEDICINE CLINIC | Facility: CLINIC | Age: 73
End: 2023-01-04

## 2023-01-13 ENCOUNTER — OFFICE VISIT (OUTPATIENT)
Dept: PODIATRY CLINIC | Facility: CLINIC | Age: 73
End: 2023-01-13

## 2023-01-13 DIAGNOSIS — M21.611 BUNION, RIGHT FOOT: ICD-10-CM

## 2023-01-13 DIAGNOSIS — E11.9 TYPE 2 DIABETES MELLITUS WITHOUT RETINOPATHY (HCC): Primary | ICD-10-CM

## 2023-01-13 DIAGNOSIS — F41.9 ANXIETY: ICD-10-CM

## 2023-01-13 PROCEDURE — 99203 OFFICE O/P NEW LOW 30 MIN: CPT | Performed by: STUDENT IN AN ORGANIZED HEALTH CARE EDUCATION/TRAINING PROGRAM

## 2023-01-13 PROCEDURE — 1126F AMNT PAIN NOTED NONE PRSNT: CPT | Performed by: STUDENT IN AN ORGANIZED HEALTH CARE EDUCATION/TRAINING PROGRAM

## 2023-01-13 RX ORDER — LORAZEPAM 1 MG/1
TABLET ORAL
Qty: 30 TABLET | Refills: 0 | Status: SHIPPED | OUTPATIENT
Start: 2023-01-13

## 2023-01-13 NOTE — TELEPHONE ENCOUNTER
Please review. Protocol failed / No protocol. Requested Prescriptions   Pending Prescriptions Disp Refills    LORAZEPAM 1 MG Oral Tab [Pharmacy Med Name: LORAZEPAM 1 MG Tablet] 30 tablet 0     Sig: TAKE 1 TABLET EVERY NIGHT       There is no refill protocol information for this order         Future Appointments         Provider Department Appt Notes    In 1 week Dixie Pickerel, [address]    In 2 weeks Dixie Pickerel, [address]    In 2 weeks Dixie Pickerel, [address]    In 3 weeks Dixie Pickerel, [address]    In 1 month Dixie Pickerel, [address]    In 1 month Dixie Pickerel, [address]    In 1 month Trino Razo MD [place] [facility],  follow up visit.  blood work    In 2 months AM[facility], PROCEDURE [address] GI [facility]              Today     [place] [facility], [address], [place], [place]    Office Visit    3 weeks ago Chronic pain of both knees    [facility], [facility], [address], Dominique Brooks MD    Office Visit    1 month ago Primary osteoarthritis of right knee    [facility] for Blank Vann MD    Office Visit    1 month ago Type 2 diabetes mellitus without retinopathy [facility])    [facility] Yoshi Luna MD    Office Visit    2 months ago Primary osteoarthritis of left knee    [facility] for Blank Vann MD    Office Visit

## 2023-01-24 ENCOUNTER — TELEPHONE (OUTPATIENT)
Dept: PHYSICAL THERAPY | Facility: HOSPITAL | Age: 73
End: 2023-01-24

## 2023-01-25 ENCOUNTER — OFFICE VISIT (OUTPATIENT)
Dept: PHYSICAL THERAPY | Age: 73
End: 2023-01-25
Attending: INTERNAL MEDICINE
Payer: MEDICARE

## 2023-01-25 DIAGNOSIS — M25.561 CHRONIC PAIN OF BOTH KNEES: ICD-10-CM

## 2023-01-25 DIAGNOSIS — M25.562 CHRONIC PAIN OF BOTH KNEES: ICD-10-CM

## 2023-01-25 DIAGNOSIS — G89.29 CHRONIC PAIN OF BOTH KNEES: ICD-10-CM

## 2023-01-25 PROCEDURE — 97162 PT EVAL MOD COMPLEX 30 MIN: CPT

## 2023-01-25 PROCEDURE — 97110 THERAPEUTIC EXERCISES: CPT

## 2023-01-27 ENCOUNTER — OFFICE VISIT (OUTPATIENT)
Dept: PHYSICAL THERAPY | Age: 73
End: 2023-01-27
Attending: INTERNAL MEDICINE
Payer: MEDICARE

## 2023-01-27 PROCEDURE — 97110 THERAPEUTIC EXERCISES: CPT

## 2023-02-01 ENCOUNTER — OFFICE VISIT (OUTPATIENT)
Dept: PHYSICAL THERAPY | Age: 73
End: 2023-02-01
Attending: INTERNAL MEDICINE
Payer: MEDICARE

## 2023-02-01 PROCEDURE — 97110 THERAPEUTIC EXERCISES: CPT

## 2023-02-02 ENCOUNTER — OFFICE VISIT (OUTPATIENT)
Dept: PHYSICAL THERAPY | Age: 73
End: 2023-02-02
Attending: INTERNAL MEDICINE
Payer: MEDICARE

## 2023-02-02 PROCEDURE — 97110 THERAPEUTIC EXERCISES: CPT

## 2023-02-03 ENCOUNTER — APPOINTMENT (OUTPATIENT)
Dept: PHYSICAL THERAPY | Age: 73
End: 2023-02-03
Attending: INTERNAL MEDICINE
Payer: MEDICARE

## 2023-02-06 NOTE — TELEPHONE ENCOUNTER
Refill passed per [facility] protocol. Requested Prescriptions   Pending Prescriptions Disp Refills    SITagliptin Phosphate 100 MG Oral Tab 90 tablet 1     Sig: Take 1 tablet (100 mg total) by mouth daily. Diabetes Medication Protocol Passed - 2/6/2023 11:15 AM        Passed - Last A1C < 7.5 and within past 6 months     Lab Results   Component Value Date    A1C 6.3 (H) 11/11/2022             Passed - In person appointment or virtual visit in the past 6 mos or appointment in next 3 mos     Recent Outpatient Visits              4 days ago     [address] [facility], [facility] Visit    5 days ago     [address], [place]    Office Visit    1 week ago     [address], [place]    Office Visit    1 week ago Chronic pain of both knees    [address], [place]    Office Visit    3 weeks ago Type 2 diabetes mellitus without retinopathy [facility])    Sweta Shaw, [place] [address], Mark [place], [place]    Office Visit          Future Appointments         Provider Department Appt Notes    In 1 week [facility], [address] visits approved  1/25 to 4/25/23  [employer]  c/p $40    In 1 week [facility], [address] visits approved  1/25 to 4/25/23  [employer]  c/p $40    In 3 weeks MD Sweta [facility] follow up visit.  blood work    In 1 month [address], [address], [place] Colonoscopy @ [facility]               Passed - EGFRCR or GFRNAA > 50     GFR Evaluation  EGFRCR: 97 , resulted on 11/11/2022          Passed - GFR in the past 12 months

## 2023-02-15 ENCOUNTER — OFFICE VISIT (OUTPATIENT)
Dept: PHYSICAL THERAPY | Age: 73
End: 2023-02-15
Attending: INTERNAL MEDICINE
Payer: MEDICARE

## 2023-02-15 PROCEDURE — 97110 THERAPEUTIC EXERCISES: CPT

## 2023-02-16 ENCOUNTER — LAB ENCOUNTER (OUTPATIENT)
Dept: LAB | Age: 73
End: 2023-02-16
Attending: INTERNAL MEDICINE
Payer: MEDICARE

## 2023-02-16 DIAGNOSIS — E11.65 TYPE 2 DIABETES MELLITUS WITH HYPERGLYCEMIA, WITHOUT LONG-TERM CURRENT USE OF INSULIN (HCC): ICD-10-CM

## 2023-02-16 DIAGNOSIS — I10 ESSENTIAL HYPERTENSION: ICD-10-CM

## 2023-02-16 LAB
BASOPHILS # BLD AUTO: 0.04 X10(3) UL (ref 0–0.2)
BASOPHILS NFR BLD AUTO: 1.1 %
DEPRECATED RDW RBC AUTO: 53.1 FL (ref 35.1–46.3)
EOSINOPHIL # BLD AUTO: 0.23 X10(3) UL (ref 0–0.7)
EOSINOPHIL NFR BLD AUTO: 6.3 %
ERYTHROCYTE [DISTWIDTH] IN BLOOD BY AUTOMATED COUNT: 15.6 % (ref 11–15)
EST. AVERAGE GLUCOSE BLD GHB EST-MCNC: 160 MG/DL (ref 68–126)
HBA1C MFR BLD: 7.2 % (ref ?–5.7)
HCT VFR BLD AUTO: 34.1 %
HGB BLD-MCNC: 10.7 G/DL
IMM GRANULOCYTES # BLD AUTO: 0 X10(3) UL (ref 0–1)
IMM GRANULOCYTES NFR BLD: 0 %
LYMPHOCYTES # BLD AUTO: 1.48 X10(3) UL (ref 1–4)
LYMPHOCYTES NFR BLD AUTO: 40.4 %
MCH RBC QN AUTO: 29.5 PG (ref 26–34)
MCHC RBC AUTO-ENTMCNC: 31.4 G/DL (ref 31–37)
MCV RBC AUTO: 93.9 FL
MONOCYTES # BLD AUTO: 0.49 X10(3) UL (ref 0.1–1)
MONOCYTES NFR BLD AUTO: 13.4 %
NEUTROPHILS # BLD AUTO: 1.42 X10 (3) UL (ref 1.5–7.7)
NEUTROPHILS # BLD AUTO: 1.42 X10(3) UL (ref 1.5–7.7)
NEUTROPHILS NFR BLD AUTO: 38.8 %
PLATELET # BLD AUTO: 216 10(3)UL (ref 150–450)
RBC # BLD AUTO: 3.63 X10(6)UL
WBC # BLD AUTO: 3.7 X10(3) UL (ref 4–11)

## 2023-02-16 PROCEDURE — 85025 COMPLETE CBC W/AUTO DIFF WBC: CPT

## 2023-02-16 PROCEDURE — 36415 COLL VENOUS BLD VENIPUNCTURE: CPT

## 2023-02-16 PROCEDURE — 83036 HEMOGLOBIN GLYCOSYLATED A1C: CPT

## 2023-02-17 ENCOUNTER — OFFICE VISIT (OUTPATIENT)
Dept: PHYSICAL THERAPY | Age: 73
End: 2023-02-17
Attending: INTERNAL MEDICINE
Payer: MEDICARE

## 2023-02-17 PROCEDURE — 97110 THERAPEUTIC EXERCISES: CPT

## 2023-02-22 ENCOUNTER — OFFICE VISIT (OUTPATIENT)
Dept: PHYSICAL THERAPY | Age: 73
End: 2023-02-22
Attending: INTERNAL MEDICINE
Payer: MEDICARE

## 2023-02-22 PROCEDURE — 97110 THERAPEUTIC EXERCISES: CPT

## 2023-03-01 ENCOUNTER — LAB ENCOUNTER (OUTPATIENT)
Dept: LAB | Age: 73
End: 2023-03-01
Attending: INTERNAL MEDICINE
Payer: MEDICARE

## 2023-03-01 ENCOUNTER — OFFICE VISIT (OUTPATIENT)
Dept: PHYSICAL THERAPY | Age: 73
End: 2023-03-01
Attending: INTERNAL MEDICINE
Payer: MEDICARE

## 2023-03-01 PROCEDURE — 97110 THERAPEUTIC EXERCISES: CPT

## 2023-03-03 ENCOUNTER — APPOINTMENT (OUTPATIENT)
Dept: PHYSICAL THERAPY | Age: 73
End: 2023-03-03
Attending: INTERNAL MEDICINE
Payer: MEDICARE

## 2023-03-03 ENCOUNTER — TELEPHONE (OUTPATIENT)
Dept: INTERNAL MEDICINE CLINIC | Facility: CLINIC | Age: 73
End: 2023-03-03

## 2023-03-03 DIAGNOSIS — E11.65 TYPE 2 DIABETES MELLITUS WITH HYPERGLYCEMIA, WITHOUT LONG-TERM CURRENT USE OF INSULIN (HCC): Primary | ICD-10-CM

## 2023-03-04 NOTE — TELEPHONE ENCOUNTER
pls applogize to the pt on my behalf   I have ordered this and he can walk in to do without an apt at his convenience

## 2023-03-08 ENCOUNTER — APPOINTMENT (OUTPATIENT)
Dept: PHYSICAL THERAPY | Age: 73
End: 2023-03-08
Attending: INTERNAL MEDICINE
Payer: MEDICARE

## 2023-03-10 ENCOUNTER — APPOINTMENT (OUTPATIENT)
Dept: PHYSICAL THERAPY | Age: 73
End: 2023-03-10
Payer: MEDICARE

## 2023-03-10 ENCOUNTER — LAB ENCOUNTER (OUTPATIENT)
Dept: LAB | Age: 73
End: 2023-03-10
Attending: INTERNAL MEDICINE
Payer: MEDICARE

## 2023-03-10 DIAGNOSIS — E11.65 TYPE 2 DIABETES MELLITUS WITH HYPERGLYCEMIA, WITHOUT LONG-TERM CURRENT USE OF INSULIN (HCC): ICD-10-CM

## 2023-03-10 LAB
CREAT UR-SCNC: 45.4 MG/DL
MICROALBUMIN UR-MCNC: 0.89 MG/DL
MICROALBUMIN/CREAT 24H UR-RTO: 19.6 UG/MG (ref ?–30)

## 2023-03-10 PROCEDURE — 82043 UR ALBUMIN QUANTITATIVE: CPT

## 2023-03-10 PROCEDURE — 82570 ASSAY OF URINE CREATININE: CPT

## 2023-03-10 PROCEDURE — 3061F NEG MICROALBUMINURIA REV: CPT | Performed by: INTERNAL MEDICINE

## 2023-03-13 ENCOUNTER — TELEPHONE (OUTPATIENT)
Dept: INTERNAL MEDICINE CLINIC | Facility: CLINIC | Age: 73
End: 2023-03-13

## 2023-03-13 RX ORDER — DIPHENHYDRAMINE HCL 25 MG
1 TABLET ORAL 2 TIMES DAILY
Qty: 1 KIT | Refills: 0 | Status: SHIPPED | OUTPATIENT
Start: 2023-03-13

## 2023-03-13 NOTE — TELEPHONE ENCOUNTER
Received fax from [facility] requesting a new meter for patient. Ordered and signed per written order and protocol.

## 2023-03-15 ENCOUNTER — APPOINTMENT (OUTPATIENT)
Dept: PHYSICAL THERAPY | Age: 73
End: 2023-03-15
Payer: MEDICARE

## 2023-03-17 ENCOUNTER — TELEPHONE (OUTPATIENT)
Facility: CLINIC | Age: 73
End: 2023-03-17

## 2023-03-18 ENCOUNTER — TELEPHONE (OUTPATIENT)
Dept: GASTROENTEROLOGY | Facility: CLINIC | Age: 73
End: 2023-03-18

## 2023-03-21 ENCOUNTER — ANESTHESIA EVENT (OUTPATIENT)
Dept: ENDOSCOPY | Age: 73
End: 2023-03-21
Payer: MEDICARE

## 2023-03-21 ENCOUNTER — HOSPITAL ENCOUNTER (OUTPATIENT)
Age: 73
Setting detail: HOSPITAL OUTPATIENT SURGERY
Discharge: HOME | End: 2023-03-21
Attending: INTERNAL MEDICINE | Admitting: INTERNAL MEDICINE
Payer: MEDICARE

## 2023-03-21 ENCOUNTER — ANESTHESIA (OUTPATIENT)
Dept: ENDOSCOPY | Age: 73
End: 2023-03-21
Payer: MEDICARE

## 2023-03-21 VITALS
HEART RATE: 50 BPM | RESPIRATION RATE: 14 BRPM | HEIGHT: 68 IN | WEIGHT: 181 LBS | SYSTOLIC BLOOD PRESSURE: 126 MMHG | BODY MASS INDEX: 27.43 KG/M2 | DIASTOLIC BLOOD PRESSURE: 66 MMHG | OXYGEN SATURATION: 98 %

## 2023-03-21 DIAGNOSIS — Z12.11 SCREENING FOR COLON CANCER: ICD-10-CM

## 2023-03-21 LAB — GLUCOSE BLDC GLUCOMTR-MCNC: 119 MG/DL (ref 70–99)

## 2023-03-21 PROCEDURE — 45385 COLONOSCOPY W/LESION REMOVAL: CPT | Performed by: INTERNAL MEDICINE

## 2023-03-21 PROCEDURE — 82962 GLUCOSE BLOOD TEST: CPT

## 2023-03-21 PROCEDURE — 99070 SPECIAL SUPPLIES PHYS/QHP: CPT | Performed by: INTERNAL MEDICINE

## 2023-03-21 PROCEDURE — 45380 COLONOSCOPY AND BIOPSY: CPT | Performed by: INTERNAL MEDICINE

## 2023-03-21 PROCEDURE — 88305 TISSUE EXAM BY PATHOLOGIST: CPT | Performed by: INTERNAL MEDICINE

## 2023-03-21 RX ORDER — DEXTROSE MONOHYDRATE 25 G/50ML
50 INJECTION, SOLUTION INTRAVENOUS
Status: DISCONTINUED | OUTPATIENT
Start: 2023-03-21 | End: 2023-03-21

## 2023-03-21 RX ORDER — NALOXONE HYDROCHLORIDE 0.4 MG/ML
80 INJECTION, SOLUTION INTRAMUSCULAR; INTRAVENOUS; SUBCUTANEOUS AS NEEDED
Status: DISCONTINUED | OUTPATIENT
Start: 2023-03-21 | End: 2023-03-21

## 2023-03-21 RX ORDER — NICOTINE POLACRILEX 4 MG
15 LOZENGE BUCCAL
Status: DISCONTINUED | OUTPATIENT
Start: 2023-03-21 | End: 2023-03-21

## 2023-03-21 RX ORDER — SODIUM CHLORIDE, SODIUM LACTATE, POTASSIUM CHLORIDE, CALCIUM CHLORIDE 600; 310; 30; 20 MG/100ML; MG/100ML; MG/100ML; MG/100ML
INJECTION, SOLUTION INTRAVENOUS CONTINUOUS
Status: DISCONTINUED | OUTPATIENT
Start: 2023-03-21 | End: 2023-03-21

## 2023-03-21 RX ORDER — LIDOCAINE HYDROCHLORIDE 10 MG/ML
INJECTION, SOLUTION EPIDURAL; INFILTRATION; INTRACAUDAL; PERINEURAL AS NEEDED
Status: DISCONTINUED | OUTPATIENT
Start: 2023-03-21 | End: 2023-03-21 | Stop reason: SURG

## 2023-03-21 RX ORDER — NICOTINE POLACRILEX 4 MG
30 LOZENGE BUCCAL
Status: DISCONTINUED | OUTPATIENT
Start: 2023-03-21 | End: 2023-03-21

## 2023-03-21 RX ADMIN — SODIUM CHLORIDE, SODIUM LACTATE, POTASSIUM CHLORIDE, CALCIUM CHLORIDE: 600; 310; 30; 20 INJECTION, SOLUTION INTRAVENOUS at 14:12:00

## 2023-03-21 RX ADMIN — SODIUM CHLORIDE, SODIUM LACTATE, POTASSIUM CHLORIDE, CALCIUM CHLORIDE: 600; 310; 30; 20 INJECTION, SOLUTION INTRAVENOUS at 13:31:00

## 2023-03-21 RX ADMIN — LIDOCAINE HYDROCHLORIDE 50 MG: 10 INJECTION, SOLUTION EPIDURAL; INFILTRATION; INTRACAUDAL; PERINEURAL at 13:33:00

## 2023-03-21 NOTE — ANESTHESIA POSTPROCEDURE EVALUATION
Patient: German Lopes    Procedure Summary     Date: 03/21/23 Room / Location: [facility] ENDOSCOPY 01 / [facility] ENDO    Anesthesia Start: 8283 Anesthesia Stop: 8961    Procedure: COLONOSCOPY Diagnosis:       Screening for colon cancer      (polyps and hemorrhoids)    Surgeons: Yamel Jensen MD Anesthesiologist:     Anesthesia Type: MAC ASA Status: 3          Anesthesia Type: MAC    Vitals Value Taken Time   /61 03/21/23 1418   Temp  03/21/23 1419   Pulse 58 03/21/23 1418   Resp 14 03/21/23 1418   SpO2 99 % 03/21/23 1418       [facility] AN Post Evaluation:   Patient Evaluated in PACU  Patient Participation: complete - patient participated  Level of Consciousness: awake  Pain Management: adequate  Airway Patency:patent  Dental exam unchanged from preop  Yes    Cardiovascular Status: acceptable  Respiratory Status: acceptable  Postoperative Hydration acceptable      Emmie Wick MD  3/21/2023 2:19 PM

## 2023-03-21 NOTE — OR NURSING
Patient states BP is usually 120s-130/70s. Patient states he was anxious when he was admitted and that BP was high for him. Vitals/meds reviewed with Dr Javed Prater. OK to resume all home meds today per Dr Javed Prater. Patient and patient's wife verbalize understanding and agree.

## 2023-03-21 NOTE — DISCHARGE INSTRUCTIONS

## 2023-03-24 ENCOUNTER — TELEPHONE (OUTPATIENT)
Dept: GASTROENTEROLOGY | Facility: CLINIC | Age: 73
End: 2023-03-24

## 2023-03-24 NOTE — TELEPHONE ENCOUNTER
GI staff: please place recall for colonoscopy in 1 year     Then close encounter    Thanks    Sabas Vela MD  [address] - Gastroenterology  3/24/2023  11:42 AM
Health maintenance updated. Last colonoscopy done 3/21/23. 1 year recall placed into Pt Outreach, next due on 03/2024 per Dr. Lynn Jeter.
No further interventions at this time

## 2023-05-03 DIAGNOSIS — F41.9 ANXIETY: ICD-10-CM

## 2023-05-04 RX ORDER — LORAZEPAM 1 MG/1
TABLET ORAL
Qty: 30 TABLET | Refills: 0 | Status: SHIPPED | OUTPATIENT
Start: 2023-05-04

## 2023-05-04 NOTE — TELEPHONE ENCOUNTER
Please review. Protocol failed or has no protocol.     Requested Prescriptions   Pending Prescriptions Disp Refills    LORAZEPAM 1 MG Oral Tab [Pharmacy Med Name: LORAZEPAM 1 MG Tablet] 30 tablet 0     Sig: TAKE 1 TABLET EVERY NIGHT       There is no refill protocol information for this order          Recent Outpatient Visits              2 months ago     [address], [place]    Office Visit    2 months ago [address], Susan Carrizales MD    Office Visit    2 months ago     [facility] [employer] [place]    Office Visit    2 months ago     [facility], [place]    Office Visit    2 months ago     [facility] [facility] [facility], [place]    Office Visit            Future Appointments         Provider Department Appt Notes    In 1 month Ignacio Lopez MD [address] [facility],[address], [address] [facility]    In 2 months [facility] OF [address] [employer] [address] [place] [address]

## 2023-05-19 DIAGNOSIS — E11.9 TYPE 2 DIABETES MELLITUS WITHOUT RETINOPATHY (HCC): ICD-10-CM

## 2023-05-19 DIAGNOSIS — E11.65 TYPE 2 DIABETES MELLITUS WITH HYPERGLYCEMIA, WITHOUT LONG-TERM CURRENT USE OF INSULIN (HCC): Chronic | ICD-10-CM

## 2023-05-19 NOTE — TELEPHONE ENCOUNTER
Refill passed per [employer], [facility] protocol.   Requested Prescriptions   Pending Prescriptions Disp Refills    METFORMIN HCL 1000 MG Oral Tab [Pharmacy Med Name: METFORMIN HYDROCHLORIDE 1000 MG Tablet] 180 tablet 1     Sig: TAKE 1 TABLET TWICE DAILY WITH MEALS       Diabetes Medication Protocol Passed - 5/19/2023  2:53 PM        Passed - Last A1C < 7.5 and within past 6 months     Lab Results   Component Value Date    A1C 7.2 (H) 02/16/2023               Passed - In person appointment or virtual visit in the past 6 mos or appointment in next 3 mos     Recent Outpatient Visits              2 months ago     [address] [place], [place]    Office Visit    2 months ago Dino Berry MD    Office Visit    2 months ago     [address] [facility]  K[place]    Office Visit    3 months ago     [address] [facility] Nikunj Amezquita    Office Visit    3 months ago     [address] [place] [address] [place] [place]    Office Visit          Future Appointments         Provider Department Appt Notes    In 2 weeks Ninfa Espinal MD [address], [address] [facility], [address]    In 2 months [address] [facility]. [address] [facility] for [facility] [facility] or [facility] > 50     GFR Evaluation  EGFRCR: 97 , resulted on 11/11/2022            Passed - GFR in the past 12 months           Recent Outpatient Visits              2 months ago     [address] [place], [place]    Office Visit    2 months ago Viri Hand MD    Office Visit    2 months ago     [address] [place] [address] [place], [facility] [address] Visit    3 months ago     [facility] Eric Saunders [place]    Office Visit    3 months ago     [address] Nikunj Segovia    Office Visit          Future Appointments         Provider Department Appt Notes    In 2 weeks Dominique Lentz MD [address] [place], [address] [facility]    In 2 months [facility] [address]

## 2023-05-22 ENCOUNTER — LAB ENCOUNTER (OUTPATIENT)
Dept: LAB | Age: 73
End: 2023-05-22
Attending: INTERNAL MEDICINE
Payer: MEDICARE

## 2023-05-22 DIAGNOSIS — D72.819 LEUKOPENIA, UNSPECIFIED TYPE: ICD-10-CM

## 2023-05-22 DIAGNOSIS — D64.9 ANEMIA, UNSPECIFIED TYPE: ICD-10-CM

## 2023-05-22 DIAGNOSIS — E11.65 TYPE 2 DIABETES MELLITUS WITH HYPERGLYCEMIA, WITHOUT LONG-TERM CURRENT USE OF INSULIN (HCC): Chronic | ICD-10-CM

## 2023-05-22 LAB
BASOPHILS # BLD AUTO: 0.03 X10(3) UL (ref 0–0.2)
BASOPHILS NFR BLD AUTO: 0.8 %
DEPRECATED RDW RBC AUTO: 48.8 FL (ref 35.1–46.3)
EOSINOPHIL # BLD AUTO: 0.18 X10(3) UL (ref 0–0.7)
EOSINOPHIL NFR BLD AUTO: 4.7 %
ERYTHROCYTE [DISTWIDTH] IN BLOOD BY AUTOMATED COUNT: 14.6 % (ref 11–15)
EST. AVERAGE GLUCOSE BLD GHB EST-MCNC: 171 MG/DL (ref 68–126)
HBA1C MFR BLD: 7.6 % (ref ?–5.7)
HCT VFR BLD AUTO: 36.2 %
HGB BLD-MCNC: 11.6 G/DL
IMM GRANULOCYTES # BLD AUTO: 0 X10(3) UL (ref 0–1)
IMM GRANULOCYTES NFR BLD: 0 %
LYMPHOCYTES # BLD AUTO: 1.53 X10(3) UL (ref 1–4)
LYMPHOCYTES NFR BLD AUTO: 40.3 %
MCH RBC QN AUTO: 29.3 PG (ref 26–34)
MCHC RBC AUTO-ENTMCNC: 32 G/DL (ref 31–37)
MCV RBC AUTO: 91.4 FL
MONOCYTES # BLD AUTO: 0.52 X10(3) UL (ref 0.1–1)
MONOCYTES NFR BLD AUTO: 13.7 %
NEUTROPHILS # BLD AUTO: 1.54 X10 (3) UL (ref 1.5–7.7)
NEUTROPHILS # BLD AUTO: 1.54 X10(3) UL (ref 1.5–7.7)
NEUTROPHILS NFR BLD AUTO: 40.5 %
PLATELET # BLD AUTO: 173 10(3)UL (ref 150–450)
RBC # BLD AUTO: 3.96 X10(6)UL
WBC # BLD AUTO: 3.8 X10(3) UL (ref 4–11)

## 2023-05-22 PROCEDURE — 83036 HEMOGLOBIN GLYCOSYLATED A1C: CPT

## 2023-05-22 PROCEDURE — 85025 COMPLETE CBC W/AUTO DIFF WBC: CPT

## 2023-05-22 PROCEDURE — 36415 COLL VENOUS BLD VENIPUNCTURE: CPT

## 2023-06-06 ENCOUNTER — OFFICE VISIT (OUTPATIENT)
Dept: INTERNAL MEDICINE CLINIC | Facility: CLINIC | Age: 73
End: 2023-06-06

## 2023-06-06 VITALS
OXYGEN SATURATION: 99 % | HEIGHT: 68 IN | RESPIRATION RATE: 16 BRPM | HEART RATE: 60 BPM | WEIGHT: 181.63 LBS | DIASTOLIC BLOOD PRESSURE: 62 MMHG | BODY MASS INDEX: 27.53 KG/M2 | SYSTOLIC BLOOD PRESSURE: 120 MMHG

## 2023-06-06 DIAGNOSIS — E11.9 TYPE 2 DIABETES MELLITUS WITHOUT RETINOPATHY (HCC): ICD-10-CM

## 2023-06-06 DIAGNOSIS — E11.65 TYPE 2 DIABETES MELLITUS WITH HYPERGLYCEMIA, WITHOUT LONG-TERM CURRENT USE OF INSULIN (HCC): Chronic | ICD-10-CM

## 2023-06-06 DIAGNOSIS — I70.0 ARTERIOSCLEROSIS OF AORTA (HCC): ICD-10-CM

## 2023-06-06 DIAGNOSIS — F13.20 SEDATIVE, HYPNOTIC OR ANXIOLYTIC DEPENDENCE, UNCOMPLICATED (HCC): ICD-10-CM

## 2023-06-06 DIAGNOSIS — R06.83 SNORING: ICD-10-CM

## 2023-06-06 DIAGNOSIS — D69.2 SENILE PURPURA (HCC): ICD-10-CM

## 2023-06-06 DIAGNOSIS — F32.0 MAJOR DEPRESSIVE DISORDER, SINGLE EPISODE, MILD (HCC): ICD-10-CM

## 2023-06-06 DIAGNOSIS — Z87.891 HISTORY OF CIGARETTE SMOKING: ICD-10-CM

## 2023-06-06 DIAGNOSIS — I35.1 NONRHEUMATIC AORTIC VALVE INSUFFICIENCY: ICD-10-CM

## 2023-06-06 DIAGNOSIS — K21.9 GASTROESOPHAGEAL REFLUX DISEASE WITHOUT ESOPHAGITIS: ICD-10-CM

## 2023-06-06 DIAGNOSIS — Z00.00 ENCOUNTER FOR ANNUAL HEALTH EXAMINATION: Primary | ICD-10-CM

## 2023-06-06 DIAGNOSIS — R31.21 ASYMPTOMATIC MICROSCOPIC HEMATURIA: ICD-10-CM

## 2023-06-06 DIAGNOSIS — M17.11 PRIMARY OSTEOARTHRITIS OF RIGHT KNEE: ICD-10-CM

## 2023-06-06 DIAGNOSIS — R91.8 LUNG NODULE, MULTIPLE: ICD-10-CM

## 2023-06-06 DIAGNOSIS — I10 ESSENTIAL HYPERTENSION: ICD-10-CM

## 2023-06-06 DIAGNOSIS — E78.00 PURE HYPERCHOLESTEROLEMIA: ICD-10-CM

## 2023-06-06 DIAGNOSIS — D69.6 THROMBOCYTOPENIA (HCC): ICD-10-CM

## 2023-06-06 PROBLEM — I51.89 GRADE I DIASTOLIC DYSFUNCTION: Status: RESOLVED | Noted: 2019-07-16 | Resolved: 2023-06-06

## 2023-06-06 PROBLEM — H43.393 FLOATERS IN VISUAL FIELD, BILATERAL: Status: RESOLVED | Noted: 2021-04-13 | Resolved: 2023-06-06

## 2023-06-06 PROBLEM — H25.13 AGE-RELATED NUCLEAR CATARACT OF BOTH EYES: Status: RESOLVED | Noted: 2021-04-13 | Resolved: 2023-06-06

## 2023-06-06 PROBLEM — E11.22 TYPE 2 DIABETES MELLITUS WITH DIABETIC CHRONIC KIDNEY DISEASE (HCC): Status: ACTIVE | Noted: 2023-06-06

## 2023-06-06 PROCEDURE — 1125F AMNT PAIN NOTED PAIN PRSNT: CPT | Performed by: INTERNAL MEDICINE

## 2023-06-06 PROCEDURE — 3008F BODY MASS INDEX DOCD: CPT | Performed by: INTERNAL MEDICINE

## 2023-06-06 PROCEDURE — 96160 PT-FOCUSED HLTH RISK ASSMT: CPT | Performed by: INTERNAL MEDICINE

## 2023-06-06 PROCEDURE — G0439 PPPS, SUBSEQ VISIT: HCPCS | Performed by: INTERNAL MEDICINE

## 2023-06-06 PROCEDURE — 1159F MED LIST DOCD IN RCRD: CPT | Performed by: INTERNAL MEDICINE

## 2023-06-06 PROCEDURE — 1170F FXNL STATUS ASSESSED: CPT | Performed by: INTERNAL MEDICINE

## 2023-06-06 PROCEDURE — 3074F SYST BP LT 130 MM HG: CPT | Performed by: INTERNAL MEDICINE

## 2023-06-06 PROCEDURE — 3078F DIAST BP <80 MM HG: CPT | Performed by: INTERNAL MEDICINE

## 2023-06-06 PROCEDURE — 1160F RVW MEDS BY RX/DR IN RCRD: CPT | Performed by: INTERNAL MEDICINE

## 2023-06-06 RX ORDER — GLIMEPIRIDE 1 MG/1
TABLET ORAL
Qty: 225 TABLET | Refills: 1 | Status: SHIPPED | OUTPATIENT
Start: 2023-06-06

## 2023-06-06 RX ORDER — GLIMEPIRIDE 1 MG/1
TABLET ORAL
Qty: 225 TABLET | Refills: 1 | Status: SHIPPED | OUTPATIENT
Start: 2023-06-06 | End: 2023-06-06

## 2023-06-16 DIAGNOSIS — I10 ESSENTIAL HYPERTENSION: ICD-10-CM

## 2023-06-17 RX ORDER — AMLODIPINE BESYLATE 10 MG/1
TABLET ORAL
Qty: 90 TABLET | Refills: 3 | Status: SHIPPED | OUTPATIENT
Start: 2023-06-17

## 2023-07-16 DIAGNOSIS — F41.9 ANXIETY: ICD-10-CM

## 2023-07-17 RX ORDER — LORAZEPAM 1 MG/1
TABLET ORAL
Qty: 30 TABLET | Refills: 0 | Status: SHIPPED | OUTPATIENT
Start: 2023-07-17

## 2023-07-17 NOTE — TELEPHONE ENCOUNTER
Please review. Protocol failed/ No protocol.     Requested Prescriptions   Pending Prescriptions Disp Refills    LORAZEPAM 1 MG Oral Tab [Pharmacy Med Name: LORAZEPAM 1 MG Tablet] 30 tablet 0     Sig: TAKE 1 TABLET EVERY NIGHT       There is no refill protocol information for this order          Recent Outpatient Visits              1 month ago Encounter for annual health examination    [facility] [facility], Saturnino Cain MD    Office Visit    4 months ago     [address] [place], [place]    Office Visit    4 months ago [address] [facility] Saturnino Cain [facility]    Office Visit    4 months ago     [address]    Office Visit    5 months ago     [address]    Office Visit            Future Appointments         Provider Department Appt Notes    In 2 weeks [facility] [address] [facility] 220 [facility]     In 3 months Mireille Arroyo MD [address], 801 [facility] result    In 4 months Nabila Washington MD [address], [address] [facility] yearly diabetic eye exam

## 2023-07-31 ENCOUNTER — HOSPITAL ENCOUNTER (OUTPATIENT)
Dept: CT IMAGING | Facility: HOSPITAL | Age: 73
Discharge: HOME | End: 2023-07-31
Attending: INTERNAL MEDICINE
Payer: MEDICARE

## 2023-07-31 DIAGNOSIS — R91.8 GROUND GLASS OPACITY PRESENT ON IMAGING OF LUNG: ICD-10-CM

## 2023-07-31 DIAGNOSIS — R91.1 LUNG NODULE: ICD-10-CM

## 2023-07-31 PROCEDURE — 71250 CT THORAX DX C-: CPT | Performed by: INTERNAL MEDICINE

## 2023-08-11 DIAGNOSIS — R91.8 LUNG NODULES: Primary | ICD-10-CM

## 2023-08-17 ENCOUNTER — MED REC SCAN ONLY (OUTPATIENT)
Dept: INTERNAL MEDICINE CLINIC | Facility: CLINIC | Age: 73
End: 2023-08-17

## 2023-08-25 DIAGNOSIS — I10 ESSENTIAL HYPERTENSION: ICD-10-CM

## 2023-08-25 NOTE — TELEPHONE ENCOUNTER
Please review. Protocol Failed or has No Protocol. Requested Prescriptions   Pending Prescriptions Disp Refills    METOPROLOL SUCCINATE ER 25 MG Oral Tablet 24 Hr [Pharmacy Med Name: METOPROLOL SUCCINATE ER 25 MG Tablet Extended Release 24 Hour] 90 tablet 3     Sig: TAKE 1 TABLET EVERY DAY       Hypertensive Medications Protocol Failed - 8/25/2023  8:06 AM        Failed - CMP or BMP in past 6 months     No results found for this or any previous visit (from the past 4392 hour(s)).             Passed - In person appointment in the past 12 or next 3 months     Recent Outpatient Visits              2 months ago Encounter for annual health examination    Viky Stephens MD    Office Visit    5 months ago     [address] [place], [place]    Office Visit    5 months ago [address], James Key MD    Office Visit    6 months ago     [address]    Office Visit    6 months ago     [address]    Office Visit          Future Appointments         Provider Department Appt Notes    In 1 week [address] [facility] [address] test Rita Sultana # [id number] from 8/21/23-9/20/23 via online    In 1 month Noah Olivier MD [address], 602 [facility] result    In 3 months Domingo Bailey MD [address], 59 [facility] yearly diabetic eye exam               Passed - Last BP reading less than 140/90     BP Readings from Last 1 Encounters:  06/06/23 : 120/62              Passed - In person appointment or virtual visit in the past 6 months     Recent Outpatient Visits              2 months ago Encounter for annual health examination    Felisa Lemos Michell Del Real MD    Office Visit    5 months ago     [address] [place], [place]    Office Visit    5 months ago [address], Inessa Haro MD    Office Visit    6 months ago     [address]    Office Visit    6 months ago     [address] Kir[address] [place]    Office Visit          Future Appointments         Provider Department Appt Notes    In 1 week [address] [facility] [address] test Kaveh Salcedo # [id number] from 8/21/23-9/20/23 via online    In 1 month Hope Alpers, MD [address], 602 [facility] result    In 3 months Edilia Tan MD [address], 7[address] [facility],[address] Floor, [place] yearly diabetic eye exam               Passed - [facility] or GFRNAA > 50     GFR Evaluation  EGFRCR: 97 , resulted on 11/11/2022               Recent Outpatient Visits              2 months ago Encounter for annual health examination    Julián Goodwin, Inessa Haro MD    Office Visit    5 months ago     [address] [place]    Office Visit    5 months ago [address], Inessa Haro MD    Office Visit    6 months ago     [address]ria    Office Visit    6 months ago     [address] Kir[address] [place]    Office Visit            Future Appointments         Provider Department Appt Notes    In 1 week [address] [facility] [address] test auth # [id number] from 8/21/23-9/20/23 via online    In 1 month Hope Alpers, MD [address], 602 [facility] result    In 3 months Yoshi Luna MD [address], 59 [facility] yearly diabetic eye exam

## 2023-08-28 RX ORDER — METOPROLOL SUCCINATE 25 MG/1
25 TABLET, EXTENDED RELEASE ORAL DAILY
Qty: 90 TABLET | Refills: 3 | Status: SHIPPED | OUTPATIENT
Start: 2023-08-28

## 2023-08-31 DIAGNOSIS — F41.9 ANXIETY: ICD-10-CM

## 2023-08-31 DIAGNOSIS — I10 ESSENTIAL HYPERTENSION: ICD-10-CM

## 2023-09-02 RX ORDER — LORAZEPAM 1 MG/1
TABLET ORAL
Qty: 30 TABLET | Refills: 0 | Status: SHIPPED | OUTPATIENT
Start: 2023-09-02

## 2023-09-02 RX ORDER — VALSARTAN AND HYDROCHLOROTHIAZIDE 320; 25 MG/1; MG/1
1 TABLET, FILM COATED ORAL DAILY
Qty: 90 TABLET | Refills: 3 | Status: SHIPPED | OUTPATIENT
Start: 2023-09-02

## 2023-09-05 ENCOUNTER — OFFICE VISIT (OUTPATIENT)
Dept: SLEEP CENTER | Age: 73
End: 2023-09-05
Attending: INTERNAL MEDICINE
Payer: MEDICARE

## 2023-09-05 DIAGNOSIS — R29.818 SUSPECTED SLEEP APNEA: Primary | ICD-10-CM

## 2023-09-05 DIAGNOSIS — R06.83 SNORING: ICD-10-CM

## 2023-09-05 PROCEDURE — 95811 POLYSOM 6/>YRS CPAP 4/> PARM: CPT

## 2023-09-06 NOTE — TELEPHONE ENCOUNTER
Per patient he needs refill on his rx med Atorvastatin. Current Outpatient Medications   Medication Sig Dispense Refill                                                                          atorvastatin 40 MG Oral Tab Take 1 tablet (40 mg total) by mouth nightly.  90 tablet 1

## 2023-09-07 RX ORDER — ATORVASTATIN CALCIUM 40 MG/1
40 TABLET, FILM COATED ORAL NIGHTLY
Qty: 90 TABLET | Refills: 1 | Status: SHIPPED | OUTPATIENT
Start: 2023-09-07

## 2023-09-07 NOTE — TELEPHONE ENCOUNTER
Please review; protocol failed. No active /future labs noted     Requested Prescriptions   Pending Prescriptions Disp Refills    atorvastatin 40 MG Oral Tab 90 tablet 1     Sig: Take 1 tablet (40 mg total) by mouth nightly.        Cholesterol Medication Protocol Failed - 9/6/2023  2:49 PM        Failed - LDL in past 12 months        Failed - Last LDL < 130     Lab Results   Component Value Date    LDL 26 05/04/2022             Passed - ALT in past 12 months        Passed - Last ALT < 80     Lab Results   Component Value Date    ALT 42 11/11/2022             Passed - In person appointment or virtual visit in the past 12 mos or appointment in next 3 mos     Recent Outpatient Visits              2 days ago Suspected sleep apnea    [address] [facility]    Office Visit    3 months ago Encounter for annual health examination    [address], Lora Thao MD    Office Visit    6 months ago     [address] [place], [place]    Office Visit    6 months ago [address], Lora Thao MD    Office Visit    6 months ago     [address] [place], [place]    Office Visit          Future Appointments         Provider Department Appt Notes    In 1 month Regan Damon MD [address], 602 [facility] result    In 2 months Yoly Julian MD [address], [address], [facility] yearly diabetic eye exam                  Recent Outpatient Visits              2 days ago Suspected sleep apnea    [address] [facility]    Office Visit    3 months ago Encounter for annual health examination    [facility] [facility], Lora Huber MD    Office Visit    6 months ago     [address] Office Visit    6 months ago Anxiety    Blanche Ba MD    Office Visit    6 months ago     [address] Nik[address]    Office Visit          Future Appointments         Provider Department Appt Notes    In 1 month Marcial Casarez MD [facility], [address] result    In 2 months Chandrika Hylton MD [address] [facility] [place], [address], [place] yearly diabetic eye exam

## 2023-09-23 DIAGNOSIS — G47.33 OSA (OBSTRUCTIVE SLEEP APNEA): Primary | ICD-10-CM

## 2023-10-18 ENCOUNTER — OFFICE VISIT (OUTPATIENT)
Dept: ENDOCRINOLOGY CLINIC | Facility: CLINIC | Age: 73
End: 2023-10-18
Payer: MEDICARE

## 2023-10-18 ENCOUNTER — PATIENT MESSAGE (OUTPATIENT)
Dept: ENDOCRINOLOGY CLINIC | Facility: CLINIC | Age: 73
End: 2023-10-18

## 2023-10-18 VITALS
SYSTOLIC BLOOD PRESSURE: 144 MMHG | DIASTOLIC BLOOD PRESSURE: 74 MMHG | BODY MASS INDEX: 27.58 KG/M2 | HEART RATE: 58 BPM | WEIGHT: 182 LBS | HEIGHT: 67.99 IN

## 2023-10-18 DIAGNOSIS — E11.69 TYPE 2 DIABETES MELLITUS WITH OTHER SPECIFIED COMPLICATION, UNSPECIFIED WHETHER LONG TERM INSULIN USE (HCC): ICD-10-CM

## 2023-10-18 DIAGNOSIS — E78.5 DYSLIPIDEMIA: Primary | ICD-10-CM

## 2023-10-18 LAB
CARTRIDGE LOT#: ABNORMAL NUMERIC
GLUCOSE BLOOD: 184
HEMOGLOBIN A1C: 7.2 % (ref 4.3–5.6)
TEST STRIP LOT #: NORMAL NUMERIC

## 2023-10-18 PROCEDURE — 82947 ASSAY GLUCOSE BLOOD QUANT: CPT | Performed by: INTERNAL MEDICINE

## 2023-10-18 PROCEDURE — 3008F BODY MASS INDEX DOCD: CPT | Performed by: INTERNAL MEDICINE

## 2023-10-18 PROCEDURE — 3077F SYST BP >= 140 MM HG: CPT | Performed by: INTERNAL MEDICINE

## 2023-10-18 PROCEDURE — 83036 HEMOGLOBIN GLYCOSYLATED A1C: CPT | Performed by: INTERNAL MEDICINE

## 2023-10-18 PROCEDURE — 1159F MED LIST DOCD IN RCRD: CPT | Performed by: INTERNAL MEDICINE

## 2023-10-18 PROCEDURE — 3051F HG A1C>EQUAL 7.0%<8.0%: CPT | Performed by: INTERNAL MEDICINE

## 2023-10-18 PROCEDURE — 1160F RVW MEDS BY RX/DR IN RCRD: CPT | Performed by: INTERNAL MEDICINE

## 2023-10-18 PROCEDURE — 99203 OFFICE O/P NEW LOW 30 MIN: CPT | Performed by: INTERNAL MEDICINE

## 2023-10-18 PROCEDURE — 3078F DIAST BP <80 MM HG: CPT | Performed by: INTERNAL MEDICINE

## 2023-10-18 RX ORDER — GLIMEPIRIDE 2 MG/1
2 TABLET ORAL 2 TIMES DAILY WITH MEALS
Qty: 180 TABLET | Refills: 0 | Status: SHIPPED | OUTPATIENT
Start: 2023-10-18 | End: 2024-01-16

## 2023-10-23 DIAGNOSIS — F41.9 ANXIETY: ICD-10-CM

## 2023-10-24 ENCOUNTER — LAB ENCOUNTER (OUTPATIENT)
Dept: LAB | Age: 73
End: 2023-10-24
Attending: INTERNAL MEDICINE

## 2023-10-24 DIAGNOSIS — E78.5 DYSLIPIDEMIA: ICD-10-CM

## 2023-10-24 LAB
CHOLEST SERPL-MCNC: 114 MG/DL (ref ?–200)
FASTING PATIENT LIPID ANSWER: YES
HDLC SERPL-MCNC: 51 MG/DL (ref 40–59)
LDLC SERPL CALC-MCNC: 41 MG/DL (ref ?–100)
NONHDLC SERPL-MCNC: 63 MG/DL (ref ?–130)
TRIGL SERPL-MCNC: 124 MG/DL (ref 30–149)
VLDLC SERPL CALC-MCNC: 17 MG/DL (ref 0–30)

## 2023-10-24 PROCEDURE — 36415 COLL VENOUS BLD VENIPUNCTURE: CPT

## 2023-10-24 PROCEDURE — 80061 LIPID PANEL: CPT

## 2023-10-24 RX ORDER — LORAZEPAM 1 MG/1
TABLET ORAL
Qty: 30 TABLET | Refills: 0 | Status: SHIPPED | OUTPATIENT
Start: 2023-10-24

## 2023-10-24 NOTE — TELEPHONE ENCOUNTER
Please review; no protocol  Medication pended for your review and approval.    Requested Prescriptions   Pending Prescriptions Disp Refills    LORAZEPAM 1 MG Oral Tab [Pharmacy Med Name: LORAZEPAM 1 MG Tablet] 30 tablet 0     Sig: TAKE 1 TABLET EVERY NIGHT       There is no refill protocol information for this order

## 2023-11-21 ENCOUNTER — APPOINTMENT (OUTPATIENT)
Dept: GENERAL RADIOLOGY | Age: 73
End: 2023-11-21
Attending: NURSE PRACTITIONER
Payer: MEDICARE

## 2023-11-21 ENCOUNTER — HOSPITAL ENCOUNTER (OUTPATIENT)
Age: 73
Discharge: HOME | End: 2023-11-21
Payer: MEDICARE

## 2023-11-21 VITALS
HEART RATE: 63 BPM | DIASTOLIC BLOOD PRESSURE: 71 MMHG | HEIGHT: 68 IN | RESPIRATION RATE: 16 BRPM | TEMPERATURE: 98 F | SYSTOLIC BLOOD PRESSURE: 149 MMHG | BODY MASS INDEX: 25.76 KG/M2 | WEIGHT: 170 LBS | OXYGEN SATURATION: 99 %

## 2023-11-21 DIAGNOSIS — R05.1 ACUTE COUGH: Primary | ICD-10-CM

## 2023-11-21 LAB — SARS-COV-2 RNA RESP QL NAA+PROBE: NOT DETECTED

## 2023-11-21 PROCEDURE — 71046 X-RAY EXAM CHEST 2 VIEWS: CPT | Performed by: NURSE PRACTITIONER

## 2023-11-21 RX ORDER — IPRATROPIUM BROMIDE AND ALBUTEROL SULFATE 2.5; .5 MG/3ML; MG/3ML
3 SOLUTION RESPIRATORY (INHALATION) ONCE
Status: COMPLETED | OUTPATIENT
Start: 2023-11-21 | End: 2023-11-21

## 2023-11-21 NOTE — ED INITIAL ASSESSMENT (HPI)
Pt c/o cough and chest congestion for 1 week, with sore throat. Denies fevers. At home covid test negative.  Denies chest pain

## 2023-11-21 NOTE — DISCHARGE INSTRUCTIONS
COVID test is negative. No pneumonia seen on the x-ray. Try over-the-counter Mucinex D with saline nasal spray and cough drops. Increase oral fluids, water, hot tea with honey.   Follow-up with your primary doctor if no improvement

## 2023-11-30 ENCOUNTER — OFFICE VISIT (OUTPATIENT)
Dept: INTERNAL MEDICINE CLINIC | Facility: CLINIC | Age: 73
End: 2023-11-30

## 2023-11-30 ENCOUNTER — LAB ENCOUNTER (OUTPATIENT)
Dept: LAB | Age: 73
End: 2023-11-30
Attending: INTERNAL MEDICINE
Payer: MEDICARE

## 2023-11-30 VITALS
BODY MASS INDEX: 27.1 KG/M2 | WEIGHT: 178.81 LBS | HEART RATE: 57 BPM | DIASTOLIC BLOOD PRESSURE: 70 MMHG | HEIGHT: 68 IN | SYSTOLIC BLOOD PRESSURE: 130 MMHG | RESPIRATION RATE: 18 BRPM

## 2023-11-30 DIAGNOSIS — E11.65 TYPE 2 DIABETES MELLITUS WITH HYPERGLYCEMIA, WITHOUT LONG-TERM CURRENT USE OF INSULIN (HCC): Chronic | ICD-10-CM

## 2023-11-30 DIAGNOSIS — G47.33 OSA (OBSTRUCTIVE SLEEP APNEA): Primary | ICD-10-CM

## 2023-11-30 DIAGNOSIS — E11.65 TYPE 2 DIABETES MELLITUS WITH HYPERGLYCEMIA, WITHOUT LONG-TERM CURRENT USE OF INSULIN (HCC): ICD-10-CM

## 2023-11-30 DIAGNOSIS — F41.9 ANXIETY: ICD-10-CM

## 2023-11-30 LAB
ALBUMIN SERPL-MCNC: 4.4 G/DL (ref 3.2–4.8)
ALBUMIN/GLOB SERPL: 1.6 {RATIO} (ref 1–2)
ALP LIVER SERPL-CCNC: 54 U/L
ALT SERPL-CCNC: 46 U/L
ANION GAP SERPL CALC-SCNC: 6 MMOL/L (ref 0–18)
AST SERPL-CCNC: 31 U/L (ref ?–34)
BASOPHILS # BLD AUTO: 0.04 X10(3) UL (ref 0–0.2)
BASOPHILS NFR BLD AUTO: 0.9 %
BILIRUB SERPL-MCNC: 0.6 MG/DL (ref 0.2–1.1)
BUN BLD-MCNC: 9 MG/DL (ref 9–23)
BUN/CREAT SERPL: 10.7 (ref 10–20)
CALCIUM BLD-MCNC: 9.8 MG/DL (ref 8.7–10.4)
CHLORIDE SERPL-SCNC: 98 MMOL/L (ref 98–112)
CO2 SERPL-SCNC: 30 MMOL/L (ref 21–32)
CREAT BLD-MCNC: 0.84 MG/DL
CREAT UR-SCNC: 22.9 MG/DL
DEPRECATED RDW RBC AUTO: 46.5 FL (ref 35.1–46.3)
EGFRCR SERPLBLD CKD-EPI 2021: 93 ML/MIN/1.73M2 (ref 60–?)
EOSINOPHIL # BLD AUTO: 0.19 X10(3) UL (ref 0–0.7)
EOSINOPHIL NFR BLD AUTO: 4.1 %
ERYTHROCYTE [DISTWIDTH] IN BLOOD BY AUTOMATED COUNT: 14.6 % (ref 11–15)
FASTING STATUS PATIENT QL REPORTED: NO
GLOBULIN PLAS-MCNC: 2.8 G/DL (ref 2.8–4.4)
GLUCOSE BLD-MCNC: 106 MG/DL (ref 70–99)
HCT VFR BLD AUTO: 37.6 %
HGB BLD-MCNC: 11.8 G/DL
IMM GRANULOCYTES # BLD AUTO: 0.01 X10(3) UL (ref 0–1)
IMM GRANULOCYTES NFR BLD: 0.2 %
LYMPHOCYTES # BLD AUTO: 1.72 X10(3) UL (ref 1–4)
LYMPHOCYTES NFR BLD AUTO: 36.9 %
MCH RBC QN AUTO: 28 PG (ref 26–34)
MCHC RBC AUTO-ENTMCNC: 31.4 G/DL (ref 31–37)
MCV RBC AUTO: 89.1 FL
MICROALBUMIN UR-MCNC: <0.3 MG/DL
MONOCYTES # BLD AUTO: 0.53 X10(3) UL (ref 0.1–1)
MONOCYTES NFR BLD AUTO: 11.4 %
NEUTROPHILS # BLD AUTO: 2.17 X10 (3) UL (ref 1.5–7.7)
NEUTROPHILS # BLD AUTO: 2.17 X10(3) UL (ref 1.5–7.7)
NEUTROPHILS NFR BLD AUTO: 46.5 %
OSMOLALITY SERPL CALC.SUM OF ELEC: 277 MOSM/KG (ref 275–295)
PLATELET # BLD AUTO: 300 10(3)UL (ref 150–450)
POTASSIUM SERPL-SCNC: 3.4 MMOL/L (ref 3.5–5.1)
PROT SERPL-MCNC: 7.2 G/DL (ref 5.7–8.2)
RBC # BLD AUTO: 4.22 X10(6)UL
SODIUM SERPL-SCNC: 134 MMOL/L (ref 136–145)
TSI SER-ACNC: 1.16 MIU/ML (ref 0.55–4.78)
WBC # BLD AUTO: 4.7 X10(3) UL (ref 4–11)

## 2023-11-30 PROCEDURE — 1160F RVW MEDS BY RX/DR IN RCRD: CPT | Performed by: INTERNAL MEDICINE

## 2023-11-30 PROCEDURE — 99214 OFFICE O/P EST MOD 30 MIN: CPT | Performed by: INTERNAL MEDICINE

## 2023-11-30 PROCEDURE — 84443 ASSAY THYROID STIM HORMONE: CPT

## 2023-11-30 PROCEDURE — 1159F MED LIST DOCD IN RCRD: CPT | Performed by: INTERNAL MEDICINE

## 2023-11-30 PROCEDURE — 36415 COLL VENOUS BLD VENIPUNCTURE: CPT

## 2023-11-30 PROCEDURE — 3008F BODY MASS INDEX DOCD: CPT | Performed by: INTERNAL MEDICINE

## 2023-11-30 PROCEDURE — 85025 COMPLETE CBC W/AUTO DIFF WBC: CPT

## 2023-11-30 PROCEDURE — 82570 ASSAY OF URINE CREATININE: CPT

## 2023-11-30 PROCEDURE — 80053 COMPREHEN METABOLIC PANEL: CPT

## 2023-11-30 PROCEDURE — 3078F DIAST BP <80 MM HG: CPT | Performed by: INTERNAL MEDICINE

## 2023-11-30 PROCEDURE — 3075F SYST BP GE 130 - 139MM HG: CPT | Performed by: INTERNAL MEDICINE

## 2023-11-30 PROCEDURE — 82043 UR ALBUMIN QUANTITATIVE: CPT

## 2023-11-30 PROCEDURE — 1125F AMNT PAIN NOTED PAIN PRSNT: CPT | Performed by: INTERNAL MEDICINE

## 2023-11-30 RX ORDER — TRAMADOL HYDROCHLORIDE 50 MG/1
TABLET ORAL DAILY
COMMUNITY
Start: 2023-07-05

## 2023-11-30 RX ORDER — LORAZEPAM 1 MG/1
TABLET ORAL
Qty: 30 TABLET | Refills: 0 | Status: SHIPPED | OUTPATIENT
Start: 2023-11-30

## 2023-11-30 RX ORDER — MELOXICAM 15 MG/1
15 TABLET ORAL DAILY
COMMUNITY
Start: 2023-07-03

## 2023-12-15 ENCOUNTER — TELEPHONE (OUTPATIENT)
Facility: CLINIC | Age: 73
End: 2023-12-15

## 2023-12-15 DIAGNOSIS — Z12.11 SPECIAL SCREENING FOR MALIGNANT NEOPLASMS, COLON: Primary | ICD-10-CM

## 2023-12-15 DIAGNOSIS — Z86.010 HISTORY OF ADENOMATOUS POLYP OF COLON: ICD-10-CM

## 2023-12-15 NOTE — TELEPHONE ENCOUNTER
Patient outreach message received:    Last colonoscopy done 3/21/23. 1 year recall placed into Pt Outreach, next due on 03/2024 per Dr. Son     Recall reminder letter mailed out to patient.

## 2023-12-18 ENCOUNTER — TELEPHONE (OUTPATIENT)
Dept: INTERNAL MEDICINE CLINIC | Facility: CLINIC | Age: 73
End: 2023-12-18

## 2023-12-18 NOTE — TELEPHONE ENCOUNTER
Patient needs Dr Cole Means office to fax over her order for patient to get a new cpap machine.     Fax to:  [employer]  Fax: [phone]

## 2023-12-27 ENCOUNTER — MED REC SCAN ONLY (OUTPATIENT)
Dept: INTERNAL MEDICINE CLINIC | Facility: CLINIC | Age: 73
End: 2023-12-27

## 2024-01-02 DIAGNOSIS — F41.9 ANXIETY: ICD-10-CM

## 2024-01-02 DIAGNOSIS — E11.65 TYPE 2 DIABETES MELLITUS WITH HYPERGLYCEMIA, WITHOUT LONG-TERM CURRENT USE OF INSULIN (HCC): Chronic | ICD-10-CM

## 2024-01-02 RX ORDER — LORAZEPAM 1 MG/1
TABLET ORAL
Qty: 30 TABLET | Refills: 0 | Status: SHIPPED | OUTPATIENT
Start: 2024-01-02

## 2024-01-02 NOTE — TELEPHONE ENCOUNTER
Please review; protocol failed/ No protocol     Requested Prescriptions   Pending Prescriptions Disp Refills    LORAZEPAM 1 MG Oral Tab [Pharmacy Med Name: LORAZEPAM 1 MG Tablet] 30 tablet 0     Sig: TAKE 1 TABLET EVERY NIGHT       There is no refill protocol information for this order        Future Appointments         Provider Department Appt Notes    In 1 month Zoe Black MD [facility]     In 1 month Sun Vega MD [facility] pre-op, surgery on 2/26    In 4 months Marck Ascencio MD [facility] yearly diabetic eye exam, R/S from 12/4. ygs    In 5 months Sun Vega MD [facility] [address], [place] MEDICARE SUPER LAST ONE 6/6/2023          Recent Outpatient Visits              1 month ago MILENA (obstructive sleep apnea)    [facility] Sun Vega MD    Office Visit    2 months ago Dyslipidemia    [facility] Zoe Black MD    Office Visit    3 months ago Suspected sleep apnea    [facility]    Office Visit    7 months ago Encounter for annual health examination    [facility] Sun Vega MD    Office Visit    10 months ago     [facility] Mervat Cason PT    Office Visit

## 2024-01-05 ENCOUNTER — APPOINTMENT (OUTPATIENT)
Dept: GENERAL RADIOLOGY | Age: 74
End: 2024-01-05
Attending: NURSE PRACTITIONER
Payer: MEDICARE

## 2024-01-05 ENCOUNTER — HOSPITAL ENCOUNTER (OUTPATIENT)
Age: 74
Discharge: HOME | End: 2024-01-05
Payer: MEDICARE

## 2024-01-05 VITALS
OXYGEN SATURATION: 98 % | RESPIRATION RATE: 16 BRPM | SYSTOLIC BLOOD PRESSURE: 129 MMHG | DIASTOLIC BLOOD PRESSURE: 66 MMHG | HEART RATE: 69 BPM | TEMPERATURE: 98 F

## 2024-01-05 DIAGNOSIS — R05.9 COUGH: Primary | ICD-10-CM

## 2024-01-05 DIAGNOSIS — J40 BRONCHITIS: ICD-10-CM

## 2024-01-05 LAB
POCT INFLUENZA A: NEGATIVE
POCT INFLUENZA B: NEGATIVE
SARS-COV-2 RNA RESP QL NAA+PROBE: NOT DETECTED

## 2024-01-05 PROCEDURE — 99214 OFFICE O/P EST MOD 30 MIN: CPT | Performed by: NURSE PRACTITIONER

## 2024-01-05 PROCEDURE — U0002 COVID-19 LAB TEST NON-CDC: HCPCS | Performed by: NURSE PRACTITIONER

## 2024-01-05 PROCEDURE — 71046 X-RAY EXAM CHEST 2 VIEWS: CPT | Performed by: NURSE PRACTITIONER

## 2024-01-05 PROCEDURE — 94640 AIRWAY INHALATION TREATMENT: CPT | Performed by: NURSE PRACTITIONER

## 2024-01-05 PROCEDURE — 87502 INFLUENZA DNA AMP PROBE: CPT | Performed by: NURSE PRACTITIONER

## 2024-01-05 RX ORDER — PREDNISONE 20 MG/1
40 TABLET ORAL DAILY
Qty: 10 TABLET | Refills: 0 | Status: SHIPPED | OUTPATIENT
Start: 2024-01-05 | End: 2024-01-10

## 2024-01-05 RX ORDER — INHALER, ASSIST DEVICES
SPACER (EA) MISCELLANEOUS
Qty: 1 EACH | Refills: 0 | Status: SHIPPED | OUTPATIENT
Start: 2024-01-05

## 2024-01-05 RX ORDER — BENZONATATE 100 MG/1
100 CAPSULE ORAL 3 TIMES DAILY PRN
Qty: 10 CAPSULE | Refills: 0 | Status: SHIPPED | OUTPATIENT
Start: 2024-01-05

## 2024-01-05 RX ORDER — ALBUTEROL SULFATE 2.5 MG/3ML
2.5 SOLUTION RESPIRATORY (INHALATION) ONCE
Status: COMPLETED | OUTPATIENT
Start: 2024-01-05 | End: 2024-01-05

## 2024-01-05 RX ORDER — IPRATROPIUM BROMIDE AND ALBUTEROL SULFATE 2.5; .5 MG/3ML; MG/3ML
3 SOLUTION RESPIRATORY (INHALATION) ONCE
Status: COMPLETED | OUTPATIENT
Start: 2024-01-05 | End: 2024-01-05

## 2024-01-05 RX ORDER — ALBUTEROL SULFATE 90 UG/1
2 AEROSOL, METERED RESPIRATORY (INHALATION) EVERY 4 HOURS PRN
Qty: 1 EACH | Refills: 0 | Status: SHIPPED | OUTPATIENT
Start: 2024-01-05 | End: 2024-02-04

## 2024-01-05 NOTE — ED PROVIDER NOTES
Patient Seen in: [facility]      History     Chief Complaint   Patient presents with    Cough/URI     Stated Complaint: Congestion, cough    Subjective:   73-year-old male with type II diabetes, anxiety, high cholesterol, hypertension presents from home with cold symptoms for 1 week.  States cough and some nasal congestion.  No fever.  No shortness of breath but notes some wheezing.  Worse at night when he lays flat.  No shortness of breath.  He did have a negative COVID test at home yesterday.  He has been taking Robitussin at home for the cough with minimal relief.  He does note that he was seen here in November with similar symptoms that had previously resolved.  He is vaccinated for COVID.  No history of pneumonia.  He did quit smoking about 12 years ago.             The history is provided by the patient. No  was used.       Objective:   Past Medical History:   Diagnosis Date    Anxiety     Arthritis     Atherosclerosis of coronary artery     Colon adenomas 10/28/2019    Colon adenomas 03/23/2023    x5    Coronary atherosclerosis     Depression     Diabetes (HCC)     Esophageal reflux     Essential hypertension     High blood pressure     High cholesterol     History of blood transfusion     12 YRS AGO, NO REACTIONS    Hyperlipidemia     Sleep apnea     Visual impairment               Past Surgical History:   Procedure Laterality Date    BYPASS SURGERY  05/2009    CABG      CABG x 5    COLONOSCOPY      COLONOSCOPY N/A 10/28/2019    Procedure: COLONOSCOPY;  Surgeon: Jack Son MD;  [facility]: [facility] ENDOSCOPY    COLONOSCOPY      COLONOSCOPY N/A 3/21/2023    Procedure: COLONOSCOPY;  Surgeon: Jack Son MD;  Location: [facility]    OTHER SURGICAL HISTORY      deviated septum repair    OTHER SURGICAL HISTORY Left     SINUS SURGERY        UPPER GI ENDOSCOPY,EXAM                  Social History     Socioeconomic History    Marital status:    Tobacco Use    Smoking  status: Former     Packs/day: 0.50     Years: 25.00     Additional pack years: 0.00     Total pack years: 12.50     Types: Cigarettes     Quit date: 2008     Years since quittin.0    Smokeless tobacco: Never   Vaping Use    Vaping Use: Never used   Substance and Sexual Activity    Alcohol use: Yes     Alcohol/week: 3.0 standard drinks of alcohol     Types: 3 Standard drinks or equivalent per week     Comment: 2-3 drinks/week    Drug use: No              Review of Systems    Positive for stated complaint: Congestion, cough  Other systems are as noted in HPI.  Constitutional and vital signs reviewed.      All other systems reviewed and negative except as noted above.    Physical Exam     ED Triage Vitals [24 1208]   /66   Pulse 69   Resp 16   Temp 97 °F (36.1 °C)   Temp src Temporal   SpO2 98 %   O2 Device None (Room air)       Current:/66   Pulse 69   Temp 98 °F (36.7 °C) (Oral)   Resp 16   SpO2 98%         Physical Exam  Vitals and nursing note reviewed.   Constitutional:       General: He is not in acute distress.     Appearance: Normal appearance. He is not ill-appearing or toxic-appearing.   HENT:      Head: Normocephalic and atraumatic.      Nose: Nose normal.      Mouth/Throat:      Mouth: Mucous membranes are moist.      Pharynx: Oropharynx is clear.   Eyes:      Pupils: Pupils are equal, round, and reactive to light.   Cardiovascular:      Rate and Rhythm: Normal rate and regular rhythm.      Pulses: Normal pulses.   Pulmonary:      Effort: Pulmonary effort is normal. No respiratory distress.      Breath sounds: Rhonchi present.      Comments: Coarse expiratory rhonchi.  No respiratory distress.  No hypoxia.  Pulse ox 98% room air which is normal  Abdominal:      General: Abdomen is flat.      Palpations: Abdomen is soft.   Musculoskeletal:         General: No signs of injury. Normal range of motion.      Cervical back: Normal range of motion and neck supple.   Skin:      General: Skin is warm and dry.      Capillary Refill: Capillary refill takes less than 2 seconds.   Neurological:      General: No focal deficit present.      Mental Status: He is alert and oriented to person, place, and time.      GCS: GCS eye subscore is 4. GCS verbal subscore is 5. GCS motor subscore is 6.   Psychiatric:         Mood and Affect: Mood normal.         Behavior: Behavior normal.         Thought Content: Thought content normal.         Judgment: Judgment normal.               ED Course     Labs Reviewed   POCT FLU TEST - Normal    Narrative:     This assay is a rapid molecular in vitro test utilizing nucleic acid amplification of influenza A and B viral RNA.   RAPID SARS-COV-2 BY PCR - Normal     Recent Results (from the past 24 hour(s))   POCT Flu Test    Collection Time: 01/05/24 12:12 PM    Specimen: Nares; Other   Result Value Ref Range    POCT INFLUENZA A Negative Negative    POCT INFLUENZA B Negative Negative   Rapid SARS-CoV-2 by PCR    Collection Time: 01/05/24 12:38 PM    Specimen: Nares; Other   Result Value Ref Range    Rapid SARS-CoV-2 by PCR Not Detected Not Detected     XR CHEST PA + LAT CHEST (CPT=71046)    Result Date: 1/5/2024  CONCLUSION:  1. Unchanged chest.  No acute appearing disease.    Dictated by (CST): Rodri Steele MD on 1/05/2024 at 1:35 PM     Finalized by (CST): Rodri Steele MD on 1/05/2024 at 1:36 PM             MDM        Medical Decision Making  Bronchitis  Patient with cough and congestion for 1 week.  Coarse rhonchi noted on exam but no respiratory distress.  No hypoxia.  Pulse ox 98% on room air which is normal  Chest x-ray is negative for pneumonia  COVID is negative  Flu is negative  No shortness of breath.  No chest pain.  No tachycardia.  No hypoxia.  No suspicion for PE  Given DuoNeb.  States feeling much improved.  Now with mild expiratory wheezing on exam.  Repeat neb given  Discussed viral origin of symptoms  Plan of care: Albuterol MDI, prednisone,  Tessalon.  He is a type II diabetic but A1c 7.2.  Recommend increasing water intake and tracking blood sugars.  Needs close follow-up with primary doctor    Results and plan of care discussed with the patient/family. They are in agreement with discharge. They understand to follow up with their primary doctor or the referral physician for further evaluation, especially if no improvement.  Also discussed the limitations of immediate care, patient is aware that if symptoms are worse they should go to the emergency room. Verbal and written discharge instructions were given.       Problems Addressed:  Bronchitis: acute illness or injury  Cough: acute illness or injury    Amount and/or Complexity of Data Reviewed  External Data Reviewed: labs.     Details: A1c 7.2 10/223  Labs: ordered. Decision-making details documented in ED Course.  Radiology: ordered and independent interpretation performed. Decision-making details documented in ED Course.     Details: No pneumonia    Risk  OTC drugs.  Prescription drug management.        Disposition and Plan     Clinical Impression:  1. Cough    2. Bronchitis         Disposition:  Discharge  1/5/2024  1:58 pm    Follow-up:  Sun Vega MD  [address] [place] [id number]  [phone]                Medications Prescribed:  Discharge Medication List as of 1/5/2024  1:59 PM        START taking these medications    Details   albuterol 108 (90 Base) MCG/ACT Inhalation Aero Soln Inhale 2 puffs into the lungs every 4 (four) hours as needed for Wheezing., Normal, Disp-1 each, R-0      Spacer/Aero-Holding Chambers (BREATHERITE ELLE SPACER ADULT) Does not apply Misc Use with albuterol, Normal, Disp-1 each, R-0      predniSONE 20 MG Oral Tab Take 2 tablets (40 mg total) by mouth daily for 5 days., Normal, Disp-10 tablet, R-0      benzonatate 100 MG Oral Cap Take 1 capsule (100 mg total) by mouth 3 (three) times daily as needed., Normal, Disp-10 capsule, R-0

## 2024-01-05 NOTE — DISCHARGE INSTRUCTIONS
Use the inhaler as needed for cough, wheezing.  Take the steroid daily.  This will increase your blood sugar.  Increase your water intake.  Check your blood sugar regularly.  You may continue to use the Robitussin as needed for cough, you may also use Tessalon as needed for cough.  Take Flonase over-the-counter daily.  Schedule follow-up with your primary doctor

## 2024-01-06 DIAGNOSIS — E78.00 PURE HYPERCHOLESTEROLEMIA: ICD-10-CM

## 2024-01-08 RX ORDER — OMEGA-3-ACID ETHYL ESTERS 1 G/1
1 CAPSULE, LIQUID FILLED ORAL 3 TIMES DAILY
Qty: 270 CAPSULE | Refills: 3 | Status: SHIPPED | OUTPATIENT
Start: 2024-01-08

## 2024-01-08 NOTE — TELEPHONE ENCOUNTER
Refill passed per [facility] protocol.  Requested Prescriptions   Pending Prescriptions Disp Refills    OMEGA-3-ACID ETHYL ESTERS 1 g Oral Cap [Pharmacy Med Name: OMEGA-3-ACID ETHYL ESTERS 1 GM Capsule] 270 capsule 3     Sig: TAKE 1 CAPSULE THREE TIMES DAILY       Cholesterol Medication Protocol Passed - 1/6/2024  9:23 PM        Passed - ALT in past 12 months        Passed - LDL in past 12 months        Passed - Last ALT < 80     Lab Results   Component Value Date    ALT 46 11/30/2023             Passed - Last LDL < 130     Lab Results   Component Value Date    LDL 41 10/24/2023             Passed - In person appointment or virtual visit in the past 12 mos or appointment in next 3 mos     Recent Outpatient Visits              1 month ago MILENA (obstructive sleep apnea)    [facility] Sun Vega MD    Office Visit    2 months ago Dyslipidemia    [facility] Zoe [facility] MD    Office Visit    4 months ago Suspected sleep apnea    [facility]    Office Visit    7 months ago Encounter for annual health examination    [facility] Sun Vega MD    Office Visit    10 months ago     [facility] Mervat Cason PT    Office Visit          Future Appointments         Provider Department Appt Notes    In 4 weeks [facility]oe [facility] MD [facility]     In 1 month Sun Vega MD [facility] pre-op, surgery on 2/26    In 4 months Marck Ascencio MD [facility] yearly diabetic eye exam, R/S from 12/4. ygs    In 5 months Sun Vega MD [facility] last 6/6/2023                  Recent Outpatient Visits              1 month ago MILENA  (obstructive sleep apnea)    [facility] Sun Vega MD    Office Visit    2 months ago Dyslipidemia    [facility] Zoe Black MD    Office Visit    4 months ago Suspected sleep apnea    [facility]    Office Visit    7 months ago Encounter for annual health examination    [facility] Sun Vega MD    Office Visit    10 months ago     [facility] Mervat Cason PT    Office Visit          Future Appointments         Provider Department Appt Notes    In 4 weeks Zoe Black MD [facility]     In 1 month Sun Vega MD [facility] pre-op, surgery on 2/26    In 4 months Marck Ascencio MD [facility] yearly diabetic eye exam, R/S from 12/4. ygs    In 5 months Sun Vega MD [facility] [place] SUPER last 6/6/2023

## 2024-01-31 DIAGNOSIS — E11.65 TYPE 2 DIABETES MELLITUS WITH HYPERGLYCEMIA, WITHOUT LONG-TERM CURRENT USE OF INSULIN (HCC): Chronic | ICD-10-CM

## 2024-01-31 DIAGNOSIS — F41.9 ANXIETY: ICD-10-CM

## 2024-02-01 ENCOUNTER — MED REC SCAN ONLY (OUTPATIENT)
Dept: INTERNAL MEDICINE CLINIC | Facility: CLINIC | Age: 74
End: 2024-02-01

## 2024-02-01 RX ORDER — LORAZEPAM 1 MG/1
TABLET ORAL
Qty: 30 TABLET | Refills: 0 | Status: SHIPPED | OUTPATIENT
Start: 2024-02-01

## 2024-02-01 NOTE — TELEPHONE ENCOUNTER
Please review. Protocol failed or has no protocol.    Requested Prescriptions   Pending Prescriptions Disp Refills    LORAZEPAM 1 MG Oral Tab [Pharmacy Med Name: LORAZEPAM 1 MG Tablet] 30 tablet 0     Sig: TAKE 1 TABLET EVERY NIGHT       There is no refill protocol information for this order          Recent Outpatient Visits              2 months ago MILENA (obstructive sleep apnea)    [facility] Sun Vega MD    Office Visit    3 months ago Dyslipidemia    [facility] Zoe Black MD    Office Visit    4 months ago Suspected sleep apnea    [facility]    Office Visit    8 months ago Encounter for annual health examination    [facility] Sun Vega MD    Office Visit    11 months ago     [facility] Mervat Cason PT    Office Visit            Future Appointments         Provider Department Appt Notes    In 3 months Marck Ascencio MD [facility] yearly diabetic eye exam, R/S from 12/4. ygs    In 4 months Sun Vega MD [facility] SUPER last 6/6/2023

## 2024-02-13 NOTE — TELEPHONE ENCOUNTER
Colonoscopy Report           Avinash Dumont      3/18/1950 Age 73 year old   PCP Sun Vega MD Endoscopist Jack Son MD      Date of procedure: 23     Procedure: Colonoscopy w/ biopsy and snare polypectomy     Pre-operative diagnosis: history of adenomatous colon polyps     Colonoscopy in 2019 found to have 9 sub-centimeter colon and rectal polyps removed of which 3 were adenomatous     Post-operative diagnosis: see impression     Sedation: monitored anesthesia care (MAC)     Consent: We discussed the risks/benefits and alternatives to this procedure, as well as the planned sedation. Informed consent was obtained from the patient after the risks of the procedure were discussed, including but not limited to bleeding, perforation, aspiration, infection, or possibility of a missed lesion as well as the risks of anesthesia including but not limited to cardiopulmonary complications. The patient signed informed consent and elected to proceed with Colonoscopy with intervention [i.e. Biopsy, control of bleeding, dilatation, polypectomy, endoscopic mucosal resection, etc.] as indicated.     Colonoscopy procedure: Once an adequate level of sedation was obtained a digital rectal exam was completed revealing normal tone and no masses palpated. Then the lubricated tip of the [id number] diagnostic video adult colonoscope was carefully inserted and advanced with difficulty (see below) to the cecum using the air insufflation technique (only Co2 was used for insufflation). The cecum was identified by localizing the trifold, the appendix and the ileocecal valve. Withdrawal was begun with thorough washing and careful examination of the colonic walls and folds. The ascending colon was viewed twice in the forward view. Photodocumentation was obtained. The bowel prep was adequate. Views of the colon were adequate with washing. Withdrawal time was 23 minutes.     Air was then withdrawn and the endoscope  was removed. The patient tolerated the procedure well. There were no immediate postoperative complications. The patient’s vital signs were monitored throughout the procedure and remained stable.     Estimated blood loss: insignificant     Specimens collected:  Colon polyps     Complications: none      Colonoscopy findings:     The adult colonoscope was advanced with relative ease to the mid/proximal ascending colon but had difficulty with intubation of the cecum due to significant looping and required multiple maneuvers with counter pressure and supine positioning to eventually intubate the cecum which was achieved. There were multiple solid particulate matter/pieces in the cecum. The ileocecal valve was visualized and appeared somewhat atypical which seemed to be due to suctioning that likely occurred during intubation of the cecum. A cold forceps biopsy of this was obtained. There was a 3 mm polyp over the posterior side of the valve which was removed by cold biopsy forceps. In the transverse colon there were two polyps measuring 2-3 mm and 3 mm both removed by cold biopsy forceps. There appeared to be a third benign appearing polyp at the hepatic flexure measuring 2-3 mm but despite multiple maneuvers to rotate into a position it was unable to be achieved which again appeared to be due to looping of the colonoscope. In the transverse colon there were three polyps measuring 5 mm, 7 mm, and 8 mm removed by cold snare polypectomy. The rest of the colon showed normal mucosa and vascular pattern.  In the rectum, there were multiple benign and hyperplastic appearing polyps (noted to be hyperplastic on biopsy previously in 2019). Retroflexed view showed small non-bleeding hemorrhoids. And otherwise, normal mucosa and vascular pattern of the rectum.     Impression:  1. 6 colon polyps removed   2. A diminutive colon polyp which was not removed due inability to position the colonoscope today  3. Multiple benign and  hyperplastic appearing rectal polyps (noted to be hyperplastic on biopsy previously in 2019)  4. Biopsy of the ileocecal valve which appeared perhaps atypical though likely related to inadvertent suctioning of the mucosa during intubation of the cecum  5. Otherwise, unremarkable colonoscopy     Recommend:  1. Await pathology.   2. Repeat colonoscopy interval pending final pathology results. If new signs or symptoms develop, procedure may need to be repeated sooner.   3. Continue your current medications  4. Follow up with your primary care physician on a routine basis     >>>If biopsies were performed and you have not received your pathology results either by phone or letter within 2 weeks, please call our office at [phone].     Jack Son MD  [facility] - Gastroenterology  3/21/2023  Final Diagnosis:      A. Ileocecal valve polyp x1:  Small intestinal mucosa with no significant pathologic change.     B. Cecal polyp x1:  Tubular adenoma.     C. Hepatic flexure polyp x3:  Tubular adenoma x1.  Fragments of colonic mucosa with superficial hyperplastic changes x2.     D. Transverse colon polyp x3:   Tubular adenoma fragments x4.

## 2024-02-13 NOTE — TELEPHONE ENCOUNTER
Dr. Son,    Patient due for 1 year recall colonoscopy. Last done 3/21/23. Patient does take metformin and januvia. Also on iron.    Please advise on orders, thank you.

## 2024-02-14 NOTE — TELEPHONE ENCOUNTER
Ok to schedule colonoscopy with MAC with split golytely for colorectal cancer screening and history of adenomatous colon polyps at [facility] or hospital on a Friday    Hold metformin, glimepiride the day before and day of the procedure   Hold januvia 4 days prior per anesthesia    Thanks    Jack Son MD  [facility] - Gastroenterology

## 2024-02-16 ENCOUNTER — TELEPHONE (OUTPATIENT)
Dept: INTERNAL MEDICINE CLINIC | Facility: CLINIC | Age: 74
End: 2024-02-16

## 2024-02-16 NOTE — TELEPHONE ENCOUNTER
Per letter from [facility] patient started CPAP on 2/15. They are requesting that he is seen between 3/17 and 5/15. So the appt scheduled for 2/19 is too early for evaluation. Please call patient to reschedule with PCP.

## 2024-02-16 NOTE — TELEPHONE ENCOUNTER
We received a form for pt to make follow up appointment for a clinical evaluation showing that pt is compliant with PAP device

## 2024-02-19 NOTE — TELEPHONE ENCOUNTER
Spoke with patient, name/ verified.    Informed pt prep was sent to his pharmacy on file, instructed pt to  prep, also read instructions sent in [employer]    Patient verbalized understanding, no further concerns, issues at this time.

## 2024-02-19 NOTE — TELEPHONE ENCOUNTER
Scheduled for:  Colonoscopy [id number]  Provider Name:  Dr. Son   Date:  5/8/2024  Location:  [facility]  Sedation:  MAC  Time:  9:30am, pt is aware eosc will call with arrival time  Prep:  Golytely  Meds/Allergies Reconciled?:  Physician reviewed     Diagnosis with codes:  colorectal cancer screening Z12.11 and history of adenomatous colon polyps Z86.010  Was patient informed to call insurance with codes (Y/N):  yes, I confirmed MEDICARE insurance with this patient.      Referral sent?:  Referral was sent at the time of electronic surgical scheduling.   EM or [facility] notified?:  I sent an electronic request to Endo Scheduling and received a confirmation today.      Medication Orders:  Hold metformin, glimepiride the day before and day of the procedure   Hold januvia 4 days prior per anesthesia  Misc Orders:  n/a     Further instructions given by staff:   I discussed the prep instructions with the patient which he verbally understood and is aware that I will mail the instructions today.

## 2024-02-22 RX ORDER — DIPHENHYDRAMINE HCL 25 MG
1 TABLET ORAL AS DIRECTED
Qty: 1 KIT | Refills: 3 | Status: SHIPPED | OUTPATIENT
Start: 2024-02-22

## 2024-02-22 NOTE — TELEPHONE ENCOUNTER
Refill passed per [facility] protocol.  Requested Prescriptions   Pending Prescriptions Disp Refills    TRUE METRIX AIR GLUCOSE METER w/Device Does not apply Kit [Pharmacy Med Name: TRUE METRIX AIR BLOOD GLUCOSE METER/BLUETOOTH SMART w/Device  Kit] 1 kit 3     Sig: USE AS DIRECTED       Diabetic Supplies Protocol Passed - 2/21/2024  7:57 AM        Passed - In person appointment or virtual visit in the past 12 mos or appointment in next 3 mos     Recent Outpatient Visits              2 months ago MILENA (obstructive sleep apnea)    [facility] Sun Vega MD    Office Visit    4 months ago Dyslipidemia    [facility] Zoe Black MD    Office Visit    5 months ago Suspected sleep apnea    [facility]    Office Visit    8 months ago Encounter for annual health examination    [facility] Sun Vega MD    Office Visit    11 months ago     [facility] Mervat Cason PT    Office Visit          Future Appointments         Provider Department Appt Notes    In 3 weeks Zoe Black MD [facility] Follow up visit    In 3 weeks Trinh Vega APRN [facility] f/u cpap see comm    In 2 months [facility] @ [facility]    In 3 months [facility], R/S from 12/4. ygs    In 3 months Sun Vega MD [facility] last 6/6/2023                  Recent Outpatient Visits              2 months ago MILENA (obstructive sleep apnea)    [facility] Sun Vega MD    Office Visit    4  months ago Dyslipidemia    [facility] Zoe Black MD    Office Visit    5 months ago Suspected sleep apnea    [facility]    Office Visit    8 months ago Encounter for annual health examination    [facility] Sun Vega MD    Office Visit    11 months ago     [facility] Mervat Cason PT    Office Visit          Future Appointments         Provider Department Appt Notes    In 3 weeks Zoe Black MD [facility] Follow up visit    In 3 weeks Trinh Vega APRN [facility] f/u cpap see comm    In 2 months GIOVANNI NE[place]ON [facility] CLN w/MAC @ [facility]    In 3 months Marck Ascencio MD [facility] yearly diabetic eye exam, R/S from 12/4. ygs    In 3 months Sun Vega MD [facility] last 6/6/2023

## 2024-02-24 DIAGNOSIS — F41.9 ANXIETY: ICD-10-CM

## 2024-02-24 DIAGNOSIS — E11.65 TYPE 2 DIABETES MELLITUS WITH HYPERGLYCEMIA, WITHOUT LONG-TERM CURRENT USE OF INSULIN (HCC): Chronic | ICD-10-CM

## 2024-02-26 RX ORDER — LORAZEPAM 1 MG/1
1 TABLET ORAL NIGHTLY PRN
Qty: 30 TABLET | Refills: 0 | Status: SHIPPED | OUTPATIENT
Start: 2024-03-03

## 2024-02-26 NOTE — TELEPHONE ENCOUNTER
Endo Clinical Staff - please assist.   See requested refills, previous note.     Patient established care with Dr. Black 10/18/23 for Diabetes management

## 2024-02-26 NOTE — TELEPHONE ENCOUNTER
Please review.  Has no protocol.    Recent fills: 12/4, 1/3, 2/2, Due 3/3/24    Requested Prescriptions   Pending Prescriptions Disp Refills    LORAZEPAM 1 MG Oral Tab [Pharmacy Med Name: LORAZEPAM 1 MG Tablet] 30 tablet 0     Sig: TAKE 1 TABLET EVERY NIGHT       Controlled Substance Medication Failed - 2/24/2024  5:02 PM        Failed - This medication is a controlled substance - forward to provider to refill           Future Appointments         Provider Department Appt Notes    In 3 weeks Zoe Black MD [facility] Follow up visit    In 3 weeks Trinh Vega APRN [facility] f/u cpap see comm    In 2 months GIOVANNI [facility] CLN w/MAC @ [facility]    In 3 months Marck Ascencio MD [facility] yearly diabetic eye exam, R/S from 12/4. ygs    In 3 months Sun Vega MD [facility] last 6/6/2023          Recent Outpatient Visits              2 months ago MILENA (obstructive sleep apnea)    [facility] Sun Vega MD    Office Visit    4 months ago Dyslipidemia    [facility] Zoe Black MD    Office Visit    5 months ago Suspected sleep apnea    [facility]    Office Visit    8 months ago Encounter for annual health examination    [facility] Sun Vega MD    Office Visit    12 months ago     [facility] Mervat Cason, TELLO    Office Visit

## 2024-02-26 NOTE — TELEPHONE ENCOUNTER
Fax from pharmacy requesting RX's    TRUE METRIX AIR GLUCOSE METER    HUMANA TRUE METRIX TEST STRIP    TRUEPLUS 22G LANCETS

## 2024-02-27 ENCOUNTER — TELEPHONE (OUTPATIENT)
Dept: FAMILY MEDICINE CLINIC | Facility: CLINIC | Age: 74
End: 2024-02-27

## 2024-02-27 NOTE — TELEPHONE ENCOUNTER
Trueplus 33G Lancets    Message: RX for true plus 33g lancets, directions, quantity, and refills are missing. Please provide directions and refills below. We will fill with a 90 day supply unless's otherwise specified.

## 2024-02-27 NOTE — TELEPHONE ENCOUNTER
Blood Glucose Monitoring Suppl (TRUE METRIX AIR GLUCOSE METER) w/Device Does not apply Kit, 1 kit As Directed., Disp: 1 kit, Rfl: 3    Message: RX for [facility] true metrix test strip 50 quantity and refills are missing. Please provide directions and refills below. We will fill with a 90 day supply unless otherwise specified

## 2024-02-28 RX ORDER — CALCIUM CITRATE/VITAMIN D3 200MG-6.25
TABLET ORAL
Qty: 200 STRIP | Refills: 1 | Status: SHIPPED | OUTPATIENT
Start: 2024-02-28

## 2024-02-28 RX ORDER — DIPHENHYDRAMINE HCL 25 MG
1 TABLET ORAL AS DIRECTED
Qty: 1 KIT | Refills: 0 | Status: SHIPPED | OUTPATIENT
Start: 2024-02-28

## 2024-03-06 RX ORDER — DIPHENHYDRAMINE HCL 25 MG
1 TABLET ORAL AS DIRECTED
Qty: 1 KIT | Refills: 3 | OUTPATIENT
Start: 2024-03-06

## 2024-03-18 ENCOUNTER — OFFICE VISIT (OUTPATIENT)
Dept: ENDOCRINOLOGY CLINIC | Facility: CLINIC | Age: 74
End: 2024-03-18
Payer: MEDICARE

## 2024-03-18 VITALS
HEIGHT: 67.99 IN | DIASTOLIC BLOOD PRESSURE: 66 MMHG | BODY MASS INDEX: 27 KG/M2 | SYSTOLIC BLOOD PRESSURE: 126 MMHG | HEART RATE: 56 BPM

## 2024-03-18 DIAGNOSIS — E78.5 DYSLIPIDEMIA: ICD-10-CM

## 2024-03-18 DIAGNOSIS — E11.65 TYPE 2 DIABETES MELLITUS WITH HYPERGLYCEMIA, WITHOUT LONG-TERM CURRENT USE OF INSULIN (HCC): Primary | ICD-10-CM

## 2024-03-18 LAB
CARTRIDGE LOT#: ABNORMAL NUMERIC
GLUCOSE BLOOD: 139
HEMOGLOBIN A1C: 7.9 % (ref 4.3–5.6)
TEST STRIP LOT #: NORMAL NUMERIC

## 2024-03-18 PROCEDURE — 3074F SYST BP LT 130 MM HG: CPT | Performed by: INTERNAL MEDICINE

## 2024-03-18 PROCEDURE — 1159F MED LIST DOCD IN RCRD: CPT | Performed by: INTERNAL MEDICINE

## 2024-03-18 PROCEDURE — 83036 HEMOGLOBIN GLYCOSYLATED A1C: CPT | Performed by: INTERNAL MEDICINE

## 2024-03-18 PROCEDURE — 3078F DIAST BP <80 MM HG: CPT | Performed by: INTERNAL MEDICINE

## 2024-03-18 PROCEDURE — 82947 ASSAY GLUCOSE BLOOD QUANT: CPT | Performed by: INTERNAL MEDICINE

## 2024-03-18 PROCEDURE — 1160F RVW MEDS BY RX/DR IN RCRD: CPT | Performed by: INTERNAL MEDICINE

## 2024-03-18 PROCEDURE — 99213 OFFICE O/P EST LOW 20 MIN: CPT | Performed by: INTERNAL MEDICINE

## 2024-03-18 PROCEDURE — 3051F HG A1C>EQUAL 7.0%<8.0%: CPT | Performed by: INTERNAL MEDICINE

## 2024-03-18 NOTE — PROGRESS NOTES
FU VISIT:     CHIEF COMPLAINT:    Chief Complaint   Patient presents with    Diabetes     Pt is in for a Diabetes follow up. A1c check         HISTORY OF PRESENT ILLNESS:   Avinash Dumont is a 74 year old male who is here to FU  for DM.     DM HISTORY  Diagnosed:  Around age 61      HISTORY OF DIABETES COMPLICATIONS: :  History of Retinopathy: denies - last eye exam : last eye exam  History of Neuropathy: denies  History of Nephropathy: denies    ASSOCIATED COMPLICATIONS:   HTN: yes  Hyperlipidemia: yes  Coronary Artery Disease:  Yes  Cerebrovascular Disease: denies      HOME GLUCOSE READINGS:   Fastin-140  Pre dinner: 120-150    CURRENT DIABETIC MEDICATIONS INCLUDE:  MTF 1000 mg BID  Januvia 100 mg daily   Amaryl 2 mg BF and 2 mg dinner    T/f jardiance due to  SE    MEALS:  Three meals a day   Mostly eats at home   Moderate compliance, not as good as before    EXERCISE:  Daily , goes to the gym       CURRENT MEDICATIONS:    Current Outpatient Medications   Medication Sig Dispense Refill    SITagliptin Phosphate (JANUVIA) 100 MG Oral Tab Take 1 tablet (100 mg total) by mouth daily. 90 tablet 3    metFORMIN HCl 1000 MG Oral Tab TAKE 1 TABLET TWICE DAILY WITH MEALS 180 tablet 3    Blood Glucose Monitoring Suppl (TRUE METRIX AIR GLUCOSE METER) w/Device Does not apply Kit 1 kit As Directed. 1 kit 0    Glucose Blood (TRUE METRIX BLOOD GLUCOSE TEST) In Vitro Strip Check sugars twice a day 200 strip 1    LORazepam 1 MG Oral Tab Take 1 tablet (1 mg total) by mouth nightly as needed. 30 tablet 0    omega-3-acid ethyl esters 1 g Oral Cap Take 1 capsule (1 g total) by mouth 3 (three) times daily. 270 capsule 3    Spacer/Aero-Holding Chambers (BREATHERITE ELLE SPACER ADULT) Does not apply Misc Use with albuterol 1 each 0    benzonatate 100 MG Oral Cap Take 1 capsule (100 mg total) by mouth 3 (three) times daily as needed. 10 capsule 0    traMADol 50 MG Oral Tab Take 1-2 tablets ( mg total) by mouth daily.       Meloxicam 15 MG Oral Tab Take 1 tablet (15 mg total) by mouth daily.      atorvastatin 40 MG Oral Tab Take 1 tablet (40 mg total) by mouth nightly. 90 tablet 1    Valsartan-hydroCHLOROthiazide 320-25 MG Oral Tab Take 1 tablet by mouth daily. 90 tablet 3    metoprolol succinate ER 25 MG Oral Tablet 24 Hr Take 1 tablet (25 mg total) by mouth daily. 90 tablet 3    AMLODIPINE 10 MG Oral Tab TAKE 1 TABLET EVERY DAY 90 tablet 3    triamcinolone 0.1 % External Cream Apply 1 Application. topically 2 (two) times daily. APPLY TO AFFECTED AREA      Blood Glucose Monitoring Suppl (TRUE METRIX METER) w/Device Does not apply Kit 1 Device 2 (two) times daily as needed. 1 kit 0    Blood Glucose Calibration (TRUE METRIX LEVEL 1) Low In Vitro Solution 1 each by In Vitro route as needed. 1 each 0    Glucose Blood In Vitro Strip 2 each by Other route 2 (two) times a day. 200 strip 3    Lancets 33G Does not apply Misc 1 each 2 (two) times a day. 200 each 3    ferrous sulfate 325 (65 FE) MG Oral Tab EC Take 1 tablet (325 mg total) by mouth daily with breakfast.      aspirin 81 MG Oral Tab Take 1 tablet (81 mg total) by mouth daily.      Multiple Vitamins-Minerals (MULTIVITAMIN ADULT OR) Take by mouth.      COENZYME Q-10 OR Take by mouth daily.         PAST MEDICAL HISTORY:   Past Medical History:   Diagnosis Date    Anxiety     Arthritis     Atherosclerosis of coronary artery     Colon adenomas 10/28/2019    Colon adenomas 03/23/2023    x5    Coronary atherosclerosis     Depression     Diabetes (HCC)     Esophageal reflux     Essential hypertension     High blood pressure     High cholesterol     History of blood transfusion     12 YRS AGO, NO REACTIONS    Hyperlipidemia     Sleep apnea     Visual impairment        PAST SURGICAL HISTORY:   Past Surgical History:   Procedure Laterality Date    BYPASS SURGERY  05/2009    CABG      CABG x 5    COLONOSCOPY      COLONOSCOPY N/A 10/28/2019    Procedure: COLONOSCOPY;  Surgeon: Adelaida  MD Jack;  Location: [facility] ENDOSCOPY    COLONOSCOPY      COLONOSCOPY N/A 3/21/2023    Procedure: COLONOSCOPY;  Surgeon: Jack Son MD;  Location: [facility]    OTHER SURGICAL HISTORY      deviated septum repair    OTHER SURGICAL HISTORY Left     SINUS SURGERY        UPPER GI ENDOSCOPY,EXAM         ALLERGIES:  No Known Allergies    SOCIAL HISTORY:    Social History     Socioeconomic History    Marital status:    Tobacco Use    Smoking status: Former     Packs/day: 0.50     Years: 25.00     Additional pack years: 0.00     Total pack years: 12.50     Types: Cigarettes     Quit date: 2008     Years since quittin.2    Smokeless tobacco: Never   Vaping Use    Vaping Use: Never used   Substance and Sexual Activity    Alcohol use: Yes     Alcohol/week: 3.0 standard drinks of alcohol     Types: 3 Standard drinks or equivalent per week     Comment: 2-3 drinks/week    Drug use: No       FAMILY HISTORY:   Family History   Problem Relation Age of Onset    Cancer Father 64        Lymph node CA    Heart Disorder Mother         MI    Hypertension Mother     Diabetes Neg     Glaucoma Neg     Macular degeneration Neg        ASSESSMENTS:        REVIEW OF SYSTEMS:  Constitutional: Negative for: weight change, fever, fatigue, cold/heat intolerance  Eyes: Negative for:  Visual changes, proptosis, blurring, floaters, poor night vision, impaired color vision  ENT: Negative for:  dysphagia, neck swelling, dysphonia  Respiratory: Negative for:  dyspnea, cough  Cardiovascular: Negative for:  chest pain, palpitations, orthopnea  GI: Negative for:  abdominal pain, nausea, vomiting, diarrhea, constipation, bleeding  Neurology: Negative for: headache, numbness, weakness,   Genito-Urinary: Negative for: dysuria, frequency  Psychiatric: Negative for:  depression, anxiety  Hematology/Lymphatics: Negative for: bruising, lower extremity edema  Endocrine: Negative for: polyuria, polydypsia  Skin: Negative for: rash,  blister,      PHYSICAL EXAM:   Vitals:    03/18/24 1130   BP: 144/70   Pulse: 59   Height: 5' 7.99\" (1.727 m)     BMI: Body mass index is 27.19 kg/m².         General Appearance:  alert, well developed, in no acute distress  Head: Atraumatic  Eyes:  normal conjunctivae, sclera., normal sclera and normal pupils  Throat/Neck: normal sound to voice. Normal hearing, normal speech  Respiratory:  Speaking in full sentences, non-labored. no increased work of breathing, no audible wheezing    Neuro: motor grossly intact, moving all extremities without difficulty  Psychiatric:  oriented to time, self, and place  Extremities: 10/2023  Bilateral barefoot skin diabetic exam is normal, visualized feet and the appearance is normal.  Bilateral monofilament/sensation of both feet is normal.  Pulsation pedal pulse exam of both lower legs/feet is normal as well.            DATA:     Pertinent data reviewed  A1c is 7.9 % ( 3/2024)     ASSESSMENT AND PLAN:    1. Type 2 DM: with hyperglycemia     Plan:  Discussed the pathogenesis, natural course of diabetes. Patient understands the importance of glycemic control and the implications of uncontrolled diabetes including Diabetic ketoacidosis and various micro vascular and macrovascular complications.        a). Medications:    A1c is elevated and above age-appropriate goal.  I discussed switching from Januvia to Trulicity.  Reviewed medication in detail.  Reading information provided.  Pen showed.  Patient would like to work on dietary changes at this time.  He is agreeable to trying Trulicity if A1c does not go down in the next 3 to 4 months.    Metformin 1000 mg BF and dinner  Take with food  GI SE reviewed    Amaryl  2 mg BF and dinner  Counselled regarding risk of hypoglycemia associated with use.     Januvia 100 mg daily     Check BG as discussed  Alternate timings  Before BF/ before dinner    Call with sugars as discussed    b). No Nephropathy  c). Discussed importance of annual eye  exams  d). Foot exam: Daily feet exam explained  e). BG log maintainence explained in great detail, to get log and glucometer on next visit.  f). Age appropriate Life style changes reviewed  g). Hypoglycemia recognition and management discussed    2. Patient’s BP is okay today  3. Dyslipidemia  A) Discussed lifestyle modifications including reductions in dietary total and saturated fat, weight loss, aerobic exercise, and eating a diet rich in fruits and vegetables.  B) Statin therapy  Discussed the potential side effects of statins including muscle and liver injury.  C) Fasting lipid panel     Patient verbalized a complete  understanding of all of the above and did not have any further questions.   RTC in 3-4 months  Check and call with sugars as discussed    Orders Placed This Encounter   Procedures    POC HemoCue Glucose 201 (Finger stick glucose)    POC Glycohemoglobin [99393]         Zoe Black MD

## 2024-03-19 ENCOUNTER — OFFICE VISIT (OUTPATIENT)
Dept: INTERNAL MEDICINE CLINIC | Facility: CLINIC | Age: 74
End: 2024-03-19
Payer: MEDICARE

## 2024-03-19 VITALS
HEART RATE: 58 BPM | WEIGHT: 177 LBS | HEIGHT: 67.99 IN | BODY MASS INDEX: 26.83 KG/M2 | TEMPERATURE: 98 F | SYSTOLIC BLOOD PRESSURE: 161 MMHG | DIASTOLIC BLOOD PRESSURE: 75 MMHG

## 2024-03-19 DIAGNOSIS — R05.1 ACUTE COUGH: Primary | ICD-10-CM

## 2024-03-19 DIAGNOSIS — R09.82 PND (POST-NASAL DRIP): ICD-10-CM

## 2024-03-19 DIAGNOSIS — E11.65 TYPE 2 DIABETES MELLITUS WITH HYPERGLYCEMIA, WITHOUT LONG-TERM CURRENT USE OF INSULIN (HCC): ICD-10-CM

## 2024-03-19 DIAGNOSIS — I10 ESSENTIAL HYPERTENSION: ICD-10-CM

## 2024-03-19 DIAGNOSIS — E11.65 TYPE 2 DIABETES MELLITUS WITH HYPERGLYCEMIA, WITHOUT LONG-TERM CURRENT USE OF INSULIN (HCC): Chronic | ICD-10-CM

## 2024-03-19 DIAGNOSIS — J06.9 VIRAL UPPER RESPIRATORY TRACT INFECTION: ICD-10-CM

## 2024-03-19 DIAGNOSIS — F41.9 ANXIETY: ICD-10-CM

## 2024-03-19 DIAGNOSIS — R05.1 ACUTE COUGH: ICD-10-CM

## 2024-03-19 PROCEDURE — 3078F DIAST BP <80 MM HG: CPT | Performed by: INTERNAL MEDICINE

## 2024-03-19 PROCEDURE — 1160F RVW MEDS BY RX/DR IN RCRD: CPT | Performed by: INTERNAL MEDICINE

## 2024-03-19 PROCEDURE — 3077F SYST BP >= 140 MM HG: CPT | Performed by: INTERNAL MEDICINE

## 2024-03-19 PROCEDURE — 1159F MED LIST DOCD IN RCRD: CPT | Performed by: INTERNAL MEDICINE

## 2024-03-19 PROCEDURE — 99214 OFFICE O/P EST MOD 30 MIN: CPT | Performed by: INTERNAL MEDICINE

## 2024-03-19 PROCEDURE — 3008F BODY MASS INDEX DOCD: CPT | Performed by: INTERNAL MEDICINE

## 2024-03-19 RX ORDER — FLUTICASONE PROPIONATE 50 MCG
2 SPRAY, SUSPENSION (ML) NASAL DAILY
Qty: 1 EACH | Refills: 0 | Status: SHIPPED | OUTPATIENT
Start: 2024-03-19 | End: 2025-03-14

## 2024-03-19 RX ORDER — ALBUTEROL SULFATE 90 UG/1
2 AEROSOL, METERED RESPIRATORY (INHALATION) EVERY 4 HOURS PRN
Qty: 1 EACH | Refills: 0 | Status: SHIPPED | OUTPATIENT
Start: 2024-03-19

## 2024-03-19 RX ORDER — CETIRIZINE HYDROCHLORIDE 10 MG/1
10 TABLET, CHEWABLE ORAL DAILY
Qty: 90 TABLET | Refills: 0 | Status: SHIPPED | OUTPATIENT
Start: 2024-03-19

## 2024-03-19 RX ORDER — FLUTICASONE FUROATE AND VILANTEROL 100; 25 UG/1; UG/1
1 POWDER RESPIRATORY (INHALATION) DAILY
Qty: 1 EACH | Refills: 0 | Status: SHIPPED | OUTPATIENT
Start: 2024-03-19

## 2024-03-19 RX ORDER — BENZONATATE 100 MG/1
100 CAPSULE ORAL 2 TIMES DAILY PRN
Qty: 20 CAPSULE | Refills: 0 | Status: SHIPPED | OUTPATIENT
Start: 2024-03-19

## 2024-03-19 NOTE — PROGRESS NOTES
Avinash Dumont is a 74 year old male.  Chief Complaint   Patient presents with    Follow - Up     Pt c/o follow up on cpap and pt stts he has a cough and wheezing       HPI:   She comes for follow-up  C/C follow-up and started coughing for the past 3 to 4 days  C/o took Robitussin over-the-counter for his cough but no help   Similar to sympt last jan --went to IC and was given breathing treatment     Got His CPAP machine--using and tolerating the machine well        HISTORY    saw ortho -dr potts - was given tramadol and meloxicam   Still has to get the CPAP machine  Since last visit he did see the  endocrinologist           HISTORY  + snores as per wife , mostly sleeps on his side but he can lie flat on his back and when he does he finds himself snoring , he does not wake up short of breath from his sleep, no choking in his sleep, +EDS      HISTORY  3/2023 VISIT   knee pain is better with the physical therapy  Would like to review labs   Needs refills on the lorazepam  Thinks the A1c has increased because he had increased his hypoalert carbohydrates but he will go back to his regular diet now     HISTORY  since her last visit she has seen the eye doctor and also the Ortho doctor and he drained some fluid and injected him with Durolane and is a little bit better  Noted he has anemia and had seen hemeatologist in the past and was asked to stop the lexapro so he has for one mn and he feels ok         HISTORY  New patient- used to see dr trujillo   C/C establish care-referred by his insurance  C/o needs refills         PMH  DM2   HTN  HL  B/l OA knee -  CAD S/P CABG -  at [facility] avinash harden   Anxiety and depression was on lexapro -takes lorazepam   H/o tob dep and lung nodules    MILENA 9/2023      Galen jain      Lives with family -wife and 2 kids  Retired used to work in electronics   ----------------------------------------------------------------------------------------       Current Outpatient  Medications   Medication Sig Dispense Refill    fluticasone propionate 50 MCG/ACT Nasal Suspension 2 sprays by Each Nare route daily. 1 each 0    Cetirizine HCl (ZYRTEC) 10 MG Oral Chew Tab Chew 1 tablet (10 mg total) by mouth daily. 90 tablet 0    benzonatate 100 MG Oral Cap Take 1 capsule (100 mg total) by mouth 2 (two) times daily as needed for cough. 20 capsule 0    fluticasone furoate-vilanterol (BREO ELLIPTA) 100-25 MCG/ACT Inhalation Aerosol Powder, Breath Activated Inhale 1 puff into the lungs daily. 1 each 0    albuterol (PROAIR HFA) 108 (90 Base) MCG/ACT Inhalation Aero Soln Inhale 2 puffs into the lungs every 4 (four) hours as needed for Wheezing. 1 each 0    Blood Glucose Monitoring Suppl (TRUE METRIX AIR GLUCOSE METER) w/Device Does not apply Kit 1 kit As Directed. 1 kit 0    LORazepam 1 MG Oral Tab Take 1 tablet (1 mg total) by mouth nightly as needed. 30 tablet 0    omega-3-acid ethyl esters 1 g Oral Cap Take 1 capsule (1 g total) by mouth 3 (three) times daily. 270 capsule 3    traMADol 50 MG Oral Tab Take 1-2 tablets ( mg total) by mouth daily.      atorvastatin 40 MG Oral Tab Take 1 tablet (40 mg total) by mouth nightly. 90 tablet 1    Valsartan-hydroCHLOROthiazide 320-25 MG Oral Tab Take 1 tablet by mouth daily. 90 tablet 3    SITagliptin Phosphate (JANUVIA) 100 MG Oral Tab Take 1 tablet (100 mg total) by mouth daily. 90 tablet 3    metoprolol succinate ER 25 MG Oral Tablet 24 Hr Take 1 tablet (25 mg total) by mouth daily. 90 tablet 3    AMLODIPINE 10 MG Oral Tab TAKE 1 TABLET EVERY DAY 90 tablet 3    metFORMIN HCl 1000 MG Oral Tab TAKE 1 TABLET TWICE DAILY WITH MEALS 180 tablet 3    triamcinolone 0.1 % External Cream Apply 1 Application. topically 2 (two) times daily. APPLY TO AFFECTED AREA      Blood Glucose Calibration (TRUE METRIX LEVEL 1) Low In Vitro Solution 1 each by In Vitro route as needed. 1 each 0    Glucose Blood In Vitro Strip 2 each by Other route 2 (two) times a day. 200  strip 3    Lancets 33G Does not apply Misc 1 each 2 (two) times a day. 200 each 3    ferrous sulfate 325 (65 FE) MG Oral Tab EC Take 1 tablet (325 mg total) by mouth daily with breakfast.      aspirin 81 MG Oral Tab Take 1 tablet (81 mg total) by mouth daily.      Multiple Vitamins-Minerals (MULTIVITAMIN ADULT OR) Take by mouth.      COENZYME Q-10 OR Take by mouth daily.        Past Medical History:   Diagnosis Date    Anxiety     Arthritis     Atherosclerosis of coronary artery     Colon adenomas 10/28/2019    Colon adenomas 03/23/2023    x5    Coronary atherosclerosis     Depression     Diabetes (HCC)     Esophageal reflux     Essential hypertension     High blood pressure     High cholesterol     History of blood transfusion     12 YRS AGO, NO REACTIONS    Hyperlipidemia     Sleep apnea     Visual impairment       Past Surgical History:   Procedure Laterality Date    Bypass surgery  2009    Cabg      CABG x 5    Colonoscopy      Colonoscopy N/A 10/28/2019    Procedure: COLONOSCOPY;  Surgeon: Jack Son MD;  Location: [facility] ENDOSCOPY    Colonoscopy      Colonoscopy N/A 3/21/2023    Procedure: COLONOSCOPY;  Surgeon: Jack Son MD;  Location: [facility]    Other surgical history      deviated septum repair    Other surgical history Left     Sinus surgery        Upper gi endoscopy,exam        Social History:  Social History     Socioeconomic History    Marital status:    Tobacco Use    Smoking status: Former     Packs/day: 0.50     Years: 25.00     Additional pack years: 0.00     Total pack years: 12.50     Types: Cigarettes     Quit date: 2008     Years since quittin.2    Smokeless tobacco: Never   Vaping Use    Vaping Use: Never used   Substance and Sexual Activity    Alcohol use: Yes     Alcohol/week: 3.0 standard drinks of alcohol     Types: 3 Standard drinks or equivalent per week     Comment: 2-3 drinks/week    Drug use: No        REVIEW OF SYSTEMS:   GENERAL HEALTH: No fevers,  chills, sweats, + fatigue  RESPIRATORY: denies shortness of breath, + cough- clear , + wheezing  CARDIOVASCULAR: denies chest pain on exertion, palpitations, swelling in feet  NEURO: denies headaches , anxiety, depression    EXAM:   BP (!) 161/75 (BP Location: Left arm, Patient Position: Sitting, Cuff Size: adult)   Pulse 58   Temp 98 °F (36.7 °C) (Oral)   Ht 5' 7.99\" (1.727 m)   Wt 177 lb (80.3 kg)   BMI 26.92 kg/m²   GENERAL: well developed, well nourished,in no apparent distress  SKIN: no rashes,no suspicious lesions  HEENT: atraumatic, normocephalic, throat -mild glistening the back of the throat  NECK: supple  LUNGS: clear to auscultation, no wheeze  CARDIO:joselito , + murmur   EXTREMITIES: no cyanosis, or edema    ASSESSMENT AND PLAN:   Diagnoses and all orders for this visit:    Acute cough  -     fluticasone propionate 50 MCG/ACT Nasal Suspension; 2 sprays by Each Nare route daily.  -     Cetirizine HCl (ZYRTEC) 10 MG Oral Chew Tab; Chew 1 tablet (10 mg total) by mouth daily.  -     benzonatate 100 MG Oral Cap; Take 1 capsule (100 mg total) by mouth 2 (two) times daily as needed for cough.  -     fluticasone furoate-vilanterol (BREO ELLIPTA) 100-25 MCG/ACT Inhalation Aerosol Powder, Breath Activated; Inhale 1 puff into the lungs daily.  -     albuterol (PROAIR HFA) 108 (90 Base) MCG/ACT Inhalation Aero Soln; Inhale 2 puffs into the lungs every 4 (four) hours as needed for Wheezing.  And   Viral upper respiratory tract infection  -     fluticasone propionate 50 MCG/ACT Nasal Suspension; 2 sprays by Each Nare route daily.  -     Cetirizine HCl (ZYRTEC) 10 MG Oral Chew Tab; Chew 1 tablet (10 mg total) by mouth daily.  -     benzonatate 100 MG Oral Cap; Take 1 capsule (100 mg total) by mouth 2 (two) times daily as needed for cough.  -     fluticasone furoate-vilanterol (BREO ELLIPTA) 100-25 MCG/ACT Inhalation Aerosol Powder, Breath Activated; Inhale 1 puff into the lungs daily.  -     albuterol (PROAIR HFA)  108 (90 Base) MCG/ACT Inhalation Aero Soln; Inhale 2 puffs into the lungs every 4 (four) hours as needed for Wheezing.  -     XR CHEST PA + LAT CHEST (CPT=71046); Future  And   PND (post-nasal drip)  -     fluticasone propionate 50 MCG/ACT Nasal Suspension; 2 sprays by Each Nare route daily.  -     Cetirizine HCl (ZYRTEC) 10 MG Oral Chew Tab; Chew 1 tablet (10 mg total) by mouth daily.  -     benzonatate 100 MG Oral Cap; Take 1 capsule (100 mg total) by mouth 2 (two) times daily as needed for cough.  -     fluticasone furoate-vilanterol (BREO ELLIPTA) 100-25 MCG/ACT Inhalation Aerosol Powder, Breath Activated; Inhale 1 puff into the lungs daily.  -     albuterol (PROAIR HFA) 108 (90 Base) MCG/ACT Inhalation Aero Soln; Inhale 2 puffs into the lungs every 4 (four) hours as needed for Wheezing.    Plan  Will try giving the Zyrtec and Flonase and Tessalon Perles and if not better then can get his chest x-ray done, will also give long-acting inhaler and a rescue inhaler but if expensive he is aware he can ask the pharmacist which 1 will be covered    Type 2 diabetes mellitus with hyperglycemia, without long-term current use of insulin ([facility])    bl sugars   Hba1c 7.5 on 6/2022 and 6.3 on 11/2022 and 7.2 on 2/2023 on 10/2023   U. Micro 1/2022 ,recheck   Eye exam -dr francisco but will see dr deshpande next mn   On Asa , on arb and  statin   Foot 3/2023   Diet -- advised to follow a low carb, low sugar diet , try to be consistent                Exercise 30 min a day      Essential hypertension  Patient states that his blood pressure is elevated today because he is not feeling well and will come back for blood pressure check     Preventive medicine  Colonoscopy-Dr. Son 2023 and rpt in one yr   Labs 5/2023 , 5/2023 and 10/2023 reviewed   Ct chest 7/2022 for lung nodule - rpt in 7/2024   Echo 7/2022 -aortic regurg moderate - mild stenosis - sees dr jain       The patient indicates understanding of these issues and agrees  to the plan.  No follow-ups on file.

## 2024-03-20 RX ORDER — FLUTICASONE PROPIONATE 50 MCG
2 SPRAY, SUSPENSION (ML) NASAL DAILY
Qty: 48 G | Refills: 0 | OUTPATIENT
Start: 2024-03-20

## 2024-03-20 NOTE — H&P
The above referenced H&P was reviewed by Pito Mccarty MD on 4/18/2024, the patient was examined and no significant changes have occurred in the patient's condition since the H&P was performed.  Risks and benefits were discussed, proceed with procedure as planned.

## 2024-03-21 ENCOUNTER — LAB ENCOUNTER (OUTPATIENT)
Dept: LAB | Age: 74
End: 2024-03-21
Attending: INTERNAL MEDICINE
Payer: MEDICARE

## 2024-03-21 ENCOUNTER — HOSPITAL ENCOUNTER (OUTPATIENT)
Dept: GENERAL RADIOLOGY | Age: 74
Discharge: HOME | End: 2024-03-21
Attending: INTERNAL MEDICINE
Payer: MEDICARE

## 2024-03-21 DIAGNOSIS — R94.39 ABNORMAL STRESS TEST: Primary | ICD-10-CM

## 2024-03-21 DIAGNOSIS — R94.39 ABNORMAL NUCLEAR STRESS TEST: ICD-10-CM

## 2024-03-21 LAB
ANION GAP SERPL CALC-SCNC: 8 MMOL/L (ref 0–18)
BASOPHILS # BLD AUTO: 0.04 X10(3) UL (ref 0–0.2)
BASOPHILS NFR BLD AUTO: 1.1 %
BUN BLD-MCNC: 11 MG/DL (ref 9–23)
BUN/CREAT SERPL: 13.9 (ref 10–20)
CALCIUM BLD-MCNC: 9.6 MG/DL (ref 8.7–10.4)
CHLORIDE SERPL-SCNC: 100 MMOL/L (ref 98–112)
CO2 SERPL-SCNC: 29 MMOL/L (ref 21–32)
CREAT BLD-MCNC: 0.79 MG/DL
DEPRECATED RDW RBC AUTO: 49.9 FL (ref 35.1–46.3)
EGFRCR SERPLBLD CKD-EPI 2021: 94 ML/MIN/1.73M2 (ref 60–?)
EOSINOPHIL # BLD AUTO: 0.26 X10(3) UL (ref 0–0.7)
EOSINOPHIL NFR BLD AUTO: 7.1 %
ERYTHROCYTE [DISTWIDTH] IN BLOOD BY AUTOMATED COUNT: 15.5 % (ref 11–15)
FASTING STATUS PATIENT QL REPORTED: YES
GLUCOSE BLD-MCNC: 103 MG/DL (ref 70–99)
HCT VFR BLD AUTO: 36.3 %
HGB BLD-MCNC: 11.7 G/DL
IMM GRANULOCYTES # BLD AUTO: 0.01 X10(3) UL (ref 0–1)
IMM GRANULOCYTES NFR BLD: 0.3 %
LYMPHOCYTES # BLD AUTO: 1.44 X10(3) UL (ref 1–4)
LYMPHOCYTES NFR BLD AUTO: 39.3 %
MCH RBC QN AUTO: 28.3 PG (ref 26–34)
MCHC RBC AUTO-ENTMCNC: 32.2 G/DL (ref 31–37)
MCV RBC AUTO: 87.9 FL
MONOCYTES # BLD AUTO: 0.48 X10(3) UL (ref 0.1–1)
MONOCYTES NFR BLD AUTO: 13.1 %
NEUTROPHILS # BLD AUTO: 1.43 X10 (3) UL (ref 1.5–7.7)
NEUTROPHILS # BLD AUTO: 1.43 X10(3) UL (ref 1.5–7.7)
NEUTROPHILS NFR BLD AUTO: 39.1 %
OSMOLALITY SERPL CALC.SUM OF ELEC: 284 MOSM/KG (ref 275–295)
PLATELET # BLD AUTO: 152 10(3)UL (ref 150–450)
POTASSIUM SERPL-SCNC: 3.6 MMOL/L (ref 3.5–5.1)
RBC # BLD AUTO: 4.13 X10(6)UL
SODIUM SERPL-SCNC: 137 MMOL/L (ref 136–145)
WBC # BLD AUTO: 3.7 X10(3) UL (ref 4–11)

## 2024-03-21 PROCEDURE — 36415 COLL VENOUS BLD VENIPUNCTURE: CPT

## 2024-03-21 PROCEDURE — 85025 COMPLETE CBC W/AUTO DIFF WBC: CPT

## 2024-03-21 PROCEDURE — 71046 X-RAY EXAM CHEST 2 VIEWS: CPT | Performed by: INTERNAL MEDICINE

## 2024-03-21 PROCEDURE — 80048 BASIC METABOLIC PNL TOTAL CA: CPT

## 2024-03-21 RX ORDER — LORAZEPAM 1 MG/1
1 TABLET ORAL NIGHTLY PRN
Qty: 30 TABLET | Refills: 0 | Status: SHIPPED | OUTPATIENT
Start: 2024-04-02

## 2024-03-21 NOTE — TELEPHONE ENCOUNTER
Please review; protocol failed/No Protocol    LOV: 03/19/2024    Fill dates: 01/03/2024, 02/02/2024, 03/04/2024-pended for start date of 04/02/2024 based on 30 day supply and  dates.-Please review if appropriate?    Requested Prescriptions   Pending Prescriptions Disp Refills    LORAZEPAM 1 MG Oral Tab [Pharmacy Med Name: LORAZEPAM 1 MG Tablet] 30 tablet 0     Sig: TAKE 1 TABLET EVERY NIGHT AS NEEDED       Controlled Substance Medication Failed - 3/19/2024  6:33 AM        Failed - This medication is a controlled substance - forward to provider to refill           Future Appointments         Provider Department Appt Notes    In 4 weeks Pito Mccarty MD; [facility] IVS RM2 CATH LAB [facility]     In 1 month GIOVANNI NEWTON [facility] CLN w/MAC @ [facility]    In 2 months Sun Vega MD [facility] last 6/6/2023    In 2 months Jose Walsh PA-C [facility] sleep apnea/cough    In 4 months Zoe Black MD [facility] Follow up visit          Recent Outpatient Visits              2 days ago Acute cough    [facility] Sun Vega MD    Office Visit    3 days ago Type 2 diabetes mellitus with hyperglycemia, without long-term current use of insulin (HCC)    [facility] Zoe Black MD    Office Visit    3 months ago MILENA (obstructive sleep apnea)    [facility] Sun Vega MD    Office Visit    5 months ago Dyslipidemia    [facility] Zoe Black MD    Office Visit    6 months ago Suspected sleep apnea    [facility]    Office Visit

## 2024-04-15 ENCOUNTER — MED REC SCAN ONLY (OUTPATIENT)
Dept: INTERNAL MEDICINE CLINIC | Facility: CLINIC | Age: 74
End: 2024-04-15

## 2024-04-18 ENCOUNTER — HOSPITAL ENCOUNTER (OUTPATIENT)
Dept: INTERVENTIONAL RADIOLOGY/VASCULAR | Facility: HOSPITAL | Age: 74
Discharge: HOME | End: 2024-04-18
Attending: INTERNAL MEDICINE | Admitting: INTERNAL MEDICINE
Payer: MEDICARE

## 2024-04-18 VITALS
RESPIRATION RATE: 11 BRPM | WEIGHT: 172 LBS | DIASTOLIC BLOOD PRESSURE: 71 MMHG | HEART RATE: 51 BPM | TEMPERATURE: 97 F | OXYGEN SATURATION: 98 % | SYSTOLIC BLOOD PRESSURE: 141 MMHG | BODY MASS INDEX: 26 KG/M2

## 2024-04-18 DIAGNOSIS — R94.39 ABNORMAL NUCLEAR STRESS TEST: ICD-10-CM

## 2024-04-18 DIAGNOSIS — I25.10 CAD (CORONARY ARTERY DISEASE): ICD-10-CM

## 2024-04-18 LAB — GLUCOSE BLDC GLUCOMTR-MCNC: 176 MG/DL (ref 70–99)

## 2024-04-18 PROCEDURE — B2111ZZ FLUOROSCOPY OF MULTIPLE CORONARY ARTERIES USING LOW OSMOLAR CONTRAST: ICD-10-PCS | Performed by: INTERNAL MEDICINE

## 2024-04-18 PROCEDURE — 93455 CORONARY ART/GRFT ANGIO S&I: CPT | Performed by: INTERNAL MEDICINE

## 2024-04-18 PROCEDURE — B2181ZZ FLUOROSCOPY OF LEFT INTERNAL MAMMARY BYPASS GRAFT USING LOW OSMOLAR CONTRAST: ICD-10-PCS | Performed by: INTERNAL MEDICINE

## 2024-04-18 PROCEDURE — 82962 GLUCOSE BLOOD TEST: CPT

## 2024-04-18 PROCEDURE — 36415 COLL VENOUS BLD VENIPUNCTURE: CPT

## 2024-04-18 PROCEDURE — 99152 MOD SED SAME PHYS/QHP 5/>YRS: CPT | Performed by: INTERNAL MEDICINE

## 2024-04-18 PROCEDURE — B2131ZZ FLUOROSCOPY OF MULTIPLE CORONARY ARTERY BYPASS GRAFTS USING LOW OSMOLAR CONTRAST: ICD-10-PCS | Performed by: INTERNAL MEDICINE

## 2024-04-18 RX ORDER — MIDAZOLAM HYDROCHLORIDE 1 MG/ML
INJECTION INTRAMUSCULAR; INTRAVENOUS
Status: COMPLETED
Start: 2024-04-18 | End: 2024-04-18

## 2024-04-18 RX ORDER — ASPIRIN 81 MG/1
324 TABLET, CHEWABLE ORAL ONCE
Status: DISCONTINUED | OUTPATIENT
Start: 2024-04-18 | End: 2024-04-19

## 2024-04-18 RX ORDER — CLOPIDOGREL BISULFATE 75 MG/1
300 TABLET ORAL ONCE
Status: COMPLETED | OUTPATIENT
Start: 2024-04-18 | End: 2024-04-18

## 2024-04-18 RX ORDER — CLOPIDOGREL BISULFATE 75 MG/1
75 TABLET ORAL DAILY
Qty: 30 TABLET | Refills: 11 | Status: SHIPPED | OUTPATIENT
Start: 2024-04-18

## 2024-04-18 RX ORDER — HEPARIN SODIUM 1000 [USP'U]/ML
INJECTION, SOLUTION INTRAVENOUS; SUBCUTANEOUS
Status: DISCONTINUED
Start: 2024-04-18 | End: 2024-04-18 | Stop reason: WASHOUT

## 2024-04-18 RX ORDER — CLOPIDOGREL BISULFATE 75 MG/1
TABLET ORAL
Status: COMPLETED
Start: 2024-04-18 | End: 2024-04-18

## 2024-04-18 RX ORDER — CHLORHEXIDINE GLUCONATE 40 MG/ML
30 SOLUTION TOPICAL
Status: DISCONTINUED | OUTPATIENT
Start: 2024-04-18 | End: 2024-04-18

## 2024-04-18 RX ORDER — SODIUM CHLORIDE 9 MG/ML
3 INJECTION, SOLUTION INTRAVENOUS
Status: COMPLETED | OUTPATIENT
Start: 2024-04-18 | End: 2024-04-18

## 2024-04-18 RX ORDER — LIDOCAINE HYDROCHLORIDE 20 MG/ML
INJECTION, SOLUTION EPIDURAL; INFILTRATION; INTRACAUDAL; PERINEURAL
Status: COMPLETED
Start: 2024-04-18 | End: 2024-04-18

## 2024-04-18 RX ADMIN — CLOPIDOGREL BISULFATE 300 MG: 75 TABLET ORAL at 11:30:00

## 2024-04-18 RX ADMIN — SODIUM CHLORIDE 3 ML/KG/HR: 9 INJECTION, SOLUTION INTRAVENOUS at 08:48:00

## 2024-04-18 NOTE — IVS NOTE
DISCHARGE NOTE     Pt is able to sit up and ambulate without difficulty.   Pt voided and tolerated fluids and food.   Procedural site remains dry and intact with good circulation, motion, and sensation.   No signs and symptoms of bleeding/hematoma noted.   IV access removed  Instruction provided, patient/family verbalizes understanding.   Dr. Mccarty spoke with patient/family post procedure.     Pt discharge via wheelchair to [facility]      Follow up Appointment: as already scheduled    New Prescription: clopidogrel prescription electronically sent to patient's preferred pharmacy

## 2024-04-18 NOTE — DISCHARGE INSTRUCTIONS
HOME CARE INSTRUCTIONS FOLLOWING CORONARY ANGIOGRAPHY, ANGIOPLASTY (PTCA/PTA) OR INSERTION OF STENT IN THE CORONARY      Activity  DO NOT drive after the procedure.  You may resume driving late the following day according to the nurse or physician's instructions  Plan on resting and relaxing tonight and tomorrow  Resume your normal activity after 48 hours, or as instructed by your physician  Avoid drinking alcohol for the next 24 hours  If the groin site was used, avoid repeated stair use and excessive walking for the next 24 hours  Do not lift push or pull anything over 10 pounds for 1 week    What is Normal?  A small lump at the procedure site associated with mild tenderness when touched  The procedure site may be bruised or discolored  There may be a small amount of drainage on the bandage    Special Instructions  Drink plenty of fluids during the next 24 hours to \"flush\" the contrast from your system  Do NOT take meformin/glucophage containing medications for 48 hours  The bandage is to remain in place for 24 hours  Keep the bandage clean and dry  Do NOT shower for 24 hours  After 24 hours, you must remove the bandage  You should shower after removing the bandage, and wash the procedure site gently with soap and water  DO NOT submerge the procedure site for 1 week (no bath tubs or pools)    When you should NOTIFY YOUR PHYSICIAN  Bleeding can occur at the procedure site - both on the outside of the skin and/or beneath the surface of the skin  Swelling or a large lump at the procedure site can occur, which may be accompanied by moderate to severe pain    If either of the above occurs, lie down flat.  Have someone apply pressure to the procedure site with both hands, as instructed by the nurse.  Hold pressure for 20 minutes and the bleeding should stop.  Notify your physician of the occurrence  If the bleeding does not stop, call 911 and continue to apply pressure    If you experience signs of a fever, temperature  > 101°, chills, infection (redness, swelling, thick yellow drainage, or a foul odor from the procedure site)  If you notice any numbness, tingling, or loss of feeling to your leg or foot or groin access      May call answering service at [phone] for any problems or concerns      Closure device utilized  type of closure used:  Andreea    Additional Instructions:  Leave the dressing/band-aid over the site for 24 hours.   You may feel a \"pea or olive pit\" sized lumpand/or note mild tenderness in the groin area for approximately 5-7 days.         The collagen will be resorbed (broken down) by your body in approximately 6-12 weeks.     See appropriate pamphlet information   Other  You may resume your present diet, unless otherwise specified by your physician.  You may resume all of your medications as prescribed, unless otherwise directed by your physician.  A list of your medications was provided to you at discharge.  Continue the walking program initiated in the hospital and progress your walking as directed.  Or, gradually resume your previous aerobic exercise schedule as tolerated.  Please call your physician’s office for a follow-up appointment.  You should be seen in 7-10 days.

## 2024-04-18 NOTE — PROCEDURES
Cardiologist: Pito Mccarty MD  Primary Proceduralist: Pito Mccarty MD  Procedure Performed: COR, VEIN GRAFTS, and LIMA  Date of Procedure: 4/18/2024     Pre procedure diagnosis: Abnormal stress test, history of CABG (report unavailable, was performed in 2008)  Post procedure diagnosis: As above    Summary of findings: Severe native vessel disease.  Patent SVG to RCA.  Atretic LIMA.      Post procedure findings:  1) Left Ventriculography at 30 degrees OSMAN: Not performed, echo available  2) Hemodynamics: Not obtained  3) Coronaries:  Left main is patent and free of obstructive disease  LAD is has mid segment of 80% stenosis.  Stenosis there appears to be spontaneous coronary dissection versus streaming from contrast injection because of the lesion in the mid LAD.  This was visualized in multiple views.  Circumflex: Circumflex is occluded proximally with left to left collaterals which is seen filling large to obtuse marginal branches.  RCA is dominant and occluded in the midsegment.  LIMA to LAD: Atretic  SVG to right PDA: Widely patent      Recommendations:  Due to concern regarding spontaneous dissection in the mid LAD, it was decided not to intervene upon the LAD.  Would recommend starting him on Plavix in addition to aspirin for 1 month.  Would recommend getting a CT coronary angio to further evaluate LAD.  If no concern for dissection or if it heals, would recommend proceeding with intervention of the LAD followed by  of the circumflex.    Summary of Case: After written informed consent was obtained from the patient, patient was brought to the cardiac catheterization laboratory.  Patient was prepped and draped in the usual sterile fashion. Lidocaine 1% was used to infiltrate the right groin for local anesthesia and a 6 Cuban introducer sheath was inserted into the right femoral artery.      Selective coronary angiography performed with JR4 catheter for RCA and JL4 catheter for LCA.  Angiography  performed in standard projections.          Specimen sent to: No specimen collected  Estimated blood loss: 10 cc  Closure:  Mynx      IV was maintained by RN and moderate conscious sedation of versed and fentanyl was given.  Patient was assessed and monitoring of oxygen, heart rate and blood pressure by nurse and myself during the exam 20 minutes.      Pito Mccarty MD  04/18/24

## 2024-04-23 ENCOUNTER — HOSPITAL ENCOUNTER (OUTPATIENT)
Dept: GENERAL RADIOLOGY | Age: 74
Discharge: HOME | End: 2024-04-23
Attending: INTERNAL MEDICINE
Payer: MEDICARE

## 2024-04-23 DIAGNOSIS — J06.9 VIRAL UPPER RESPIRATORY TRACT INFECTION: ICD-10-CM

## 2024-04-23 PROCEDURE — 71046 X-RAY EXAM CHEST 2 VIEWS: CPT | Performed by: INTERNAL MEDICINE

## 2024-04-26 ENCOUNTER — MED REC SCAN ONLY (OUTPATIENT)
Dept: INTERNAL MEDICINE CLINIC | Facility: CLINIC | Age: 74
End: 2024-04-26

## 2024-04-26 DIAGNOSIS — F41.9 ANXIETY: ICD-10-CM

## 2024-04-26 DIAGNOSIS — E11.65 TYPE 2 DIABETES MELLITUS WITH HYPERGLYCEMIA, WITHOUT LONG-TERM CURRENT USE OF INSULIN (HCC): Chronic | ICD-10-CM

## 2024-04-29 ENCOUNTER — TELEPHONE (OUTPATIENT)
Facility: CLINIC | Age: 74
End: 2024-04-29

## 2024-04-29 NOTE — TELEPHONE ENCOUNTER
Received e-mail from [facility] stating:    \"Patient has a blocked artery that needs attention first\"    [facility] cancelled case from Epic and I cancelled from appointment desk.     Please call patient to see if he would like to reschedule to a later date.

## 2024-04-29 NOTE — H&P
History & Physical Examination    Patient Name: Humble Ugarte  MRN: [id number]  [facility]: [id number]  YOB: 1950    Diagnosis: colorectal cancer screening, anemia, GERD    LORazepam 1 MG Oral Tab, TAKE 1 TABLET EVERY NIGHT, Disp: 90 tablet, Rfl: 1 Patient was calling to schedule the MRI. Gave him the number for Central Scheduling and transferred him   • OTHER SURGICAL HISTORY      deviated septum repair     Family History   Problem Relation Age of Onset   • Cancer Father 59        Lymph node CA   • Heart Disorder Mother         MI   • Hypertension Mother      Social History    Tobacco Use      Smoking

## 2024-04-29 NOTE — TELEPHONE ENCOUNTER
Please review. Protocol Failed; No Protocol    Recent fills: 2/2/2024, 3/4/2024, 3/29/2024  Last Rx written: 3/21/2024  Last office visit: 3/19/2024    Future Appointments  Date Type Provider Dept   06/06/24 Appointment Sun Vega MD [facility]   Showing future appointments within next 150 days with a meds authorizing provider and meeting all other requirements        Requested Prescriptions   Pending Prescriptions Disp Refills    LORAZEPAM 1 MG Oral Tab [Pharmacy Med Name: LORAZEPAM 1 MG Tablet] 30 tablet 0     Sig: TAKE 1 TABLET EVERY NIGHT AS NEEDED       Controlled Substance Medication Failed - 4/26/2024  6:16 PM        Failed - This medication is a controlled substance - forward to provider to refill               Future Appointments         Provider Department Appt Notes    In 1 month Sun Vega MD [facility] last 6/6/2023    In 2 months Zoe Black MD [facility] Follow up visit          Recent Outpatient Visits              1 month ago Acute cough    [facility] Sun Vega MD    Office Visit    1 month ago Type 2 diabetes mellitus with hyperglycemia, without long-term current use of insulin (HCC)    [facility] Zoe Black MD    Office Visit    5 months ago MILENA (obstructive sleep apnea)    [facility] Sun Vega MD    Office Visit    6 months ago Dyslipidemia    [facility] Zoe Black MD    Office Visit    7 months ago Suspected sleep apnea    [facility]    Office Visit

## 2024-04-30 RX ORDER — LORAZEPAM 1 MG/1
1 TABLET ORAL NIGHTLY PRN
Qty: 30 TABLET | Refills: 0 | Status: SHIPPED | OUTPATIENT
Start: 2024-04-30

## 2024-05-02 ENCOUNTER — ORDER TRANSCRIPTION (OUTPATIENT)
Dept: ADMINISTRATIVE | Facility: HOSPITAL | Age: 74
End: 2024-05-02

## 2024-05-02 DIAGNOSIS — Z95.1 S/P CABG X 5: ICD-10-CM

## 2024-05-02 DIAGNOSIS — I70.0 ARTERIOSCLEROSIS OF AORTA (HCC): Primary | ICD-10-CM

## 2024-05-03 ENCOUNTER — ORDER TRANSCRIPTION (OUTPATIENT)
Dept: ADMINISTRATIVE | Facility: HOSPITAL | Age: 74
End: 2024-05-03

## 2024-05-03 DIAGNOSIS — I70.0 ARTERIOSCLEROSIS OF AORTA (HCC): ICD-10-CM

## 2024-05-03 DIAGNOSIS — Z95.1 S/P CABG X 5: Primary | ICD-10-CM

## 2024-05-14 ENCOUNTER — MED REC SCAN ONLY (OUTPATIENT)
Dept: INTERNAL MEDICINE CLINIC | Facility: CLINIC | Age: 74
End: 2024-05-14

## 2024-05-14 RX ORDER — GLIMEPIRIDE 1 MG/1
TABLET ORAL
Qty: 225 TABLET | Refills: 3 | OUTPATIENT
Start: 2024-05-14

## 2024-05-26 DIAGNOSIS — E11.65 TYPE 2 DIABETES MELLITUS WITH HYPERGLYCEMIA, WITHOUT LONG-TERM CURRENT USE OF INSULIN (HCC): Chronic | ICD-10-CM

## 2024-05-26 DIAGNOSIS — F41.9 ANXIETY: ICD-10-CM

## 2024-05-27 DIAGNOSIS — E11.9 TYPE 2 DIABETES MELLITUS WITHOUT RETINOPATHY (HCC): ICD-10-CM

## 2024-05-27 DIAGNOSIS — E11.65 TYPE 2 DIABETES MELLITUS WITH HYPERGLYCEMIA, WITHOUT LONG-TERM CURRENT USE OF INSULIN (HCC): Chronic | ICD-10-CM

## 2024-05-29 RX ORDER — LORAZEPAM 1 MG/1
1 TABLET ORAL NIGHTLY PRN
Qty: 30 TABLET | Refills: 0 | Status: SHIPPED | OUTPATIENT
Start: 2024-05-29

## 2024-05-29 NOTE — TELEPHONE ENCOUNTER
Please review. Protocol Failed; No Protocol    Recent fills: 3/1/2024, 3/28/2024, 5/1/2024  Last Rx written: 4/30/2024  Last office visit: 3/19/2024      Future Appointments  Date Type Provider Dept   06/06/24 Appointment Sun Vega MD [facility]   Showing future appointments within next 150 days with a meds authorizing provider and meeting all other requirements      Requested Prescriptions   Pending Prescriptions Disp Refills    LORazepam 1 MG Oral Tab 30 tablet 0     Sig: Take 1 tablet (1 mg total) by mouth nightly as needed.       Controlled Substance Medication Failed - 5/26/2024  9:31 PM        Failed - This medication is a controlled substance - forward to provider to refill               Future Appointments           Provider Department     In 1 week Sun Vega MD [facility] Notes: MA SUPER last 6/6/2023      In 3 weeks [facility] RN RADIOLOGY 3; [facility] RM1 CARD [facility]     In 3 weeks [facility] CT RM4 FFR [facility] CT     In [facility] month [facility]           Recent Outpatient Visits              2 months ago Acute cough    [facility]    Office Visit    2 months ago Type 2 diabetes mellitus with hyperglycemia, without long-term current use of insulin (HCC)    [facility] Zoe Black MD    Office Visit    6 months ago MILENA (obstructive sleep apnea)    [facility] Sun [facility]ega MD    Office Visit    7 months ago Dyslipidemia    [facility] Zoe Black MD    Office Visit    8 months ago Suspected sleep apnea    [facility]    Office Visit

## 2024-05-30 NOTE — TELEPHONE ENCOUNTER
Please review. Protocol Failed; No Protocol    Requested Prescriptions   Pending Prescriptions Disp Refills    METFORMIN HCL 1000 MG Oral Tab [Pharmacy Med Name: METFORMIN HYDROCHLORIDE 1000 MG Tablet] 180 tablet 3     Sig: TAKE 1 TABLET TWICE DAILY WITH MEALS       Diabetes Medication Protocol Failed - 5/27/2024  1:22 AM        Failed - Last A1C < 7.5 and within past 6 months     Lab Results   Component Value Date    A1C 7.9 (A) 03/18/2024             Passed - In person appointment or virtual visit in the past 6 mos or appointment in next 3 mos     Recent Outpatient Visits              2 months ago Acute cough    [facility] Sun Vega MD    Office Visit    2 months ago Type 2 diabetes mellitus with hyperglycemia, without long-term current use of insulin (HCC)    [facility]    Office Visit    6 months ago MILENA (obstructive sleep apnea)    [facility] Sun Vega MD    Office Visit    7 months ago Dyslipidemia    [facility] Zoe Black MD    Office Visit    8 months ago Suspected sleep apnea    [facility]    Office Visit          Future Appointments           Provider Department     In 1 week Sun Vega MD [facility]    Appt Notes: MA SUPER last 6/6/2023      In 3 weeks [facility] RN RADIOLOGY 3; [facility] CT RM1 CARD [facility] CT     In 3 weeks [facility] CT RM4 FFR [facility] CT     In 1 month Zoe Black MD [facility]    Appt Notes: Follow up visit                     Passed - Microalbumin procedure in past 12 months or taking ACE/ARB        Passed - EGFRCR or GFRNAA > 50     GFR Evaluation  EGFRCR: 94 , resulted on 3/21/2024          Passed - GFR in the past 12 months                Future Appointments           Provider Department     In 1 week Sun Vega MD [facility] Notes: MA SUPER last 6/6/2023      In 3 weeks [facility] RN RADIOLOGY 3; [facility] CT RM1 CARD [facility] CT     In 3 weeks [facility] CT RM4 FFR [facility] CT     In 1 month Zoe Black MD [facility] Notes: Follow up visit           Recent Outpatient Visits              2 months ago Acute cough    [facility] Sun Vega MD    Office Visit    2 months ago Type 2 diabetes mellitus with hyperglycemia, without long-term current use of insulin (HCC)    [facility] Zoe Black MD    Office Visit    6 months ago MILENA (obstructive sleep apnea)    [facility]    Office Visit    7 months ago Dyslipidemia    [facility] Zoe Black MD    Office Visit    8 months ago Suspected sleep apnea    [facility]    Office Visit

## 2024-06-06 ENCOUNTER — OFFICE VISIT (OUTPATIENT)
Dept: INTERNAL MEDICINE CLINIC | Facility: CLINIC | Age: 74
End: 2024-06-06
Payer: MEDICARE

## 2024-06-06 VITALS
OXYGEN SATURATION: 99 % | HEIGHT: 68 IN | WEIGHT: 179.5 LBS | DIASTOLIC BLOOD PRESSURE: 66 MMHG | HEART RATE: 55 BPM | BODY MASS INDEX: 27.2 KG/M2 | SYSTOLIC BLOOD PRESSURE: 145 MMHG

## 2024-06-06 DIAGNOSIS — E78.00 PURE HYPERCHOLESTEROLEMIA: ICD-10-CM

## 2024-06-06 DIAGNOSIS — I70.0 ARTERIOSCLEROSIS OF AORTA (HCC): ICD-10-CM

## 2024-06-06 DIAGNOSIS — Z12.5 ENCOUNTER FOR SCREENING FOR MALIGNANT NEOPLASM OF PROSTATE: ICD-10-CM

## 2024-06-06 DIAGNOSIS — I10 ESSENTIAL HYPERTENSION: ICD-10-CM

## 2024-06-06 DIAGNOSIS — D69.6 THROMBOCYTOPENIA (HCC): ICD-10-CM

## 2024-06-06 DIAGNOSIS — I35.1 NONRHEUMATIC AORTIC VALVE INSUFFICIENCY: ICD-10-CM

## 2024-06-06 DIAGNOSIS — F32.0 MAJOR DEPRESSIVE DISORDER, SINGLE EPISODE, MILD (HCC): ICD-10-CM

## 2024-06-06 DIAGNOSIS — N18.30 TYPE 2 DIABETES MELLITUS WITH STAGE 3 CHRONIC KIDNEY DISEASE, WITHOUT LONG-TERM CURRENT USE OF INSULIN, UNSPECIFIED WHETHER STAGE 3A OR 3B CKD (HCC): ICD-10-CM

## 2024-06-06 DIAGNOSIS — R91.8 LUNG NODULE, MULTIPLE: ICD-10-CM

## 2024-06-06 DIAGNOSIS — Z00.00 ENCOUNTER FOR ANNUAL HEALTH EXAMINATION: Primary | ICD-10-CM

## 2024-06-06 DIAGNOSIS — M17.11 PRIMARY OSTEOARTHRITIS OF RIGHT KNEE: ICD-10-CM

## 2024-06-06 DIAGNOSIS — E11.22 TYPE 2 DIABETES MELLITUS WITH STAGE 3 CHRONIC KIDNEY DISEASE, WITHOUT LONG-TERM CURRENT USE OF INSULIN, UNSPECIFIED WHETHER STAGE 3A OR 3B CKD (HCC): ICD-10-CM

## 2024-06-06 DIAGNOSIS — E11.9 TYPE 2 DIABETES MELLITUS WITHOUT RETINOPATHY (HCC): ICD-10-CM

## 2024-06-06 DIAGNOSIS — F13.20 SEDATIVE, HYPNOTIC OR ANXIOLYTIC DEPENDENCE, UNCOMPLICATED (HCC): ICD-10-CM

## 2024-06-06 DIAGNOSIS — K21.9 GASTROESOPHAGEAL REFLUX DISEASE WITHOUT ESOPHAGITIS: ICD-10-CM

## 2024-06-06 DIAGNOSIS — D69.2 SENILE PURPURA (HCC): ICD-10-CM

## 2024-06-06 DIAGNOSIS — E11.65 TYPE 2 DIABETES MELLITUS WITH HYPERGLYCEMIA, WITHOUT LONG-TERM CURRENT USE OF INSULIN (HCC): Chronic | ICD-10-CM

## 2024-06-06 DIAGNOSIS — R31.21 ASYMPTOMATIC MICROSCOPIC HEMATURIA: ICD-10-CM

## 2024-06-06 DIAGNOSIS — G47.33 OSA (OBSTRUCTIVE SLEEP APNEA): ICD-10-CM

## 2024-06-06 PROBLEM — R06.83 SNORING: Status: RESOLVED | Noted: 2023-06-06 | Resolved: 2024-06-06

## 2024-06-06 PROBLEM — Z87.891 HISTORY OF CIGARETTE SMOKING: Status: RESOLVED | Noted: 2019-07-16 | Resolved: 2024-06-06

## 2024-06-06 RX ORDER — GLIMEPIRIDE 2 MG/1
2 TABLET ORAL 2 TIMES DAILY
Qty: 180 TABLET | Refills: 0 | Status: SHIPPED | OUTPATIENT
Start: 2024-06-06

## 2024-06-06 RX ORDER — GLIMEPIRIDE 2 MG/1
2 TABLET ORAL 2 TIMES DAILY
COMMUNITY
End: 2024-06-06

## 2024-06-06 NOTE — PATIENT INSTRUCTIONS
Avinash Dumont's SCREENING SCHEDULE   Tests on this list are recommended by your physician but may not be covered, or covered at this frequency, by your insurer.   Please check with your insurance carrier before scheduling to verify coverage.   PREVENTATIVE SERVICES FREQUENCY &  COVERAGE DETAILS LAST COMPLETION DATE   Diabetes Screening    Fasting Blood Sugar / Glucose    One screening every 12 months if never tested or if previously tested but not diagnosed with pre-diabetes   One screening every 6 months if diagnosed with pre-diabetes Lab Results   Component Value Date     (H) 03/21/2024        Cardiovascular Disease Screening    Lipid Panel  Cholesterol  Lipoprotein (HDL)  Triglycerides Covered every 5 years for all Medicare beneficiaries without apparent signs or symptoms of cardiovascular disease Lab Results   Component Value Date    CHOLEST 114 10/24/2023    HDL 51 10/24/2023    LDL 41 10/24/2023    TRIG 124 10/24/2023         Electrocardiogram (EKG)   Covered if needed at Welcome to Medicare, and non-screening if indicated for medical reasons 05/04/2022      Ultrasound Screening for Abdominal Aortic Aneurysm (AAA) Covered once in a lifetime for one of the following risk factors   • Men who are 65-75 years old and have ever smoked   • Anyone with a family history -     Colorectal Cancer Screening  Covered for ages 50-85; only need ONE of the following:    Colonoscopy   Covered every 10 years    Covered every 2 years if patient is at high risk or previous colonoscopy was abnormal 03/21/2023    Health Maintenance   Topic Date Due   • Colorectal Cancer Screening  03/21/2024       Flexible Sigmoidoscopy   Covered every 4 years -    Fecal Occult Blood Test Covered annually -   Prostate Cancer Screening    Prostate-Specific Antigen (PSA) Annually No results found for: \"PSA\"  Health Maintenance   Topic Date Due   • PSA  01/15/2024      Immunizations    Influenza Covered once per flu season  Please get every  year 10/23/2023  No recommendations at this time    Pneumococcal Each vaccine ([id number] & [id number]) covered once after 65 Prevnar 13: 07/23/2019    [id number]: 07/30/2020     No recommendations at this time    Hepatitis B One screening covered for patients with certain risk factors   -  No recommendations at this time    Tetanus Toxoid Not covered by Medicare Part B unless medically necessary (cut with metal); may be covered with your pharmacy prescription benefits -    Tetanus, Diptheria and Pertusis TD and TDaP Not covered by Medicare Part B -  No recommendations at this time    Zoster Not covered by Medicare Part B; may be covered with your pharmacy  prescription benefits -  Zoster Vaccines(1 of 2) Never done     Diabetes      Hemoglobin A1C Annually; if last result is elevated, may be asked to retest more frequently.    Medicare covers every 3 months Lab Results   Component Value Date     (H) 05/22/2023    A1C 7.9 (A) 03/18/2024       No recommendations at this time    Creat/alb ratio Annually Lab Results   Component Value Date    MICROALBCREA  11/30/2023      Comment:      Unable to calculate due to Urine Microalbumin <0.3 mg/dL           LDL Annually Lab Results   Component Value Date    LDL 41 10/24/2023       Dilated Eye Exam Annually Last Diabetic Eye Exam:  Last Dilated Eye Exam: 04/24/24  Eye Exam shows Diabetic Retinopathy?: No       Annual Monitoring of Persistent Medications (ACE/ARB, digoxin diuretics, anticonvulsants)    Potassium Annually Lab Results   Component Value Date    K 3.6 03/21/2024         Creatinine   Annually Lab Results   Component Value Date    CREATSERUM 0.79 03/21/2024         BUN Annually Lab Results   Component Value Date    BUN 11 03/21/2024       Drug Serum Conc Annually No results found for: \"DIGOXIN\", \"DIG\", \"VALP\"

## 2024-06-06 NOTE — PROGRESS NOTES
Subjective:   Avinash Dumont is a 74 year old male who presents for a MA (Medicare Advantage) Supervisit (Once per calendar year) and scheduled follow up of multiple significant but stable problems.   Patient comes for his Medicare physical  Since last visit he had an abnormal EKG and went for an angiogram by dr dudley and started plavix   5/24 cpap signed   Got his CPAP supplies and using that without any complications and tolerating it well    History 3/2024  Got His CPAP machine--using and tolerating the machine well    HISTORY    saw ortho -dr potts - was given tramadol and meloxicam   Still has to get the CPAP machine  Since last visit he did see the  endocrinologist           HISTORY  + snores as per wife , mostly sleeps on his side but he can lie flat on his back and when he does he finds himself snoring , he does not wake up short of breath from his sleep, no choking in his sleep, +EDS      HISTORY  3/2023 VISIT   knee pain is better with the physical therapy  Would like to review labs   Needs refills on the lorazepam  Thinks the A1c has increased because he had increased his hypoalert carbohydrates but he will go back to his regular diet now     HISTORY  since her last visit she has seen the eye doctor and also the Ortho doctor and he drained some fluid and injected him with Durolane and is a little bit better  Noted he has anemia and had seen hemeatologist in the past and was asked to stop the lexapro so he has for one mn and he feels ok         HISTORY  New patient- used to see dr trujillo   C/C establish care-referred by his insurance  C/o needs refills         PMH  DM2   HTN  HL  B/l OA knee -  CAD S/P CABG -  at [facility] dr avinash harden   Anxiety and depression was on lexapro -takes lorazepam   H/o tob dep and lung nodules    MILENA 9/2023      Galen jain      Lives with family -wife and 2 kids  Retired used to work in electronics    ----------------------------------------------------------------------------------------  History/Other:   Fall Risk Assessment:   He has been screened for Falls and is low risk.      Cognitive Assessment:   He had a completely normal cognitive assessment - see flowsheet entries     Functional Ability/Status:   Avinash Dumont has some abnormal functions as listed below:  He has Hearing problems based on screening of functional status.He has Walking problems based on screening of functional status.       Depression Screening (PHQ-2/PHQ-9): PHQ-2 SCORE: 0  , done 5/31/2024             Advanced Directives:   He does NOT have a Living Will. [Do you have a living will?: No]  He does have a POA but we do NOT have it on file in Epic.    Discussed Advance Care Planning with patient (and family/surrogate if present). Standard forms made available to patient in After Visit Summary.      Patient Active Problem List   Diagnosis    Essential hypertension    Hyperlipidemia    Type 2 diabetes mellitus without retinopathy (HCC)    Thrombocytopenia (HCC)    Gastroesophageal reflux disease    Arteriosclerosis of aorta (HCC)    Nonrheumatic aortic valve insufficiency    Primary osteoarthritis of right knee    Type 2 diabetes mellitus with hyperglycemia, without long-term current use of insulin (HCC)    Asymptomatic microscopic hematuria    Sedative, hypnotic or anxiolytic dependence, uncomplicated (HCC)    Lung nodule, multiple    Major depressive disorder, single episode, mild (HCC)    Type 2 diabetes mellitus with diabetic chronic kidney disease (HCC)    Senile purpura (HCC)    MILENA (obstructive sleep apnea)     Allergies:  He has No Known Allergies.    Current Medications:  Outpatient Medications Marked as Taking for the 6/6/24 encounter (Office Visit) with Sun Vega MD   Medication Sig    glimepiride 2 MG Oral Tab Take 1 tablet (2 mg total) by mouth in the morning and 1 tablet (2 mg total) before bedtime.    metFORMIN HCl  1000 MG Oral Tab TAKE 1 TABLET TWICE DAILY WITH MEALS    LORazepam 1 MG Oral Tab Take 1 tablet (1 mg total) by mouth nightly as needed.    clopidogrel 75 MG Oral Tab Take 1 tablet (75 mg total) by mouth daily.    albuterol (PROAIR HFA) 108 (90 Base) MCG/ACT Inhalation Aero Soln Inhale 2 puffs into the lungs every 4 (four) hours as needed for Wheezing.    Blood Glucose Monitoring Suppl (TRUE METRIX AIR GLUCOSE METER) w/Device Does not apply Kit 1 kit As Directed.    omega-3-acid ethyl esters 1 g Oral Cap Take 1 capsule (1 g total) by mouth 3 (three) times daily.    traMADol 50 MG Oral Tab Take 1-2 tablets ( mg total) by mouth daily.    atorvastatin 40 MG Oral Tab Take 1 tablet (40 mg total) by mouth nightly.    Valsartan-hydroCHLOROthiazide 320-25 MG Oral Tab Take 1 tablet by mouth daily.    SITagliptin Phosphate (JANUVIA) 100 MG Oral Tab Take 1 tablet (100 mg total) by mouth daily.    metoprolol succinate ER 25 MG Oral Tablet 24 Hr Take 1 tablet (25 mg total) by mouth daily.    AMLODIPINE 10 MG Oral Tab TAKE 1 TABLET EVERY DAY    triamcinolone 0.1 % External Cream Apply 1 Application. topically 2 (two) times daily. APPLY TO AFFECTED AREA    Blood Glucose Calibration (TRUE METRIX LEVEL 1) Low In Vitro Solution 1 each by In Vitro route as needed.    Glucose Blood In Vitro Strip 2 each by Other route 2 (two) times a day.    Lancets 33G Does not apply Misc 1 each 2 (two) times a day.    ferrous sulfate 325 (65 FE) MG Oral Tab EC Take 1 tablet (325 mg total) by mouth daily with breakfast.    aspirin 81 MG Oral Tab Take 1 tablet (81 mg total) by mouth daily.    Multiple Vitamins-Minerals (MULTIVITAMIN ADULT OR) Take 1 tablet by mouth daily.    COENZYME Q-10 OR Take 2 tablets by mouth daily.       Medical History:  He  has a past medical history of Anxiety, Arthritis, Atherosclerosis of coronary artery, Colon adenomas (10/28/2019), Colon adenomas (03/23/2023), Coronary atherosclerosis, Depression, Diabetes (HCC),  Esophageal reflux, Essential hypertension, High blood pressure, High cholesterol, History of blood transfusion, Hyperlipidemia, Sleep apnea, and Visual impairment.  Surgical History:  He  has a past surgical history that includes bypass surgery (2009); colonoscopy; colonoscopy (N/A, 10/28/2019); colonoscopy; sinus surgery  ; cabg; upper gi endoscopy,exam; other surgical history; other surgical history (Left); and colonoscopy (N/A, 3/21/2023).   Family History:  His family history includes Cancer (age of onset: 64) in his father; Heart Disorder in his mother; Hypertension in his mother.  Social History:  He  reports that he quit smoking about 16 years ago. His smoking use included cigarettes. He started smoking about 41 years ago. He has a 12.5 pack-year smoking history. He has never used smokeless tobacco. He reports current alcohol use of about 3.0 standard drinks of alcohol per week. He reports that he does not use drugs.    Tobacco:  He smoked tobacco in the past but quit greater than 12 months ago.  Social History     Tobacco Use   Smoking Status Former    Current packs/day: 0.00    Average packs/day: 0.5 packs/day for 25.0 years (12.5 ttl pk-yrs)    Types: Cigarettes    Start date: 1983    Quit date: 2008    Years since quittin.4   Smokeless Tobacco Never          CAGE Alcohol Screen:   CAGE screening score of 0 on 2024, showing low risk of alcohol abuse.      Patient Care Team:  Sun Vega MD as PCP - General (Internal Medicine)    Review of Systems  GENERAL: feels well otherwise  SKIN: denies any unusual skin lesions  EYES: denies blurred vision or double vision  HEENT: denies nasal congestion, sinus pain or ST  LUNGS: denies shortness of breath with exertion  CARDIOVASCULAR: denies chest pain on exertion  GI: denies abdominal pain, denies heartburn  : 1 per night nocturia, no complaint of urinary incontinence  MUSCULOSKELETAL: denies back pain  NEURO: denies headaches  PSYCHE:  denies depression or anxiety-stable on meds   HEMATOLOGIC: denies hx of anemia- stable , on iron   ENDOCRINE: denies thyroid history  ALL/ASTHMA: denies hx of allergy or asthma    Objective:   Physical Exam  GENERAL: well developed, well nourished,in no apparent distress  SKIN: no rashes,no suspicious lesions  HEENT: atraumatic, normocephalic,ears and throat are clear,  no frontal or maxillary sinus tenderness, pupils equal reactive to light bilaterally, extraocular muscles intact  NECK: supple,no adenopathy,nontender   LUNGS: clear to auscultation, no wheeze  CARDIO: RRR without murmur  GI: good BS's,no masses or tenderness  EXTREMITIES: no cyanosis, or edema    /66 (BP Location: Right arm, Patient Position: Sitting)   Pulse 55   Ht 5' 8\" (1.727 m)   Wt 179 lb 8 oz (81.4 kg)   SpO2 99%   BMI 27.29 kg/m²  Estimated body mass index is 27.29 kg/m² as calculated from the following:    Height as of this encounter: 5' 8\" (1.727 m).    Weight as of this encounter: 179 lb 8 oz (81.4 kg).    Medicare Hearing Assessment:   Hearing Screening    Screening Method: Questionnaire  I have a problem hearing over the telephone: No I have trouble following the conversations when two or more people are talking at the same time: No   I have trouble understanding things on the TV: No I have to strain to understand conversations: No   I have to worry about missing the telephone ring or doorbell: No I have trouble hearing conversations in a noisy background such as a crowded room or restaurant: No   I get confused about where sounds come from: No I misunderstand some words in a sentence and need to ask people to repeat themselves: No   I especially have trouble understanding the speech of women and children: No I have trouble understanding the speaker in a large room such as at a meeting or place of Voodoo: No   Many people I talk to seem to mumble (or don't speak clearly): No People get annoyed because I misunderstand what  they say: No   I misunderstand what others are saying and make inappropriate responses: No I avoid social activities because I cannot hear well and fear I will reply improperly: No   Family members and friends have told me they think I may have hearing loss: No             Visual Acuity:   Right Eye Visual Acuity: Corrected Right Eye Chart Acuity: 20/30   Left Eye Visual Acuity: Corrected Left Eye Chart Acuity: 20/30   Both Eyes Visual Acuity: Corrected Both Eyes Chart Acuity: 20/20   Able To Tolerate Visual Acuity: Yes        Assessment & Plan:   Avinash Dumont is a 74 year old male who presents for a Medicare Assessment.     1. Encounter for annual health examination (Primary)  Advised patient to watch what he eats exercise, seatbelt use no texting driving, sunscreen use advised   2. Encounter for screening for malignant neoplasm of prostate  -     PSA Total, Screen; Future; Expected date: 06/06/2024  Recheck psa  3. Thrombocytopenia (HCC)  Monitor   4. Arteriosclerosis of aorta ([facility])  Overview:  CT scan 11/26/19  5. Senile purpura ([facility])  6. Major depressive disorder, single episode, mild (HCC)  Stable on medications, continue   7. Sedative, hypnotic or anxiolytic dependence, uncomplicated (HCC)  Aware of the potential side effects and addictive properties, continue medications, monitor   8. Nonrheumatic aortic valve insufficiency  Overview:  10/2020 echo    Study Conclusions   1. Left ventricle: The cavity size was normal. Wall thickness was      increased in a pattern of mild LVH. Systolic function was      normal. The estimated ejection fraction was 65%, by biplane      method of disks. Wall motion was normal; there were no regional      wall motion abnormalities. Left ventricular diastolic function      parameters were normal for the patient's age.   2. Aortic valve: There is sclerosis without stenosis. Mild to      moderate regurgitation. Peak velocity (S): 2m/sec. Mean gradient      (S): 8mm Hg. Peak gradient  (S): 16mm Hg. Valve area (VTI):      2.29cm^2.   3. Inferior vena cava: The vessel was normal in size. The      respirophasic diameter changes were in the normal range (= 50%),      consistent with normal central venous pressure.     Monitor     9. Pure hypercholesterolemia  Follow low-fat low-cholesterol diet and exercise with a goal of 30 minutes a day As tolerated-he does state that he goes to the gym   10. Essential hypertension  Advised pt to follow a low salt , low sodium (including fast foods and processed foods), can look up DASH diet, exercise 30 min a day , monitor bp   Continue medications  11. Lung nodule, multiple  Monitor   12. Gastroesophageal reflux disease without esophagitis  Stable without medications, continue to monitor and avoid the foods that cause symptoms   13. Primary osteoarthritis of right knee  F/u ortho ,stable   14. Asymptomatic microscopic hematuria  stable  15. MILENA (obstructive sleep apnea)  stable  16. Type 2 diabetes mellitus with stage 3 chronic kidney disease, without long-term current use of insulin, unspecified whether stage 3a or 3b CKD (HCC)  Stable, monitor   17. Type 2 diabetes mellitus with hyperglycemia, without long-term current use of insulin (HCC)  -     Glimepiride; Take 1 tablet (2 mg total) by mouth in the morning and 1 tablet (2 mg total) before bedtime.  Dispense: 180 tablet; Refill: 0  Cont meds , monitor   18. Type 2 diabetes mellitus without retinopathy (HCC)  -     PSA Total, Screen; Future; Expected date: 06/06/2024  -     CBC With Differential With Platelet; Future; Expected date: 06/06/2024  -     Comp Metabolic Panel (14); Future; Expected date: 06/06/2024  -     Assay, Thyroid Stim Hormone; Future; Expected date: 06/06/2024  bl sugars   Hba1c 7.5 on 6/2022 and 6.3 on 11/2022 and 7.2 on 2/2023 on 10/2023 and 7.9 on 3/2024   U. Micro 11/2023   Eye exam -dr francisco but will see dr deshpande next mn   On Asa , on arb and  statin   Foot 10/2023   Diet -- advised  to follow a low carb, low sugar diet , try to be consistent                Exercise 30 min a day   Sees Dr. Black        Preventive medicine  Colonoscopy-Dr. Son 2023 and rpt in one yr ,scheduled   Labs 1/2024 and 11/2023 reviewed   Ct chest 7/2022 for lung nodule - rpt in 7/2024   Echo 7/2022 -aortic regurg moderate - mild stenosis - sees dr jain        The patient indicates understanding of these issues and agrees to the plan.        Return in 3 months (on 9/6/2024).     Sun Vega MD, 6/6/2024     Supplementary Documentation:   General Health:  In the past six months, have you lost more than 10 pounds without trying?: 2 - No  Has your appetite been poor?: No  Type of Diet: Balanced;Diabetic;Low Salt;Low Carb  How does the patient maintain a good energy level?: Appropriate Exercise  How would you describe your daily physical activity?: Light  How would you describe your current health state?: Fair  How do you maintain positive mental well-being?: Visiting Family  On a scale of 0 to 10, with 0 being no pain and 10 being severe pain, what is your pain level?: 6 - (Moderate)  In the past six months, have you experienced urine leakage?: 0-No  At any time do you feel concerned for the safety/well-being of yourself and/or your children, in your home or elsewhere?: No  Have you had any immunizations at another office such as Influenza, Hepatitis B, Tetanus, or Pneumococcal?: No          Avinash Dumont's SCREENING SCHEDULE   Tests on this list are recommended by your physician but may not be covered, or covered at this frequency, by your insurer.   Please check with your insurance carrier before scheduling to verify coverage.   PREVENTATIVE SERVICES FREQUENCY &  COVERAGE DETAILS LAST COMPLETION DATE   Diabetes Screening    Fasting Blood Sugar / Glucose    One screening every 12 months if never tested or if previously tested but not diagnosed with pre-diabetes   One screening every 6 months if diagnosed  with pre-diabetes Lab Results   Component Value Date     (H) 03/21/2024        Cardiovascular Disease Screening    Lipid Panel  Cholesterol  Lipoprotein (HDL)  Triglycerides Covered every 5 years for all Medicare beneficiaries without apparent signs or symptoms of cardiovascular disease Lab Results   Component Value Date    CHOLEST 114 10/24/2023    HDL 51 10/24/2023    LDL 41 10/24/2023    TRIG 124 10/24/2023         Electrocardiogram (EKG)   Covered if needed at Welcome to Medicare, and non-screening if indicated for medical reasons 05/04/2022      Ultrasound Screening for Abdominal Aortic Aneurysm (AAA) Covered once in a lifetime for one of the following risk factors    Men who are 65-75 years old and have ever smoked    Anyone with a family history -     Colorectal Cancer Screening  Covered for ages 50-85; only need ONE of the following:    Colonoscopy   Covered every 10 years    Covered every 2 years if patient is at high risk or previous colonoscopy was abnormal 03/21/2023    Health Maintenance   Topic Date Due    Colorectal Cancer Screening  03/21/2024       Flexible Sigmoidoscopy   Covered every 4 years -    Fecal Occult Blood Test Covered annually -   Prostate Cancer Screening    Prostate-Specific Antigen (PSA) Annually No results found for: \"PSA\"  Health Maintenance   Topic Date Due    PSA  01/15/2024      Immunizations    Influenza Covered once per flu season  Please get every year 10/23/2023  No recommendations at this time    Pneumococcal Each vaccine ([id number] & [id number]) covered once after 65 Prevnar 13: 07/23/2019    [id number]: 07/30/2020     No recommendations at this time    Hepatitis B One screening covered for patients with certain risk factors   -  No recommendations at this time    Tetanus Toxoid Not covered by Medicare Part B unless medically necessary (cut with metal); may be covered with your pharmacy prescription benefits -    Tetanus, Diptheria and Pertusis TD and TDaP Not  covered by Medicare Part B -  No recommendations at this time    Zoster Not covered by Medicare Part B; may be covered with your pharmacy  prescription benefits -  Zoster Vaccines(1 of 2) Never done     Diabetes      Hemoglobin A1C Annually; if last result is elevated, may be asked to retest more frequently.    Medicare covers every 3 months Lab Results   Component Value Date     (H) 05/22/2023    A1C 7.9 (A) 03/18/2024       No recommendations at this time    Creat/alb ratio Annually Lab Results   Component Value Date    MICROALBCREA  11/30/2023      Comment:      Unable to calculate due to Urine Microalbumin <0.3 mg/dL           LDL Annually Lab Results   Component Value Date    LDL 41 10/24/2023       Dilated Eye Exam Annually Last Diabetic Eye Exam:  Last Dilated Eye Exam: 04/24/24  Eye Exam shows Diabetic Retinopathy?: No       Annual Monitoring of Persistent Medications (ACE/ARB, digoxin diuretics, anticonvulsants)    Potassium Annually Lab Results   Component Value Date    K 3.6 03/21/2024         Creatinine   Annually Lab Results   Component Value Date    CREATSERUM 0.79 03/21/2024         BUN Annually Lab Results   Component Value Date    BUN 11 03/21/2024       Drug Serum Conc Annually No results found for: \"DIGOXIN\", \"DIG\", \"VALP\"

## 2024-06-20 ENCOUNTER — APPOINTMENT (OUTPATIENT)
Dept: CT IMAGING | Facility: HOSPITAL | Age: 74
End: 2024-06-20
Attending: INTERNAL MEDICINE
Payer: MEDICARE

## 2024-06-20 ENCOUNTER — HOSPITAL ENCOUNTER (OUTPATIENT)
Dept: CT IMAGING | Facility: HOSPITAL | Age: 74
Discharge: HOME | End: 2024-06-20
Attending: INTERNAL MEDICINE
Payer: MEDICARE

## 2024-06-20 VITALS
HEIGHT: 68 IN | DIASTOLIC BLOOD PRESSURE: 69 MMHG | WEIGHT: 170 LBS | RESPIRATION RATE: 9 BRPM | SYSTOLIC BLOOD PRESSURE: 146 MMHG | BODY MASS INDEX: 25.76 KG/M2 | HEART RATE: 59 BPM

## 2024-06-20 DIAGNOSIS — Z95.1 S/P CABG X 5: ICD-10-CM

## 2024-06-20 DIAGNOSIS — I70.0 ARTERIOSCLEROSIS OF AORTA (HCC): ICD-10-CM

## 2024-06-20 LAB
CREAT BLD-MCNC: 0.7 MG/DL
EGFRCR SERPLBLD CKD-EPI 2021: 97 ML/MIN/1.73M2 (ref 60–?)

## 2024-06-20 PROCEDURE — 75574 CT ANGIO HRT W/3D IMAGE: CPT | Performed by: INTERNAL MEDICINE

## 2024-06-20 PROCEDURE — 82565 ASSAY OF CREATININE: CPT

## 2024-06-20 RX ORDER — METOPROLOL TARTRATE 1 MG/ML
5 INJECTION, SOLUTION INTRAVENOUS SEE ADMIN INSTRUCTIONS
Status: DISCONTINUED | OUTPATIENT
Start: 2024-06-20 | End: 2024-06-22

## 2024-06-20 RX ORDER — DILTIAZEM HYDROCHLORIDE 5 MG/ML
5 INJECTION INTRAVENOUS SEE ADMIN INSTRUCTIONS
Status: DISCONTINUED | OUTPATIENT
Start: 2024-06-20 | End: 2024-06-22

## 2024-06-20 RX ORDER — NITROGLYCERIN 0.4 MG/1
TABLET SUBLINGUAL
Status: COMPLETED
Start: 2024-06-20 | End: 2024-06-20

## 2024-06-20 RX ORDER — ATORVASTATIN CALCIUM 40 MG/1
40 TABLET, FILM COATED ORAL NIGHTLY
Qty: 90 TABLET | Refills: 3 | Status: SHIPPED | OUTPATIENT
Start: 2024-06-20

## 2024-06-20 RX ORDER — NITROGLYCERIN 0.4 MG/1
0.4 TABLET SUBLINGUAL ONCE
Status: COMPLETED | OUTPATIENT
Start: 2024-06-20 | End: 2024-06-20

## 2024-06-20 RX ADMIN — NITROGLYCERIN 0.4 MG: 0.4 TABLET SUBLINGUAL at 08:38:00

## 2024-06-20 NOTE — IMAGING NOTE
TO RAD HOLDING AT 0759      HX TAKEN: PT NEEDS KNEE SURGERY, EKG ABNORMAL, STRESS TEST ABNORMAL     PT CONSENTED AT  0812     BASELINE VITAL SIGNS: HR 59  /69, BMI 25.9    CTA ORDERED BY STAS HUNTLEY MD; WAS PT GIVEN CTA PREMEDS: YES INCREASED DOSE OF METOPROLOL SUCCINATE FROM 25 MG TO 50 MG    18 GAUGE IV STARTED AT 0820, POC TESTING COMPLETED GFR = 97   CREATINE = 0.7    TO CT TABLE @ 0835    CONNECT TO MONITOR  HR HR 56 /61 AT 0836      NITROGLYCERIN 0.4 MILLIGRAMS SUBLINGUAL GIVEN AT 0838     INJECTION STARTED AT 0845, HR AVG 50 DURING SCAN, PROCEDURE COMPLETE    POST SCAN HR 59 /59 AT 0848, 2/10 HEADACHE    PT TO HOLDING AREA,  VS 58 /60 AT 0852     AVS  PROVIDED      VS HR 56 /65 AT 0907, PT FEELS WELL    0920 DISCHARGED HOME

## 2024-06-20 NOTE — TELEPHONE ENCOUNTER
Refill passed per [facility] protocols.    Requested Prescriptions   Pending Prescriptions Disp Refills    atorvastatin 40 MG Oral Tab 90 tablet 1     Sig: Take 1 tablet (40 mg total) by mouth nightly.       Cholesterol Medication Protocol Passed - 6/18/2024  4:23 PM        Passed - ALT < 80     Lab Results   Component Value Date    ALT 46 11/30/2023             Passed - ALT resulted within past year        Passed - Lipid panel within past 12 months     Lab Results   Component Value Date    CHOLEST 114 10/24/2023    TRIG 124 10/24/2023    HDL 51 10/24/2023    LDL 41 10/24/2023    VLDL 17 10/24/2023    NONHDLC 63 10/24/2023             Passed - In person appointment or virtual visit in the past 12 mos or appointment in next 3 mos     Recent Outpatient Visits              2 weeks ago Encounter for annual health examination    [facility] Sun Vega [facility]    Office Visit    3 months ago Acute cough    [facility] Sun Vega MD    Office Visit    3 months ago Type 2 diabetes mellitus with hyperglycemia, without long-term current use of insulin (HCC)    [facility] Zoe Black MD    Office Visit    6 months ago MILENA (obstructive sleep apnea)    [facility] Sun Vega MD    Office Visit    8 months ago Dyslipidemia    [facility] Zoe Black MD    Office Visit          Future Appointments         Provider Department Appt Notes    In 4 weeks Zoe Black MD [facility] Follow up visit

## 2024-06-24 DIAGNOSIS — E11.65 TYPE 2 DIABETES MELLITUS WITH HYPERGLYCEMIA, WITHOUT LONG-TERM CURRENT USE OF INSULIN (HCC): Chronic | ICD-10-CM

## 2024-06-24 DIAGNOSIS — F41.9 ANXIETY: ICD-10-CM

## 2024-06-26 NOTE — TELEPHONE ENCOUNTER
Please review. Protocol Failed; No Protocol      Recent fills: 3/28/2024, 5/1/2024, 6/1/2024  Last Rx written: 6/1/2024  Last office visit: 6/6/2024          Requested Prescriptions   Pending Prescriptions Disp Refills    LORazepam 1 MG Oral Tab 30 tablet 0     Sig: Take 1 tablet (1 mg total) by mouth nightly as needed.       Controlled Substance Medication Failed - 6/24/2024  2:01 PM        Failed - This medication is a controlled substance - forward to provider to refill               Future Appointments         Provider Department Appt Notes    In 3 weeks Zoe Black MD [facility] Follow up visit          Recent Outpatient Visits              2 weeks [facility]    3 months ago Acute cough    [facility]    Office Visit    3 months ago Type 2 diabetes mellitus with hyperglycemia, without long-term current use of insulin (HCC)    [facility] Zoe Black MD    Office Visit    6 months ago MILENA (obstructive sleep apnea)    [facility] Sun Vega MD    Office Visit    8 months ago Dyslipidemia    [facility] Zoe Black MD    Office Visit

## 2024-06-27 RX ORDER — LORAZEPAM 1 MG/1
1 TABLET ORAL NIGHTLY PRN
Qty: 30 TABLET | Refills: 0 | Status: SHIPPED | OUTPATIENT
Start: 2024-06-27

## 2024-06-28 ENCOUNTER — LAB ENCOUNTER (OUTPATIENT)
Dept: LAB | Age: 74
End: 2024-06-28
Attending: INTERNAL MEDICINE
Payer: MEDICARE

## 2024-06-28 DIAGNOSIS — Z12.5 ENCOUNTER FOR SCREENING FOR MALIGNANT NEOPLASM OF PROSTATE: ICD-10-CM

## 2024-06-28 DIAGNOSIS — E11.9 TYPE 2 DIABETES MELLITUS WITHOUT RETINOPATHY (HCC): ICD-10-CM

## 2024-06-28 LAB
ALBUMIN SERPL-MCNC: 4.5 G/DL (ref 3.2–4.8)
ALBUMIN/GLOB SERPL: 2 {RATIO} (ref 1–2)
ALP LIVER SERPL-CCNC: 44 U/L
ALT SERPL-CCNC: 33 U/L
ANION GAP SERPL CALC-SCNC: 5 MMOL/L (ref 0–18)
AST SERPL-CCNC: 23 U/L (ref ?–34)
BASOPHILS # BLD AUTO: 0.03 X10(3) UL (ref 0–0.2)
BASOPHILS NFR BLD AUTO: 0.9 %
BILIRUB SERPL-MCNC: 1.3 MG/DL (ref 0.2–1.1)
BUN BLD-MCNC: 9 MG/DL (ref 9–23)
BUN/CREAT SERPL: 11.3 (ref 10–20)
CALCIUM BLD-MCNC: 9.6 MG/DL (ref 8.7–10.4)
CHLORIDE SERPL-SCNC: 102 MMOL/L (ref 98–112)
CO2 SERPL-SCNC: 32 MMOL/L (ref 21–32)
COMPLEXED PSA SERPL-MCNC: 1.34 NG/ML (ref ?–4)
CREAT BLD-MCNC: 0.8 MG/DL
DEPRECATED RDW RBC AUTO: 48.7 FL (ref 35.1–46.3)
EGFRCR SERPLBLD CKD-EPI 2021: 93 ML/MIN/1.73M2 (ref 60–?)
EOSINOPHIL # BLD AUTO: 0.15 X10(3) UL (ref 0–0.7)
EOSINOPHIL NFR BLD AUTO: 4.4 %
ERYTHROCYTE [DISTWIDTH] IN BLOOD BY AUTOMATED COUNT: 15 % (ref 11–15)
FASTING STATUS PATIENT QL REPORTED: YES
GLOBULIN PLAS-MCNC: 2.3 G/DL (ref 2–3.5)
GLUCOSE BLD-MCNC: 121 MG/DL (ref 70–99)
HCT VFR BLD AUTO: 38.8 %
HGB BLD-MCNC: 12.4 G/DL
IMM GRANULOCYTES # BLD AUTO: 0.01 X10(3) UL (ref 0–1)
IMM GRANULOCYTES NFR BLD: 0.3 %
LYMPHOCYTES # BLD AUTO: 1.35 X10(3) UL (ref 1–4)
LYMPHOCYTES NFR BLD AUTO: 39.2 %
MCH RBC QN AUTO: 28.4 PG (ref 26–34)
MCHC RBC AUTO-ENTMCNC: 32 G/DL (ref 31–37)
MCV RBC AUTO: 88.8 FL
MONOCYTES # BLD AUTO: 0.51 X10(3) UL (ref 0.1–1)
MONOCYTES NFR BLD AUTO: 14.8 %
NEUTROPHILS # BLD AUTO: 1.39 X10 (3) UL (ref 1.5–7.7)
NEUTROPHILS # BLD AUTO: 1.39 X10(3) UL (ref 1.5–7.7)
NEUTROPHILS NFR BLD AUTO: 40.4 %
OSMOLALITY SERPL CALC.SUM OF ELEC: 288 MOSM/KG (ref 275–295)
PLATELET # BLD AUTO: 163 10(3)UL (ref 150–450)
POTASSIUM SERPL-SCNC: 4.4 MMOL/L (ref 3.5–5.1)
PROT SERPL-MCNC: 6.8 G/DL (ref 5.7–8.2)
RBC # BLD AUTO: 4.37 X10(6)UL
SODIUM SERPL-SCNC: 139 MMOL/L (ref 136–145)
TSI SER-ACNC: 2.04 MIU/ML (ref 0.55–4.78)
WBC # BLD AUTO: 3.4 X10(3) UL (ref 4–11)

## 2024-06-28 PROCEDURE — 85025 COMPLETE CBC W/AUTO DIFF WBC: CPT

## 2024-06-28 PROCEDURE — 84443 ASSAY THYROID STIM HORMONE: CPT

## 2024-06-28 PROCEDURE — 36415 COLL VENOUS BLD VENIPUNCTURE: CPT

## 2024-06-28 PROCEDURE — 80053 COMPREHEN METABOLIC PANEL: CPT

## 2024-07-05 DIAGNOSIS — I10 ESSENTIAL HYPERTENSION: ICD-10-CM

## 2024-07-10 RX ORDER — AMLODIPINE BESYLATE 10 MG/1
10 TABLET ORAL DAILY
Qty: 90 TABLET | Refills: 3 | Status: SHIPPED | OUTPATIENT
Start: 2024-07-10

## 2024-07-10 NOTE — TELEPHONE ENCOUNTER
Please Review. Protocol Failed; No Protocol   BP Readings from Last 1 Encounters:   06/20/24 146/69       Requested Prescriptions   Pending Prescriptions Disp Refills    AMLODIPINE 10 MG Oral Tab [Pharmacy Med Name: AMLODIPINE BESYLATE 10 MG Tablet] 90 tablet 3     Sig: TAKE 1 TABLET EVERY DAY       Hypertension Medications Protocol Failed - 7/5/2024  8:40 PM        Failed - Last BP reading less than 140/90     BP Readings from Last 1 Encounters:   06/20/24 146/69               Passed - CMP or BMP in past 12 months        Passed - In person appointment or virtual visit in the past 12 mos or appointment in next 3 mos     Recent Outpatient Visits              1 month ago Encounter for annual health examination    [facility] Sun Horne MD    Office Visit    3 months ago [facility]    [facility] Visit    3 months ago Type 2 diabetes mellitus with hyperglycemia, without long-term current use of insulin (HCC)    [facility]    Office Visit    7 months ago MILENA (obstructive sleep apnea)    [facility] Sun Horne MD    Office Visit    8 months ago Dyslipidemia    [facility]    Office Visit          Future Appointments         Provider Department Appt Notes    In 1 week Zoe Black MD [facility] Follow up visit                    Passed - EGFRCR or GFRNAA > 50     GFR Evaluation  EGFRCR: 93 , resulted on 6/28/2024                 Future Appointments         Provider Department Appt Notes    In 1 week Zoe Black MD [facility] Follow up visit          Recent Outpatient Visits              1 month ago Encounter for annual  health examination    [facility], Sun Horne MD    Office Visit    3 months ago Acute cough    [facility], Sun Horne MD    Office Visit    3 months ago Type 2 diabetes mellitus with hyperglycemia, without long-term current use of insulin (HCC)    [facility], [facility], Zoe Lynch MD    Office Visit    7 months ago MILENA (obstructive sleep apnea)    [facility], Sun Horne MD    Office Visit    8 months ago Dyslipidemia    [facility], [facility], Zoe Lynch MD    Office Visit

## 2024-07-16 ENCOUNTER — OFFICE VISIT (OUTPATIENT)
Dept: ORTHOPEDICS CLINIC | Facility: CLINIC | Age: 74
End: 2024-07-16
Payer: MEDICARE

## 2024-07-16 ENCOUNTER — HOSPITAL ENCOUNTER (OUTPATIENT)
Dept: GENERAL RADIOLOGY | Facility: HOSPITAL | Age: 74
Discharge: HOME | End: 2024-07-16
Attending: ORTHOPAEDIC SURGERY
Payer: MEDICARE

## 2024-07-16 VITALS
HEART RATE: 51 BPM | SYSTOLIC BLOOD PRESSURE: 152 MMHG | WEIGHT: 170 LBS | DIASTOLIC BLOOD PRESSURE: 66 MMHG | BODY MASS INDEX: 25.76 KG/M2 | HEIGHT: 68 IN

## 2024-07-16 DIAGNOSIS — M17.12 PRIMARY OSTEOARTHRITIS OF LEFT KNEE: Primary | ICD-10-CM

## 2024-07-16 DIAGNOSIS — M25.462 EFFUSION, LEFT KNEE: ICD-10-CM

## 2024-07-16 DIAGNOSIS — M25.562 LEFT KNEE PAIN, UNSPECIFIED CHRONICITY: ICD-10-CM

## 2024-07-16 PROCEDURE — 1160F RVW MEDS BY RX/DR IN RCRD: CPT | Performed by: ORTHOPAEDIC SURGERY

## 2024-07-16 PROCEDURE — 20610 DRAIN/INJ JOINT/BURSA W/O US: CPT | Performed by: ORTHOPAEDIC SURGERY

## 2024-07-16 PROCEDURE — 73562 X-RAY EXAM OF KNEE 3: CPT | Performed by: ORTHOPAEDIC SURGERY

## 2024-07-16 PROCEDURE — 3077F SYST BP >= 140 MM HG: CPT | Performed by: ORTHOPAEDIC SURGERY

## 2024-07-16 PROCEDURE — 99214 OFFICE O/P EST MOD 30 MIN: CPT | Performed by: ORTHOPAEDIC SURGERY

## 2024-07-16 PROCEDURE — 3008F BODY MASS INDEX DOCD: CPT | Performed by: ORTHOPAEDIC SURGERY

## 2024-07-16 PROCEDURE — 1159F MED LIST DOCD IN RCRD: CPT | Performed by: ORTHOPAEDIC SURGERY

## 2024-07-16 PROCEDURE — 3078F DIAST BP <80 MM HG: CPT | Performed by: ORTHOPAEDIC SURGERY

## 2024-07-16 PROCEDURE — 1125F AMNT PAIN NOTED PAIN PRSNT: CPT | Performed by: ORTHOPAEDIC SURGERY

## 2024-07-16 RX ORDER — TRIAMCINOLONE ACETONIDE 40 MG/ML
40 INJECTION, SUSPENSION INTRA-ARTICULAR; INTRAMUSCULAR ONCE
Status: COMPLETED | OUTPATIENT
Start: 2024-07-16 | End: 2024-07-16

## 2024-07-16 RX ADMIN — TRIAMCINOLONE ACETONIDE 40 MG: 40 INJECTION, SUSPENSION INTRA-ARTICULAR; INTRAMUSCULAR at 14:09:00

## 2024-07-16 NOTE — PROGRESS NOTES
Per verbal order from Dr. Stanley, draw up 5ml of 0.5% Marcaine and 1ml of Kenalog 40 for cortisone injection to left knee Urszula Fuentes  Patient provided education handout for cortisone injection.

## 2024-07-16 NOTE — H&P
NURSING INTAKE COMMENTS:   Chief Complaint   Patient presents with    Knee Pain     Consult - Referred by Dr. Vega for swelling in Left knee. Patient rates his pain 9/10 at this time. He states that it's difficult for him to walk.       HPI: This 74 year old male presents today with his wife for acute left knee pain and swelling.  He has known osteoarthritis of both knees.  He was scheduled to have the left knee replaced by Dr. Boudreaux but his cardiac workup became positive and he is now following up with a cardiologist.  Knee replacement has been canceled for now.  [place]  He has no known history of gout.  His left leg swelling as well as the left knee but there is no asymmetric warmth.  He will see the cardiologist later this week or next week to get the final opinion in terms of whether he needs heart surgery or not.  He denies fever, chills, or sweats.    Past Medical History:    Anxiety    Arthritis    Atherosclerosis of coronary artery    Colon adenomas    Colon adenomas    x5    Coronary atherosclerosis    Depression    Diabetes (HCC)    Esophageal reflux    Essential hypertension    High blood pressure    High cholesterol    History of blood transfusion    12 YRS AGO, NO REACTIONS    Hyperlipidemia    Sleep apnea    Visual impairment     Past Surgical History:   Procedure Laterality Date    Bypass surgery  05/2009    Cabg      CABG x 5    Colonoscopy      Colonoscopy N/A 10/28/2019    Procedure: COLONOSCOPY;  Surgeon: Jack Son MD;  Location: [facility] ENDOSCOPY    Colonoscopy      Colonoscopy N/A 3/21/2023    Procedure: COLONOSCOPY;  Surgeon: Jack Son MD;  Location: [facility]    Other surgical history      deviated septum repair    Other surgical history Left     Sinus surgery        Upper gi endoscopy,exam       Current Outpatient Medications   Medication Sig Dispense Refill    amLODIPine 10 MG Oral Tab Take 1 tablet (10 mg total) by mouth daily. 90  tablet 3    LORazepam 1 MG Oral Tab Take 1 tablet (1 mg total) by mouth nightly as needed. 30 tablet 0    atorvastatin 40 MG Oral Tab Take 1 tablet (40 mg total) by mouth nightly. 90 tablet 3    glimepiride 2 MG Oral Tab Take 1 tablet (2 mg total) by mouth in the morning and 1 tablet (2 mg total) before bedtime. 180 tablet 0    metFORMIN HCl 1000 MG Oral Tab TAKE 1 TABLET TWICE DAILY WITH MEALS 180 tablet 3    clopidogrel 75 MG Oral Tab Take 1 tablet (75 mg total) by mouth daily. 30 tablet 11    Blood Glucose Monitoring Suppl (TRUE METRIX AIR GLUCOSE METER) w/Device Does not apply Kit 1 kit As Directed. 1 kit 0    omega-3-acid ethyl esters 1 g Oral Cap Take 1 capsule (1 g total) by mouth 3 (three) times daily. 270 capsule 3    traMADol 50 MG Oral Tab Take 1-2 tablets ( mg total) by mouth daily.      Valsartan-hydroCHLOROthiazide 320-25 MG Oral Tab Take 1 tablet by mouth daily. 90 tablet 3    SITagliptin Phosphate (JANUVIA) 100 MG Oral Tab Take 1 tablet (100 mg total) by mouth daily. 90 tablet 3    metoprolol succinate ER 25 MG Oral Tablet 24 Hr Take 1 tablet (25 mg total) by mouth daily. 90 tablet 3    triamcinolone 0.1 % External Cream Apply 1 Application. topically 2 (two) times daily. APPLY TO AFFECTED AREA      Blood Glucose Calibration (TRUE METRIX LEVEL 1) Low In Vitro Solution 1 each by In Vitro route as needed. 1 each 0    Glucose Blood In Vitro Strip 2 each by Other route 2 (two) times a day. 200 strip 3    Lancets 33G Does not apply Misc 1 each 2 (two) times a day. 200 each 3    ferrous sulfate 325 (65 FE) MG Oral Tab EC Take 1 tablet (325 mg total) by mouth daily with breakfast.      aspirin 81 MG Oral Tab Take 1 tablet (81 mg total) by mouth daily.      Multiple Vitamins-Minerals (MULTIVITAMIN ADULT OR) Take 1 tablet by mouth daily.      COENZYME Q-10 OR Take 1 tablet by mouth daily.       No Known Allergies  Family History   Problem Relation Age of Onset    Cancer Father 64        Lymph node CA     Heart Disorder Mother         MI    Hypertension Mother     Diabetes Neg     Glaucoma Neg     Macular degeneration Neg      No family Hx of DVT/PE    Social History     Occupational History    Not on file   Tobacco Use    Smoking status: Former     Current packs/day: 0.00     Average packs/day: 0.5 packs/day for 25.0 years (12.5 ttl pk-yrs)     Types: Cigarettes     Start date: 1983     Quit date: 2008     Years since quittin.5    Smokeless tobacco: Never   Vaping Use    Vaping status: Never Used   Substance and Sexual Activity    Alcohol use: Yes     Alcohol/week: 3.0 standard drinks of alcohol     Types: 3 Standard drinks or equivalent per week     Comment: 2-3 drinks/week    Drug use: No    Sexual activity: Not on file        Review of Systems:  GENERAL: feels generally well, no significant weight loss or weight gain  SKIN: no ulcerated or worrisome skin lesions  EYES:denies blurred vision or double vision  HEENT: denies new nasal congestion, sinus pain or ST  LUNGS: denies shortness of breath  CARDIOVASCULAR: denies chest pain  GI: no hematemesis, no worsening heartburn, no diarrhea  : no dysuria, no blood in urine, no difficulty urinating, no incontinence  MUSCULOSKELETAL: no other musculoskeletal complaints other than in HPI  NEURO: no numbness or tingling, no weakness or balance disorder  PSYCHE: no depression or anxiety  HEMATOLOGIC: no hx of blood dyscrasia, no Hx DVT/PE  ENDOCRINE: no thyroid or diabetes issues  ALL/ASTHMA: no new hx of severe allergy or asthma    Physical Examination:    Ht 5' 8\" (1.727 m)   Wt 170 lb (77.1 kg)   BMI 25.85 kg/m²   Constitutional: appears well hydrated, alert and responsive, no acute distress noted  Extremities: Left knee with moderate effusion but no asymmetric warmth.  Skin healthy on the knee.  Skin pretty healthy on the leg with 1+ pitting edema.  No calf tenderness.  Musculoskeletal: Motion of both knees was 10 to 130 degrees.  No instability.  Medial  joint line tenderness left worse than right.  Neurological: Normal motor and sensory function bilateral lower extremities.    Imaging: X-rays of the knees show bilateral bone-on-bone medial osteoarthritis.  The right medial tibial plateau had a subchondroplasty previously.      CTA GATED BYPASS GRAFT NO CALCIUM SCORING (CPT=75574)    Result Date: 6/20/2024  Table formatting from the original result was not included. PROCEDURE:  CTA GATED BYPASS GRAFT NO CALCIUM SCORING (CPT=75574) DATE:    6/20/24 COMPARISON:     CT CARDIAC OVER READ 06/20/2024 [id number] Final INDICATIONS:  S/P CABG x 5, Arteriosclerosis of aorta (HCC) TECHNIQUE: The patient was placed supine on the multidetector CT table. Axial sections were obtained before and after administration of intravenous contrast. IV contrast was used for the study. Images were analyzed and reconstructed in axial, 2 and 3-dimensional format. Images are performed on a narrow field of view to maximize vascular imaging. See the Radiologist over-read for evaluation of non-vascular structures. Dose reduction techniques were used. Dose information is transmitted to the ACR (American College of Radiology) NRDR (National Radiology Data Registry) which includes the Dose Index Registry. STUDY QUALITY:  Good with vessels visualized to the crux of the heart LIMITATIONS:   Coronary calcifications may decrease accuracy of stenosis assessment EXAM FORM: CT Scanner:   [employer] One 320 Slice Scanner Contrast Given:  Isovue 370 Contrast Amount (cc):  100 Dose (msv):   8.995 BMI:    25 Heart Rate (bpm):  50 Medication Used:  Nitroglycerin Sublingual (mg): 0.4     Metoprolol IV (mg):     Metoprolol Oral (mg): 50     Diltiazem IV (mg):     Diltiazem Oral (mg):     Cardizem IV (mg):     Cardizem Oral (mg):     Corlanor Oral (mg):     Xanax Oral (mg):   FINDINGS: LEFT VENTRICLE:  Chamber size is normal. Wall thickness is normal. Myocardium is normal. LEFT ATRIUM:  Chamber size is  mildly enlarged. Attachment is normal. The left arterial appendage is well seen. There is no atrial or appendage thrombus. INTRA ATRIAL SEPTUM: Normal appearance RIGHT ATRIAL:  Chamber size is normal. Appearance is normal. RIGHT VENTRICLE:  Chamber size is normal. Appearance is normal. INTRA VENTR SEPTUM: Normal appearance. AORTIC VALVE:  Trileaflet and mildly calcified MITRAL VALVE:  Normal appearance. PERICARDIUM:  Normal appearance. PULMONARY ARTERIES: Inadequately visualized due to contrast bolus timing. PULMONARY VEINS: Four pulmonary veins return normally to left atrium.  AORTA:   Visualized segments of ascending and descending aorta are normal caliber. CORONARY ARTERIES: Dominance Right. Origin with no anomalies. Left main no stenosis or plaque. Left anterior descending artery is normal size. There are 2 major diagonal branches.  Left anterior descending artery demonstrates proximal calcified plaque stenosis of 40 to 60% followed by a long calcified plaque stenosis that appears significant with narrowing greater than 70% extending to takeoff of the first diagonal.  No dissection is seen. Diagonal branches demonstrate a significant proximal calcified plaque stenosis in the first diagonal branch.  The distal diagonal 1 fills via a patent vein graft Circumflex is normal size. There are 3 major marginal branches.  Circumflex demonstrates moderate proximal disease with 100% mid vessel occlusion.  Distal vessel fills via collaterals. Marginal branches demonstrate at least moderate calcified plaque stenosis of the origin of the first obtuse marginal branch.. Right coronary artery is normal size. Right coronary artery demonstrates significant proximal and mid vessel calcified plaque stenosis.  There is filling of the distal vessel.  PDA demonstrates a mid vessel calcified plaque stenosis of 40 to 60%.  There is a patent graft attached to the proximal PDA with no significant narrowing and also fills the PDA.  Posterolateral branches demonstrate no stenosis or plaque. Graft arteriography LIMA graft is not patent Vein graft to the first diagonal is patent with no significant narrowing Vein graft to the PDA is patent with plaquing and no significant stenosis CONCLUSION: 1.  Severe native coronary disease with significant stenosis in the proximal LAD with no evidence of dissection 2.  Patent vein graft to the first diagonal with significant narrowing in the proximal first diagonal 3.  Patent graft to the PDA that retrograde fills posterolateral branch and distal RCA 4.  Occluded mid circumflex that fills via collaterals 5.  Mildly calcified trileaflet aortic valve with left atrial enlargement See the Radiologist report for over-read of non-vascular structures. Jack York MD 6/20/2024 11:25 AM     CT CARDIAC OVER READ    Result Date: 6/20/2024  PROCEDURE: CT CARDIAC OVER READ  COMPARISON: [facility], CT CHEST (CPT=71250), 5/18/2020, 2:37 PM.  [facility], CT CHEST (CPT=71250), 7/31/2023, 1:42 PM.  INDICATIONS: Arteriosclerosis of aorta, post CABG x 5  TECHNIQUE:   CT coronary artery study was performed.  The raw data from that study was reprocessed into images of the mediastinum and lung fields in the area that was scanned.  Automated exposure control for dose reduction was used.  Adjustment of the mA  and/or kV was done based on the patient's size. Use of iterative reconstruction technique for dose reduction was used.  Dose information is transmitted to the ACR (American College of Radiology) NRDR (National Radiology Data Registry) which includes the  Dose Index Registry. The CTA Coronary Arteries will be reported separately by the cardiologist.   FINDINGS:    CARDIAC: Please see the cardiologist report for further details.  Coronary artery calcification with coronary artery bypass. VASCULATURE: No evidence of thoracic aortic aneurysm or dissection.  Mild aortic annular  calcifications with additional mild scattered thoracic aortic atherosclerosis. LUNGS/PLEURA: No airspace consolidation, pleural effusion, or pneumothorax is detected in the imaged region.  There is dependent subsegmental atelectasis bilaterally.  Stable 6 mm subpleural ground-glass density nodule in the left upper lobe (series 12, image 12).  Tiny other ground-glass density nodules in the left upper lobe are unchanged.  Dominant ill-defined 1.6 x 1.4 cm ground-glass density nodule in the right upper lobe is unchanged.  Stable associated 0.6 cm satellite ground-glass density nodule  in the right upper lobe.  Other scattered subcentimeter right upper lobe ground-glass density nodules appear grossly unchanged (series 12, images 17, 19, 25, and 44 for instance).  Stable small 5 mm perivascular ground-glass density nodule in the right lower lobe (series 12, image 66). AIRWAYS: Secretions or debris in the distal trachea near the right mainstem bronchus. MEDIASTINUM/JAY: No mass or lymphadenopathy within the imaged volume.  CHEST WALL: No gross abnormalities are detected in the imaged volume.  LIMITED ABDOMEN: Partially imaged well-circumscribed 2 cm low-attenuation liver lesion. BONES: Demineralization with diffuse idiopathic skeletal hyperostosis.  Stable bone island in the T10 vertebral body. OTHER: Scattered heterogeneous low-density thyroid nodules, which measure up to 1.2 cm in the left lobe.   Please see separate report for CTA Coronary Arteries.         CONCLUSION:  1. Cardiac CT over read performed. 2. Multiple ground-glass density nodules throughout the right greater than left lungs as detailed.  Most notable is a dominant 1.6 cm ground-glass density nodule in the right upper lobe, which is unchanged since most recent chest CT from July, 2023, but has increased in size since index chest CT from May, 2020. Continued 12 surveillance imaging is recommended as ground-glass density nodules typically require 5 years  of stability to confirm benignity. 3. No evidence of thoracic aortic aneurysm or dissection. 4. Scattered heterogeneous low attenuation thyroid nodules which measure up to 1 2 cm in the left lobe.  Thyroid nodules less than 1.5 cm on cross-sectional imaging typically require no additional follow-up. 5. Stable probable hepatic cyst.    [id number]  Dictated by (CST): Epifanio Marino MD on 6/20/2024 at 9:20 AM     Finalized by (CST): Epifanio Marino MD on 6/20/2024 at 9:29 AM             Lab Results   Component Value Date    WBC 3.4 (L) 06/28/2024    HGB 12.4 (L) 06/28/2024    .0 06/28/2024      Lab Results   Component Value Date     (H) 06/28/2024    BUN 9 06/28/2024    CREATSERUM 0.80 06/28/2024    GFRNAA 81 05/04/2022    GFRAA 94 05/04/2022        Assessment and Plan:  Diagnoses and all orders for this visit:    Primary osteoarthritis of left knee  -     Arthrocentesis aspiration and injection major Left joint bursa w/o US  -     triamcinolone acetonide (Kenalog-40) 40 MG/ML injection 40 mg    Left knee pain, unspecified chronicity  -     XR KNEE (3 VIEWS), LEFT (CPT=73562); Future  -     Arthrocentesis aspiration and injection major Left joint bursa w/o US  -     triamcinolone acetonide (Kenalog-40) 40 MG/ML injection 40 mg    Effusion, left knee  -     Arthrocentesis aspiration and injection major Left joint bursa w/o US  -     triamcinolone acetonide (Kenalog-40) 40 MG/ML injection 40 mg        Assessment: Osteoarthritis with current effusion and left leg swelling.  Left knee replacement on hold due to cardiac issues.    Plan: I told him I would be happy to replace his left knee once he gets cardiac clearance.  Because of the current pain and difficulty ambulating however I recommended aspiration and injection of cortisone.  Aspiration of the Baker's cyst area was negative.  Aspiration of the knee yielded 20 cc normal joint fluid.  I injected bupivacaine 0.5% 5 cc and Kenalog 1 cc without  issue.    If his cardiac issues allow him to have surgery, I told him to come back in 2 months and we can start setting up left knee replacement for later this year.  I told him he would need to wait at least 3 months from the cortisone injection to have the actual surgery.    Follow Up: No follow-ups on file.    Denny Stanley MD

## 2024-07-17 ENCOUNTER — LAB ENCOUNTER (OUTPATIENT)
Dept: LAB | Age: 74
End: 2024-07-17
Attending: INTERNAL MEDICINE
Payer: MEDICARE

## 2024-07-17 DIAGNOSIS — Z01.818 PREOP EXAMINATION: Primary | ICD-10-CM

## 2024-07-17 LAB
ANION GAP SERPL CALC-SCNC: 6 MMOL/L (ref 0–18)
BASOPHILS # BLD AUTO: 0.01 X10(3) UL (ref 0–0.2)
BASOPHILS NFR BLD AUTO: 0.1 %
BUN BLD-MCNC: 16 MG/DL (ref 9–23)
BUN/CREAT SERPL: 18.6 (ref 10–20)
CALCIUM BLD-MCNC: 10.1 MG/DL (ref 8.7–10.4)
CHLORIDE SERPL-SCNC: 97 MMOL/L (ref 98–112)
CO2 SERPL-SCNC: 28 MMOL/L (ref 21–32)
CREAT BLD-MCNC: 0.86 MG/DL
DEPRECATED RDW RBC AUTO: 44.7 FL (ref 35.1–46.3)
EGFRCR SERPLBLD CKD-EPI 2021: 91 ML/MIN/1.73M2 (ref 60–?)
EOSINOPHIL # BLD AUTO: 0 X10(3) UL (ref 0–0.7)
EOSINOPHIL NFR BLD AUTO: 0 %
ERYTHROCYTE [DISTWIDTH] IN BLOOD BY AUTOMATED COUNT: 14.3 % (ref 11–15)
FASTING STATUS PATIENT QL REPORTED: NO
GLUCOSE BLD-MCNC: 210 MG/DL (ref 70–99)
HCT VFR BLD AUTO: 34.2 %
HGB BLD-MCNC: 11.3 G/DL
IMM GRANULOCYTES # BLD AUTO: 0.02 X10(3) UL (ref 0–1)
IMM GRANULOCYTES NFR BLD: 0.3 %
LYMPHOCYTES # BLD AUTO: 1.28 X10(3) UL (ref 1–4)
LYMPHOCYTES NFR BLD AUTO: 18.8 %
MCH RBC QN AUTO: 28.7 PG (ref 26–34)
MCHC RBC AUTO-ENTMCNC: 33 G/DL (ref 31–37)
MCV RBC AUTO: 86.8 FL
MONOCYTES # BLD AUTO: 0.35 X10(3) UL (ref 0.1–1)
MONOCYTES NFR BLD AUTO: 5.1 %
NEUTROPHILS # BLD AUTO: 5.16 X10 (3) UL (ref 1.5–7.7)
NEUTROPHILS # BLD AUTO: 5.16 X10(3) UL (ref 1.5–7.7)
NEUTROPHILS NFR BLD AUTO: 75.7 %
OSMOLALITY SERPL CALC.SUM OF ELEC: 279 MOSM/KG (ref 275–295)
PLATELET # BLD AUTO: 204 10(3)UL (ref 150–450)
POTASSIUM SERPL-SCNC: 4.3 MMOL/L (ref 3.5–5.1)
RBC # BLD AUTO: 3.94 X10(6)UL
SODIUM SERPL-SCNC: 131 MMOL/L (ref 136–145)
WBC # BLD AUTO: 6.8 X10(3) UL (ref 4–11)

## 2024-07-17 PROCEDURE — 36415 COLL VENOUS BLD VENIPUNCTURE: CPT

## 2024-07-17 PROCEDURE — 80048 BASIC METABOLIC PNL TOTAL CA: CPT

## 2024-07-17 PROCEDURE — 85025 COMPLETE CBC W/AUTO DIFF WBC: CPT

## 2024-07-17 NOTE — H&P
The above referenced H&P was reviewed by Pito Mccarty MD on 7/25/2024, the patient was examined and no significant changes have occurred in the patient's condition since the H&P was performed.  Risks and benefits were discussed, proceed with procedure as planned.

## 2024-07-19 ENCOUNTER — OFFICE VISIT (OUTPATIENT)
Dept: ENDOCRINOLOGY CLINIC | Facility: CLINIC | Age: 74
End: 2024-07-19
Payer: MEDICARE

## 2024-07-19 VITALS
DIASTOLIC BLOOD PRESSURE: 88 MMHG | HEART RATE: 60 BPM | WEIGHT: 176 LBS | HEIGHT: 68 IN | BODY MASS INDEX: 26.67 KG/M2 | SYSTOLIC BLOOD PRESSURE: 138 MMHG

## 2024-07-19 DIAGNOSIS — E11.65 TYPE 2 DIABETES MELLITUS WITH HYPERGLYCEMIA, WITHOUT LONG-TERM CURRENT USE OF INSULIN (HCC): Primary | ICD-10-CM

## 2024-07-19 DIAGNOSIS — E78.5 DYSLIPIDEMIA: ICD-10-CM

## 2024-07-19 LAB
GLUCOSE BLOOD: 157
HEMOGLOBIN A1C: 6.4 % (ref 4.3–5.6)
TEST STRIP LOT #: NORMAL NUMERIC

## 2024-07-19 PROCEDURE — 83036 HEMOGLOBIN GLYCOSYLATED A1C: CPT | Performed by: INTERNAL MEDICINE

## 2024-07-19 PROCEDURE — 99213 OFFICE O/P EST LOW 20 MIN: CPT | Performed by: INTERNAL MEDICINE

## 2024-07-19 PROCEDURE — 1159F MED LIST DOCD IN RCRD: CPT | Performed by: INTERNAL MEDICINE

## 2024-07-19 PROCEDURE — 82947 ASSAY GLUCOSE BLOOD QUANT: CPT | Performed by: INTERNAL MEDICINE

## 2024-07-19 PROCEDURE — 3008F BODY MASS INDEX DOCD: CPT | Performed by: INTERNAL MEDICINE

## 2024-07-19 PROCEDURE — 1160F RVW MEDS BY RX/DR IN RCRD: CPT | Performed by: INTERNAL MEDICINE

## 2024-07-19 PROCEDURE — 3079F DIAST BP 80-89 MM HG: CPT | Performed by: INTERNAL MEDICINE

## 2024-07-19 PROCEDURE — 3044F HG A1C LEVEL LT 7.0%: CPT | Performed by: INTERNAL MEDICINE

## 2024-07-19 PROCEDURE — 3075F SYST BP GE 130 - 139MM HG: CPT | Performed by: INTERNAL MEDICINE

## 2024-07-19 RX ORDER — CALCIUM CITRATE/VITAMIN D3 200MG-6.25
1 TABLET ORAL DAILY
Qty: 100 STRIP | Refills: 0 | Status: SHIPPED | OUTPATIENT
Start: 2024-07-19

## 2024-07-19 RX ORDER — VALSARTAN 320 MG/1
320 TABLET ORAL DAILY
COMMUNITY
Start: 2024-07-18

## 2024-07-19 RX ORDER — GLIMEPIRIDE 2 MG/1
2 TABLET ORAL
COMMUNITY

## 2024-07-19 NOTE — PROGRESS NOTES
FU VISIT:     CHIEF COMPLAINT:    Chief Complaint   Patient presents with    Diabetes        HISTORY OF PRESENT ILLNESS:   Avinash Dumont is a 74 year old male who is here to FU  for DM.     DM HISTORY  Diagnosed:  Around age 61      HISTORY OF DIABETES COMPLICATIONS: :  History of Retinopathy: denies - last eye exam : , Follows with Dr. Jakob Freedman  History of Neuropathy: denies  History of Nephropathy: denies    ASSOCIATED COMPLICATIONS:   HTN: yes  Hyperlipidemia: yes  Coronary Artery Disease:  Yes  Cerebrovascular Disease: denies      HOME GLUCOSE READINGS:   Fastin-130    CURRENT DIABETIC MEDICATIONS INCLUDE:  MTF 1000 mg BID  Januvia 100 mg daily   Amaryl 2 mg BF and 2 mg dinner    T/f jardiance due to  SE    MEALS:  Three meals a day   Mostly eats at home   Moderate compliance    EXERCISE:  Daily , goes to the gym       CURRENT MEDICATIONS:    Current Outpatient Medications   Medication Sig Dispense Refill    valsartan 320 MG Oral Tab Take 1 tablet (320 mg total) by mouth daily.      Glucose Blood (TRUE METRIX BLOOD GLUCOSE TEST) In Vitro Strip 1 each by In Vitro route daily. 100 strip 0    glimepiride 2 MG Oral Tab Take 1 tablet (2 mg total) by mouth daily with breakfast.      amLODIPine 10 MG Oral Tab Take 1 tablet (10 mg total) by mouth daily. 90 tablet 3    LORazepam 1 MG Oral Tab Take 1 tablet (1 mg total) by mouth nightly as needed. 30 tablet 0    atorvastatin 40 MG Oral Tab Take 1 tablet (40 mg total) by mouth nightly. 90 tablet 3    metFORMIN HCl 1000 MG Oral Tab TAKE 1 TABLET TWICE DAILY WITH MEALS 180 tablet 3    clopidogrel 75 MG Oral Tab Take 1 tablet (75 mg total) by mouth daily. 30 tablet 11    Blood Glucose Monitoring Suppl (TRUE METRIX AIR GLUCOSE METER) w/Device Does not apply Kit 1 kit As Directed. 1 kit 0    omega-3-acid ethyl esters 1 g Oral Cap Take 1 capsule (1 g total) by mouth 3 (three) times daily. 270 capsule 3    traMADol 50 MG Oral Tab Take 1-2 tablets ( mg total)  by mouth daily.      SITagliptin Phosphate (JANUVIA) 100 MG Oral Tab Take 1 tablet (100 mg total) by mouth daily. 90 tablet 3    metoprolol succinate ER 25 MG Oral Tablet 24 Hr Take 1 tablet (25 mg total) by mouth daily. 90 tablet 3    triamcinolone 0.1 % External Cream Apply 1 Application. topically 2 (two) times daily. APPLY TO AFFECTED AREA      Blood Glucose Calibration (TRUE METRIX LEVEL 1) Low In Vitro Solution 1 each by In Vitro route as needed. 1 each 0    Glucose Blood In Vitro Strip 2 each by Other route 2 (two) times a day. 200 strip 3    Lancets 33G Does not apply Misc 1 each 2 (two) times a day. 200 each 3    ferrous sulfate 325 (65 FE) MG Oral Tab EC Take 1 tablet (325 mg total) by mouth daily with breakfast.      aspirin 81 MG Oral Tab Take 1 tablet (81 mg total) by mouth daily.      Multiple Vitamins-Minerals (MULTIVITAMIN ADULT OR) Take 1 tablet by mouth daily.      COENZYME Q-10 OR Take 1 tablet by mouth daily.         PAST MEDICAL HISTORY:   Past Medical History:    Anxiety    Arthritis    Atherosclerosis of coronary artery    Colon adenomas    Colon adenomas    x5    Coronary atherosclerosis    Depression    Diabetes (HCC)    Esophageal reflux    Essential hypertension    High blood pressure    High cholesterol    History of blood transfusion    12 YRS AGO, NO REACTIONS    Hyperlipidemia    Sleep apnea    Visual impairment       PAST SURGICAL HISTORY:   Past Surgical History:   Procedure Laterality Date    Bypass surgery  05/2009    Cabg      CABG x 5    Colonoscopy      Colonoscopy N/A 10/28/2019    Procedure: COLONOSCOPY;  Surgeon: Jack Son MD;  Location: [facility] ENDOSCOPY    Colonoscopy      Colonoscopy N/A 3/21/2023    Procedure: COLONOSCOPY;  Surgeon: Jack Son MD;  Location: [facility]    Other surgical history      deviated septum repair    Other surgical history Left     Sinus surgery        Upper gi endoscopy,exam         ALLERGIES:  No Known Allergies    SOCIAL HISTORY:     Social History     Socioeconomic History    Marital status:    Tobacco Use    Smoking status: Former     Current packs/day: 0.00     Average packs/day: 0.5 packs/day for 25.0 years (12.5 ttl pk-yrs)     Types: Cigarettes     Start date: 1983     Quit date: 2008     Years since quittin.5    Smokeless tobacco: Never   Vaping Use    Vaping status: Never Used   Substance and Sexual Activity    Alcohol use: Yes     Alcohol/week: 3.0 standard drinks of alcohol     Types: 3 Standard drinks or equivalent per week     Comment: 2-3 drinks/week    Drug use: No       FAMILY HISTORY:   Family History   Problem Relation Age of Onset    Cancer Father 64        Lymph node CA    Heart Disorder Mother         MI    Hypertension Mother     Diabetes Neg     Glaucoma Neg     Macular degeneration Neg        ASSESSMENTS:        REVIEW OF SYSTEMS:  Constitutional: Negative for: weight change, fever, fatigue, cold/heat intolerance  Eyes: Negative for:  Visual changes, proptosis, blurring, floaters, poor night vision, impaired color vision  ENT: Negative for:  dysphagia, neck swelling, dysphonia  Respiratory: Negative for:  dyspnea, cough  Cardiovascular: Negative for:  chest pain, palpitations, orthopnea  GI: Negative for:  abdominal pain, nausea, vomiting, diarrhea, constipation, bleeding  Neurology: Negative for: headache, numbness, weakness,   Genito-Urinary: Negative for: dysuria, frequency  Psychiatric: Negative for:  depression, anxiety  Hematology/Lymphatics: Negative for: bruising, lower extremity edema  Endocrine: Negative for: polyuria, polydypsia  Skin: Negative for: rash, blister,      PHYSICAL EXAM:   Vitals:    24 1003   BP: 138/88   Pulse: 60   Weight: 176 lb (79.8 kg)   Height: 5' 8\" (1.727 m)     BMI: Body mass index is 26.76 kg/m².         General Appearance:  alert, well developed, in no acute distress  Head: Atraumatic  Eyes:  normal conjunctivae, sclera., normal sclera and normal  pupils  Throat/Neck: normal sound to voice. Normal hearing, normal speech  Respiratory:  Speaking in full sentences, non-labored. no increased work of breathing, no audible wheezing    Neuro: motor grossly intact, moving all extremities without difficulty  Psychiatric:  oriented to time, self, and place  Extremities: 10/2023  Bilateral barefoot skin diabetic exam is normal, visualized feet and the appearance is normal.  Bilateral monofilament/sensation of both feet is normal.  Pulsation pedal pulse exam of both lower legs/feet is normal as well.            DATA:     Pertinent data reviewed  A1c is 6.4  % ( 7/2024)     ASSESSMENT AND PLAN:    1. Type 2 DM: with hyperglycemia     Plan:  Discussed the pathogenesis, natural course of diabetes. Patient understands the importance of glycemic control and the implications of uncontrolled diabetes including Diabetic ketoacidosis and various micro vascular and macrovascular complications.        a). Medications:      Metformin 1000 mg BF and dinner  Take with food  GI SE reviewed    Amaryl  2 mg BF and dinner--> 2 mg with BF only  Counselled regarding risk of hypoglycemia associated with use.     Januvia 100 mg daily     Check BG as discussed  Alternate timings  Before BF/ before dinner    Call with sugars as discussed    b). No Nephropathy  c). Discussed importance of annual eye exams  d). Foot exam: Daily feet exam explained  e). BG log maintainence explained in great detail, to get log and glucometer on next visit.  f). Age appropriate Life style changes reviewed  g). Hypoglycemia recognition and management discussed    2. Patient’s BP is okay today  3. Dyslipidemia  A) Discussed lifestyle modifications including reductions in dietary total and saturated fat, weight loss, aerobic exercise, and eating a diet rich in fruits and vegetables.  B) Statin therapy  Discussed the potential side effects of statins including muscle and liver injury.  C) Fasting lipid panel  reviewed      Patient verbalized a complete  understanding of all of the above and did not have any further questions.   RTC in 4-5 months  Check and call with sugars as discussed    Orders Placed This Encounter   Procedures    POC HemoCue Glucose 201 (Finger stick glucose)    POC Glycohemoglobin [81585]         Zoe Black MD

## 2024-07-22 ENCOUNTER — LAB ENCOUNTER (OUTPATIENT)
Dept: LAB | Age: 74
End: 2024-07-22
Attending: NURSE PRACTITIONER
Payer: MEDICARE

## 2024-07-22 DIAGNOSIS — E87.1 HYPONATREMIA: ICD-10-CM

## 2024-07-22 DIAGNOSIS — I10 HTN (HYPERTENSION): Primary | ICD-10-CM

## 2024-07-22 DIAGNOSIS — R94.39 ABNORMAL NUCLEAR STRESS TEST: ICD-10-CM

## 2024-07-22 LAB
ANION GAP SERPL CALC-SCNC: 5 MMOL/L (ref 0–18)
BUN BLD-MCNC: 10 MG/DL (ref 9–23)
BUN/CREAT SERPL: 12.5 (ref 10–20)
CALCIUM BLD-MCNC: 9.1 MG/DL (ref 8.7–10.4)
CHLORIDE SERPL-SCNC: 103 MMOL/L (ref 98–112)
CO2 SERPL-SCNC: 29 MMOL/L (ref 21–32)
CREAT BLD-MCNC: 0.8 MG/DL
EGFRCR SERPLBLD CKD-EPI 2021: 93 ML/MIN/1.73M2 (ref 60–?)
FASTING STATUS PATIENT QL REPORTED: NO
GLUCOSE BLD-MCNC: 149 MG/DL (ref 70–99)
OSMOLALITY SERPL CALC.SUM OF ELEC: 286 MOSM/KG (ref 275–295)
POTASSIUM SERPL-SCNC: 4 MMOL/L (ref 3.5–5.1)
SODIUM SERPL-SCNC: 137 MMOL/L (ref 136–145)

## 2024-07-22 PROCEDURE — 36415 COLL VENOUS BLD VENIPUNCTURE: CPT

## 2024-07-22 PROCEDURE — 80048 BASIC METABOLIC PNL TOTAL CA: CPT

## 2024-07-24 DIAGNOSIS — F41.9 ANXIETY: ICD-10-CM

## 2024-07-24 DIAGNOSIS — E11.65 TYPE 2 DIABETES MELLITUS WITH HYPERGLYCEMIA, WITHOUT LONG-TERM CURRENT USE OF INSULIN (HCC): Chronic | ICD-10-CM

## 2024-07-25 ENCOUNTER — HOSPITAL ENCOUNTER (OUTPATIENT)
Dept: INTERVENTIONAL RADIOLOGY/VASCULAR | Facility: HOSPITAL | Age: 74
Discharge: HOME | End: 2024-07-25
Attending: INTERNAL MEDICINE | Admitting: INTERNAL MEDICINE
Payer: MEDICARE

## 2024-07-25 VITALS
HEART RATE: 56 BPM | SYSTOLIC BLOOD PRESSURE: 137 MMHG | RESPIRATION RATE: 11 BRPM | HEIGHT: 68 IN | DIASTOLIC BLOOD PRESSURE: 72 MMHG | BODY MASS INDEX: 25.76 KG/M2 | OXYGEN SATURATION: 99 % | WEIGHT: 170 LBS | TEMPERATURE: 97 F

## 2024-07-25 DIAGNOSIS — I25.10 CAD (CORONARY ARTERY DISEASE): ICD-10-CM

## 2024-07-25 DIAGNOSIS — R94.39 ABNORMAL NUCLEAR STRESS TEST: ICD-10-CM

## 2024-07-25 DIAGNOSIS — Z95.1 S/P CABG X 5: ICD-10-CM

## 2024-07-25 LAB
GLUCOSE BLDC GLUCOMTR-MCNC: 151 MG/DL (ref 70–99)
ISTAT ACTIVATED CLOTTING TIME: 287 SECONDS (ref 125–137)

## 2024-07-25 PROCEDURE — 027034Z DILATION OF CORONARY ARTERY, ONE ARTERY WITH DRUG-ELUTING INTRALUMINAL DEVICE, PERCUTANEOUS APPROACH: ICD-10-PCS | Performed by: INTERNAL MEDICINE

## 2024-07-25 PROCEDURE — 85347 COAGULATION TIME ACTIVATED: CPT

## 2024-07-25 PROCEDURE — 92978 ENDOLUMINL IVUS OCT C 1ST: CPT | Performed by: INTERNAL MEDICINE

## 2024-07-25 PROCEDURE — B240ZZ3 ULTRASONOGRAPHY OF SINGLE CORONARY ARTERY, INTRAVASCULAR: ICD-10-PCS | Performed by: INTERNAL MEDICINE

## 2024-07-25 PROCEDURE — 82962 GLUCOSE BLOOD TEST: CPT

## 2024-07-25 PROCEDURE — 99152 MOD SED SAME PHYS/QHP 5/>YRS: CPT | Performed by: INTERNAL MEDICINE

## 2024-07-25 PROCEDURE — 99153 MOD SED SAME PHYS/QHP EA: CPT | Performed by: INTERNAL MEDICINE

## 2024-07-25 RX ORDER — ASPIRIN 81 MG/1
324 TABLET, CHEWABLE ORAL ONCE
Status: DISCONTINUED | OUTPATIENT
Start: 2024-07-25 | End: 2024-07-25

## 2024-07-25 RX ORDER — CLOPIDOGREL BISULFATE 75 MG/1
75 TABLET ORAL DAILY
Status: DISCONTINUED | OUTPATIENT
Start: 2024-07-26 | End: 2024-07-25

## 2024-07-25 RX ORDER — ASPIRIN 81 MG/1
81 TABLET ORAL DAILY
Status: DISCONTINUED | OUTPATIENT
Start: 2024-07-26 | End: 2024-07-25

## 2024-07-25 RX ORDER — VERAPAMIL HYDROCHLORIDE 2.5 MG/ML
INJECTION, SOLUTION INTRAVENOUS
Status: COMPLETED
Start: 2024-07-25 | End: 2024-07-25

## 2024-07-25 RX ORDER — LIDOCAINE HYDROCHLORIDE 20 MG/ML
INJECTION, SOLUTION EPIDURAL; INFILTRATION; INTRACAUDAL; PERINEURAL
Status: DISCONTINUED
Start: 2024-07-25 | End: 2024-07-25 | Stop reason: WASHOUT

## 2024-07-25 RX ORDER — SODIUM CHLORIDE 9 MG/ML
INJECTION, SOLUTION INTRAVENOUS CONTINUOUS
Status: ACTIVE | OUTPATIENT
Start: 2024-07-25 | End: 2024-07-25

## 2024-07-25 RX ORDER — NITROGLYCERIN 20 MG/100ML
INJECTION INTRAVENOUS
Status: COMPLETED
Start: 2024-07-25 | End: 2024-07-25

## 2024-07-25 RX ORDER — SODIUM CHLORIDE 9 MG/ML
3 INJECTION, SOLUTION INTRAVENOUS
Status: DISCONTINUED | OUTPATIENT
Start: 2024-07-25 | End: 2024-07-25

## 2024-07-25 RX ORDER — CLOPIDOGREL BISULFATE 75 MG/1
TABLET ORAL
Status: COMPLETED
Start: 2024-07-25 | End: 2024-07-25

## 2024-07-25 RX ORDER — HEPARIN SODIUM 1000 [USP'U]/ML
INJECTION, SOLUTION INTRAVENOUS; SUBCUTANEOUS
Status: COMPLETED
Start: 2024-07-25 | End: 2024-07-25

## 2024-07-25 RX ORDER — LIDOCAINE HYDROCHLORIDE 20 MG/ML
INJECTION, SOLUTION EPIDURAL; INFILTRATION; INTRACAUDAL; PERINEURAL
Status: COMPLETED
Start: 2024-07-25 | End: 2024-07-25

## 2024-07-25 RX ORDER — MIDAZOLAM HYDROCHLORIDE 1 MG/ML
INJECTION INTRAMUSCULAR; INTRAVENOUS
Status: COMPLETED
Start: 2024-07-25 | End: 2024-07-25

## 2024-07-25 NOTE — PROCEDURES
[facility]  part of [facility] Cardiac Cath Procedure Note  Avinash Dumont Patient Status:  Outpatient    3/18/1950 MRN [id number]   Location [facility] INTERVENTIONAL SUITES Attending Pito Mccarty MD   Hosp Day # 0 PCP Sun Vega MD       Cardiologist: iPto cMcarty MD  Primary Proceduralist: Pito Mccarty MD  Procedure Performed:  Staged intervention of the mid and proximal LAD with ASHTYN x 1/intravascular ultrasound of LAD  Date of Procedure: 2024     Pre procedure diagnosis: Staged intervention after recent recent coronary angiogram  Post procedure diagnosis: As above    Summary of Case: After written informed consent was obtained from the patient, patient was brought to the cardiac catheterization laboratory.  Patient was prepped and draped in the usual sterile fashion. Lidocaine 1% was used to infiltrate the right wrist for local anesthesia and a 5-6 Barb[place] Slender introducer sheath was inserted into the right radial artery.        Post procedure findings:  1) Coronaries:  Please refer to diagnostic angiogram performed in April    Specimen sent to: No specimen collected  Estimated blood loss: 10 cc  Closure: TR band      Lesion #1 mid and proximal LAD  Lesion Characteristics-moderate torturous, moderate eccentric calcified.  Type C lesion.  Pre-intervention stenosis 80%, Post intervention stenosis 0%.  Pre SYLVIE 3, Post SYLVIE 3.     Guide Catheter: EBU 3.5  Wire: Prowater  Intravascular ultrasound: Intravascular ultrasound was performed for sizing and evaluation of calcium  Pre-dilation Balloon: 3 mm x 20 mm noncompliant@18 BERLIN  Stent: 3.5 mm x 38 mm Synergy XD at 16 BERLIN  Post-dilation Balloon: 4 mm x 20 mm noncompliant@16 BERLIN    IV was maintained by RN and moderate conscious sedation of versed and fentanyl was given.  Patient was assessed and monitoring of oxygen, heart rate and blood pressure by nurse and myself during the exam f for 33 minutes.      Pito  MD Bibiana  07/25/24

## 2024-07-25 NOTE — DIETARY NOTE
NUTRITION EDUCATION NOTE     Received consult for cardiac nutrition education. Verbally reviewed basic cardiac diet restrictions. Provided with Eating Heart-Healthy NCM handout to reinforce. Receptive to instruction. Would benefit from outpt f/u. Expect fair compliance. RD contact information provided to pt.        Shasta Cotton RD, LDN  Clinical Dietitian  P: [phone]

## 2024-07-25 NOTE — IVS NOTE
DISCHARGE NOTE      Pt is able to sit up and ambulate without difficulty.   Pt voided and tolerated fluids and food.   Procedural site remains dry and intact with good circulation, motion, and sensation.   No signs and symptoms of bleeding/hematoma noted.   IV removed. Band-aid applied.   Instruction provided, patient/family verbalizes understanding.   Dr. Mccarty spoke with patient/family post procedure.      Follow up Appointment: Made with Dr. Mccarty  Cardiac Rehab and Dietician seen patient   No changes to medications

## 2024-07-25 NOTE — DISCHARGE INSTRUCTIONS
TRANSRADIAL DISCHARGE INSTRUCTION  HOME CARE INSTRUCTIONS FOLLOWING CORONARY ANGIOGRAPHY,  INSERTION OF STENT IN THE CORONARY    Activity  DO NOT drive after the procedure.  You may resume driving late the following day according to the nurse or physician's instructions  Plan on resting and relaxing tonight and tomorrow  Resume your normal activity after 48 hours, or as instructed by your physician  Avoid drinking alcohol for the next 24 hours  Avoid wrist flexion, extension, and fine motor activities (i.e. texting, typing, using computer mouse, etc.) for 24 hours  Do not lift or pull anything heavier than 5 to 8  pounds with affected hand for 1 week    What is Normal?  A small lump at the procedure site associated with mild tenderness when touched  The procedure site may be bruised or discolored  There may be a small amount of drainage on the bandage    Special Instructions   Drink plenty of fluids during the next 24 hours to \"flush\" the contrast from your system  Keep the bandage clean and dry  After 24 hours, you must remove the bandage. Do not put ointment, powders, or creams to site.  You can shower after removing the bandage, and wash the procedure site gently with soap and water  DO NOT submerge the procedure site for 1 week (no bath tubs or pools)  If you choose to wear a bandage for a few days, make sure it remains clean and dry and that it is changed daily  For local swelling: apply ice  Bleeding can occur at the procedure site - both on the outside of the skin and/or beneath the surface of the skin        If bleeding occurs: Elevate hand above heart and apply local pressure  Swelling or a large lump at the procedure site can occur, which may be accompanied by moderate to severe pain  If the bleeding does not stop, call 911 and continue to apply pressure    When to contact physician:   Swelling, pain, or bleeding at the site that is not relieved by applying ice or pressure  Signs of infection: Redness,  warmth, drainage at the site, chills, or temperature of 100.5 or greater  Changes in sensation, numbness, or tingling of affected hand    You Received a Stent:    You will remain on an antiplatelet drug and/or aspirin.  Antiplatelet medications are usually taken for six months to one year and should not be stopped unless your cardiologist directs you to do so.  These medications help to prevent blockage at the stent site.  If another physician or dentist asks you to stop your antiplatelet medication, you need to consult your cardiologist first.  Together, your cardiologist and other physician can discuss the risks that may be involved if you are not taking the antiplatelet medication   If an MRI is necessary, it may be done 4-6 weeks after your procedure.  Verify this with your cardiologist  Keep your stent card with you at all times!  If you need an MRI in the future, your stent card will need to be shown to the technologist before performing the MRI.  A duplicate card CANNOT be reproduced.    Other    You may resume your present diet, unless otherwise specified by your physician.  A list of your medications was provided to you at discharge.  If you are on any METFORMIN containing agents - HOLD for 48 hours         **If you have any question or concern, please call the on-call nurse at [phone]

## 2024-07-28 RX ORDER — LORAZEPAM 1 MG/1
1 TABLET ORAL NIGHTLY PRN
Qty: 30 TABLET | Refills: 0 | Status: SHIPPED | OUTPATIENT
Start: 2024-07-28

## 2024-07-28 NOTE — TELEPHONE ENCOUNTER
Please Review. Protocol Failed; No Protocol     Recent fills: Quantity: 30  06/28/2024 06/01/2024 05/01/2024                                                                    Patient is due    Last Office Visit: 06/06/2024      Requested Prescriptions   Pending Prescriptions Disp Refills    LORazepam 1 MG Oral Tab 30 tablet 0     Sig: Take 1 tablet (1 mg total) by mouth nightly as needed.       Controlled Substance Medication Failed - 7/24/2024  4:54 PM        Failed - This medication is a controlled substance - forward to provider to refill               Future Appointments         Provider Department Appt Notes    In 4 months Zoe Black MD [facility] 5 months          Recent Outpatient Visits              1 week ago Type 2 diabetes mellitus with hyperglycemia, without long-term current use of insulin ([facility])    [facility] Zoe Black MD    Office Visit    1 week ago Primary osteoarthritis of left knee    [facility] Denny Stanley MD    Office Visit    1 month ago Encounter for annual health examination    [facility]    Office Visit    4 months ago Acute cough    [facility]    Office Visit    4 months ago Type 2 diabetes mellitus with hyperglycemia, without long-term current use of insulin ([facility])    [facility] Zoe Black MD    Office Visit

## 2024-08-06 ENCOUNTER — CARDPULM VISIT (OUTPATIENT)
Dept: CARDIAC REHAB | Facility: HOSPITAL | Age: 74
End: 2024-08-06
Attending: INTERNAL MEDICINE
Payer: MEDICARE

## 2024-08-09 ENCOUNTER — ORDER TRANSCRIPTION (OUTPATIENT)
Dept: CARDIAC REHAB | Facility: HOSPITAL | Age: 74
End: 2024-08-09

## 2024-08-09 DIAGNOSIS — Z98.61 S/P PTCA (PERCUTANEOUS TRANSLUMINAL CORONARY ANGIOPLASTY): Primary | ICD-10-CM

## 2024-08-13 ENCOUNTER — MED REC SCAN ONLY (OUTPATIENT)
Dept: INTERNAL MEDICINE CLINIC | Facility: CLINIC | Age: 74
End: 2024-08-13

## 2024-08-14 ENCOUNTER — CARDPULM VISIT (OUTPATIENT)
Dept: CARDIAC REHAB | Facility: HOSPITAL | Age: 74
End: 2024-08-14
Attending: INTERNAL MEDICINE
Payer: MEDICARE

## 2024-08-14 PROCEDURE — 93798 PHYS/QHP OP CAR RHAB W/ECG: CPT

## 2024-08-19 ENCOUNTER — CARDPULM VISIT (OUTPATIENT)
Dept: CARDIAC REHAB | Facility: HOSPITAL | Age: 74
End: 2024-08-19
Attending: INTERNAL MEDICINE
Payer: MEDICARE

## 2024-08-19 PROCEDURE — 93798 PHYS/QHP OP CAR RHAB W/ECG: CPT

## 2024-08-21 ENCOUNTER — CARDPULM VISIT (OUTPATIENT)
Dept: CARDIAC REHAB | Facility: HOSPITAL | Age: 74
End: 2024-08-21
Attending: INTERNAL MEDICINE
Payer: MEDICARE

## 2024-08-21 PROCEDURE — 93798 PHYS/QHP OP CAR RHAB W/ECG: CPT

## 2024-08-23 RX ORDER — VALSARTAN 320 MG/1
320 TABLET ORAL DAILY
Refills: 0 | OUTPATIENT
Start: 2024-08-23

## 2024-08-26 ENCOUNTER — CARDPULM VISIT (OUTPATIENT)
Dept: CARDIAC REHAB | Facility: HOSPITAL | Age: 74
End: 2024-08-26
Attending: INTERNAL MEDICINE
Payer: MEDICARE

## 2024-08-26 PROCEDURE — 93798 PHYS/QHP OP CAR RHAB W/ECG: CPT

## 2024-08-27 ENCOUNTER — TELEPHONE (OUTPATIENT)
Dept: INTERNAL MEDICINE CLINIC | Facility: CLINIC | Age: 74
End: 2024-08-27

## 2024-08-27 DIAGNOSIS — F41.9 ANXIETY: ICD-10-CM

## 2024-08-27 DIAGNOSIS — E11.65 TYPE 2 DIABETES MELLITUS WITH HYPERGLYCEMIA, WITHOUT LONG-TERM CURRENT USE OF INSULIN (HCC): Chronic | ICD-10-CM

## 2024-08-27 RX ORDER — LORAZEPAM 1 MG/1
1 TABLET ORAL NIGHTLY PRN
Qty: 30 TABLET | Refills: 0 | Status: SHIPPED | OUTPATIENT
Start: 2024-08-27

## 2024-08-27 NOTE — TELEPHONE ENCOUNTER
Called to deyanira with pt - he is taking valsartan 320mg only --per pt dr dudley has given many refills to him and he is monitoring his blood pressure   /[address] on [address]   SO IN SHORT DR DUDLEY WILL BE TAKING OVER BP  MED REFILLS

## 2024-08-27 NOTE — TELEPHONE ENCOUNTER
Please Review. Protocol Failed; No Protocol   07/29/2024 06/28/2024 06/01/2024  Recent Visits  Date Type Provider Dept   06/06/24 Office Visit Sun Vega MD [facility]   03/19/24 Office Visit Sun Vega MD [facility]   11/30/23 Office Visit Sun Vega MD [facility]   06/06/23 Office Visit Sun Vega MD [facility]   Showing recent visits within past 540 days with a meds authorizing provider and meeting all other requirements  Future Appointments  Date Type Provider Dept   11/14/24 Appointment Sun Vega MD [facility]   Showing future appointments within next 150 days with a meds authorizing provider and meeting all other requirements    Requested Prescriptions   Pending Prescriptions Disp Refills    LORazepam 1 MG Oral Tab 30 tablet 0     Sig: Take 1 tablet (1 mg total) by mouth nightly as needed.       Controlled Substance Medication Failed - 8/27/2024  7:56 AM        Failed - This medication is a controlled substance - forward to provider to refill               Future Appointments         Provider Department Appt Notes    Tomorrow [facility] CARD PHASE 2 [facility] Mccarty, DESx1 7/25/2024    In 6 days [facility] CARD PHASE 2 [facility] Mccarty, DESx1 7/25/2024    In 1 week [facility] CARD PHASE 2 [facility] Mccarty, DESx1 7/25/2024    In 1 week [facility] CARD PHASE 2 [facility] Mccarty, DESx1 7/25/2024    In 2 weeks [facility] CARD PHASE 2 [facility] Mccarty, DESx1 7/25/2024    In 2 weeks [facility] CARD PHASE 2 [facility] Mccarty, DESx1 7/25/2024    In 3 weeks [facility] CARD PHASE 2 [facility] Mccarty, DESx1 7/25/2024    In 3 weeks [facility] CARD PHASE 2 [facility] Mccarty, DESx1 7/25/2024     In 4 weeks [facility] CARD PHASE 2 [facility] Cardiopulmonary Rehab Mccarty, DESx1 7/25/2024    In 1 month [facility] CARD PHASE 2 [facility] Card[facility] Re[facility] Mccarty, DESx1 7/25/2024    In 1 month [facility] CARD PHASE 2 [facility] Card[facility] Re[facility] Mccarty, DESx1 7/25/2024    In 1 month [facility] CARD PHASE 2 [facility] Card[facility] Re[facility] Mccarty, DESx1 7/25/2024    In 1 month [facility] CARD PHASE 2 [facility] Card[facility] Re[facility] Mccarty, DESx1 7/25/2024    In 1 month [facility] CARD PHASE 2 [facility] Card[facility] Re[facility] Mccarty, DESx1 7/25/2024    In 1 month [facility] CARD PHASE 2 [facility] Card[facility] Re[facility] Mccarty, DESx1 7/25/2024    In 1 month [facility] CARD PHASE 2 [facility] Card[facility] Re[facility] Mccarty, DESx1 7/25/2024    In 1 month [facility] CARD PHASE 2 [facility] Card[facility] Re[facility] Mccarty, DESx1 7/25/2024    In 2 months [facility] CARD PHASE 2 [facility] Card[facility] Re[facility] Mccarty, DESx1 7/25/2024    In 2 months [facility] CARD PHASE 2 [facility] Cardiop[facility] Rehab Mccarty, DESx1 7/25/2024    In 2 months [facility] CARD PHASE 2 [facility] Card[facility] Re[facility] Mccarty, DESx1 7/25/2024    In 2 months [facility] CARD PHASE 2 [facility] Card[facility] Rehab Mccarty, DESx1 7/25/2024    In 2 months [facility] CARD PHASE 2 [facility] Card[facility] Re[facility] Mccarty, DESx1 7/25/2024    In 2 months [facility] CARD PHASE 2 [facility] Card[facility] Rehab Mccarty, DESx1 7/25/2024    In 2 months Sun Vega MD [facility] follow up blood work.    In 2 months Denny Stanley MD [facility] Left knee pain, possible surgery    In 2 months [facility] Mccarty  7/25/2024    In 2 months [facility] CARD PHASE 2 [facility] Cardiopulmonary Rehab Mccarty, DESx1 7/25/2024    In 3 months [facility] CARD PHASE 2 [facility] Card[facility] Mccarty, DESx1 7/25/2024    In 3 months [facility] CARD PHASE 2 [facility] Card[facility] Re[facility] Mccarty, DESx1 7/25/2024    In 3 months [facility] CARD PHASE 2 [facility] Card[facility] Re[facility] Mccarty, DESx1 7/25/2024    In 3 months [facility] CARD PHASE 2 [facility] Cardiop[facility] Rehab Mccarty, DESx1 7/25/2024    In 3 months [facility] CARD PHASE 2 [facility] Cardiop[facility] Rehab Mccarty, DESx1 7/25/2024    In 3 months [facility] CARD PHASE 2 [facility] Card[facility] Rehab Mccarty, DESx1 7/25/2024    In 3 months [facility] CARD PHASE 2 [facility] Card[facility] Re[facility] Mccarty, DESx1 7/25/2024    In 3 months Zoe Black MD [facility] 5 months    In 3 months [facility] CARD PHASE 2 [facility] Cardiopulmonary Rehab Mccarty, DESx1 7/25/2024    In 3 months [facility] CARD PHASE 2 [facility] Card[facility] Re[facility] Mccarty, DESx1 7/25/2024    In 4 months [facility] CARD PHASE 2 [facility] Card[facility] Re[facility] Mccarty, DESx1 7/25/2024          Recent Outpatient Visits              1 month ago Type 2 diabetes mellitus with hyperglycemia, without long-term current use of insulin (HCC)    [facility] Zoe Black MD    Office Visit    1 month ago Primary osteoarthritis of left knee    [facility] Denny Stanley MD    Office Visit    2 months ago Encounter for annual health examination    [facility] Sun Vega MD    Office Visit    5 months ago Acute cough    [facility] Sun Vega MD     Office Visit    5 months ago Type 2 diabetes mellitus with hyperglycemia, without long-term current use of insulin (HCC)    [facility] [place] Zoe Black MD    Office Visit

## 2024-08-27 NOTE — TELEPHONE ENCOUNTER
Received call from [facility] regarding valsartan.  Has 2 prescriptions on file, valsartan 320 mg and valsartan hydrochlorothiazide 320-25.  Last refill was declined as this medication is now managed per Dr. Mccarty.  Dr Vega, ok to discontinue medication under your name with [facility]?

## 2024-08-28 ENCOUNTER — CARDPULM VISIT (OUTPATIENT)
Dept: CARDIAC REHAB | Facility: HOSPITAL | Age: 74
End: 2024-08-28
Attending: INTERNAL MEDICINE
Payer: MEDICARE

## 2024-08-28 PROCEDURE — 93798 PHYS/QHP OP CAR RHAB W/ECG: CPT

## 2024-08-28 NOTE — TELEPHONE ENCOUNTER
Spoke with Jerica from [facility] and also Fernanda   RX for Valsartan hydrochlorothiazide will be cancelled per our request.   Pharmacist at [facility] was able to confirm, patient did get plain Valsartan filled at local pharmacy.   I advised center well that cardio provider Dr. Miller will fill b/p meds moving forward.

## 2024-09-02 ENCOUNTER — APPOINTMENT (OUTPATIENT)
Dept: CARDIAC REHAB | Facility: HOSPITAL | Age: 74
End: 2024-09-02
Attending: INTERNAL MEDICINE
Payer: MEDICARE

## 2024-09-04 ENCOUNTER — APPOINTMENT (OUTPATIENT)
Dept: CARDIAC REHAB | Facility: HOSPITAL | Age: 74
End: 2024-09-04
Attending: INTERNAL MEDICINE
Payer: MEDICARE

## 2024-09-09 ENCOUNTER — APPOINTMENT (OUTPATIENT)
Dept: CARDIAC REHAB | Facility: HOSPITAL | Age: 74
End: 2024-09-09
Attending: INTERNAL MEDICINE
Payer: MEDICARE

## 2024-09-11 ENCOUNTER — APPOINTMENT (OUTPATIENT)
Dept: CARDIAC REHAB | Facility: HOSPITAL | Age: 74
End: 2024-09-11
Attending: INTERNAL MEDICINE
Payer: MEDICARE

## 2024-09-16 ENCOUNTER — APPOINTMENT (OUTPATIENT)
Dept: CARDIAC REHAB | Facility: HOSPITAL | Age: 74
End: 2024-09-16
Attending: INTERNAL MEDICINE
Payer: MEDICARE

## 2024-09-18 ENCOUNTER — CARDPULM VISIT (OUTPATIENT)
Dept: CARDIAC REHAB | Facility: HOSPITAL | Age: 74
End: 2024-09-18
Attending: INTERNAL MEDICINE
Payer: MEDICARE

## 2024-09-22 DIAGNOSIS — E11.65 TYPE 2 DIABETES MELLITUS WITH HYPERGLYCEMIA, WITHOUT LONG-TERM CURRENT USE OF INSULIN (HCC): Chronic | ICD-10-CM

## 2024-09-22 DIAGNOSIS — F41.9 ANXIETY: ICD-10-CM

## 2024-09-23 ENCOUNTER — APPOINTMENT (OUTPATIENT)
Dept: CARDIAC REHAB | Facility: HOSPITAL | Age: 74
End: 2024-09-23
Attending: INTERNAL MEDICINE
Payer: MEDICARE

## 2024-09-25 ENCOUNTER — APPOINTMENT (OUTPATIENT)
Dept: CARDIAC REHAB | Facility: HOSPITAL | Age: 74
End: 2024-09-25
Attending: INTERNAL MEDICINE
Payer: MEDICARE

## 2024-09-27 RX ORDER — LORAZEPAM 1 MG/1
1 TABLET ORAL NIGHTLY PRN
Qty: 30 TABLET | Refills: 0 | Status: SHIPPED | OUTPATIENT
Start: 2024-09-27

## 2024-09-30 ENCOUNTER — APPOINTMENT (OUTPATIENT)
Dept: CARDIAC REHAB | Facility: HOSPITAL | Age: 74
End: 2024-09-30
Attending: INTERNAL MEDICINE
Payer: MEDICARE

## 2024-10-02 ENCOUNTER — APPOINTMENT (OUTPATIENT)
Dept: CARDIAC REHAB | Facility: HOSPITAL | Age: 74
End: 2024-10-02
Attending: INTERNAL MEDICINE
Payer: MEDICARE

## 2024-10-07 ENCOUNTER — APPOINTMENT (OUTPATIENT)
Dept: CARDIAC REHAB | Facility: HOSPITAL | Age: 74
End: 2024-10-07
Attending: INTERNAL MEDICINE
Payer: MEDICARE

## 2024-10-09 ENCOUNTER — APPOINTMENT (OUTPATIENT)
Dept: CARDIAC REHAB | Facility: HOSPITAL | Age: 74
End: 2024-10-09
Attending: INTERNAL MEDICINE
Payer: MEDICARE

## 2024-10-14 ENCOUNTER — APPOINTMENT (OUTPATIENT)
Dept: CARDIAC REHAB | Facility: HOSPITAL | Age: 74
End: 2024-10-14
Attending: INTERNAL MEDICINE
Payer: MEDICARE

## 2024-10-16 ENCOUNTER — APPOINTMENT (OUTPATIENT)
Dept: CARDIAC REHAB | Facility: HOSPITAL | Age: 74
End: 2024-10-16
Attending: INTERNAL MEDICINE
Payer: MEDICARE

## 2024-10-21 ENCOUNTER — APPOINTMENT (OUTPATIENT)
Dept: CARDIAC REHAB | Facility: HOSPITAL | Age: 74
End: 2024-10-21
Attending: INTERNAL MEDICINE
Payer: MEDICARE

## 2024-10-21 DIAGNOSIS — F41.9 ANXIETY: ICD-10-CM

## 2024-10-21 DIAGNOSIS — E11.65 TYPE 2 DIABETES MELLITUS WITH HYPERGLYCEMIA, WITHOUT LONG-TERM CURRENT USE OF INSULIN (HCC): Chronic | ICD-10-CM

## 2024-10-21 NOTE — TELEPHONE ENCOUNTER
REFILL PASSED PER [facility] PROTOCOLS    Requested Prescriptions   Pending Prescriptions Disp Refills    JANUVIA 100 MG Oral Tab [Pharmacy Med Name: Januvia Oral Tablet 100 MG] 90 tablet 3     Sig: TAKE 1 TABLET EVERY DAY       Diabetes Medication Protocol Passed - 10/21/2024 11:27 AM        Passed - Last A1C < 7.5 and within past 6 months     Lab Results   Component Value Date    A1C 6.4 (A) 07/19/2024             Passed - In person appointment or virtual visit in the past 6 mos or appointment in next 3 mos     Recent Outpatient Visits              3 months ago Type 2 diabetes mellitus with hyperglycemia, without long-term current use of insulin ([facility])    [facility] Zoe Black MD    Office Visit    3 months ago Primary osteoarthritis of left knee    [facility] Denny Stanley MD    Office Visit    4 months ago Encounter for annual health examination    [facility] Sun Vega MD    Office Visit    7 months ago Acute cough    [facility] Sun Vega MD    Office Visit    7 months ago Type 2 diabetes mellitus with hyperglycemia, without long-term current use of insulin ([facility])    [facility] Zoe Black MD    Office Visit          Future Appointments         Provider Department Appt Notes    In 3 weeks Sun Vega MD [facility] follow up blood work.    In 4 weeks Denny Stanley MD [facility] Left knee pain, possible surgery    In 1 month Zoe Black MD [facility] 5 months                    Passed - Microalbumin procedure in past 12 months or taking ACE/ARB        Passed - EGFRCR or GFRNAA > 50     GFR Evaluation  EGFRCR: 93 ,  resulted on 7/22/2024          Passed - GFR in the past 12 months             Future Appointments         Provider Department Appt Notes    In 3 weeks Sun Vega MD [facility] follow up blood work.    In 4 weeks Denny Stanley MD [facility] Left knee pain, possible surgery    In 1 month Zoe Black MD [facility] 5 months          Recent Outpatient Visits              3 months ago Type 2 diabetes mellitus with hyperglycemia, without long-term current use of insulin (HCC)    [facility] Zoe Black MD    Office Visit    3 months ago Primary osteoarthritis of left knee    [facility] Denny Stanley MD    Office Visit    4 months ago Encounter for annual health examination    [facility] Sun Vega MD    Office Visit    7 months ago Acute cough    [facility] Sun Vega MD    Office Visit    7 months ago Type 2 diabetes mellitus with hyperglycemia, without long-term current use of insulin ([facility])    [facility] Zoe Black MD    Office Visit

## 2024-10-23 ENCOUNTER — APPOINTMENT (OUTPATIENT)
Dept: CARDIAC REHAB | Facility: HOSPITAL | Age: 74
End: 2024-10-23
Attending: INTERNAL MEDICINE
Payer: MEDICARE

## 2024-10-23 RX ORDER — LORAZEPAM 1 MG/1
1 TABLET ORAL NIGHTLY PRN
Qty: 30 TABLET | Refills: 0 | Status: SHIPPED | OUTPATIENT
Start: 2024-10-23

## 2024-10-23 NOTE — TELEPHONE ENCOUNTER
Please review. Protocol Failed; No Protocol      Recent fills: 7/30/2024, 8/29/2024, 9/30/2024  Last Rx written: 9/27/2024  Last office visit: 6/6/2024    Future Appointments  Date Type Provider Dept   11/14/24 Appointment Sun Vega MD [facility]   Showing future appointments within next 150 days with a meds authorizing provider and meeting all other requirements        Requested Prescriptions   Pending Prescriptions Disp Refills    LORazepam 1 MG Oral Tab 30 tablet 0     Sig: Take 1 tablet (1 mg total) by mouth nightly as needed.       Controlled Substance Medication Failed - 10/23/2024  9:09 AM        Failed - This medication is a controlled substance - forward to provider to refill               Future Appointments         Provider Department Appt Notes    In 3 weeks Sun Vega MD [facility] follow up blood work.    In 3 weeks Denny Stanley MD [facility] Left knee pain, possible surgery    In 1 month Zoe Black MD [facility] 5 months          Recent Outpatient Visits              3 months ago Type 2 diabetes mellitus with hyperglycemia, without long-term current use of insulin ([facility])    [facility] Zoe Black MD    Office Visit    3 months ago Primary osteoarthritis of left knee    [facility] Denny Stanley MD    Office Visit    4 months ago Encounter for annual health examination    [facility] Sun Vega MD    Office Visit    7 months ago Acute cough    [facility] Sun Vega MD    Office Visit    7 months ago Type 2 diabetes mellitus with hyperglycemia, without long-term current use of insulin ([facility])    [facility]  Zoe Black MD    Office Visit

## 2024-10-24 ENCOUNTER — LAB ENCOUNTER (OUTPATIENT)
Dept: LAB | Age: 74
End: 2024-10-24
Attending: INTERNAL MEDICINE
Payer: MEDICARE

## 2024-10-24 DIAGNOSIS — I10 HTN (HYPERTENSION): ICD-10-CM

## 2024-10-24 DIAGNOSIS — E78.5 HYPERLIPIDEMIA: Primary | ICD-10-CM

## 2024-10-24 DIAGNOSIS — Z95.1 S/P CABG X 5: ICD-10-CM

## 2024-10-24 LAB
ALBUMIN SERPL-MCNC: 4.6 G/DL (ref 3.2–4.8)
ALBUMIN/GLOB SERPL: 2 {RATIO} (ref 1–2)
ALP LIVER SERPL-CCNC: 49 U/L
ALT SERPL-CCNC: 26 U/L
ANION GAP SERPL CALC-SCNC: 4 MMOL/L (ref 0–18)
AST SERPL-CCNC: 19 U/L (ref ?–34)
BASOPHILS # BLD AUTO: 0.03 X10(3) UL (ref 0–0.2)
BASOPHILS NFR BLD AUTO: 0.9 %
BILIRUB SERPL-MCNC: 1 MG/DL (ref 0.2–1.1)
BUN BLD-MCNC: 12 MG/DL (ref 9–23)
BUN/CREAT SERPL: 14.1 (ref 10–20)
CALCIUM BLD-MCNC: 9.7 MG/DL (ref 8.7–10.4)
CHLORIDE SERPL-SCNC: 108 MMOL/L (ref 98–112)
CHOLEST SERPL-MCNC: 113 MG/DL (ref ?–200)
CO2 SERPL-SCNC: 30 MMOL/L (ref 21–32)
CREAT BLD-MCNC: 0.85 MG/DL
DEPRECATED RDW RBC AUTO: 48.5 FL (ref 35.1–46.3)
EGFRCR SERPLBLD CKD-EPI 2021: 92 ML/MIN/1.73M2 (ref 60–?)
EOSINOPHIL # BLD AUTO: 0.16 X10(3) UL (ref 0–0.7)
EOSINOPHIL NFR BLD AUTO: 5 %
ERYTHROCYTE [DISTWIDTH] IN BLOOD BY AUTOMATED COUNT: 14.7 % (ref 11–15)
FASTING PATIENT LIPID ANSWER: YES
FASTING STATUS PATIENT QL REPORTED: YES
GLOBULIN PLAS-MCNC: 2.3 G/DL (ref 2–3.5)
GLUCOSE BLD-MCNC: 130 MG/DL (ref 70–99)
HCT VFR BLD AUTO: 37.1 %
HDLC SERPL-MCNC: 41 MG/DL (ref 40–59)
HGB BLD-MCNC: 12.2 G/DL
IMM GRANULOCYTES # BLD AUTO: 0.01 X10(3) UL (ref 0–1)
IMM GRANULOCYTES NFR BLD: 0.3 %
LDLC SERPL CALC-MCNC: 52 MG/DL (ref ?–100)
LYMPHOCYTES # BLD AUTO: 1.4 X10(3) UL (ref 1–4)
LYMPHOCYTES NFR BLD AUTO: 43.5 %
MCH RBC QN AUTO: 29.7 PG (ref 26–34)
MCHC RBC AUTO-ENTMCNC: 32.9 G/DL (ref 31–37)
MCV RBC AUTO: 90.3 FL
MONOCYTES # BLD AUTO: 0.44 X10(3) UL (ref 0.1–1)
MONOCYTES NFR BLD AUTO: 13.7 %
NEUTROPHILS # BLD AUTO: 1.18 X10 (3) UL (ref 1.5–7.7)
NEUTROPHILS # BLD AUTO: 1.18 X10(3) UL (ref 1.5–7.7)
NEUTROPHILS NFR BLD AUTO: 36.6 %
NONHDLC SERPL-MCNC: 72 MG/DL (ref ?–130)
OSMOLALITY SERPL CALC.SUM OF ELEC: 296 MOSM/KG (ref 275–295)
PLATELET # BLD AUTO: 175 10(3)UL (ref 150–450)
POTASSIUM SERPL-SCNC: 4.9 MMOL/L (ref 3.5–5.1)
PROT SERPL-MCNC: 6.9 G/DL (ref 5.7–8.2)
RBC # BLD AUTO: 4.11 X10(6)UL
SODIUM SERPL-SCNC: 142 MMOL/L (ref 136–145)
TRIGL SERPL-MCNC: 109 MG/DL (ref 30–149)
VLDLC SERPL CALC-MCNC: 16 MG/DL (ref 0–30)
WBC # BLD AUTO: 3.2 X10(3) UL (ref 4–11)

## 2024-10-24 PROCEDURE — 85025 COMPLETE CBC W/AUTO DIFF WBC: CPT

## 2024-10-24 PROCEDURE — 36415 COLL VENOUS BLD VENIPUNCTURE: CPT

## 2024-10-24 PROCEDURE — 80061 LIPID PANEL: CPT

## 2024-10-24 PROCEDURE — 80053 COMPREHEN METABOLIC PANEL: CPT

## 2024-10-28 ENCOUNTER — APPOINTMENT (OUTPATIENT)
Dept: CARDIAC REHAB | Facility: HOSPITAL | Age: 74
End: 2024-10-28
Attending: INTERNAL MEDICINE
Payer: MEDICARE

## 2024-10-28 RX ORDER — GLIMEPIRIDE 2 MG/1
2 TABLET ORAL
Qty: 90 TABLET | Refills: 3 | Status: SHIPPED | OUTPATIENT
Start: 2024-10-28

## 2024-10-28 NOTE — TELEPHONE ENCOUNTER
Please Review. Protocol Failed; No Protocol   Please advise medication is patient external reported or historical.   Medication written by Dr. Sun Vega in the past (see dispense history)   Requested Prescriptions   Pending Prescriptions Disp Refills    glimepiride 2 MG Oral Tab  0     Sig: Take 1 tablet (2 mg total) by mouth daily with breakfast.       Diabetes Medication Protocol Passed - 10/28/2024  3:28 PM        Passed - Last A1C < 7.5 and within past 6 months     Lab Results   Component Value Date    A1C 6.4 (A) 07/19/2024             Passed - In person appointment or virtual visit in the past 6 mos or appointment in next 3 mos     Recent Outpatient Visits              3 months ago Type 2 diabetes mellitus with hyperglycemia, without long-term current use of insulin ([facility])    [facility] Zoe Black MD    Office Visit    3 months ago Primary osteoarthritis of left knee    [facility] Denny Stanley MD    Office Visit    4 months ago Encounter for annual health examination    [facility] Sun Vega MD    Office Visit    7 months ago Acute cough    [facility] Sun Vega MD    Office Visit    7 months ago Type 2 diabetes mellitus with hyperglycemia, without long-term current use of insulin ([facility])    [facility] Zoe Black MD    Office Visit          Future Appointments         Provider Department Appt Notes    In 2 weeks Sun Vega MD [facility] follow up blood work.    In 3 weeks Denny Stanley MD [facility] Left knee pain, possible surgery    In 1 month Zoe Black MD [facility] 5 months                    Passed -  Microalbumin procedure in past 12 months or taking ACE/ARB        Passed - EGFRCR or GFRNAA > 50     GFR Evaluation  EGFRCR: 92 , resulted on 10/24/2024          Passed - GFR in the past 12 months               Future Appointments         Provider Department Appt Notes    In 2 weeks Sun Vega MD [facility] follow up blood work.    In 3 weeks Denny Stanley MD [facility] Left knee pain, possible surgery    In 1 month Zoe Black MD [facility] 5 months          Recent Outpatient Visits              3 months ago Type 2 diabetes mellitus with hyperglycemia, without long-term current use of insulin ([facility])    [facility] Zoe Black MD    Office Visit    3 months ago Primary osteoarthritis of left knee    [facility] Denny Stalney MD    Office Visit    4 months ago Encounter for annual health examination    [facility] Sun Vega MD    Office Visit    7 months ago Acute cough    [facility] Sun Vega MD    Office Visit    7 months ago Type 2 diabetes mellitus with hyperglycemia, without long-term current use of insulin ([facility])    [facility] Zoe Black MD    Office Visit

## 2024-10-30 ENCOUNTER — APPOINTMENT (OUTPATIENT)
Dept: CARDIAC REHAB | Facility: HOSPITAL | Age: 74
End: 2024-10-30
Attending: INTERNAL MEDICINE
Payer: MEDICARE

## 2024-11-04 ENCOUNTER — APPOINTMENT (OUTPATIENT)
Dept: CARDIAC REHAB | Facility: HOSPITAL | Age: 74
End: 2024-11-04
Attending: INTERNAL MEDICINE
Payer: MEDICARE

## 2024-11-06 ENCOUNTER — APPOINTMENT (OUTPATIENT)
Dept: CARDIAC REHAB | Facility: HOSPITAL | Age: 74
End: 2024-11-06
Attending: INTERNAL MEDICINE
Payer: MEDICARE

## 2024-11-11 ENCOUNTER — APPOINTMENT (OUTPATIENT)
Dept: CARDIAC REHAB | Facility: HOSPITAL | Age: 74
End: 2024-11-11
Attending: INTERNAL MEDICINE
Payer: MEDICARE

## 2024-11-13 ENCOUNTER — APPOINTMENT (OUTPATIENT)
Dept: CARDIAC REHAB | Facility: HOSPITAL | Age: 74
End: 2024-11-13
Attending: INTERNAL MEDICINE
Payer: MEDICARE

## 2024-11-14 ENCOUNTER — LAB ENCOUNTER (OUTPATIENT)
Dept: LAB | Age: 74
End: 2024-11-14
Attending: INTERNAL MEDICINE
Payer: MEDICARE

## 2024-11-14 ENCOUNTER — OFFICE VISIT (OUTPATIENT)
Dept: INTERNAL MEDICINE CLINIC | Facility: CLINIC | Age: 74
End: 2024-11-14
Payer: MEDICARE

## 2024-11-14 VITALS
BODY MASS INDEX: 27.4 KG/M2 | HEART RATE: 56 BPM | SYSTOLIC BLOOD PRESSURE: 131 MMHG | WEIGHT: 180.81 LBS | DIASTOLIC BLOOD PRESSURE: 65 MMHG | HEIGHT: 68 IN

## 2024-11-14 DIAGNOSIS — M17.11 PRIMARY OSTEOARTHRITIS OF RIGHT KNEE: ICD-10-CM

## 2024-11-14 DIAGNOSIS — R21 RASH: Primary | ICD-10-CM

## 2024-11-14 DIAGNOSIS — E11.65 TYPE 2 DIABETES MELLITUS WITH HYPERGLYCEMIA, WITHOUT LONG-TERM CURRENT USE OF INSULIN (HCC): Chronic | ICD-10-CM

## 2024-11-14 DIAGNOSIS — Z12.11 COLON CANCER SCREENING: ICD-10-CM

## 2024-11-14 LAB
CREAT UR-SCNC: 17.1 MG/DL
MICROALBUMIN UR-MCNC: <0.3 MG/DL

## 2024-11-14 PROCEDURE — 3078F DIAST BP <80 MM HG: CPT | Performed by: INTERNAL MEDICINE

## 2024-11-14 PROCEDURE — G2211 COMPLEX E/M VISIT ADD ON: HCPCS | Performed by: INTERNAL MEDICINE

## 2024-11-14 PROCEDURE — 82570 ASSAY OF URINE CREATININE: CPT

## 2024-11-14 PROCEDURE — 82043 UR ALBUMIN QUANTITATIVE: CPT

## 2024-11-14 PROCEDURE — 3075F SYST BP GE 130 - 139MM HG: CPT | Performed by: INTERNAL MEDICINE

## 2024-11-14 PROCEDURE — 1159F MED LIST DOCD IN RCRD: CPT | Performed by: INTERNAL MEDICINE

## 2024-11-14 PROCEDURE — 3008F BODY MASS INDEX DOCD: CPT | Performed by: INTERNAL MEDICINE

## 2024-11-14 PROCEDURE — 99214 OFFICE O/P EST MOD 30 MIN: CPT | Performed by: INTERNAL MEDICINE

## 2024-11-14 PROCEDURE — 1160F RVW MEDS BY RX/DR IN RCRD: CPT | Performed by: INTERNAL MEDICINE

## 2024-11-14 RX ORDER — TRIAMCINOLONE ACETONIDE 1 MG/G
1 CREAM TOPICAL 2 TIMES DAILY
Qty: 45 G | Refills: 0 | Status: SHIPPED | OUTPATIENT
Start: 2024-11-14

## 2024-11-14 RX ORDER — MELOXICAM 15 MG/1
15 TABLET ORAL DAILY
COMMUNITY
Start: 2024-08-15

## 2024-11-14 RX ORDER — NITROGLYCERIN 0.4 MG/1
TABLET SUBLINGUAL
COMMUNITY

## 2024-11-14 RX ORDER — MELOXICAM 7.5 MG/1
7.5 TABLET ORAL DAILY
COMMUNITY
Start: 2024-11-14

## 2024-11-14 NOTE — PROGRESS NOTES
Avinash Dumont is a 74 year old male.  Chief Complaint   Patient presents with    Follow - Up       HPI:   Patient comes for follow-up  C/C follow-up  C/O since last visit in 7/24 angio w/ stent to LAD by dr dudley, no hctz due to hyponatremia but now has swelling in the L LE since stopping the hydrochlorothiazide   Has knee pain used to be on the right but now both knees hurt and will have an appointment with the orthopedic doctor Dr. Stanley next week  He uses meloxicam and does not use the tramadol uses the meloxicam after meals may be maximum once a week- one tab       History 3/2024  Got His CPAP machine--using and tolerating the machine well     HISTORY    saw ortho -dr potts - was given tramadol and meloxicam   Still has to get the CPAP machine  Since last visit he did see the  endocrinologist           HISTORY  + snores as per wife , mostly sleeps on his side but he can lie flat on his back and when he does he finds himself snoring , he does not wake up short of breath from his sleep, no choking in his sleep, +EDS      HISTORY  3/2023 VISIT   knee pain is better with the physical therapy  Would like to review labs   Needs refills on the lorazepam  Thinks the A1c has increased because he had increased his hypoalert carbohydrates but he will go back to his regular diet now     HISTORY  since her last visit she has seen the eye doctor and also the Ortho doctor and he drained some fluid and injected him with Durolane and is a little bit better  Noted he has anemia and had seen hemeatologist in the past and was asked to stop the lexapro so he has for one mn and he feels ok         HISTORY  New patient- used to see dr trujillo   C/C establish care-referred by his insurance  C/o needs refills         PMH  DM2   HTN  HL  B/l OA knee -  CAD S/P CABG -  at [place] dr avinash harden and then 2024 stent to LAD   Anxiety and depression was on lexapro -takes lorazepam   H/o tob dep and lung nodules    MILENA 9/2023      Drs    Dr jain      Lives with family -wife and 2 kids  Retired used to work in [employer]   ----------------------------------------------------------------------------------------       Current Outpatient Medications   Medication Sig Dispense Refill    Meloxicam 15 MG Oral Tab Take 1 tablet (15 mg total) by mouth daily.      nitroglycerin 0.4 MG Sublingual SL Tab TAKE 1 TABLET SUBLINGUALLY AS NEEDED      triamcinolone 0.1 % External Cream Apply 1 Application  topically 2 (two) times daily. APPLY TO AFFECTED AREA 45 g 0    Meloxicam 7.5 MG Oral Tab Take 1 tablet (7.5 mg total) by mouth daily.      glimepiride 2 MG Oral Tab Take 1 tablet (2 mg total) by mouth daily with breakfast. 90 tablet 3    LORazepam 1 MG Oral Tab Take 1 tablet (1 mg total) by mouth nightly as needed. 30 tablet 0    SITagliptin Phosphate (JANUVIA) 100 MG Oral Tab Take 1 tablet (100 mg total) by mouth daily. 90 tablet 3    valsartan 320 MG Oral Tab Take 1 tablet (320 mg total) by mouth daily.      Glucose Blood (TRUE METRIX BLOOD GLUCOSE TEST) In Vitro Strip 1 each by In Vitro route daily. 100 strip 0    amLODIPine 10 MG Oral Tab Take 1 tablet (10 mg total) by mouth daily. 90 tablet 3    atorvastatin 40 MG Oral Tab Take 1 tablet (40 mg total) by mouth nightly. 90 tablet 3    metFORMIN HCl 1000 MG Oral Tab TAKE 1 TABLET TWICE DAILY WITH MEALS 180 tablet 3    clopidogrel 75 MG Oral Tab Take 1 tablet (75 mg total) by mouth daily. 30 tablet 11    Blood Glucose Monitoring Suppl (TRUE METRIX AIR GLUCOSE METER) w/Device Does not apply Kit 1 kit As Directed. 1 kit 0    omega-3-acid ethyl esters 1 g Oral Cap Take 1 capsule (1 g total) by mouth 3 (three) times daily. 270 capsule 3    metoprolol succinate ER 25 MG Oral Tablet 24 Hr Take 1 tablet (25 mg total) by mouth daily. 90 tablet 3    Blood Glucose Calibration (TRUE METRIX LEVEL 1) Low In Vitro Solution 1 each by In Vitro route as needed. 1 each 0    Lancets 33G Does not apply Misc 1 each 2 (two)  times a day. 200 each 3    ferrous sulfate 325 (65 FE) MG Oral Tab EC Take 1 tablet (325 mg total) by mouth daily with breakfast.      aspirin 81 MG Oral Tab Take 1 tablet (81 mg total) by mouth daily.      Multiple Vitamins-Minerals (MULTIVITAMIN ADULT OR) Take 1 tablet by mouth daily.      COENZYME Q-10 OR Take 1 tablet by mouth daily.        Past Medical History:    Anxiety    Arthritis    Atherosclerosis of coronary artery    Colon adenomas    Colon adenomas    x5    Coronary atherosclerosis    Depression    Diabetes (HCC)    Esophageal reflux    Essential hypertension    High blood pressure    High cholesterol    History of blood transfusion    12 YRS AGO, NO REACTIONS    Hyperlipidemia    Sleep apnea    Visual impairment      Past Surgical History:   Procedure Laterality Date    Bypass surgery  2009    Cabg      CABG x 5    Colonoscopy      Colonoscopy N/A 10/28/2019    Procedure: COLONOSCOPY;  Surgeon: Jack Son MD;  Location: [facility] ENDOSCOPY    Colonoscopy      Colonoscopy N/A 3/21/2023    Procedure: COLONOSCOPY;  Surgeon: Jack Son MD;  Location: [facility] ENDO    Other surgical history      deviated septum repair    Other surgical history Left     Sinus surgery        Upper gi endoscopy,exam        Social History:  Social History     Socioeconomic History    Marital status:    Tobacco Use    Smoking status: Former     Current packs/day: 0.00     Average packs/day: 0.5 packs/day for 25.0 years (12.5 ttl pk-yrs)     Types: Cigarettes     Start date: 1983     Quit date: 2008     Years since quittin.8    Smokeless tobacco: Never   Vaping Use    Vaping status: Never Used   Substance and Sexual Activity    Alcohol use: Yes     Alcohol/week: 3.0 standard drinks of alcohol     Types: 3 Standard drinks or equivalent per week     Comment: 2-3 drinks/week    Drug use: No     Social Drivers of Health      [facility]     Housing Stability        REVIEW OF SYSTEMS:   GENERAL HEALTH: No fevers, chills, sweats, fatigue  VISION: No recent vision problems, blurry vision or double vision  RESPIRATORY: denies shortness of breath, cough, wheezing  CARDIOVASCULAR: denies chest pain on exertion, palpitations, + swelling in feet  NEURO: denies headaches , anxiety, depression- stable on meds     EXAM:   /65   Pulse 56   Ht 5' 8\" (1.727 m)   Wt 180 lb 12.8 oz (82 kg)   BMI 27.49 kg/m²   GENERAL: well developed, well nourished,in no apparent distress  SKIN: no rashes,no suspicious lesions  HEENT: atraumatic, normocephalic  NECK: supple,no adenopathy  LUNGS: clear to auscultation, no wheeze  CARDIO: RRR without murmur  GI: good BS's,no masses or tenderness  EXTREMITIES: + edema LLE   Bilateral barefoot skin diabetic exam is normal, visualized feet and the appearance is normal.  Bilateral monofilament/sensation of both feet is normal.  Pulsation pedal pulse exam of both lower legs/feet is normal as well.        ASSESSMENT AND PLAN:   Diagnoses and all orders for this visit:    Rash  -     triamcinolone 0.1 % External Cream; Apply 1 Application  topically 2 (two) times daily. APPLY TO AFFECTED AREA  Stable on oint - refilled awre to use thin layer prn only    Primary osteoarthritis of right knee  Will see Ortho, planning on surgery for next year early     Colon cancer screening  -     Occult Blood, Fecal, FIT Immunoassay; Future  FIT test ordered     Type 2 diabetes mellitus with hyperglycemia, without long-term current use of insulin (HCC)  -     Microalb/Creat Ratio, Random Urine; Future    bl sugars   Hba1c 7.5 on 6/2022 and 6.3 on 11/2022 and 7.2 on 2/2023 on 10/2023 and 7.9 on 3/2024 and 6.4 on 7/2024   U. Micro 11/2023 ,recheck   Eye exam -dr francisco but will see dr charlee mak   On Asa , on arb and  statin   Foot  11/14/2024  Diet -- advised to follow a low carb, low sugar diet , try to be consistent                Exercise 30  min a day   Sees Dr. Black           Preventive medicine  Colonoscopy-Dr. Son 2023 and rpt in one yr   Labs 10/2024 and 11/2023 reviewed   Ct chest 7/2022 for lung nodule - rpt in 7/2024   Echo 7/2022 -aortic regurg moderate - mild stenosis - sees dr jain     The patient indicates understanding of these issues and agrees to the plan.  No follow-ups on file.

## 2024-11-16 ENCOUNTER — LAB ENCOUNTER (OUTPATIENT)
Dept: LAB | Age: 74
End: 2024-11-16
Attending: NURSE PRACTITIONER
Payer: MEDICARE

## 2024-11-18 ENCOUNTER — OFFICE VISIT (OUTPATIENT)
Dept: ORTHOPEDICS CLINIC | Facility: CLINIC | Age: 74
End: 2024-11-18
Payer: MEDICARE

## 2024-11-18 ENCOUNTER — APPOINTMENT (OUTPATIENT)
Dept: CARDIAC REHAB | Facility: HOSPITAL | Age: 74
End: 2024-11-18
Attending: INTERNAL MEDICINE
Payer: MEDICARE

## 2024-11-18 ENCOUNTER — TELEPHONE (OUTPATIENT)
Dept: ORTHOPEDICS CLINIC | Facility: CLINIC | Age: 74
End: 2024-11-18

## 2024-11-18 VITALS — WEIGHT: 170 LBS | BODY MASS INDEX: 25.76 KG/M2 | HEIGHT: 68 IN

## 2024-11-18 DIAGNOSIS — I10 ESSENTIAL HYPERTENSION: ICD-10-CM

## 2024-11-18 DIAGNOSIS — M25.562 LEFT KNEE PAIN, UNSPECIFIED CHRONICITY: ICD-10-CM

## 2024-11-18 DIAGNOSIS — M17.12 PRIMARY OSTEOARTHRITIS OF LEFT KNEE: Primary | ICD-10-CM

## 2024-11-18 DIAGNOSIS — M25.462 EFFUSION, LEFT KNEE: ICD-10-CM

## 2024-11-18 NOTE — TELEPHONE ENCOUNTER
Patient was given pre surgical papers today in clinic. Including Dental clearance form, [facility] Surgery Check List (Medical clearance), Pre Operative Education for Total Joint replacements and Medacta/Yossi booklet.

## 2024-11-18 NOTE — PROGRESS NOTES
NURSING INTAKE COMMENTS:   Chief Complaint   Patient presents with    Knee Pain     L knee f/u -  Pt states he is able to move forward with sx. Pt rates pain 6/10. Some swelling.Pt was given cortisone injection on 07/16, with some results.        HPI: This 74 year old male presents today to schedule left knee replacement in mid February 2025.  Both knees have bone-on-bone medial osteoarthritis.  The left has been refractory to nonoperative care.  He had a cardiac stent in July 2025 by Dr. Mccarty and is currently on Plavix and aspirin.  He lives independently with his wife.  He is retired.  BMI is 25.85.    He has a little neuropathy on the right foot but not the left from his diabetes.    Past Medical History:    Anxiety    Arthritis    Atherosclerosis of coronary artery    Colon adenomas    Colon adenomas    x5    Coronary atherosclerosis    Depression    Diabetes (HCC)    Esophageal reflux    Essential hypertension    High blood pressure    High cholesterol    History of blood transfusion    12 YRS AGO, NO REACTIONS    Hyperlipidemia    Sleep apnea    Visual impairment     Past Surgical History:   Procedure Laterality Date    Bypass surgery  05/2009    Cabg      CABG x 5    Colonoscopy      Colonoscopy N/A 10/28/2019    Procedure: COLONOSCOPY;  Surgeon: Jack Son MD;  Location: [facility] ENDOSCOPY    Colonoscopy      Colonoscopy N/A 3/21/2023    Procedure: COLONOSCOPY;  Surgeon: Jack Son MD;  Location: [facility]    Other surgical history      deviated septum repair    Other surgical history Left     Sinus surgery        Upper gi endoscopy,exam       Current Outpatient Medications   Medication Sig Dispense Refill    Meloxicam 15 MG Oral Tab Take 1 tablet (15 mg total) by mouth daily.      nitroglycerin 0.4 MG Sublingual SL Tab TAKE 1 TABLET SUBLINGUALLY AS NEEDED      triamcinolone 0.1 % External Cream Apply 1 Application  topically 2 (two) times daily. APPLY TO AFFECTED AREA 45 g 0    Meloxicam 7.5  MG Oral Tab Take 1 tablet (7.5 mg total) by mouth daily.      glimepiride 2 MG Oral Tab Take 1 tablet (2 mg total) by mouth daily with breakfast. 90 tablet 3    LORazepam 1 MG Oral Tab Take 1 tablet (1 mg total) by mouth nightly as needed. 30 tablet 0    SITagliptin Phosphate (JANUVIA) 100 MG Oral Tab Take 1 tablet (100 mg total) by mouth daily. 90 tablet 3    valsartan 320 MG Oral Tab Take 1 tablet (320 mg total) by mouth daily.      Glucose Blood (TRUE METRIX BLOOD GLUCOSE TEST) In Vitro Strip 1 each by In Vitro route daily. 100 strip 0    amLODIPine 10 MG Oral Tab Take 1 tablet (10 mg total) by mouth daily. 90 tablet 3    atorvastatin 40 MG Oral Tab Take 1 tablet (40 mg total) by mouth nightly. 90 tablet 3    metFORMIN HCl 1000 MG Oral Tab TAKE 1 TABLET TWICE DAILY WITH MEALS 180 tablet 3    clopidogrel 75 MG Oral Tab Take 1 tablet (75 mg total) by mouth daily. 30 tablet 11    Blood Glucose Monitoring Suppl (TRUE METRIX AIR GLUCOSE METER) w/Device Does not apply Kit 1 kit As Directed. 1 kit 0    omega-3-acid ethyl esters 1 g Oral Cap Take 1 capsule (1 g total) by mouth 3 (three) times daily. 270 capsule 3    metoprolol succinate ER 25 MG Oral Tablet 24 Hr Take 1 tablet (25 mg total) by mouth daily. 90 tablet 3    Blood Glucose Calibration (TRUE METRIX LEVEL 1) Low In Vitro Solution 1 each by In Vitro route as needed. 1 each 0    Lancets 33G Does not apply Misc 1 each 2 (two) times a day. 200 each 3    ferrous sulfate 325 (65 FE) MG Oral Tab EC Take 1 tablet (325 mg total) by mouth daily with breakfast.      aspirin 81 MG Oral Tab Take 1 tablet (81 mg total) by mouth daily.      Multiple Vitamins-Minerals (MULTIVITAMIN ADULT OR) Take 1 tablet by mouth daily.      COENZYME Q-10 OR Take 1 tablet by mouth daily.       Allergies[1]  Family History   Problem Relation Age of Onset    Cancer Father 64        Lymph node CA    Heart Disorder Mother         MI    Hypertension Mother     Diabetes Neg     Glaucoma Neg      Macular degeneration Neg      No family Hx of DVT/PE    Social History     Occupational History    Not on file   Tobacco Use    Smoking status: Former     Current packs/day: 0.00     Average packs/day: 0.5 packs/day for 25.0 years (12.5 ttl pk-yrs)     Types: Cigarettes     Start date: 1983     Quit date: 2008     Years since quittin.8    Smokeless tobacco: Never   Vaping Use    Vaping status: Never Used   Substance and Sexual Activity    Alcohol use: Yes     Alcohol/week: 3.0 standard drinks of alcohol     Types: 3 Standard drinks or equivalent per week     Comment: 2-3 drinks/week    Drug use: No    Sexual activity: Not on file        Review of Systems:  GENERAL: feels generally well, no significant weight loss or weight gain  SKIN: no ulcerated or worrisome skin lesions  EYES:denies blurred vision or double vision  HEENT: denies new nasal congestion, sinus pain or ST  LUNGS: denies shortness of breath  CARDIOVASCULAR: denies chest pain  GI: no hematemesis, no worsening heartburn, no diarrhea  : no dysuria, no blood in urine, no difficulty urinating, no incontinence  MUSCULOSKELETAL: no other musculoskeletal complaints other than in HPI  NEURO: no numbness or tingling, no weakness or balance disorder  PSYCHE: no depression or anxiety  HEMATOLOGIC: no hx of blood dyscrasia, no Hx DVT/PE  ENDOCRINE: no thyroid or diabetes issues  ALL/ASTHMA: no new hx of severe allergy or asthma    Physical Examination:    Ht 5' 8\" (1.727 m)   Wt 170 lb (77.1 kg)   BMI 25.85 kg/m²   Constitutional: appears well hydrated, alert and responsive, no acute distress noted  Extremities: The skin on both legs was healthy without pitting edema or calf tenderness today.  No effusions or asymmetric warmth of the knees.  Musculoskeletal: Both knees had motion 5 to 125 degrees.  No instability.  Tenderness mostly on the medial joint lines left worse than right in the patellofemoral joints more mildly.  Neurological: Normal motor  function bilateral lower extremities.    Imaging: X-rays of the knee show bone-on-bone medial osteoarthritis.      No results found.     Lab Results   Component Value Date    WBC 3.2 (L) 10/24/2024    HGB 12.2 (L) 10/24/2024    .0 10/24/2024      Lab Results   Component Value Date     (H) 10/24/2024    BUN 12 10/24/2024    CREATSERUM 0.85 10/24/2024    GFRNAA 81 05/04/2022    GFRAA 94 05/04/2022        Assessment and Plan:  Diagnoses and all orders for this visit:    Primary osteoarthritis of left knee  -     MRI KNEE LULU, LEFT ([id number]); Future    Left knee pain, unspecified chronicity  -     MRI KNEE LULU, LEFT ([id number]); Future    Effusion, left knee  -     MRI KNEE LULU, LEFT ([id number]); Future        Assessment: Wants to schedule left knee replacement in February 2025 when he can come off his Plavix and aspirin.    Plan: We discussed the necessary rehab and the usual outcomes.  We discussed potential risks of surgery such as death, heart attack, stroke, bleeding, infection, blood clot, nerve injury, artery injury, failure fixation, fracture, stiffness, loosening, revision surgery, and persistent pain despite surgery.  We discussed that he will have some numbness lateral to the incision.  He has questions which were answered to his satisfaction.    He will need medical, dental, and cardiology clearance.  He will have to call if his Plavix and aspirin for 1 week before surgery for the spinal.  The plan will be a Lulu persona medial congruent or LPS knee replacement with patient specific instruments.    Follow Up: No follow-ups on file.    Denny Stanley MD         [1] No Known Allergies

## 2024-11-18 NOTE — TELEPHONE ENCOUNTER
Ortho Surgery Request  Surgery: Left total knee arthroplasty with MRI templated patient-specific instruments      Surgical Assistant: Mary Dang PA-C and Luis Daniel Hagen CSA  Surgery Day Request: Mid February 2025  Estimated Procedure Length: 2 hours  Anesthesia type: Spinal with Duramorph/MAC   Position: Supine   Special Needs:[]Mini C-Arm  []Large C-arm  []Nurse Assist [x]SCD's [x]Surgical Assistant []Ultrasound []Ultrasound Catalino  Equipment: Yossi persona medial congruent or LPS.  Patient specific instruments.  Location: [facility]  Post Op Visit Date: 10 to 12 days postop  CPT Code: 79410     ICD Code:  Medical Clearance Needed: Medical, dental, and cardiology  Preadmission Testing Orders:  Anesthesia testing protocols and anesthesia pre op orders will be used  Additional PAT orders: Stop Plavix and aspirin 1 week prior to surgery.  Pre OP Orders:  Use [facility] procedure driven order set in addition to anesthesia protocol  Additional Pre Op Orders:  PCN Allergy:  No  If Yes:   Proceed with PCN/Cephalosporin recommend antibiotic as benefit outweighs risk  Admit:  Outpatient in a Critical access hospital  Yes

## 2024-11-18 NOTE — TELEPHONE ENCOUNTER
Patient called back for surgery date, He chose 2/28. Patient was reminded that Medical and/or Dental clearance is needed within 30 days of surgical date. Failure to complete all testing in a timely manner will cause surgery to be delayed and/or rescheduled. Patient understood and had no further questions at this time.

## 2024-11-19 ENCOUNTER — TELEPHONE (OUTPATIENT)
Dept: INTERNAL MEDICINE CLINIC | Facility: CLINIC | Age: 74
End: 2024-11-19

## 2024-11-19 NOTE — TELEPHONE ENCOUNTER
Patient states that he is having knee surgery on 2-27-25 and needs a pre op clearance. The first available appointment with Dr. Vega is not until 2-20-24 and patient would like to be seen sooner. Would the doctor like to add the patient to the schedule? If so, which date and time. Patient states that he has an appointment scheduled with cardiology on 1-31-25 and patient would like to see Dr. Vega after that appointment.     Please reply to pool: EM CC IM FM ALG RHE [[id number]]

## 2024-11-20 ENCOUNTER — APPOINTMENT (OUTPATIENT)
Dept: CARDIAC REHAB | Facility: HOSPITAL | Age: 74
End: 2024-11-20
Attending: INTERNAL MEDICINE
Payer: MEDICARE

## 2024-11-22 RX ORDER — METOPROLOL SUCCINATE 25 MG/1
25 TABLET, EXTENDED RELEASE ORAL DAILY
Qty: 90 TABLET | Refills: 3 | Status: SHIPPED | OUTPATIENT
Start: 2024-11-22

## 2024-11-22 NOTE — TELEPHONE ENCOUNTER
Refill passed per [facility] protocol.    Requested Prescriptions   Pending Prescriptions Disp Refills    METOPROLOL SUCCINATE ER 25 MG Oral Tablet 24 Hr [Pharmacy Med Name: Metoprolol Succinate ER Oral Tablet Extended Release 24 Hour 25 MG] 90 tablet 3     Sig: TAKE 1 TABLET EVERY DAY       Hypertension Medications Protocol Passed - 11/22/2024  1:30 PM        Passed - CMP or BMP in past 12 months        Passed - Last BP reading less than 140/90     BP Readings from Last 1 Encounters:   11/14/24 131/65               Passed - In person appointment or virtual visit in the past 12 mos or appointment in next 3 mos     Recent Outpatient Visits              4 days ago Primary osteoarthritis of left knee    [facility] Denny Stanley MD    Office Visit    1 week ago Rash    [facility] Sun Vega MD    Office Visit    4 months ago Type 2 diabetes mellitus with hyperglycemia, without long-term current use of insulin (HCC)    [facility] Zoe Black MD    Office Visit    4 months ago Primary osteoarthritis of left knee    [facility] Denny Stanley MD    Office Visit    5 months ago Encounter for annual health examination    [facility] Sun Vega MD    Office Visit          Future Appointments         Provider Department Appt Notes    In 3 weeks [facility] MRI RM2 (3T WIDE) [facility] MRI     In 3 weeks Zoe Black MD [facility] 5 months    In 2 months Sun Vega MD [facility] pre op surgery is on 02/27/20/25 see comm    In 3 months Mary Dang PA [facility] 1st POV Left TKA 2/28                    Passed - EGFRCR or GFRNAA > 50     GFR  Evaluation  EGFRCR: 92 , resulted on 10/24/2024               Recent Outpatient Visits              4 days ago Primary osteoarthritis of left knee    [facility] Denny Stanley MD    Office Visit    1 week ago Rash    [facility] Sun Vega MD    Office Visit    4 months ago Type 2 diabetes mellitus with hyperglycemia, without long-term current use of insulin (HCC)    [facility] Zoe Black MD    Office Visit    4 months ago Primary osteoarthritis of left knee    [facility] Denny Stanley MD    Office Visit    5 months ago Encounter for annual health examination    [facility] Sun Vega MD    Office Visit            Future Appointments         Provider Department Appt Notes    In 3 weeks [facility] MRI RM2 (3T WIDE) [facility] MRI     In 3 weeks Zoe Black MD [facility] 5 months    In 2 months Sun Vega MD [facility] pre op surgery is on 02/27/20/25 see comm    In 3 months Mary Dang PA [facility] 1st POV Left TKA 2/28

## 2024-11-24 DIAGNOSIS — F41.9 ANXIETY: ICD-10-CM

## 2024-11-24 DIAGNOSIS — E11.65 TYPE 2 DIABETES MELLITUS WITH HYPERGLYCEMIA, WITHOUT LONG-TERM CURRENT USE OF INSULIN (HCC): Chronic | ICD-10-CM

## 2024-11-25 ENCOUNTER — APPOINTMENT (OUTPATIENT)
Dept: CARDIAC REHAB | Facility: HOSPITAL | Age: 74
End: 2024-11-25
Attending: INTERNAL MEDICINE
Payer: MEDICARE

## 2024-11-27 ENCOUNTER — APPOINTMENT (OUTPATIENT)
Dept: CARDIAC REHAB | Facility: HOSPITAL | Age: 74
End: 2024-11-27
Attending: INTERNAL MEDICINE
Payer: MEDICARE

## 2024-11-28 NOTE — TELEPHONE ENCOUNTER
Please review; protocol failed/ has no protocol      Recent fills: 10/24/204,09/27/2024,08/28/2024  Last Rx written: 10/24/2024  Last Office Visit: 11/14/2024    Recent Visits  Date Type Provider Dept   11/14/24 Office Visit Sun Vega MD [facility]     Future Appointments  Date Type Provider Dept   02/04/25 Appointment Sun Vega MD [facility]   Showing future appointments within next 150 days with a meds authorizing provider and meeting all other requirements      Requested Prescriptions   Pending Prescriptions Disp Refills    LORazepam 1 MG Oral Tab 30 tablet 0     Sig: Take 1 tablet (1 mg total) by mouth nightly as needed.       Controlled Substance Medication Failed - 11/28/2024 12:04 PM        Failed - This medication is a controlled substance - forward to provider to refill           Recent Outpatient Visits              1 week ago Primary osteoarthritis of left knee    [facility] Denny Stanley MD    Office Visit    2 weeks ago Rash    [facility] Sun Vgea MD    Office Visit    4 months ago Type 2 diabetes mellitus with hyperglycemia, without long-term current use of insulin ([facility])    [facility] Zoe Black MD    Office Visit    4 months ago Primary osteoarthritis of left knee    [facility] Denny Stanley MD    Office Visit    5 months ago Encounter for annual health examination    [facility] Sun Vega MD    Office Visit          Future Appointments         Provider Department Appt Notes    In 2 weeks [facility] MRI RM2 (3T WIDE) [facility] MRI     In 2 weeks Zoe Black MD [facility] 5 months    In 2 months Sun Vega MD [facility] pre op  surgery is on 02/27/20/25 see comm    In 3 months Mary Dang PA [facility] [place] 1st POV Left TKA 2/28

## 2024-11-29 RX ORDER — LORAZEPAM 1 MG/1
1 TABLET ORAL NIGHTLY PRN
Qty: 30 TABLET | Refills: 0 | Status: SHIPPED | OUTPATIENT
Start: 2024-11-29

## 2024-12-02 ENCOUNTER — APPOINTMENT (OUTPATIENT)
Dept: CARDIAC REHAB | Facility: HOSPITAL | Age: 74
End: 2024-12-02
Attending: INTERNAL MEDICINE
Payer: MEDICARE

## 2024-12-04 ENCOUNTER — APPOINTMENT (OUTPATIENT)
Dept: CARDIAC REHAB | Facility: HOSPITAL | Age: 74
End: 2024-12-04
Attending: INTERNAL MEDICINE
Payer: MEDICARE

## 2024-12-09 ENCOUNTER — APPOINTMENT (OUTPATIENT)
Dept: CARDIAC REHAB | Facility: HOSPITAL | Age: 74
End: 2024-12-09
Attending: INTERNAL MEDICINE
Payer: MEDICARE

## 2024-12-11 ENCOUNTER — APPOINTMENT (OUTPATIENT)
Dept: CARDIAC REHAB | Facility: HOSPITAL | Age: 74
End: 2024-12-11
Attending: INTERNAL MEDICINE
Payer: MEDICARE

## 2024-12-13 ENCOUNTER — HOSPITAL ENCOUNTER (OUTPATIENT)
Dept: MRI IMAGING | Facility: HOSPITAL | Age: 74
Discharge: HOME | End: 2024-12-13
Attending: ORTHOPAEDIC SURGERY
Payer: MEDICARE

## 2024-12-13 DIAGNOSIS — M25.562 LEFT KNEE PAIN, UNSPECIFIED CHRONICITY: ICD-10-CM

## 2024-12-13 DIAGNOSIS — M17.12 PRIMARY OSTEOARTHRITIS OF LEFT KNEE: ICD-10-CM

## 2024-12-13 DIAGNOSIS — M25.462 EFFUSION, LEFT KNEE: ICD-10-CM

## 2024-12-13 PROCEDURE — 73721 MRI JNT OF LWR EXTRE W/O DYE: CPT | Performed by: ORTHOPAEDIC SURGERY

## 2024-12-16 ENCOUNTER — APPOINTMENT (OUTPATIENT)
Dept: CARDIAC REHAB | Facility: HOSPITAL | Age: 74
End: 2024-12-16
Attending: INTERNAL MEDICINE
Payer: MEDICARE

## 2024-12-17 ENCOUNTER — OFFICE VISIT (OUTPATIENT)
Dept: ENDOCRINOLOGY CLINIC | Facility: CLINIC | Age: 74
End: 2024-12-17
Payer: MEDICARE

## 2024-12-17 VITALS
SYSTOLIC BLOOD PRESSURE: 138 MMHG | WEIGHT: 179 LBS | DIASTOLIC BLOOD PRESSURE: 74 MMHG | BODY MASS INDEX: 27.13 KG/M2 | HEIGHT: 68 IN | HEART RATE: 55 BPM

## 2024-12-17 DIAGNOSIS — E11.69 TYPE 2 DIABETES MELLITUS WITH OTHER SPECIFIED COMPLICATION, WITHOUT LONG-TERM CURRENT USE OF INSULIN (HCC): Primary | ICD-10-CM

## 2024-12-17 DIAGNOSIS — E78.5 DYSLIPIDEMIA: ICD-10-CM

## 2024-12-17 LAB
GLUCOSE BLOOD: 207
HEMOGLOBIN A1C: 6.8 % (ref 4.3–5.6)
TEST STRIP LOT #: NORMAL NUMERIC

## 2024-12-17 PROCEDURE — 1159F MED LIST DOCD IN RCRD: CPT | Performed by: INTERNAL MEDICINE

## 2024-12-17 PROCEDURE — 83036 HEMOGLOBIN GLYCOSYLATED A1C: CPT | Performed by: INTERNAL MEDICINE

## 2024-12-17 PROCEDURE — 3044F HG A1C LEVEL LT 7.0%: CPT | Performed by: INTERNAL MEDICINE

## 2024-12-17 PROCEDURE — 3075F SYST BP GE 130 - 139MM HG: CPT | Performed by: INTERNAL MEDICINE

## 2024-12-17 PROCEDURE — 3061F NEG MICROALBUMINURIA REV: CPT | Performed by: INTERNAL MEDICINE

## 2024-12-17 PROCEDURE — 99213 OFFICE O/P EST LOW 20 MIN: CPT | Performed by: INTERNAL MEDICINE

## 2024-12-17 PROCEDURE — 82947 ASSAY GLUCOSE BLOOD QUANT: CPT | Performed by: INTERNAL MEDICINE

## 2024-12-17 PROCEDURE — 1160F RVW MEDS BY RX/DR IN RCRD: CPT | Performed by: INTERNAL MEDICINE

## 2024-12-17 PROCEDURE — 3078F DIAST BP <80 MM HG: CPT | Performed by: INTERNAL MEDICINE

## 2024-12-17 PROCEDURE — 3008F BODY MASS INDEX DOCD: CPT | Performed by: INTERNAL MEDICINE

## 2024-12-17 RX ORDER — CALCIUM CITRATE/VITAMIN D3 200MG-6.25
1 TABLET ORAL DAILY
Qty: 100 STRIP | Refills: 1 | Status: SHIPPED | OUTPATIENT
Start: 2024-12-17

## 2024-12-17 NOTE — PROGRESS NOTES
FU VISIT:     CHIEF COMPLAINT:    Chief Complaint   Patient presents with    Diabetes     Pt is here for follow up for diabetes and A1c check        HISTORY OF PRESENT ILLNESS:   Avinash Dumont is a 74 year old male who is here to FU  for DM.     DM HISTORY  Diagnosed:  Around age 61      HISTORY OF DIABETES COMPLICATIONS: :  History of Retinopathy: denies - last eye exam : , Follows with Dr. Jakob Freedman  History of Neuropathy: denies  History of Nephropathy: denies    ASSOCIATED COMPLICATIONS:   HTN: yes  Hyperlipidemia: yes  Coronary Artery Disease:  Yes  Cerebrovascular Disease: denies      HOME GLUCOSE READINGS:   Fastin-130    CURRENT DIABETIC MEDICATIONS INCLUDE:  MTF 1000 mg BID  Januvia 100 mg daily   Amaryl 2 mg BF     T/f jardiance due to  SE    MEALS:  Three meals a day   Mostly eats at home   Moderate compliance    EXERCISE:  Daily , goes to the gym       CURRENT MEDICATIONS:    Current Outpatient Medications   Medication Sig Dispense Refill    LORazepam 1 MG Oral Tab Take 1 tablet (1 mg total) by mouth nightly as needed. 30 tablet 0    metoprolol succinate ER 25 MG Oral Tablet 24 Hr Take 1 tablet (25 mg total) by mouth daily. 90 tablet 3    Meloxicam 15 MG Oral Tab Take 1 tablet (15 mg total) by mouth daily.      nitroglycerin 0.4 MG Sublingual SL Tab TAKE 1 TABLET SUBLINGUALLY AS NEEDED      triamcinolone 0.1 % External Cream Apply 1 Application  topically 2 (two) times daily. APPLY TO AFFECTED AREA 45 g 0    Meloxicam 7.5 MG Oral Tab Take 1 tablet (7.5 mg total) by mouth daily.      glimepiride 2 MG Oral Tab Take 1 tablet (2 mg total) by mouth daily with breakfast. 90 tablet 3    SITagliptin Phosphate (JANUVIA) 100 MG Oral Tab Take 1 tablet (100 mg total) by mouth daily. 90 tablet 3    valsartan 320 MG Oral Tab Take 1 tablet (320 mg total) by mouth daily.      Glucose Blood (TRUE METRIX BLOOD GLUCOSE TEST) In Vitro Strip 1 each by In Vitro route daily. 100 strip 0    amLODIPine 10 MG Oral  Tab Take 1 tablet (10 mg total) by mouth daily. 90 tablet 3    atorvastatin 40 MG Oral Tab Take 1 tablet (40 mg total) by mouth nightly. 90 tablet 3    metFORMIN HCl 1000 MG Oral Tab TAKE 1 TABLET TWICE DAILY WITH MEALS 180 tablet 3    clopidogrel 75 MG Oral Tab Take 1 tablet (75 mg total) by mouth daily. 30 tablet 11    Blood Glucose Monitoring Suppl (TRUE METRIX AIR GLUCOSE METER) w/Device Does not apply Kit 1 kit As Directed. 1 kit 0    omega-3-acid ethyl esters 1 g Oral Cap Take 1 capsule (1 g total) by mouth 3 (three) times daily. 270 capsule 3    Blood Glucose Calibration (TRUE METRIX LEVEL 1) Low In Vitro Solution 1 each by In Vitro route as needed. 1 each 0    Lancets 33G Does not apply Misc 1 each 2 (two) times a day. 200 each 3    ferrous sulfate 325 (65 FE) MG Oral Tab EC Take 1 tablet (325 mg total) by mouth daily with breakfast.      aspirin 81 MG Oral Tab Take 1 tablet (81 mg total) by mouth daily.      Multiple Vitamins-Minerals (MULTIVITAMIN ADULT OR) Take 1 tablet by mouth daily.      COENZYME Q-10 OR Take 1 tablet by mouth daily.         PAST MEDICAL HISTORY:   Past Medical History:    Anxiety    Arthritis    Atherosclerosis of coronary artery    Colon adenomas    Colon adenomas    x5    Coronary atherosclerosis    Depression    Diabetes (HCC)    Esophageal reflux    Essential hypertension    High blood pressure    High cholesterol    History of blood transfusion    12 YRS AGO, NO REACTIONS    Hyperlipidemia    Sleep apnea    Visual impairment       PAST SURGICAL HISTORY:   Past Surgical History:   Procedure Laterality Date    Bypass surgery  05/2009    Cabg      CABG x 5    Colonoscopy      Colonoscopy N/A 10/28/2019    Procedure: COLONOSCOPY;  Surgeon: Jack Son MD;  Location: [facility] ENDOSCOPY    Colonoscopy      Colonoscopy N/A 3/21/2023    Procedure: COLONOSCOPY;  Surgeon: Jack Son MD;  Location: [facility] ENDO    Other surgical history      deviated septum repair    Other surgical  history Left     Sinus surgery        Upper gi endoscopy,exam         ALLERGIES:  No Known Allergies    SOCIAL HISTORY:    Social History     Socioeconomic History    Marital status:    Tobacco Use    Smoking status: Former     Current packs/day: 0.00     Average packs/day: 0.5 packs/day for 25.0 years (12.5 ttl pk-yrs)     Types: Cigarettes     Start date: 1983     Quit date: 2008     Years since quittin.9    Smokeless tobacco: Never   Vaping Use    Vaping status: Never Used   Substance and Sexual Activity    Alcohol use: Yes     Alcohol/week: 3.0 standard drinks of alcohol     Types: 3 Standard drinks or equivalent per week     Comment: 2-3 drinks/week    Drug use: No       FAMILY HISTORY:   Family History   Problem Relation Age of Onset    Cancer Father 64        Lymph node CA    Heart Disorder Mother         MI    Hypertension Mother     Diabetes Neg     Glaucoma Neg     Macular degeneration Neg        ASSESSMENTS:        REVIEW OF SYSTEMS:  Constitutional: Negative for: weight change, fever, fatigue, cold/heat intolerance  Eyes: Negative for:  Visual changes, proptosis, blurring, floaters, poor night vision, impaired color vision  ENT: Negative for:  dysphagia, neck swelling, dysphonia  Respiratory: Negative for:  dyspnea, cough  Cardiovascular: Negative for:  chest pain, palpitations, orthopnea  GI: Negative for:  abdominal pain, nausea, vomiting, diarrhea, constipation, bleeding  Neurology: Negative for: headache, numbness, weakness,   Genito-Urinary: Negative for: dysuria, frequency  Psychiatric: Negative for:  depression, anxiety  Hematology/Lymphatics: Negative for: bruising, lower extremity edema  Endocrine: Negative for: polyuria, polydypsia  Skin: Negative for: rash, blister,      PHYSICAL EXAM:   Vitals:    24 1001   BP: 138/74   Pulse: 55   Weight: 179 lb (81.2 kg)   Height: 5' 8\" (1.727 m)     BMI: Body mass index is 27.22 kg/m².         General Appearance:  alert, well  developed, in no acute distress  Head: Atraumatic  Eyes:  normal conjunctivae, sclera., normal sclera and normal pupils  Throat/Neck: normal sound to voice. Normal hearing, normal speech  Respiratory:  Speaking in full sentences, non-labored. no increased work of breathing, no audible wheezing    Neuro: motor grossly intact, moving all extremities without difficulty  Psychiatric:  oriented to time, self, and place  Extremities: 12/2024  Bilateral barefoot skin diabetic exam is normal, visualized feet and the appearance is normal.  Bilateral monofilament/sensation of both feet is normal.  Pulsation pedal pulse exam of both lower legs/feet is normal as well.            DATA:     Pertinent data reviewed  A1c is 6.8 % ( 12/2024)     ASSESSMENT AND PLAN:    1. Type 2 DM:      Plan:  Discussed the pathogenesis, natural course of diabetes. Patient understands the importance of glycemic control and the implications of uncontrolled diabetes including Diabetic ketoacidosis and various micro vascular and macrovascular complications.        a). Medications:      Metformin 1000 mg BF and dinner  Take with food  GI SE reviewed    Amaryl  2 mg with BF only  Counselled regarding risk of hypoglycemia associated with use.     Januvia 100 mg daily     Check BG as discussed  Alternate timings  Before BF/ before dinner    Call with sugars as discussed    b). No Nephropathy  c). Discussed importance of annual eye exams  d). Foot exam: Daily feet exam explained  e). BG log maintainence explained in great detail, to get log and glucometer on next visit.  f). Age appropriate Life style changes reviewed  g). Hypoglycemia recognition and management discussed    2. Patient’s BP is okay today  3. Dyslipidemia  A) Discussed lifestyle modifications including reductions in dietary total and saturated fat, weight loss, aerobic exercise, and eating a diet rich in fruits and vegetables.  B) Statin therapy  Discussed the potential side effects of  statins including muscle and liver injury.  C) Fasting lipid panel  reviewed     Patient verbalized a complete  understanding of all of the above and did not have any further questions.   RTC in 4-5 months  Check and call with sugars as discussed    Orders Placed This Encounter   Procedures    POC HemoCue Glucose 201 (Finger stick glucose)    POC Glycohemoglobin [65945]         Zoe Black MD

## 2024-12-18 ENCOUNTER — APPOINTMENT (OUTPATIENT)
Dept: CARDIAC REHAB | Facility: HOSPITAL | Age: 74
End: 2024-12-18
Attending: INTERNAL MEDICINE
Payer: MEDICARE

## 2024-12-22 DIAGNOSIS — E11.65 TYPE 2 DIABETES MELLITUS WITH HYPERGLYCEMIA, WITHOUT LONG-TERM CURRENT USE OF INSULIN (HCC): Chronic | ICD-10-CM

## 2024-12-22 DIAGNOSIS — F41.9 ANXIETY: ICD-10-CM

## 2024-12-23 ENCOUNTER — APPOINTMENT (OUTPATIENT)
Dept: CARDIAC REHAB | Facility: HOSPITAL | Age: 74
End: 2024-12-23
Attending: INTERNAL MEDICINE
Payer: MEDICARE

## 2024-12-25 ENCOUNTER — APPOINTMENT (OUTPATIENT)
Dept: CARDIAC REHAB | Facility: HOSPITAL | Age: 74
End: 2024-12-25
Attending: INTERNAL MEDICINE
Payer: MEDICARE

## 2024-12-27 RX ORDER — LORAZEPAM 1 MG/1
1 TABLET ORAL NIGHTLY PRN
Qty: 30 TABLET | Refills: 0 | Status: SHIPPED | OUTPATIENT
Start: 2024-12-27

## 2024-12-27 NOTE — TELEPHONE ENCOUNTER
Please review. Protocol Failed; No Protocol      Recent fills: 9/30/2024, 10/28/2024, 12/2/2024  Last Rx written: 11/29/2024  Last office visit: 11/14/2024    Future Appointments  Date Type Provider Dept   02/04/25 Appointment Sun Vega MD [facility]   Showing future appointments within next 150 days with a meds authorizing provider and meeting all other requirements        Requested Prescriptions   Pending Prescriptions Disp Refills    LORazepam 1 MG Oral Tab 30 tablet 0     Sig: Take 1 tablet (1 mg total) by mouth nightly as needed.       Controlled Substance Medication Failed - 12/27/2024  2:30 PM        Failed - This medication is a controlled substance - forward to provider to refill               Future Appointments         Provider Department Appt Notes    In 1 month Sun Vega MD [facility] pre op surgery is on 02/27/20/25 see comm    In 2 months Mary Dang PA [facility] 1st POV Left TKA 2/28    In 5 months Zoe Black MD [facility] 6 months          Recent Outpatient Visits              1 week ago Type 2 diabetes mellitus with other specified complication, without long-term current use of insulin ([facility])    [facility] Zoe Black MD    Office Visit    1 month ago Primary osteoarthritis of left knee    [facility] Denny Stanley MD    Office Visit    1 month ago Rash    [facility] Sun Vega MD    Office Visit    5 months ago Type 2 diabetes mellitus with hyperglycemia, without long-term current use of insulin ([facility])    [facility] Zoe Black MD    Office Visit    5 months ago Primary osteoarthritis of left knee    [facility]  Denny Guerrero MD    Office Visit

## 2025-01-20 DIAGNOSIS — E11.65 TYPE 2 DIABETES MELLITUS WITH HYPERGLYCEMIA, WITHOUT LONG-TERM CURRENT USE OF INSULIN (HCC): Chronic | ICD-10-CM

## 2025-01-20 DIAGNOSIS — F41.9 ANXIETY: ICD-10-CM

## 2025-01-23 RX ORDER — LORAZEPAM 1 MG/1
1 TABLET ORAL NIGHTLY PRN
Qty: 30 TABLET | Refills: 0 | Status: SHIPPED | OUTPATIENT
Start: 2025-01-23

## 2025-01-23 NOTE — TELEPHONE ENCOUNTER
Please review. Protocol Failed; No Protocol      Recent fills: 12/2/2024, 12/30/2024  Last Rx written: 12/27/2024  Last office visit: 11/14/2024    Future Appointments  Date Type Provider Dept   02/04/25 Appointment Sun Vega MD [facility]   Showing future appointments within next 150 days with a meds authorizing provider and meeting all other requirements        Requested Prescriptions   Pending Prescriptions Disp Refills    LORazepam 1 MG Oral Tab 30 tablet 0     Sig: Take 1 tablet (1 mg total) by mouth nightly as needed.       Controlled Substance Medication Failed - 1/23/2025  4:31 PM        Failed - This medication is a controlled substance - forward to provider to refill        Passed - Medication is active on med list               Future Appointments         Provider Department Appt Notes    In 1 week Sun Vega MD [facility] pre op surgery is on 02/27/20/25 see comm    In 1 month Mary Dang PA [facility] 1st POV Left TKA 2/28    In 4 months Zoe Black MD [facility] 6 months          Recent Outpatient Visits              1 month ago Type 2 diabetes mellitus with other specified complication, without long-term current use of insulin ([facility])    [facility] Zoe Black MD    Office Visit    2 months ago Primary osteoarthritis of left knee    [facility] Denny Stanley MD    Office Visit    2 months ago Rash    [facility] Sun Vega MD    Office Visit    6 months ago Type 2 diabetes mellitus with hyperglycemia, without long-term current use of insulin ([facility])    [facility] Zoe Black MD    Office Visit    6 months ago Primary osteoarthritis of left knee    [facility]  [facility] Denny Stanley MD    Office Visit

## 2025-02-04 ENCOUNTER — OFFICE VISIT (OUTPATIENT)
Dept: INTERNAL MEDICINE CLINIC | Facility: CLINIC | Age: 75
End: 2025-02-04
Payer: MEDICARE

## 2025-02-04 ENCOUNTER — LAB ENCOUNTER (OUTPATIENT)
Dept: LAB | Age: 75
End: 2025-02-04
Attending: INTERNAL MEDICINE
Payer: MEDICARE

## 2025-02-04 VITALS
HEIGHT: 68 IN | WEIGHT: 180 LBS | DIASTOLIC BLOOD PRESSURE: 66 MMHG | HEART RATE: 59 BPM | BODY MASS INDEX: 27.28 KG/M2 | SYSTOLIC BLOOD PRESSURE: 132 MMHG

## 2025-02-04 DIAGNOSIS — M17.11 PRIMARY OSTEOARTHRITIS OF RIGHT KNEE: ICD-10-CM

## 2025-02-04 DIAGNOSIS — Z01.818 PREOP EXAMINATION: Primary | ICD-10-CM

## 2025-02-04 DIAGNOSIS — Z01.818 PREOP EXAMINATION: ICD-10-CM

## 2025-02-04 DIAGNOSIS — E11.9 TYPE 2 DIABETES MELLITUS WITHOUT RETINOPATHY (HCC): ICD-10-CM

## 2025-02-04 LAB
ALBUMIN SERPL-MCNC: 4.5 G/DL (ref 3.2–4.8)
ALBUMIN/GLOB SERPL: 2 {RATIO} (ref 1–2)
ALP LIVER SERPL-CCNC: 51 U/L
ALT SERPL-CCNC: 28 U/L
ANION GAP SERPL CALC-SCNC: 9 MMOL/L (ref 0–18)
AST SERPL-CCNC: 19 U/L (ref ?–34)
BILIRUB SERPL-MCNC: 1 MG/DL (ref 0.2–1.1)
BILIRUB UR QL: NEGATIVE
BUN BLD-MCNC: 11 MG/DL (ref 9–23)
BUN/CREAT SERPL: 14.3 (ref 10–20)
CALCIUM BLD-MCNC: 9.2 MG/DL (ref 8.7–10.4)
CHLORIDE SERPL-SCNC: 101 MMOL/L (ref 98–112)
CLARITY UR: CLEAR
CO2 SERPL-SCNC: 31 MMOL/L (ref 21–32)
COLOR UR: COLORLESS
CREAT BLD-MCNC: 0.77 MG/DL
DEPRECATED RDW RBC AUTO: 50.6 FL (ref 35.1–46.3)
EGFRCR SERPLBLD CKD-EPI 2021: 95 ML/MIN/1.73M2 (ref 60–?)
ERYTHROCYTE [DISTWIDTH] IN BLOOD BY AUTOMATED COUNT: 15.4 % (ref 11–15)
FASTING STATUS PATIENT QL REPORTED: YES
GLOBULIN PLAS-MCNC: 2.3 G/DL (ref 2–3.5)
GLUCOSE BLD-MCNC: 179 MG/DL (ref 70–99)
GLUCOSE UR-MCNC: 50 MG/DL
HCT VFR BLD AUTO: 36 %
HGB BLD-MCNC: 11.6 G/DL
KETONES UR-MCNC: NEGATIVE MG/DL
LEUKOCYTE ESTERASE UR QL STRIP.AUTO: NEGATIVE
MCH RBC QN AUTO: 29 PG (ref 26–34)
MCHC RBC AUTO-ENTMCNC: 32.2 G/DL (ref 31–37)
MCV RBC AUTO: 90 FL
NITRITE UR QL STRIP.AUTO: NEGATIVE
OSMOLALITY SERPL CALC.SUM OF ELEC: 296 MOSM/KG (ref 275–295)
PH UR: 7 [PH] (ref 5–8)
PLATELET # BLD AUTO: 189 10(3)UL (ref 150–450)
POTASSIUM SERPL-SCNC: 3.8 MMOL/L (ref 3.5–5.1)
PROT SERPL-MCNC: 6.8 G/DL (ref 5.7–8.2)
PROT UR-MCNC: NEGATIVE MG/DL
RBC # BLD AUTO: 4 X10(6)UL
SODIUM SERPL-SCNC: 141 MMOL/L (ref 136–145)
SP GR UR STRIP: 1.01 (ref 1–1.03)
UROBILINOGEN UR STRIP-ACNC: NORMAL
WBC # BLD AUTO: 4.6 X10(3) UL (ref 4–11)

## 2025-02-04 PROCEDURE — 99214 OFFICE O/P EST MOD 30 MIN: CPT | Performed by: INTERNAL MEDICINE

## 2025-02-04 PROCEDURE — 1159F MED LIST DOCD IN RCRD: CPT | Performed by: INTERNAL MEDICINE

## 2025-02-04 PROCEDURE — G2211 COMPLEX E/M VISIT ADD ON: HCPCS | Performed by: INTERNAL MEDICINE

## 2025-02-04 PROCEDURE — 36415 COLL VENOUS BLD VENIPUNCTURE: CPT

## 2025-02-04 PROCEDURE — 1160F RVW MEDS BY RX/DR IN RCRD: CPT | Performed by: INTERNAL MEDICINE

## 2025-02-04 PROCEDURE — 80053 COMPREHEN METABOLIC PANEL: CPT

## 2025-02-04 PROCEDURE — 3008F BODY MASS INDEX DOCD: CPT | Performed by: INTERNAL MEDICINE

## 2025-02-04 PROCEDURE — 81001 URINALYSIS AUTO W/SCOPE: CPT

## 2025-02-04 PROCEDURE — 3075F SYST BP GE 130 - 139MM HG: CPT | Performed by: INTERNAL MEDICINE

## 2025-02-04 PROCEDURE — 85027 COMPLETE CBC AUTOMATED: CPT

## 2025-02-04 PROCEDURE — 3078F DIAST BP <80 MM HG: CPT | Performed by: INTERNAL MEDICINE

## 2025-02-04 PROCEDURE — 87081 CULTURE SCREEN ONLY: CPT

## 2025-02-04 NOTE — PROGRESS NOTES
Avinash Dumont is a 74 year old male.  Chief Complaint   Patient presents with    Pre-Op Exam     Left total knee arthropalsty wit MRI templated patient specific instruments  With Dr Denny Stanley   2/28/25        HPI:   Patient comes for preop  C/C preop  C/o going for left total knee replacement with Dr. Stanley on 2/28/2025  Patient states that he can walk up 2 flights of stairs without feeling short of breath  EKG was done January 26 and seen in care everywhere, had chest x-ray which was normal April 2024  NO NEW COMPLAINTS        HISTORY   since last visit in 7/24 angio w/ stent to LAD by dr dudley, no hctz due to hyponatremia but now has swelling in the L LE since stopping the hydrochlorothiazide   Has knee pain used to be on the right but now both knees hurt and will have an appointment with the orthopedic doctor Dr. Stanley next week  He uses meloxicam and does not use the tramadol uses the meloxicam after meals may be maximum once a week- one tab         History 3/2024  Got His CPAP machine--using and tolerating the machine well     HISTORY    saw ortho -dr potts - was given tramadol and meloxicam   Still has to get the CPAP machine  Since last visit he did see the  endocrinologist           HISTORY  + snores as per wife , mostly sleeps on his side but he can lie flat on his back and when he does he finds himself snoring , he does not wake up short of breath from his sleep, no choking in his sleep, +EDS      HISTORY  3/2023 VISIT   knee pain is better with the physical therapy  Would like to review labs   Needs refills on the lorazepam  Thinks the A1c has increased because he had increased his hypoalert carbohydrates but he will go back to his regular diet now     HISTORY  since her last visit she has seen the eye doctor and also the Ortho doctor and he drained some fluid and injected him with Durolane and is a little bit better  Noted he has anemia and had seen hemeatologist in the past and was asked to  stop the lexapro so he has for one mn and he feels ok         HISTORY  New patient- used to see dr trujillo   C/C establish care-referred by his insurance  C/o needs refills         PMH  DM2   HTN  HL  B/l OA knee -  CAD S/P CABG -  at [place] dr arturo harden and then 2024 stent to LAD   Anxiety and depression was on lexapro -takes lorazepam   H/o tob dep and lung nodules    MILENA 9/2023      Galen jain      Lives with family -wife and 2 kids  Retired used to work in [employer]   ----------------------------------------------------------------------------------------    Current Outpatient Medications   Medication Sig Dispense Refill    LORazepam 1 MG Oral Tab Take 1 tablet (1 mg total) by mouth nightly as needed. 30 tablet 0    Glucose Blood (TRUE METRIX BLOOD GLUCOSE TEST) In Vitro Strip 1 each by In Vitro route daily. 100 strip 1    metoprolol succinate ER 25 MG Oral Tablet 24 Hr Take 1 tablet (25 mg total) by mouth daily. 90 tablet 3    Meloxicam 15 MG Oral Tab Take 1 tablet (15 mg total) by mouth daily.      nitroglycerin 0.4 MG Sublingual SL Tab TAKE 1 TABLET SUBLINGUALLY AS NEEDED      triamcinolone 0.1 % External Cream Apply 1 Application  topically 2 (two) times daily. APPLY TO AFFECTED AREA 45 g 0    glimepiride 2 MG Oral Tab Take 1 tablet (2 mg total) by mouth daily with breakfast. 90 tablet 3    SITagliptin Phosphate (JANUVIA) 100 MG Oral Tab Take 1 tablet (100 mg total) by mouth daily. 90 tablet 3    valsartan 320 MG Oral Tab Take 1 tablet (320 mg total) by mouth daily.      amLODIPine 10 MG Oral Tab Take 1 tablet (10 mg total) by mouth daily. 90 tablet 3    atorvastatin 40 MG Oral Tab Take 1 tablet (40 mg total) by mouth nightly. 90 tablet 3    metFORMIN HCl 1000 MG Oral Tab TAKE 1 TABLET TWICE DAILY WITH MEALS 180 tablet 3    clopidogrel 75 MG Oral Tab Take 1 tablet (75 mg total) by mouth daily. 30 tablet 11    Blood Glucose Monitoring Suppl (TRUE METRIX AIR GLUCOSE METER) w/Device Does not  apply Kit 1 kit As Directed. 1 kit 0    omega-3-acid ethyl esters 1 g Oral Cap Take 1 capsule (1 g total) by mouth 3 (three) times daily. 270 capsule 3    Blood Glucose Calibration (TRUE METRIX LEVEL 1) Low In Vitro Solution 1 each by In Vitro route as needed. 1 each 0    Lancets 33G Does not apply Misc 1 each 2 (two) times a day. 200 each 3    ferrous sulfate 325 (65 FE) MG Oral Tab EC Take 1 tablet (325 mg total) by mouth daily with breakfast.      aspirin 81 MG Oral Tab Take 1 tablet (81 mg total) by mouth daily.      Multiple Vitamins-Minerals (MULTIVITAMIN ADULT OR) Take 1 tablet by mouth daily.      COENZYME Q-10 OR Take 1 tablet by mouth daily.        Past Medical History:    Anxiety    Arthritis    Atherosclerosis of coronary artery    Colon adenomas    Colon adenomas    x5    Coronary atherosclerosis    Depression    Diabetes (HCC)    Esophageal reflux    Essential hypertension    High blood pressure    High cholesterol    History of blood transfusion    12 YRS AGO, NO REACTIONS    Hyperlipidemia    Sleep apnea    Visual impairment      Past Surgical History:   Procedure Laterality Date    Bypass surgery  2009    Cabg      CABG x 5    Colonoscopy      Colonoscopy N/A 10/28/2019    Procedure: COLONOSCOPY;  Surgeon: Jack Son MD;  Location: [facility] ENDOSCOPY    Colonoscopy      Colonoscopy N/A 3/21/2023    Procedure: COLONOSCOPY;  Surgeon: Jack Son MD;  Location: [facility] ENDO    Other surgical history      deviated septum repair    Other surgical history Left     Sinus surgery        Upper gi endoscopy,exam        Social History:  Social History     Socioeconomic History    Marital status:    Tobacco Use    Smoking status: Former     Current packs/day: 0.00     Average packs/day: 0.5 packs/day for 25.0 years (12.5 ttl pk-yrs)     Types: Cigarettes     Start date: 1983     Quit date: 2008     Years since quittin.1    Smokeless tobacco: Never   Vaping Use    Vaping status:  Never Used   Substance and Sexual Activity    Alcohol use: Yes     Alcohol/week: 3.0 standard drinks of alcohol     Types: 3 Standard drinks or equivalent per week     Comment: 2-3 drinks/week    Drug use: No     Social Drivers of Health      Received from [facility], [facility]    Housing Stability        REVIEW OF SYSTEMS:   GENERAL HEALTH: No fevers, chills, sweats, fatigue  VISION: No recent vision problems, blurry vision or double vision  HEENT: No decreased hearing ear pain nasal congestion or sore throat  SKIN: denies any unusual skin lesions or rashes  RESPIRATORY: denies shortness of breath, cough, wheezing  CARDIOVASCULAR: denies chest pain on exertion, palpitations, swelling in feet  GI: denies abdominal pain and denies heartburn, nausea or vomiting  : No Pain on urination, change in the color of urine, discharge, urinating frequently  MUS: No back pain, joint pain, muscle pain--just the knee   NEURO: denies headaches , anxiety, depression    EXAM:   /66   Pulse 59   Ht 5' 8\" (1.727 m)   Wt 180 lb (81.6 kg)   BMI 27.37 kg/m²   GENERAL: well developed, well nourished,in no apparent distress  SKIN: no rashes,no suspicious lesions  HEENT: atraumatic, normocephalic  NECK: supple,no adenopathy,FULL ROM OF NECK   LUNGS: clear to auscultation, no wheeze  CARDIO: RRR + murmur  EXTREMITIES: no cyanosis, or edema    ASSESSMENT AND PLAN:   Diagnoses and all orders for this visit:    Preop examination  -     Comp Metabolic Panel (14); Future  -     CBC, Platelet; No Differential; Future  -     MRSA Culture Only [E]; Future  -     Urinalysis with Culture Reflex; Future  And   Primary osteoarthritis of right knee  -     Comp Metabolic Panel (14); Future  -     CBC, Platelet; No Differential; Future  -     MRSA Culture Only [E]; Future  -     Urinalysis with Culture Reflex; Future  Ordered labs including blood work and urine  Noted he just had an EKG recently and saw  cardiology  EKG shows lateral ST depression with T wave inversions although patient is asymptomatic as per cardiology note, recent coronary angiogram reviewed  Cardiology recommendations is to recommend proceeding with left knee replacement surgery without further cardiac testing based on recent angio Aidan coronaries and lack of symptoms although EKG is not normal  Hold Plavix 5 days prior to the procedure, and aspirin 1 day prior to the procedure follow-up with another ACT echocardiogram in 6 months and with the cardiologist 1 week after completing the echocardiogram    ADDENDUM  2/10/2025  Reviewed blood work and urine-patient is cleared for surgery  He is at low risk for any perioperative cardiac complications  He will need routine perioperative care including early ambulation, DVT prophylaxis, intense dysarthria etc.  Hold Plavix 5 days prior to the procedure and aspirin 1 day prior to the procedure    Type 2 diabetes mellitus without retinopathy (HCC)  -     Comp Metabolic Panel (14); Future  -     CBC, Platelet; No Differential; Future  -     Urinalysis with Culture Reflex; Future  bl sugars   Hba1c 7.5 on 6/2022 and 6.3 on 11/2022 and 7.2 on 2/2023 on 10/2023 and 7.9 on 3/2024 and 6.4 on 7/2024 and 6.8 on 12/2024   U. Micro 11/2024   Eye exam -dr francisco but will see dr charlee mak   On Asa , on arb and  statin   Foot  11/14/2024  Diet -- advised to follow a low carb, low sugar diet , try to be consistent                Exercise 30 min a day   Sees Dr. Black           Preventive medicine  Colonoscopy-Dr. Son 2023 and rpt in one yr , FIT neg 11/2024   Labs 10/2024 and 11/2023 reviewed   Ct chest 7/2022 for lung nodule - rpt in 7/2024   Echo 7/2022 -aortic regurg moderate - mild stenosis - sees dr jain          The patient indicates understanding of these issues and agrees to the plan.  No follow-ups on file.

## 2025-02-11 ENCOUNTER — TELEPHONE (OUTPATIENT)
Dept: INTERNAL MEDICINE CLINIC | Facility: CLINIC | Age: 75
End: 2025-02-11

## 2025-02-11 NOTE — TELEPHONE ENCOUNTER
I have faxed medical clearance to Ortho including notes, labs and chest xray also including clearance from Cardio - successfully faxed to [phone]

## 2025-02-13 ENCOUNTER — TELEPHONE (OUTPATIENT)
Dept: ORTHOPEDICS CLINIC | Facility: CLINIC | Age: 75
End: 2025-02-13

## 2025-02-13 NOTE — TELEPHONE ENCOUNTER
Patient calling stating he only needs dental clearance prior to upcoming surgery with Dr Stanley but patient stating that the appointment with the dentist is on 2/20 would like to know if that ok?Please advise

## 2025-02-20 DIAGNOSIS — E11.65 TYPE 2 DIABETES MELLITUS WITH HYPERGLYCEMIA, WITHOUT LONG-TERM CURRENT USE OF INSULIN (HCC): Chronic | ICD-10-CM

## 2025-02-20 DIAGNOSIS — F41.9 ANXIETY: ICD-10-CM

## 2025-02-24 ENCOUNTER — TELEPHONE (OUTPATIENT)
Dept: ORTHOPEDICS CLINIC | Facility: CLINIC | Age: 75
End: 2025-02-24

## 2025-02-24 NOTE — TELEPHONE ENCOUNTER
Per patient has surgery 2/28 and the preadmission nurse stated patient needs a nerve blocker and they need an order. Please advise

## 2025-02-25 RX ORDER — LORAZEPAM 1 MG/1
1 TABLET ORAL NIGHTLY PRN
Qty: 30 TABLET | Refills: 0 | Status: SHIPPED | OUTPATIENT
Start: 2025-02-25

## 2025-02-25 NOTE — TELEPHONE ENCOUNTER
Spinal with Duramorph is correct. I only use the adductor block when they cannot get the spinal.

## 2025-02-25 NOTE — TELEPHONE ENCOUNTER
Colon recall.     Reviewed allergies pharmacy, medications, medical/surgical history on file, and GI symptoms.     Please provide orders if ok to schedule directly.     Last Procedure, Date, MD:  colonoscopy 05/27/2022  Last Diagnosis: colon polyps x 2  - diverticulosis  - internal hemorrhoids   Recalled (mth/yrs): 3 years  Sedation Used Previously:  MAC  Last Prep Used (if known):  Trilyte  Quality Of Prep (if known): good  Anticoagulants:Eliquis  Diuretics: no  Diabetic Medication (Includes Insulin): no  Weight loss Medication: no  Iron/Herbal/Multivitamin Supplements:no  Marijuana/Vaping/CBD:no  Height/Weight:6'4/295  BMI:35.92  Hx of Cardiac &/or CVA Issues (MI/Stroke):hx of atrial fibrillation  If yes, in the last 12 months?   Devices Pacemaker/Defibrillator/Stent(s):no  Respiratory Issues/Oxygen Use/MARQUEZ/COPD:no  CiPAP/BiPAP: no  Issues w/ Anesthesia:no    Symptoms (Y/N): none  Symptoms Details:     Special Comments/Notes:    Thank you!      Author: Miguel Angel Roche MD Service: Gastroenterology Author Type: Physician   Filed: 5/27/2022 10:42 AM Date of Service: 5/27/2022 10:40 AM Status: Signed   : Miguel Angel Roche MD (Physician)   [facility] Endoscopy Report        Preoperative Diagnosis:  - history of colon polyps         Postoperative Diagnosis:  - colon polyps x 2  - diverticulosis  - internal hemorrhoids        Procedure:    Colonoscopy         Surgeon:  Miguel Angel Roche M.D.     Anesthesia:  MAC sedation     Technique:  After informed consent, the patient was placed in the left lateral recumbent position.  Digital rectal examination revealed no palpable intraluminal abnormalities.  An Olympus variable stiffness 190 series HD colonoscope was inserted into the rectum and advanced under direct vision by following the lumen to the cecum.  The colon was examined upon withdrawal in the left lateral position.     The procedure was well tolerated without immediate complication.       Provider not at this practice     See TE for ENDO      Findings:  The preparation of the colon was good.  The terminal ileum was examined for 4 cm and visually normal.  The ileocecal valve was well preserved. The visualized colonic mucosa from the cecum to the anal verge was normal with an intact vascular pattern.     Colon polyps x2 removed as follows;  -Ascending x1, sessile 3 to 4 mm in size and cold snare removed.  -Sigmoid x1, semipedunculated 1.2 cm and removed by hot snare technique with placement of 2 clips across the mucosal defect.  Specimens retrieved.  Both polypectomy sites inspected and found to be free of bleeding and both specimens retrieved and sent for analysis.     Diverticulosis located in the sigmoid colon, known to diverticulitis.     Small internal hemorrhoids noted on retroflexed view.     Estimated blood loss-insignificant  Specimens-colon polyps        Impression:  - colon polyps x 2  - diverticulosis  - internal hemorrhoids     Recommendations:  - Post polypectomy instructions given  - Repeat colonoscopy in 3 years   - High fiber diet for diverticular disease  - Symptomatic treatment of hemorrhoids              Miguel Angel Roche MD  5/27/2022  10:40 AM           Final Diagnosis:      A. Ascending colon polyp:   Small fragments of tubular adenoma.     B. Sigmoid colon polyp:  Hyperplastic polyp, inflamed and ulcerated.

## 2025-02-26 NOTE — H&P
Denny Stanley MD   Physician  Specialty: SURGERY, ORTHOPEDIC     Progress Notes     Signed        Signed       Expand All Collapse All    NURSING INTAKE COMMENTS:        Chief Complaint   Patient presents with    Knee Pain       L knee f/u -  Pt states he is able to move forward with sx. Pt rates pain 6/10. Some swelling.Pt was given cortisone injection on 07/16, with some results.          HPI: This 74 year old male presents today to schedule left knee replacement in mid February 2025.  Both knees have bone-on-bone medial osteoarthritis.  The left has been refractory to nonoperative care.  He had a cardiac stent in July 2025 by Dr. Mccarty and is currently on Plavix and aspirin.  He lives independently with his wife.  He is retired.  BMI is 25.85.     He has a little neuropathy on the right foot but not the left from his diabetes.     Past Medical History       Past Medical History:    Anxiety    Arthritis    Atherosclerosis of coronary artery    Colon adenomas    Colon adenomas     x5    Coronary atherosclerosis    Depression    Diabetes (HCC)    Esophageal reflux    Essential hypertension    High blood pressure    High cholesterol    History of blood transfusion     12 YRS AGO, NO REACTIONS    Hyperlipidemia    Sleep apnea    Visual impairment         Past Surgical History         Past Surgical History:   Procedure Laterality Date    Bypass surgery   05/2009    Cabg         CABG x 5    Colonoscopy        Colonoscopy N/A 10/28/2019     Procedure: COLONOSCOPY;  Surgeon: Jack Son MD;  Location: [facility] ENDOSCOPY    Colonoscopy        Colonoscopy N/A 3/21/2023     Procedure: COLONOSCOPY;  Surgeon: Jack Son MD;  [facility]    Other surgical history         deviated septum repair    Other surgical history Left      Sinus surgery          Upper gi endoscopy,exam             Current Medications          Current Outpatient Medications   Medication Sig Dispense Refill    Meloxicam 15 MG  Oral Tab Take 1 tablet (15 mg total) by mouth daily.        nitroglycerin 0.4 MG Sublingual SL Tab TAKE 1 TABLET SUBLINGUALLY AS NEEDED        triamcinolone 0.1 % External Cream Apply 1 Application  topically 2 (two) times daily. APPLY TO AFFECTED AREA 45 g 0    Meloxicam 7.5 MG Oral Tab Take 1 tablet (7.5 mg total) by mouth daily.        glimepiride 2 MG Oral Tab Take 1 tablet (2 mg total) by mouth daily with breakfast. 90 tablet 3    LORazepam 1 MG Oral Tab Take 1 tablet (1 mg total) by mouth nightly as needed. 30 tablet 0    SITagliptin Phosphate (JANUVIA) 100 MG Oral Tab Take 1 tablet (100 mg total) by mouth daily. 90 tablet 3    valsartan 320 MG Oral Tab Take 1 tablet (320 mg total) by mouth daily.        Glucose Blood (TRUE METRIX BLOOD GLUCOSE TEST) In Vitro Strip 1 each by In Vitro route daily. 100 strip 0    amLODIPine 10 MG Oral Tab Take 1 tablet (10 mg total) by mouth daily. 90 tablet 3    atorvastatin 40 MG Oral Tab Take 1 tablet (40 mg total) by mouth nightly. 90 tablet 3    metFORMIN HCl 1000 MG Oral Tab TAKE 1 TABLET TWICE DAILY WITH MEALS 180 tablet 3    clopidogrel 75 MG Oral Tab Take 1 tablet (75 mg total) by mouth daily. 30 tablet 11    Blood Glucose Monitoring Suppl (TRUE METRIX AIR GLUCOSE METER) w/Device Does not apply Kit 1 kit As Directed. 1 kit 0    omega-3-acid ethyl esters 1 g Oral Cap Take 1 capsule (1 g total) by mouth 3 (three) times daily. 270 capsule 3    metoprolol succinate ER 25 MG Oral Tablet 24 Hr Take 1 tablet (25 mg total) by mouth daily. 90 tablet 3    Blood Glucose Calibration (TRUE METRIX LEVEL 1) Low In Vitro Solution 1 each by In Vitro route as needed. 1 each 0    Lancets 33G Does not apply Misc 1 each 2 (two) times a day. 200 each 3    ferrous sulfate 325 (65 FE) MG Oral Tab EC Take 1 tablet (325 mg total) by mouth daily with breakfast.        aspirin 81 MG Oral Tab Take 1 tablet (81 mg total) by mouth daily.        Multiple Vitamins-Minerals (MULTIVITAMIN ADULT OR) Take  1 tablet by mouth daily.        COENZYME Q-10 OR Take 1 tablet by mouth daily.             [Allergies]    [Allergies]  No Known Allergies  Family History         Family History   Problem Relation Age of Onset    Cancer Father 64         Lymph node CA    Heart Disorder Mother           MI    Hypertension Mother      Diabetes Neg      Glaucoma Neg      Macular degeneration Neg           No family Hx of DVT/PE     Social History            Occupational History    Not on file   Tobacco Use    Smoking status: Former       Current packs/day: 0.00       Average packs/day: 0.5 packs/day for 25.0 years (12.5 ttl pk-yrs)       Types: Cigarettes       Start date: 1983       Quit date: 2008       Years since quittin.8    Smokeless tobacco: Never   Vaping Use    Vaping status: Never Used   Substance and Sexual Activity    Alcohol use: Yes       Alcohol/week: 3.0 standard drinks of alcohol       Types: 3 Standard drinks or equivalent per week       Comment: 2-3 drinks/week    Drug use: No    Sexual activity: Not on file         Review of Systems:  GENERAL: feels generally well, no significant weight loss or weight gain  SKIN: no ulcerated or worrisome skin lesions  EYES:denies blurred vision or double vision  HEENT: denies new nasal congestion, sinus pain or ST  LUNGS: denies shortness of breath  CARDIOVASCULAR: denies chest pain  GI: no hematemesis, no worsening heartburn, no diarrhea  : no dysuria, no blood in urine, no difficulty urinating, no incontinence  MUSCULOSKELETAL: no other musculoskeletal complaints other than in HPI  NEURO: no numbness or tingling, no weakness or balance disorder  PSYCHE: no depression or anxiety  HEMATOLOGIC: no hx of blood dyscrasia, no Hx DVT/PE  ENDOCRINE: no thyroid or diabetes issues  ALL/ASTHMA: no new hx of severe allergy or asthma     Physical Examination:    Ht 5' 8\" (1.727 m)   Wt 170 lb (77.1 kg)   BMI 25.85 kg/m²   Constitutional: appears well hydrated, alert and  responsive, no acute distress noted  Extremities: The skin on both legs was healthy without pitting edema or calf tenderness today.  No effusions or asymmetric warmth of the knees.  Musculoskeletal: Both knees had motion 5 to 125 degrees.  No instability.  Tenderness mostly on the medial joint lines left worse than right in the patellofemoral joints more mildly.  Neurological: Normal motor function bilateral lower extremities.     Imaging: X-rays of the knee show bone-on-bone medial osteoarthritis.        No results found.            Lab Results   Component Value Date     WBC 3.2 (L) 10/24/2024     HGB 12.2 (L) 10/24/2024     .0 10/24/2024            Lab Results   Component Value Date      (H) 10/24/2024     BUN 12 10/24/2024     CREATSERUM 0.85 10/24/2024     GFRNAA 81 05/04/2022     GFRAA 94 05/04/2022         Assessment and Plan:  Diagnoses and all orders for this visit:     Primary osteoarthritis of left knee  -     MRI KNEE LULU, LEFT ([id number]); Future     Left knee pain, unspecified chronicity  -     MRI KNEE LULU, LEFT ([id number]); Future     Effusion, left knee  -     MRI KNEE LULU, LEFT ([id number]); Future           Assessment: Wants to schedule left knee replacement in February 2025 when he can come off his Plavix and aspirin.     Plan: We discussed the necessary rehab and the usual outcomes.  We discussed potential risks of surgery such as death, heart attack, stroke, bleeding, infection, blood clot, nerve injury, artery injury, failure fixation, fracture, stiffness, loosening, revision surgery, and persistent pain despite surgery.  We discussed that he will have some numbness lateral to the incision.  He has questions which were answered to his satisfaction.     He will need medical, dental, and cardiology clearance.  He will have to call if his Plavix and aspirin for 1 week before surgery for the spinal.  The plan will be a Lulu persona medial congruent or LPS knee replacement  with patient specific instruments.     Follow Up: No follow-ups on file.     Denny Stanley MD

## 2025-02-28 ENCOUNTER — HOSPITAL ENCOUNTER (OUTPATIENT)
Facility: HOSPITAL | Age: 75
Discharge: HOME HEALTH | End: 2025-03-01
Attending: ORTHOPAEDIC SURGERY | Admitting: ORTHOPAEDIC SURGERY
Payer: MEDICARE

## 2025-02-28 ENCOUNTER — APPOINTMENT (OUTPATIENT)
Dept: GENERAL RADIOLOGY | Facility: HOSPITAL | Age: 75
End: 2025-02-28
Attending: ORTHOPAEDIC SURGERY
Payer: MEDICARE

## 2025-02-28 ENCOUNTER — ANESTHESIA EVENT (OUTPATIENT)
Dept: SURGERY | Facility: HOSPITAL | Age: 75
End: 2025-02-28
Payer: MEDICARE

## 2025-02-28 ENCOUNTER — ANESTHESIA (OUTPATIENT)
Dept: SURGERY | Facility: HOSPITAL | Age: 75
End: 2025-02-28
Payer: MEDICARE

## 2025-02-28 DIAGNOSIS — M17.12 PRIMARY OSTEOARTHRITIS OF LEFT KNEE: Primary | ICD-10-CM

## 2025-02-28 PROBLEM — I25.10 CAD (CORONARY ARTERY DISEASE): Status: ACTIVE | Noted: 2019-03-28

## 2025-02-28 LAB
GLUCOSE BLDC GLUCOMTR-MCNC: 112 MG/DL (ref 70–99)
GLUCOSE BLDC GLUCOMTR-MCNC: 129 MG/DL (ref 70–99)
GLUCOSE BLDC GLUCOMTR-MCNC: 141 MG/DL (ref 70–99)
GLUCOSE BLDC GLUCOMTR-MCNC: 159 MG/DL (ref 70–99)

## 2025-02-28 PROCEDURE — 1170F FXNL STATUS ASSESSED: CPT | Performed by: HOSPITALIST

## 2025-02-28 PROCEDURE — 0SRD0J9 REPLACEMENT OF LEFT KNEE JOINT WITH SYNTHETIC SUBSTITUTE, CEMENTED, OPEN APPROACH: ICD-10-PCS | Performed by: ORTHOPAEDIC SURGERY

## 2025-02-28 PROCEDURE — 73560 X-RAY EXAM OF KNEE 1 OR 2: CPT | Performed by: ORTHOPAEDIC SURGERY

## 2025-02-28 PROCEDURE — 3077F SYST BP >= 140 MM HG: CPT | Performed by: HOSPITALIST

## 2025-02-28 PROCEDURE — 3008F BODY MASS INDEX DOCD: CPT | Performed by: HOSPITALIST

## 2025-02-28 PROCEDURE — 1159F MED LIST DOCD IN RCRD: CPT | Performed by: HOSPITALIST

## 2025-02-28 PROCEDURE — 99222 1ST HOSP IP/OBS MODERATE 55: CPT | Performed by: HOSPITALIST

## 2025-02-28 PROCEDURE — 3078F DIAST BP <80 MM HG: CPT | Performed by: HOSPITALIST

## 2025-02-28 DEVICE — IMPLANTABLE DEVICE
Type: IMPLANTABLE DEVICE | Site: KNEE | Status: FUNCTIONAL
Brand: PERSONA® NATURAL TIBIA®

## 2025-02-28 DEVICE — IMPLANTABLE DEVICE
Type: IMPLANTABLE DEVICE | Site: KNEE | Status: FUNCTIONAL
Brand: PERSONA® VIVACIT-E®

## 2025-02-28 DEVICE — IMPLANTABLE DEVICE
Type: IMPLANTABLE DEVICE | Site: KNEE | Status: FUNCTIONAL
Brand: PERSONA®

## 2025-02-28 DEVICE — IMPLANTABLE DEVICE
Type: IMPLANTABLE DEVICE | Site: KNEE | Status: FUNCTIONAL
Brand: REFOBACIN® BONE CEMENT R

## 2025-02-28 RX ORDER — NICOTINE POLACRILEX 4 MG
30 LOZENGE BUCCAL
Status: DISCONTINUED | OUTPATIENT
Start: 2025-02-28 | End: 2025-02-28 | Stop reason: HOSPADM

## 2025-02-28 RX ORDER — LABETALOL HYDROCHLORIDE 5 MG/ML
5 INJECTION, SOLUTION INTRAVENOUS EVERY 5 MIN PRN
Status: DISCONTINUED | OUTPATIENT
Start: 2025-02-28 | End: 2025-02-28 | Stop reason: HOSPADM

## 2025-02-28 RX ORDER — SODIUM PHOSPHATE, DIBASIC AND SODIUM PHOSPHATE, MONOBASIC 7; 19 G/230ML; G/230ML
1 ENEMA RECTAL ONCE AS NEEDED
Status: DISCONTINUED | OUTPATIENT
Start: 2025-02-28 | End: 2025-03-01

## 2025-02-28 RX ORDER — METOPROLOL TARTRATE 25 MG/1
25 TABLET, FILM COATED ORAL ONCE AS NEEDED
Status: DISCONTINUED | OUTPATIENT
Start: 2025-02-28 | End: 2025-02-28 | Stop reason: HOSPADM

## 2025-02-28 RX ORDER — NALOXONE HYDROCHLORIDE 0.4 MG/ML
0.08 INJECTION, SOLUTION INTRAMUSCULAR; INTRAVENOUS; SUBCUTANEOUS AS NEEDED
Status: DISCONTINUED | OUTPATIENT
Start: 2025-02-28 | End: 2025-02-28 | Stop reason: HOSPADM

## 2025-02-28 RX ORDER — NAPROXEN 250 MG/1
250 TABLET ORAL 2 TIMES DAILY WITH MEALS
Status: DISCONTINUED | OUTPATIENT
Start: 2025-02-28 | End: 2025-03-01

## 2025-02-28 RX ORDER — DIPHENHYDRAMINE HYDROCHLORIDE 50 MG/ML
12.5 INJECTION INTRAMUSCULAR; INTRAVENOUS EVERY 4 HOURS PRN
Status: DISCONTINUED | OUTPATIENT
Start: 2025-02-28 | End: 2025-03-01

## 2025-02-28 RX ORDER — DOCUSATE SODIUM 100 MG/1
100 CAPSULE, LIQUID FILLED ORAL 2 TIMES DAILY
Status: DISCONTINUED | OUTPATIENT
Start: 2025-02-28 | End: 2025-03-01

## 2025-02-28 RX ORDER — METOPROLOL SUCCINATE 25 MG/1
25 TABLET, EXTENDED RELEASE ORAL EVERY MORNING
Status: DISCONTINUED | OUTPATIENT
Start: 2025-03-01 | End: 2025-03-01

## 2025-02-28 RX ORDER — VALSARTAN 320 MG/1
320 TABLET ORAL EVERY MORNING
Status: DISCONTINUED | OUTPATIENT
Start: 2025-03-01 | End: 2025-03-01

## 2025-02-28 RX ORDER — FERROUS SULFATE 325(65) MG
325 TABLET, DELAYED RELEASE (ENTERIC COATED) ORAL
Status: DISCONTINUED | OUTPATIENT
Start: 2025-03-01 | End: 2025-03-01

## 2025-02-28 RX ORDER — EPHEDRINE SULFATE 50 MG/ML
INJECTION INTRAVENOUS AS NEEDED
Status: DISCONTINUED | OUTPATIENT
Start: 2025-02-28 | End: 2025-02-28 | Stop reason: SURG

## 2025-02-28 RX ORDER — LIDOCAINE HYDROCHLORIDE 10 MG/ML
INJECTION, SOLUTION INFILTRATION; PERINEURAL
Status: COMPLETED | OUTPATIENT
Start: 2025-02-28 | End: 2025-02-28

## 2025-02-28 RX ORDER — SENNOSIDES 8.6 MG
17.2 TABLET ORAL NIGHTLY
Status: DISCONTINUED | OUTPATIENT
Start: 2025-02-28 | End: 2025-03-01

## 2025-02-28 RX ORDER — HYDROMORPHONE HYDROCHLORIDE 1 MG/ML
0.4 INJECTION, SOLUTION INTRAMUSCULAR; INTRAVENOUS; SUBCUTANEOUS EVERY 5 MIN PRN
Status: DISCONTINUED | OUTPATIENT
Start: 2025-02-28 | End: 2025-02-28 | Stop reason: HOSPADM

## 2025-02-28 RX ORDER — ACETAMINOPHEN 500 MG
1000 TABLET ORAL ONCE
Status: COMPLETED | OUTPATIENT
Start: 2025-02-28 | End: 2025-02-28

## 2025-02-28 RX ORDER — AMLODIPINE BESYLATE 10 MG/1
10 TABLET ORAL EVERY MORNING
Status: DISCONTINUED | OUTPATIENT
Start: 2025-03-01 | End: 2025-03-01

## 2025-02-28 RX ORDER — DIPHENHYDRAMINE HCL 25 MG
25 CAPSULE ORAL EVERY 4 HOURS PRN
Status: DISCONTINUED | OUTPATIENT
Start: 2025-02-28 | End: 2025-03-01

## 2025-02-28 RX ORDER — PSEUDOEPHEDRINE HCL 30 MG
100 TABLET ORAL 2 TIMES DAILY
Qty: 20 CAPSULE | Refills: 0 | Status: SHIPPED | OUTPATIENT
Start: 2025-02-28

## 2025-02-28 RX ORDER — LORAZEPAM 1 MG/1
1 TABLET ORAL NIGHTLY PRN
Status: DISCONTINUED | OUTPATIENT
Start: 2025-02-28 | End: 2025-03-01

## 2025-02-28 RX ORDER — DOXEPIN HYDROCHLORIDE 50 MG/1
1 CAPSULE ORAL DAILY
Status: DISCONTINUED | OUTPATIENT
Start: 2025-02-28 | End: 2025-03-01

## 2025-02-28 RX ORDER — BUPIVACAINE HYDROCHLORIDE 7.5 MG/ML
INJECTION, SOLUTION INTRASPINAL
Status: COMPLETED | OUTPATIENT
Start: 2025-02-28 | End: 2025-02-28

## 2025-02-28 RX ORDER — ONDANSETRON 2 MG/ML
4 INJECTION INTRAMUSCULAR; INTRAVENOUS EVERY 6 HOURS PRN
Status: DISCONTINUED | OUTPATIENT
Start: 2025-02-28 | End: 2025-03-01

## 2025-02-28 RX ORDER — DEXTROSE MONOHYDRATE 25 G/50ML
50 INJECTION, SOLUTION INTRAVENOUS
Status: DISCONTINUED | OUTPATIENT
Start: 2025-02-28 | End: 2025-02-28 | Stop reason: HOSPADM

## 2025-02-28 RX ORDER — LIDOCAINE HYDROCHLORIDE 10 MG/ML
INJECTION, SOLUTION EPIDURAL; INFILTRATION; INTRACAUDAL; PERINEURAL AS NEEDED
Status: DISCONTINUED | OUTPATIENT
Start: 2025-02-28 | End: 2025-02-28 | Stop reason: SURG

## 2025-02-28 RX ORDER — ASPIRIN 325 MG
325 TABLET, DELAYED RELEASE (ENTERIC COATED) ORAL 2 TIMES DAILY
Qty: 60 TABLET | Refills: 0 | Status: SHIPPED | OUTPATIENT
Start: 2025-02-28

## 2025-02-28 RX ORDER — BISACODYL 10 MG
10 SUPPOSITORY, RECTAL RECTAL
Status: DISCONTINUED | OUTPATIENT
Start: 2025-02-28 | End: 2025-03-01

## 2025-02-28 RX ORDER — DEXTROSE MONOHYDRATE 25 G/50ML
50 INJECTION, SOLUTION INTRAVENOUS
Status: DISCONTINUED | OUTPATIENT
Start: 2025-02-28 | End: 2025-03-01

## 2025-02-28 RX ORDER — HYDROCODONE BITARTRATE AND ACETAMINOPHEN 10; 325 MG/1; MG/1
1 TABLET ORAL EVERY 6 HOURS PRN
Qty: 25 TABLET | Refills: 0 | Status: SHIPPED | OUTPATIENT
Start: 2025-02-28

## 2025-02-28 RX ORDER — SODIUM CHLORIDE, SODIUM LACTATE, POTASSIUM CHLORIDE, CALCIUM CHLORIDE 600; 310; 30; 20 MG/100ML; MG/100ML; MG/100ML; MG/100ML
INJECTION, SOLUTION INTRAVENOUS CONTINUOUS
Status: DISCONTINUED | OUTPATIENT
Start: 2025-02-28 | End: 2025-02-28

## 2025-02-28 RX ORDER — ACETAMINOPHEN 325 MG/1
650 TABLET ORAL EVERY 8 HOURS PRN
Status: DISCONTINUED | OUTPATIENT
Start: 2025-02-28 | End: 2025-03-01

## 2025-02-28 RX ORDER — NICOTINE POLACRILEX 4 MG
30 LOZENGE BUCCAL
Status: DISCONTINUED | OUTPATIENT
Start: 2025-02-28 | End: 2025-03-01

## 2025-02-28 RX ORDER — METOCLOPRAMIDE HYDROCHLORIDE 5 MG/ML
10 INJECTION INTRAMUSCULAR; INTRAVENOUS EVERY 8 HOURS PRN
Status: DISCONTINUED | OUTPATIENT
Start: 2025-02-28 | End: 2025-03-01

## 2025-02-28 RX ORDER — SODIUM CHLORIDE 9 MG/ML
INJECTION, SOLUTION INTRAVENOUS CONTINUOUS
Status: DISCONTINUED | OUTPATIENT
Start: 2025-02-28 | End: 2025-03-01

## 2025-02-28 RX ORDER — ACETAMINOPHEN 325 MG/1
650 TABLET ORAL EVERY 8 HOURS PRN
Qty: 60 TABLET | Refills: 0 | Status: SHIPPED | OUTPATIENT
Start: 2025-02-28

## 2025-02-28 RX ORDER — NAPROXEN 250 MG/1
250 TABLET ORAL 2 TIMES DAILY WITH MEALS
Qty: 28 TABLET | Refills: 0 | Status: SHIPPED | OUTPATIENT
Start: 2025-03-01

## 2025-02-28 RX ORDER — NICOTINE POLACRILEX 4 MG
15 LOZENGE BUCCAL
Status: DISCONTINUED | OUTPATIENT
Start: 2025-02-28 | End: 2025-02-28 | Stop reason: HOSPADM

## 2025-02-28 RX ORDER — TRANEXAMIC ACID 10 MG/ML
INJECTION, SOLUTION INTRAVENOUS AS NEEDED
Status: DISCONTINUED | OUTPATIENT
Start: 2025-02-28 | End: 2025-02-28 | Stop reason: SURG

## 2025-02-28 RX ORDER — ROPIVACAINE HYDROCHLORIDE 5 MG/ML
INJECTION, SOLUTION EPIDURAL; INFILTRATION; PERINEURAL
Status: COMPLETED | OUTPATIENT
Start: 2025-02-28 | End: 2025-02-28

## 2025-02-28 RX ORDER — HYDROMORPHONE HYDROCHLORIDE 1 MG/ML
0.2 INJECTION, SOLUTION INTRAMUSCULAR; INTRAVENOUS; SUBCUTANEOUS EVERY 5 MIN PRN
Status: DISCONTINUED | OUTPATIENT
Start: 2025-02-28 | End: 2025-02-28 | Stop reason: HOSPADM

## 2025-02-28 RX ORDER — DIPHENHYDRAMINE HYDROCHLORIDE 50 MG/ML
25 INJECTION INTRAMUSCULAR; INTRAVENOUS ONCE AS NEEDED
Status: ACTIVE | OUTPATIENT
Start: 2025-02-28 | End: 2025-02-28

## 2025-02-28 RX ORDER — HYDROMORPHONE HYDROCHLORIDE 1 MG/ML
0.6 INJECTION, SOLUTION INTRAMUSCULAR; INTRAVENOUS; SUBCUTANEOUS EVERY 5 MIN PRN
Status: DISCONTINUED | OUTPATIENT
Start: 2025-02-28 | End: 2025-02-28 | Stop reason: HOSPADM

## 2025-02-28 RX ORDER — SODIUM CHLORIDE, SODIUM LACTATE, POTASSIUM CHLORIDE, CALCIUM CHLORIDE 600; 310; 30; 20 MG/100ML; MG/100ML; MG/100ML; MG/100ML
INJECTION, SOLUTION INTRAVENOUS CONTINUOUS
Status: DISCONTINUED | OUTPATIENT
Start: 2025-02-28 | End: 2025-03-01

## 2025-02-28 RX ORDER — NICOTINE POLACRILEX 4 MG
15 LOZENGE BUCCAL
Status: DISCONTINUED | OUTPATIENT
Start: 2025-02-28 | End: 2025-03-01

## 2025-02-28 RX ORDER — ASPIRIN 325 MG
325 TABLET, DELAYED RELEASE (ENTERIC COATED) ORAL 2 TIMES DAILY
Status: DISCONTINUED | OUTPATIENT
Start: 2025-02-28 | End: 2025-03-01

## 2025-02-28 RX ORDER — HYDROCODONE BITARTRATE AND ACETAMINOPHEN 10; 325 MG/1; MG/1
1 TABLET ORAL EVERY 4 HOURS PRN
Status: DISCONTINUED | OUTPATIENT
Start: 2025-02-28 | End: 2025-03-01

## 2025-02-28 RX ORDER — POLYETHYLENE GLYCOL 3350 17 G/17G
17 POWDER, FOR SOLUTION ORAL DAILY PRN
Status: DISCONTINUED | OUTPATIENT
Start: 2025-02-28 | End: 2025-03-01

## 2025-02-28 RX ORDER — ATORVASTATIN CALCIUM 40 MG/1
40 TABLET, FILM COATED ORAL NIGHTLY
Status: DISCONTINUED | OUTPATIENT
Start: 2025-02-28 | End: 2025-03-01

## 2025-02-28 RX ADMIN — BUPIVACAINE HYDROCHLORIDE 1.5 ML: 7.5 INJECTION, SOLUTION INTRASPINAL at 11:40:00

## 2025-02-28 RX ADMIN — ROPIVACAINE HYDROCHLORIDE 20 ML: 5 INJECTION, SOLUTION EPIDURAL; INFILTRATION; PERINEURAL at 14:18:00

## 2025-02-28 RX ADMIN — TRANEXAMIC ACID 1000 MG: 10 INJECTION, SOLUTION INTRAVENOUS at 13:50:00

## 2025-02-28 RX ADMIN — EPHEDRINE SULFATE 10 MG: 50 INJECTION INTRAVENOUS at 12:40:00

## 2025-02-28 RX ADMIN — LIDOCAINE HYDROCHLORIDE 5 ML: 10 INJECTION, SOLUTION INFILTRATION; PERINEURAL at 11:36:00

## 2025-02-28 RX ADMIN — LIDOCAINE HYDROCHLORIDE 50 MG: 10 INJECTION, SOLUTION EPIDURAL; INFILTRATION; INTRACAUDAL; PERINEURAL at 11:41:00

## 2025-02-28 RX ADMIN — EPHEDRINE SULFATE 10 MG: 50 INJECTION INTRAVENOUS at 12:22:00

## 2025-02-28 RX ADMIN — TRANEXAMIC ACID 1000 MG: 10 INJECTION, SOLUTION INTRAVENOUS at 11:46:00

## 2025-02-28 NOTE — DISCHARGE INSTRUCTIONS
Starting today, work on straightening and bending the knee.  Work several hours a day on bending and straightening your knee.  Pump feet often to help prevent blood clots. Weight bearing as tolerated with walker.  When sitting in a chair elevate the leg.  Ice for up to 20 minutes at a time. Place towel between ice and skin.     For wound care, each Mepilex dressing can be used for up to 3 days if it is not soiled.  Peel back dressing and paint wound with betadine and Q-tips daily. Do not double dip Q-tips. Monitor incision for any signs of infection such as excessive redness, inflammation, presence of pus like drainage coming out of your incision or fever more than 100.4F. Sponge bath only. Take medications as prescribed in the discharge paperwork. Use incentive spirometry 10x each time, 4-5x in a day to prevent pneumonia. May ice incision for 30-40 minutes every 2 hours in the next 48 hours then as needed to prevent swelling or to help reduce pain.    Restart Plavix on Monday if not bleeding.     Call Dr. Stanley's office to make a follow-up appointment for 2 weeks.     Dear Patient,     [facility] cares about your progress with recovery following your joint replacement surgery.     300 days from your scheduled surgery, [facility] will send you a follow-up survey to help us understand how your surgery impacted your mobility, pain, and overall quality of life. Please make every effort to complete this survey. The information collected from this survey will be used by your physician to track your recovery.     Sincerely,     [facility] [place]

## 2025-02-28 NOTE — ANESTHESIA PREPROCEDURE EVALUATION
Anesthesia PreOp Note    HPI:     Avinash Dumont is a 74 year old male who presents for preoperative consultation requested by: Denny Stanley MD    Date of Surgery: 2/28/2025    Procedure(s):  Left total knee arthroplasty with MRI templated patient specific instruments  Indication: Primary osteoarthritis of left knee [M17.12]    Relevant Problems   No relevant active problems       NPO:  Last Liquid Consumption Date: 02/27/25  Last Liquid Consumption Time: 2200  Last Solid Consumption Date: 02/27/25  Last Solid Consumption Time: 2200  Last Liquid Consumption Date: 02/27/25          History Review:  Patient Active Problem List    Diagnosis Date Noted    MILENA (obstructive sleep apnea) 11/30/2023    Type 2 diabetes mellitus with diabetic chronic kidney disease (HCC) 06/06/2023    Senile purpura 06/06/2023    Major depressive disorder, single episode, mild 09/26/2022    Sedative, hypnotic or anxiolytic dependence, uncomplicated ([facility]) 07/13/2022    Lung nodule, multiple 07/13/2022    Asymptomatic microscopic hematuria 01/26/2022    Type 2 diabetes mellitus with hyperglycemia, without long-term current use of insulin ([facility]) 04/29/2021    Nonrheumatic aortic valve insufficiency 12/02/2020    Primary osteoarthritis of right knee 12/02/2020    Arteriosclerosis of aorta 07/19/2020    Gastroesophageal reflux disease     Thrombocytopenia 01/25/2019    Essential hypertension 01/03/2019    Hyperlipidemia 01/03/2019    Type 2 diabetes mellitus without retinopathy (HCC) 01/03/2019       Past Medical History:    Anxiety    Arthritis    Atherosclerosis of coronary artery    Colon adenomas    Colon adenomas    x5    Coronary atherosclerosis    Depression    Diabetes (HCC)    Esophageal reflux    Essential hypertension    High blood pressure    High cholesterol    History of blood transfusion    2008, NO REACTIONS    Hyperlipidemia    Osteoarthritis    Sleep apnea    Visual impairment    glasses       Past Surgical History:    Procedure Laterality Date    Bypass surgery  2009    Cabg      CABG x 5    Cath drug eluting stent      Colonoscopy      Colonoscopy N/A 10/28/2019    Procedure: COLONOSCOPY;  Surgeon: Jack Son MD;  [facility]: [facility] ENDOSCOPY    Colonoscopy      Colonoscopy N/A 2023    Procedure: COLONOSCOPY;  Surgeon: Jack Son MD;  Location: [facility] ENDO    Other surgical history      deviated septum repair    Other surgical history Left     Sinus surgery        Upper gi endoscopy,exam         Prescriptions Prior to Admission[1]  Current Medications and Prescriptions Ordered in Epic[2]    Allergies[3]    Family History   Problem Relation Age of Onset    Cancer Father 64        Lymph node CA    Heart Disorder Mother         MI    Hypertension Mother     Diabetes Neg     Glaucoma Neg     Macular degeneration Neg      Social History     Socioeconomic History    Marital status:    Tobacco Use    Smoking status: Former     Current packs/day: 0.00     Average packs/day: 0.5 packs/day for 25.0 years (12.5 ttl pk-yrs)     Types: Cigarettes     Start date: 1983     Quit date: 2008     Years since quittin.1    Smokeless tobacco: Never   Vaping Use    Vaping status: Never Used   Substance and Sexual Activity    Alcohol use: Yes     Alcohol/week: 3.0 standard drinks of alcohol     Types: 3 Standard drinks or equivalent per week     Comment: 2-3 drinks/week    Drug use: No       Available pre-op labs reviewed.  Lab Results   Component Value Date    WBC 4.6 2025    RBC 4.00 2025    HGB 11.6 (L) 2025    HCT 36.0 (L) 2025    MCV 90.0 2025    MCH 29.0 2025    MCHC 32.2 2025    RDW 15.4 (H) 2025    .0 2025     Lab Results   Component Value Date     2025    K 3.8 2025     2025    CO2 31.0 2025    BUN 11 2025    CREATSERUM 0.77 2025     (H) 2025    PGLU 141 (H) 2025    CA 9.2  02/04/2025          Vital Signs:  Body mass index is 26.3 kg/m².   height is 1.727 m (5' 8\") and weight is 78.5 kg (173 lb). His oral temperature is 98 °F (36.7 °C). His blood pressure is 143/60 and his pulse is 64. His respiration is 18 and oxygen saturation is 98%.   Vitals:    02/24/25 1152 02/28/25 0908   BP:  143/60   Pulse:  64   Resp:  18   Temp:  98 °F (36.7 °C)   TempSrc:  Oral   SpO2:  98%   Weight: 77.6 kg (171 lb) 78.5 kg (173 lb)   Height: 1.727 m (5' 8\") 1.727 m (5' 8\")        Anesthesia Evaluation     Patient summary reviewed    Airway   Mallampati: II  TM distance: >3 FB  Neck ROM: full  Dental - Dentition appears grossly intact     Pulmonary - negative ROS    breath sounds clear to auscultation  (+) sleep apnea  Cardiovascular   Exercise tolerance: good  (+) hypertension, CAD, CABG/stent, dysrhythmias    ECG reviewed  Rhythm: regular  Rate: normal    Neuro/Psych - negative ROS   (+)  anxiety/panic attacks,  depression      GI/Hepatic/Renal - negative ROS   (+) GERD    Endo/Other - negative ROS   (+) diabetes mellitus type 2 well controlled  Abdominal  - normal exam                 Anesthesia Plan:   ASA:  3  Plan:   Spinal and MAC  Post-op Pain Management: Saphenous block  Informed Consent Plan and Risks Discussed With:  Patient  Use of Blood Products Discussed With:  Patient      I have informed Avinash Dumont and/or legal guardian or family member of the nature of the anesthetic plan, benefits, risks including possible dental damage if relevant, major complications, and any alternative forms of anesthetic management.   All of the patient's questions were answered to the best of my ability. The patient desires the anesthetic management as planned.  Gray Duncan MD  2/28/2025 9:35 AM  Present on Admission:  **None**           [1]   Medications Prior to Admission   Medication Sig Dispense Refill Last Dose/Taking    LORazepam 1 MG Oral Tab Take 1 tablet (1 mg total) by mouth nightly as needed. 30  tablet 0 2/27/2025 Bedtime    metoprolol succinate ER 25 MG Oral Tablet 24 Hr Take 1 tablet (25 mg total) by mouth daily. (Patient taking differently: Take 1 tablet (25 mg total) by mouth every morning.) 90 tablet 3 2/28/2025 Morning    nitroglycerin 0.4 MG Sublingual SL Tab    Past Week    triamcinolone 0.1 % External Cream Apply 1 Application  topically 2 (two) times daily. APPLY TO AFFECTED AREA 45 g 0 Past Week    glimepiride 2 MG Oral Tab Take 1 tablet (2 mg total) by mouth daily with breakfast. 90 tablet 3 2/27/2025 Evening    SITagliptin Phosphate (JANUVIA) 100 MG Oral Tab Take 1 tablet (100 mg total) by mouth daily. 90 tablet 3 2/27/2025 Morning    valsartan 320 MG Oral Tab Take 1 tablet (320 mg total) by mouth every morning.   2/27/2025 Morning    amLODIPine 10 MG Oral Tab Take 1 tablet (10 mg total) by mouth daily. (Patient taking differently: Take 1 tablet (10 mg total) by mouth every morning.) 90 tablet 3 2/28/2025 Morning    atorvastatin 40 MG Oral Tab Take 1 tablet (40 mg total) by mouth nightly. 90 tablet 3 2/27/2025 Bedtime    metFORMIN HCl 1000 MG Oral Tab TAKE 1 TABLET TWICE DAILY WITH MEALS 180 tablet 3 2/27/2025 Evening    clopidogrel 75 MG Oral Tab Take 1 tablet (75 mg total) by mouth daily. 30 tablet 11 2/23/2025    aspirin 81 MG Oral Tab Take 1 tablet (81 mg total) by mouth daily.   2/27/2025 Noon    Glucose Blood (TRUE METRIX BLOOD GLUCOSE TEST) In Vitro Strip 1 each by In Vitro route daily. 100 strip 1     Meloxicam 15 MG Oral Tab Take 1 tablet (15 mg total) by mouth daily.   2/20/2025    Blood Glucose Monitoring Suppl (TRUE METRIX AIR GLUCOSE METER) w/Device Does not apply Kit 1 kit As Directed. 1 kit 0     omega-3-acid ethyl esters 1 g Oral Cap Take 1 capsule (1 g total) by mouth 3 (three) times daily. 270 capsule 3 2/20/2025    Blood Glucose Calibration (TRUE METRIX LEVEL 1) Low In Vitro Solution 1 each by In Vitro route as needed. 1 each 0     Lancets 33G Does not apply Misc 1 each 2  (two) times a day. 200 each 3     ferrous sulfate 325 (65 FE) MG Oral Tab EC Take 1 tablet (325 mg total) by mouth daily with breakfast.   2/20/2025    Multiple Vitamins-Minerals (MULTIVITAMIN ADULT OR) Take 1 tablet by mouth daily.   2/20/2025    COENZYME Q-10 OR Take 1 tablet by mouth daily.   2/20/2025   [2]   Current Facility-Administered Medications Ordered in Epic   Medication Dose Route Frequency Provider Last Rate Last Admin    lactated ringers infusion   Intravenous Continuous Denny Stanley MD 20 mL/hr at 02/28/25 0900 New Bag at 02/28/25 0900    metoprolol tartrate (Lopressor) tab 25 mg  25 mg Oral Once PRN Denny Stanley MD        ceFAZolin (Ancef) 2g in 10mL IV syringe premix  2 g Intravenous Once Denny Stanley MD        clonidine-EPINEPHrine-ropivacaine-ketorolac (CERTS) (Duraclon-Adrenalin-Naropin-Toradol) pain cocktail irrigation   Intra-articular Once (Intra-Op) Denny Stanley MD         No current [facility]-ordered outpatient medications on file.   [3] No Known Allergies

## 2025-02-28 NOTE — PLAN OF CARE
PT family wanting to change spinal to nerve block, called into OR room , anesthesiology out to see family at RN station and change made   Nancy JOHNSON RN  \"Yuliya\"

## 2025-02-28 NOTE — INTERVAL H&P NOTE
Pre-op Diagnosis: Primary osteoarthritis of left knee [M17.12]    The above referenced H&P was reviewed by Denny Stanley MD on 2/28/2025, the patient was examined and no significant changes have occurred in the patient's condition since the H&P was performed.  I discussed with the patient and/or legal representative the potential benefits, risks and side effects of this procedure; the likelihood of the patient achieving goals; and potential problems that might occur during recuperation.  I discussed reasonable alternatives to the procedure, including risks, benefits and side effects related to the alternatives and risks related to not receiving this procedure.  We will proceed with procedure as planned.

## 2025-02-28 NOTE — PLAN OF CARE
Updated Pt and family on OR time/  Asked if there were any needs/questions at this time.   All needs/questions addressed to Pt satisfaction.  No further issues noted at this time.  Nancy JOHNSON RN  \"Yuliya\"

## 2025-02-28 NOTE — OPERATIVE REPORT
[facility]  part of [facility]    Operative Note         Avinash Dumont Location: OR   [facility] [id number] MRN [id number]   Admission Date 2/28/2025 Operation Date 2/28/2025   Attending Physician Denny Stanley MD       Patient Name: Avinash Dumont     Preoperative Diagnosis: Primary osteoarthritis of left knee [M17.12]     Postoperative Diagnosis: Primary osteoarthritis of left knee [M17.12]     Procedure(s):  Left total knee arthroplasty with MRI templated patient specific instruments     Primary Surgeon: Denny Stanley MD     Assistants: Mary Dang PA-C (first assistant); Luis Daniel Hagen CSA (second assistant).    Anesthesia: MAC and spinal with adductor block in the recovery room.  No Duramorph.    Specimen:   ID Type Source Tests Collected by Time Destination   1 : 1. Left knee bone and tissue Tissue knee, left SURGICAL PATHOLOGY TISSUE Denny Stanley MD 2/28/2025 12:43 PM         Estimated Blood Loss: 50    Complications: none    Indications for procedure: Patient is a 74-year-old independent man with the above diagnoses documented by physical exam, x-ray, MRI.  He has failed nonoperative treatment and the risks and complication of surgery were discussed and no guarantees were given.  The consent was signed.  She    Surgical Findings: Tricompartmental osteoarthritis with effusion confirmed on entry into the joint.      Operative Summary:   Patient was brought to the operating room and placed in supine position.  Appropriate IV access and monitors were placed.  Ancef 2 g IV and tranexamic acid 1 g IV were both given as a prophylaxis at the appropriate interval.  Spinal Duramorph followed by monitored sedation was performed by Dr. Duncan.  Tourniquet was placed in the upper right thigh and an SCD boot on the left leg.  Right lower extremity was then prepped draped in sterile fashion with Betadine followed by ChloraPrep the leg was exsanguinated and the tourniquet inflated to 300 mmHg.   Tourniquet time for the case was 92 minutes.    Incision was made in the mid vastus snip was using an access to the right knee.  Medial bone-on-bone osteoarthritis was confirmed.  Effusion was noted.  Fat pad was excised and patella resected from 24 mm to 16 mm.  A 41 mm patella fit optimally in the 3 drill holes were placed.  A protector was placed and patella tucked in the lateral gutter.    Anterior menisci and cruciate ligaments were excised.  Patient-specific guide fit well in the distal femur and the distal femoral cuts were as templated.  The cutting block was properly actually rotated.  Anterior cuts were made without notch.  Posterior cuts were made as templated.  Chamfer cuts were finished.  There were some very small medial and lateral osteophytes removed.    The rest of the menisci were excised and the patient-specific guide fit well in the proximal tibia.  Drop taqueria was in line with the anterior tibial spine with proper posterior slope.  Medial lateral cuts were made as templated.  Some medial osteophyte was removed.  Size F tibia fit well.  Spacer block with a 10 mm showed good balance in flexion extension and full extension.  Drop taqueria was in line with anterior tibial spine.  Trial reduction with a 11 mm medial congruent polyethylene showed full extension and full deep flexion with proper rollback.  The patella was tracking well and there was good stability to anterior drawer at 90 degrees.  Good balance was noted at 0 degrees, 40 degrees, and 100 degrees.    The pegs were drilled for the femur and the stem prepared for the tibia.  Bone surfaces were cleansed with dilute Betadine solution followed by Pulsavac lavage saline.  Tibia followed by femur were impacted and excellent fit was noted for both.  Trial reduction showed the 12 mm polyethylene fit better than the 11 m.  The 12 mm medial congruent polyethylene was then snapped into position without soft tissue interposed.  I got excellent motion,  balance, and tracking were noted.    Patella was then impacted using the insertion device.  Excellent fit was noted.  Range of motion again showed excellent motion, balance, tracking, and rollback.  Good stability with anterior drawer at 90 degrees.    Tourniquet was let down.  No significant bleeding was noted.  Tranexamic acid 1 g IV second dose was given.  The wounds were closed in layers in flexion with staples for the skin.  Dressings were applied in flexion and the patient was taken out of sedation and brought to recovery in stable condition.      Components used were [employer]oss[employer] persona femur 8 left standard cruciate retaining cemented, tibia F left with short stem extension cemented, patella 41 x 10.0 mm, polyethylene 12 mm medial congruent Vivacit-E.    Implants:   Implant Name Type Inv. Item Serial No.  Lot No. LRB No. Used Action   CEMENT BNE 40GM W/ GENT HI VISC RADPQ - SN/A  CEMENT BNE 40GM W/ GENT HI VISC RADPQ N/A [employer]  [id number] Left 2 Implanted   COMPONENT FEM SZ 8 STD L CR PERSONA - SN/A  COMPONENT FEM SZ 8 STD L CR PERSONA N/A [employer]  [id number] Left 1 Implanted   EXTENSION STEM 14MM +30MM TAPR PERSONA - SN/A  EXTENSION STEM 14MM +30MM TAPR PERSONA N/A [employer]  [id number] Left 1 Implanted   COMPONENT TIB SZ F 5DEG L VITALIY STEM PERSONA - SN/A  COMPONENT TIB SZ F 5DEG L VITALIY STEM PERSONA N/A [employer]  [id number] Left 1 Implanted   COMPONENT PAT 41MM [id number] STD POLY VITALIY - SN/A  COMPONENT PAT 41MM [id number] STD POLY VITALIY N/A [employer]  [id number] Left 1 Implanted   SURFACE ARTC FEM SZ 8-11 TIB SZ EF H12MM L - SN/A  SURFACE ARTC FEM SZ 8-11 TIB SZ EF H12MM L N/A [employer]  [id number] Left 1 Implanted        Drains: None    Condition: Stable to PACU.  Adductor block to be performed in PACU.      Denny Stanley MD

## 2025-02-28 NOTE — ANESTHESIA PROCEDURE NOTES
Peripheral Block    Date/Time: 2/28/2025 2:18 PM    Performed by: Gray Duncan MD  Authorized by: Gray Duncan MD      General Information and Staff    Start Time:  2/28/2025 2:18 PM  End Time:  2/28/2025 2:21 PM  Anesthesiologist:  Gray Duncan MD  Performed by:  Anesthesiologist  Patient Location:  PACU      Site Identification: real time ultrasound guided and image stored and retrievable    Block site/laterality marked before start: site marked  Reason for Block: at surgeon's request and post-op pain management    Preanesthetic Checklist: 2 patient identifers, IV checked, risks and benefits discussed, monitors and equipment checked, pre-op evaluation, timeout performed, anesthesia consent and sterile technique used      Procedure Details    Patient Position:  Supine  Prep: ChloraPrep    Monitoring:  Cardiac monitor  Block Type:  Saphenous  Laterality:  Left  Injection Technique:  Single-shot    Needle    Needle Type:  Echogenic and short-bevel  Needle Gauge:  21 G  Needle Length:  90 mm  Needle Localization:  Ultrasound guidance  Reason for Ultrasound Use: appropriate spread of the medication was noted in real time and no ultrasound evidence of intravascular and/or intraneural injection            Assessment    Injection Assessment:  Good spread noted, incremental injection, low pressure, local visualized surrounding nerve on ultrasound, negative aspiration for heme and no pain on injection  Paresthesia Pain:  None  Heart Rate Change: No    - Patient tolerated block procedure well without evidence of immediate block related complications.     Medications  2/28/2025 2:18 PM  ropivacaine (NAROPIN) injection 0.5% - Infiltration   20 mL - 2/28/2025 2:18:00 PM    Additional Comments

## 2025-02-28 NOTE — ANESTHESIA PROCEDURE NOTES
Spinal Block    Date/Time: 2/28/2025 11:36 AM    Performed by: Gray Duncan MD  Authorized by: Gray Duncan MD      General Information and Staff    Start Time:  2/28/2025 11:36 AM  End Time:  2/28/2025 11:40 AM  Anesthesiologist:  Gray Duncan MD  Performed by:  Anesthesiologist  Site identification: static ultrasound guided    Preanesthetic Checklist: patient identified, IV checked, risks and benefits discussed, monitors and equipment checked, pre-op evaluation, timeout performed, anesthesia consent and sterile technique used      Procedure Details    Patient Position:  Sitting  Prep: ChloraPrep    Monitoring:  Cardiac monitor  Approach:  Midline  Location:  L3-4  Injection Technique:  Single-shot    Needle    Needle Type:  Pencil-tip  Needle Gauge:  24 G  Needle Length:  3.5 in    Assessment    Sensory Level:  T6  Events: clear CSF, CSF aspirated, well tolerated and blood negative      Additional Comments

## 2025-02-28 NOTE — ANESTHESIA POSTPROCEDURE EVALUATION
Patient: Avinash Dumont    Procedure Summary       Date: 02/28/25 Room / Location: [facility] MAIN OR  / [facility] MAIN OR    Anesthesia Start: 1129 Anesthesia Stop: 1425    Procedure: Left total knee arthroplasty with MRI templated patient specific instruments (Left: Knee) Diagnosis:       Primary osteoarthritis of left knee      (Primary osteoarthritis of left knee [M17.12])    Surgeons: Denny Stanley MD Anesthesiologist: Gray Duncan MD    Anesthesia Type: spinal, MAC ASA Status: 3            Anesthesia Type: spinal, MAC    Vitals Value Taken Time   /66 02/28/25 1505   Temp 97.8 °F (36.6 °C) 02/28/25 1415   Pulse 51 02/28/25 1507   Resp 18 02/28/25 1507   SpO2 100 % 02/28/25 1507   Vitals shown include unfiled device data.    [facility] AN Post Evaluation:   Patient Evaluated in PACU  Patient Participation: complete - patient participated  Level of Consciousness: awake  Pain Score: 0  Pain Management: adequate  Airway Patency:patent  Dental exam unchanged from preop  Yes    Nausea/Vomiting: none  Cardiovascular Status: acceptable and hemodynamically stable  Respiratory Status: acceptable and room air  Postoperative Hydration stable      Gray Duncan MD  2/28/2025 3:08 PM

## 2025-02-28 NOTE — CONSULTS
[facility]    PATIENT'S NAME: RICO OTOOLE   ATTENDING PHYSICIAN: Denny Stanley MD   CONSULTING PHYSICIAN: Noa Garcia MD   PATIENT ACCOUNT#:   [id number]    LOCATION:  [facility] PACU 1 [facility] 10  MEDICAL RECORD #:   [id number]       YOB: 1950  ADMISSION DATE:       02/28/2025      CONSULT DATE:  02/28/2025    REPORT OF CONSULTATION      REASON FOR ADMISSION:  Post left total knee arthroplasty.    HISTORY OF PRESENT ILLNESS:  The patient is a 74-year-old [place] male with chronic left knee pain, underlying severe primary osteoarthritis.  Failed outpatient conservative medical management options.  Scheduled today for above-mentioned procedure by his orthopedic surgeon, Dr. Stanley.  Preoperatively he had spinal block.  Postoperatively transferred to PACU for further monitoring.    PAST MEDICAL HISTORY:  Generalized osteoarthritis, obstructive sleep apnea, diabetes mellitus type 2, coronary artery disease, depression, anxiety, gastroesophageal reflux disease, hypertension, hyperlipidemia.    PAST SURGICAL HISTORY:  Coronary artery bypass graft surgery, nasal septoplasty.    MEDICATIONS:  Please see medication reconciliation list.     ALLERGIES:  No known drug allergies.    SOCIAL HISTORY:  Ex-tobacco user.  Social alcohol.  No drug use.      FAMILY HISTORY:  Father had lymphoma.  Mother had coronary artery disease.    REVIEW OF SYSTEMS:  Currently resting in bed.  No left knee pain.  No chest pain, no shortness of breath.  Other 12-point review of systems is negative.        PHYSICAL EXAMINATION:    GENERAL:  Alert and oriented to time, place, and person.  No acute distress.    VITAL SIGNS:  Temperature 97.8, pulse 55, respiratory rate 15, blood pressure 119/66, pulse ox 100% on 2L nasal cannula oxygen.  HEENT:  Atraumatic.  Oropharynx clear.  Moist mucous membranes.  Ears and nose normal.  Eyes:  Anicteric sclerae.  NECK:  Supple.  No lymphadenopathy.  Trachea midline.  Full  range of motion.  LUNGS:  Clear to auscultation bilaterally.  Normal respiratory effort.    HEART:  Regular rate and rhythm.  S1 and S2 auscultated.  No murmur.  ABDOMEN:  Soft, nondistended.  No tenderness.  Positive bowel sounds.  EXTREMITIES:  Left knee dressing.  No leg edema, clubbing, or cyanosis.  NEUROLOGIC:  Decreased sensation and muscle movement, both lower extremities, post spinal block.  No other focal findings.     ASSESSMENT AND PLAN:    1.   Left knee primary osteoarthritis, status post left total knee arthroplasty, spinal block.  Pain control.  Neurovascular checks.  Aspirin for DVT prophylaxis.   2.   Coronary artery disease.  Continue home medications.  Hold Plavix while on aspirin b.i.d.  3.   Essential hypertension.  Continue home medications and monitor.   4.   Hyperlipidemia.  Continue home medications.  5.   Diabetes mellitus type 2.  Continue home medications.  Monitor Accu-Cheks.  Sliding scale insulin.     6.   Obstructive sleep apnea.  Apply obstructive sleep apnea protocol and monitor.      Dictated By Nao Garcia MD  d: 02/28/2025 15:20:01  t: 02/28/2025 15:36:08  [facility] [id number]  FB/

## 2025-03-01 VITALS
WEIGHT: 173 LBS | DIASTOLIC BLOOD PRESSURE: 77 MMHG | OXYGEN SATURATION: 100 % | HEIGHT: 68 IN | HEART RATE: 73 BPM | TEMPERATURE: 98 F | RESPIRATION RATE: 18 BRPM | SYSTOLIC BLOOD PRESSURE: 148 MMHG | BODY MASS INDEX: 26.22 KG/M2

## 2025-03-01 LAB
ANION GAP SERPL CALC-SCNC: 6 MMOL/L (ref 0–18)
BUN BLD-MCNC: 10 MG/DL (ref 9–23)
BUN/CREAT SERPL: 13.2 (ref 10–20)
CALCIUM BLD-MCNC: 8.8 MG/DL (ref 8.7–10.4)
CHLORIDE SERPL-SCNC: 102 MMOL/L (ref 98–112)
CO2 SERPL-SCNC: 29 MMOL/L (ref 21–32)
CREAT BLD-MCNC: 0.76 MG/DL
EGFRCR SERPLBLD CKD-EPI 2021: 95 ML/MIN/1.73M2 (ref 60–?)
GLUCOSE BLD-MCNC: 138 MG/DL (ref 70–99)
GLUCOSE BLDC GLUCOMTR-MCNC: 151 MG/DL (ref 70–99)
HCT VFR BLD AUTO: 29.9 %
HGB BLD-MCNC: 9.7 G/DL
OSMOLALITY SERPL CALC.SUM OF ELEC: 285 MOSM/KG (ref 275–295)
POTASSIUM SERPL-SCNC: 3.9 MMOL/L (ref 3.5–5.1)
SODIUM SERPL-SCNC: 137 MMOL/L (ref 136–145)

## 2025-03-01 PROCEDURE — 99239 HOSP IP/OBS DSCHRG MGMT >30: CPT | Performed by: HOSPITALIST

## 2025-03-01 NOTE — DISCHARGE SUMMARY
[facility]  part of [facility]     Discharge Summary    Avinash Dumont Patient Status:  Outpatient in a Bed    3/18/1950 MRN [id number]   Location [facility] Attending Namita Montgomery MD   Hosp Day # 0 PCP Sun Vega MD     Date of Admission: 2025    Date of Discharge: 2025  Admitting Diagnosis: Primary osteoarthritis of left knee [M17.12]  Primary osteoarthritis of left knee    Discharge Diagnosis:   Patient Active Problem List   Diagnosis    Essential hypertension    Hyperlipidemia    Type 2 diabetes mellitus without retinopathy (HCC)    Thrombocytopenia    CAD (coronary artery disease)    Gastroesophageal reflux disease    Arteriosclerosis of aorta    Nonrheumatic aortic valve insufficiency    Primary osteoarthritis of right knee    Type 2 diabetes mellitus with hyperglycemia, without long-term current use of insulin (HCC)    Asymptomatic microscopic hematuria    Sedative, hypnotic or anxiolytic dependence, uncomplicated (HCC)    Lung nodule, multiple    Major depressive disorder, single episode, mild    Type 2 diabetes mellitus with diabetic chronic kidney disease (HCC)    Senile purpura    MILENA (obstructive sleep apnea)    Primary osteoarthritis of left knee       Reason for Admission:     Primary osteoarthritis of left knee    Physical Exam:     General: Patient is alert and oriented x3 does not appear to be in acute distress at this time  HEENT: EOMI PERRLA, atraumatic normocephalic  Cardiac: S1-S2 appreciated  Lungs: Good air entry bilaterally clear to auscultation  Abdomen: Soft nontender nondistended positive bowel sounds  Ext: Peripheral pulses are positive, surgical site cdi  Neuro: No focal deficits noted  Psych: Normal mood  Skin: No rashes noted  MSK: Full range of motion intact      Hospital Course:     1.       Left knee primary osteoarthritis, status post left total knee arthroplasty, spinal block.  Pain control.  Neurovascular checks.  Aspirin for DVT  prophylaxis.   2.       Coronary artery disease.  Continue home medications.  Hold Plavix while on aspirin b.i.d.  3.       Essential hypertension.  Continue home medications and monitor.   4.       Hyperlipidemia.  Continue home medications.  5.       Diabetes mellitus type 2.  Continue home medications.  Monitor Accu-Cheks.  Sliding scale insulin.     6.       Obstructive sleep apnea.  Apply obstructive sleep apnea protocol and monitor.        History of Present Illness:     Per admitting:   The patient is a 74-year-old [place]  male with chronic left knee pain, underlying severe primary osteoarthritis. Failed outpatient conservative medical management options. Scheduled today for above-mentioned procedure by his orthopedic surgeon, Dr. Stanley. Preoperatively he had spinal block. Postoperatively transferred to PACU for further monitoring.     Disposition: Home or Self Care    Discharge Condition: Stable    Discharge Medications:   Current Discharge Medication List        START taking these medications    Details   acetaminophen 325 MG Oral Tab Take 2 tablets (650 mg total) by mouth every 8 (eight) hours as needed. Maximum 4000 mg Tylenol/acetaminophen daily from all sources.  Each Norco has 325 mg of Tylenol/acetaminophen in it.  Qty: 60 tablet, Refills: 0    Associated Diagnoses: Primary osteoarthritis of left knee      calcium carbonate-vitamin D 250-3.125 MG-MCG Oral Tab Take 1 tablet by mouth 2 (two) times daily with meals.  Qty: 60 tablet, Refills: 0    Associated Diagnoses: Primary osteoarthritis of left knee      docusate sodium 100 MG Oral Cap Take 100 mg by mouth 2 (two) times daily. Do not crush. Stop if loose stool  Qty: 20 capsule, Refills: 0    Associated Diagnoses: Primary osteoarthritis of left knee      HYDROcodone-acetaminophen  MG Oral Tab Take 1 tablet by mouth every 6 (six) hours as needed. No alcohol or driving on this medication.  Stop if lethargic or hallucinating.  Maximum 4000  mg Tylenol/acetaminophen daily from all sources.  Recall each Norco has 325 mg of Tylenol/acetaminophen in it.  Qty: 25 tablet, Refills: 0    Associated Diagnoses: Primary osteoarthritis of left knee      naproxen 250 MG Oral Tab Take 1 tablet (250 mg total) by mouth 2 (two) times daily with meals.  Qty: 28 tablet, Refills: 0    Associated Diagnoses: Primary osteoarthritis of left knee      aspirin 325 MG Oral Tab EC Take 1 tablet (325 mg total) by mouth in the morning and 1 tablet (325 mg total) before bedtime. Do not crush.  Qty: 60 tablet, Refills: 0    Associated Diagnoses: Primary osteoarthritis of left knee           CONTINUE these medications which have NOT CHANGED    Details   LORazepam 1 MG Oral Tab Take 1 tablet (1 mg total) by mouth nightly as needed.  Qty: 30 tablet, Refills: 0    Associated Diagnoses: Anxiety; Type 2 diabetes mellitus with hyperglycemia, without long-term current use of insulin (HCC)      metoprolol succinate ER 25 MG Oral Tablet 24 Hr Take 1 tablet (25 mg total) by mouth daily.  Qty: 90 tablet, Refills: 3    Associated Diagnoses: Essential hypertension      nitroglycerin 0.4 MG Sublingual SL Tab       triamcinolone 0.1 % External Cream Apply 1 Application  topically 2 (two) times daily. APPLY TO AFFECTED AREA  Qty: 45 g, Refills: 0    Associated Diagnoses: Rash      glimepiride 2 MG Oral Tab Take 1 tablet (2 mg total) by mouth daily with breakfast.  Qty: 90 tablet, Refills: 3      SITagliptin Phosphate (JANUVIA) 100 MG Oral Tab Take 1 tablet (100 mg total) by mouth daily.  Qty: 90 tablet, Refills: 3      valsartan 320 MG Oral Tab Take 1 tablet (320 mg total) by mouth every morning.      amLODIPine 10 MG Oral Tab Take 1 tablet (10 mg total) by mouth daily.  Qty: 90 tablet, Refills: 3    Associated Diagnoses: Essential hypertension      atorvastatin 40 MG Oral Tab Take 1 tablet (40 mg total) by mouth nightly.  Qty: 90 tablet, Refills: 3      metFORMIN HCl 1000 MG Oral Tab TAKE 1 TABLET  TWICE DAILY WITH MEALS  Qty: 180 tablet, Refills: 3    Associated Diagnoses: Type 2 diabetes mellitus with hyperglycemia, without long-term current use of insulin (HCC); Type 2 diabetes mellitus without retinopathy (HCC)      Glucose Blood (TRUE METRIX BLOOD GLUCOSE TEST) In Vitro Strip 1 each by In Vitro route daily.  Qty: 100 strip, Refills: 1      Blood Glucose Monitoring Suppl (TRUE METRIX AIR GLUCOSE METER) w/Device Does not apply Kit 1 kit As Directed.  Qty: 1 kit, Refills: 0      omega-3-acid ethyl esters 1 g Oral Cap Take 1 capsule (1 g total) by mouth 3 (three) times daily.  Qty: 270 capsule, Refills: 3    Associated Diagnoses: Pure hypercholesterolemia      Blood Glucose Calibration (TRUE METRIX LEVEL 1) Low In Vitro Solution 1 each by In Vitro route as needed.  Qty: 1 each, Refills: 0      Lancets 33G Does not apply Misc 1 each 2 (two) times a day.  Qty: 200 each, Refills: 3      ferrous sulfate 325 (65 FE) MG Oral Tab EC Take 1 tablet (325 mg total) by mouth daily with breakfast.      Multiple Vitamins-Minerals (MULTIVITAMIN ADULT OR) Take 1 tablet by mouth daily.      COENZYME Q-10 OR Take 1 tablet by mouth daily.           STOP taking these medications       clopidogrel 75 MG Oral Tab        aspirin 81 MG Oral Tab        Meloxicam 15 MG Oral Tab              Total dc time > 30 min    Namita Montgomery MD  3/1/2025  9:24 AM     Hospital Discharge Diagnoses:  Primary osteoarthritis of left knee    Lace+ Score: 52  59-90 High Risk  29-58 Medium Risk  0-28   Low Risk.    TCM Follow-Up Recommendation:  LACE 29-58: Moderate Risk of readmission after discharge from the hospital.

## 2025-03-01 NOTE — PROGRESS NOTES
Subjective: No complaints. Pain controlled.    Objective:  Patient Vitals for the past 24 hrs:   BP Temp Temp src Pulse Resp SpO2   03/01/25 0750 148/77 98 °F (36.7 °C) Temporal 73 18 100 %   03/01/25 0420 141/77 97.7 °F (36.5 °C) Oral -- 19 96 %   02/28/25 2344 147/70 97.2 °F (36.2 °C) Oral -- 18 95 %   02/28/25 2024 155/77 97.3 °F (36.3 °C) Oral -- 17 --   02/28/25 1900 -- -- -- 63 -- --   02/28/25 1636 136/71 -- -- 59 16 100 %   02/28/25 1620 -- -- -- 52 -- --   02/28/25 1615 137/73 -- -- (!) 49 13 100 %   02/28/25 1605 136/69 -- -- 51 12 --   02/28/25 1555 136/65 97.8 °F (36.6 °C) Temporal 50 15 100 %   02/28/25 1545 131/58 -- -- 51 15 99 %   02/28/25 1535 141/68 -- -- 50 15 100 %   02/28/25 1525 121/71 -- -- 55 12 100 %   02/28/25 1515 139/73 -- -- 52 13 100 %   02/28/25 1505 119/66 -- -- 55 15 100 %   02/28/25 1455 111/75 -- -- 50 13 100 %   02/28/25 1445 111/66 -- -- 51 13 97 %   02/28/25 1435 95/64 -- -- 57 16 93 %   02/28/25 1425 91/65 -- -- 64 13 99 %   02/28/25 1415 106/65 97.8 °F (36.6 °C) Temporal 63 10 94 %     Sitting in bed in no acute distress.   Dressing is clean, dry, and intact.  Foot light touch sensation is intact.  Ankle dorsiflexion strength is 5/5. Ankle plantarflexion strength is 5/5.  No calf tenderness.  No calf swelling.  Tyrel's sign is negative.   The lower/upper extremity is neurovascularly intact.   The lower/upper extremity compartments are supple and non-tender.   Lab Results   Component Value Date    WBC 4.6 02/04/2025    RBC 4.00 02/04/2025    HGB 9.7 (L) 03/01/2025    MCV 90.0 02/04/2025    MCH 29.0 02/04/2025    MCHC 32.2 02/04/2025    RDW 15.4 (H) 02/04/2025    MPV 9.5 01/05/2019     No results found for: \"INR\"    Assessment:  Postoperative day #1   S/p LTKA    Plan:      Continue pain control.  Continue DVT prophylaxis.  Continue PT/OT.  D/C planning:  home with home health today    SANDRA MURRAY MD

## 2025-03-01 NOTE — CM/SW NOTE
Department  notified of request for HHC, aidin referrals started. Assigned CM/SW to follow up with pt/family on further discharge planning.     Honey Martinez, DSC

## 2025-03-01 NOTE — HOME CARE LIAISON
[facility] Spoke w/ patient who is agreeable with [facility]. Contact information placed on AVS.

## 2025-03-01 NOTE — PROGRESS NOTES
[facility]  part of [facility]    Progress Note    Avinash Dumont Patient Status:  Outpatient in a Bed    3/18/1950 MRN [id number]   Location [facility] Attending Denny Stanley MD   Hosp Day # 0 PCP Sun Vega MD       Subjective:     Constitutional:         Patient awake and alert in the chair at the side of the bed.  Wife visiting.  He is moving the left knee very well.  No complaints from either them.       Objective:   Blood pressure 136/71, pulse 59, temperature 97.8 °F (36.6 °C), temperature source Temporal, resp. rate 16, height 5' 8\" (1.727 m), weight 173 lb (78.5 kg), SpO2 100%.  Physical Exam  Musculoskeletal:      Comments: He actively moved his knee 5 to 90 degrees.  Able to dorsiflex and plantarflex both feet and toes.  Dressing clean.  I taught him stretching exercises for the knee in both flexion and extension.         Results:   Lab Results   Component Value Date    WBC 4.6 2025    HGB 11.6 (L) 2025    HCT 36.0 (L) 2025    .0 2025    CREATSERUM 0.77 2025    BUN 11 2025     2025    K 3.8 2025     2025    CO2 31.0 2025     (H) 2025    CA 9.2 2025    ALB 4.5 2025    ALKPHO 51 2025    BILT 1.0 2025    TP 6.8 2025    AST 19 2025    ALT 28 2025    TSH 2.040 2024    DDIMER 0.39 2022    MG 2.0 2022    TROPHS 21 2022    B12 395 08/10/2019       XR KNEE (1 OR 2 VIEWS), LEFT (CPT=73560)    Result Date: 2025  CONCLUSION:   Postsurgical changes of left knee arthroplasty with radiographically uncomplicated hardware.    Dictated by (CST): Epifanio Marino MD on 2025 at 2:53 PM     Finalized by (CST): Epifanio Marino MD on 2025 at 2:53 PM               Assessment & Plan:       Essential hypertension  Pulm medicine      Hyperlipidemia  Per medicine      Type 2 diabetes mellitus without retinopathy (HCC)  Per  medicine      CAD (coronary artery disease)  Per medicine      MILENA (obstructive sleep apnea)  Per medicine      Primary osteoarthritis of left knee  Continue self rehab and physical and occupational therapies.  Continue discharge planning for home health.  Plan is discharge home tomorrow with home health.  I discussed self rehab with the patient and his wife.              Denny Stanley MD  2/28/2025

## 2025-03-01 NOTE — PHYSICAL THERAPY NOTE
PHYSICAL THERAPY EVALUATION - INPATIENT     Room Number: Room 5/Room 5-A  Evaluation Date: 3/1/2025  Type of Evaluation: Initial   Physician Order: PT Eval and Treat    Presenting Problem: s/p L TKA on      Reason for Therapy: Mobility Dysfunction and Discharge Planning    PHYSICAL THERAPY ASSESSMENT   Patient is a 74 year old male admitted 2025 for L TKA.  Prior to admission, patient's baseline is independent with ADLs and functional mobility with occasional use of a quad cane.  Patient is currently functioning below baseline with bed mobility, transfers, gait, stair negotiation, standing prolonged periods, and performing household tasks however demonstrated all functional mobility at SBA level and is safe to return home with  PT and additional support from spouse.     PLAN  Patient has been evaluated and presents with no skilled Physical Therapy needs at this time.  Patient will be discharged from Physical Therapy services. Please re-order if a new functional limitation presents during this admission.    PT Device Recommendation: Rolling walker    PHYSICAL THERAPY MEDICAL/SOCIAL HISTORY     Problem List  Principal Problem:    Primary osteoarthritis of left knee  Active Problems:    Essential hypertension    Hyperlipidemia    Type 2 diabetes mellitus without retinopathy (HCC)    CAD (coronary artery disease)    MILENA (obstructive sleep apnea)    HOME SITUATION  Type of Home: House  Home Layout: Multi-level  Stairs to Bedroom: 5    Railing: Yes    Lives With: Spouse    Drives: Yes   Patient Regularly Uses: SBQC (occasional use of quad cane inside the home)      Prior Level of [place]: independent     SUBJECTIVE  Agreeable to activity.     PHYSICAL THERAPY EXAMINATION   OBJECTIVE  Precautions: None  Fall Risk: Standard fall risk    WEIGHT BEARING RESTRICTION  L Lower Extremity: Weight Bearing as Tolerated    PAIN ASSESSMENT  Ratin  Location: left knee  Management Techniques: Activity promotion;Body  mechanics;Repositioning    COGNITION  Overall Cognitive Status:  WFL - within functional limits    RANGE OF MOTION AND STRENGTH ASSESSMENT  Upper extremity ROM and strength are within functional limits.  Lower extremity ROM is within functional limits.  Lower extremity strength is within functional limits.    BALANCE  Static Sitting: Good  Dynamic Sitting: Fair +  Static Standing: Fair  Dynamic Standing: Fair -    AM-PAC '6-Clicks' INPATIENT SHORT FORM - BASIC MOBILITY  How much difficulty does the patient currently have...  Patient Difficulty: Turning over in bed (including adjusting bedclothes, sheets and blankets)?: A Little   Patient Difficulty: Sitting down on and standing up from a chair with arms (e.g., wheelchair, bedside commode, etc.): A Little   Patient Difficulty: Moving from lying on back to sitting on the side of the bed?: A Little   How much help from another person does the patient currently need...   Help from Another: Moving to and from a bed to a chair (including a wheelchair)?: A Little   Help from Another: Need to walk in hospital room?: A Little   Help from Another: Climbing 3-5 steps with a railing?: A Little     AM-PAC Score:  Raw Score: 18   Approx Degree of Impairment: 46.58%   Standardized Score (AM-PAC Scale): 43.63   CMS Modifier (G-Code): CK    FUNCTIONAL ABILITY STATUS  Functional Mobility/Gait Assessment  Gait Assistance: Other (Comment) (SBA)  Distance (ft): 85  Assistive Device: Rolling walker  Pattern: Shuffle;L Decreased stance time (slow pace, guarded and flexed posture with cues to correct, step-to pattern)  Stairs: Stairs  How Many Stairs: 4 x 2  Device: 1 Rail  Assist: Other (SBA)  Pattern: Ascend and Descend  Ascend and Descend : Step to  Sit to Stand: stand-by assist    Exercise/Education Provided:  Education Provided To: Patient;Family/Caregiver     Patient Education: Role of Physical Therapy;Plan of Care;Discharge Recommendations;DME Recommendations;Functional Transfer  Techniques;Fall Prevention;Weight Bear Status;Posture/Positioning;Surgical Precautions;Edema Reduction;Energy Conservation;Proper Body Mechanics;LE HEP for Strengthening;Gait Training;Stair Training     Patient's Response to Education: Verbalized Understanding;Returned Demonstration     Skilled Therapy Provided: Pt received sitting in chair with wife at bedside. Introduced self and role. Educated on WBAT, TKA exercises and mobility protocol, safe surgical limb positioning, and benefits of frequent ambulation/mobility. Pt with c/o 7/10 L knee pain. Able to stand from chair and to/from toilet with SBA. Ambulated in hallway with RW and SBA, demos slow but steady gait, shuffled and guarded with cues given to correct posture. No LOB. Navigated 4 x 2 stairs with 1 HR to simulate home environment; cues given for safe sequencing of step-to pattern. Returned to room and was left sitting in chair with needs within reach, all questions and concerns addressed, handoff to RN complete.     The patient's Approx Degree of Impairment: 46.58% has been calculated based on documentation in the [facility] '6 clicks' Inpatient Basic Mobility Short Form.  Research supports that patients with this level of impairment may benefit from home with HH PT.  Final disposition will be made by interdisciplinary medical team.    Patient End of Session: Up in chair;Needs met;Call light within reach;RN aware of session/findings;All patient questions and concerns addressed;Hospital anti-slip socks;Family present    Patient was able to achieve the following ...   Patient able to transfer   Safely with SBA    Patient able to ambulate on level surfaces   Safely with SBA     Patient Evaluation Complexity Level:  History Low - no personal factors and/or co-morbidities   Examination of body systems Low -  addressing 1-2 elements   Clinical Presentation Low- Stable   Clinical Decision Making  Low Complexity     Therapeutic Activity:  20 minutes

## 2025-03-01 NOTE — OCCUPATIONAL THERAPY NOTE
OCCUPATIONAL THERAPY EVALUATION - INPATIENT     Room Number: Room 5/Room 5-A  Evaluation Date: 3/1/2025  Type of Evaluation: Initial  Presenting Problem: L TKA    Physician Order: IP Consult to Occupational Therapy  Reason for Therapy: ADL/IADL Dysfunction and Discharge Planning    OCCUPATIONAL THERAPY ASSESSMENT   Patient is a 74 year old male admitted 2/28/2025 for L TKA.  Prior to admission, patient's baseline is independent with ADLs, IADLs and functional mobility.  Patient is currently functioning near baseline with toileting, bathing, upper body dressing, lower body dressing, grooming, eating, bed mobility, transfers, static standing balance, dynamic standing balance, functional standing tolerance, and energy conservation strategies.    PLAN  Patient has been evaluated and presents with no skilled Occupational Therapy needs  at this time.  Patient will be discharged from Occupational Therapy services. Please re-order if a new functional limitation presents during this admission.    OT Device Recommendations: None (owns all recommended equipment)    OCCUPATIONAL THERAPY MEDICAL/SOCIAL HISTORY   Problem List  Principal Problem:    Primary osteoarthritis of left knee  Active Problems:    Essential hypertension    Hyperlipidemia    Type 2 diabetes mellitus without retinopathy (HCC)    CAD (coronary artery disease)    MILENA (obstructive sleep apnea)    HOME SITUATION  Type of Home: House  Home Layout: Multi-level  Lives With: Spouse  Toilet and Equipment: Grab bar; Toilet riser with arms  Shower/Tub and Equipment: Tub-shower combo; Tub transfer bench  Other Equipment: None  Occupation/Status: retired  Hand Dominance: Right  Drives: Yes  Patient Regularly Uses: SBQC (occasional use of quad cane inside the home)    Stairs in Home: multilevel home  Use of Assistive Device(s): none    Prior Level of Garza: .Prior to admission, patient was independent with all ADLs and IADLs. Patient lives in a house with wife.  Patient was driving and currently does not work. Patient used no assistive device for mobility. At home patient currently owns all recommended equipment.     SUBJECTIVE  \"My wife will be home to help if needed.\"    OCCUPATIONAL THERAPY EXAMINATION    OBJECTIVE  Precautions: None  Fall Risk: Standard fall risk    WEIGHT BEARING RESTRICTION  L Lower Extremity: Weight Bearing as Tolerated    PAIN ASSESSMENT  Ratin  Location: LLE  Management Techniques: Activity promotion; Body mechanics; Relaxation; Repositioning; Ice    COGNITION  Overall Cognitive Status:  WFL - within functional limits    VISION  Current Vision: no visual deficits    PERCEPTION  Overall Perception Status:   WFL - within functional limits    SENSATION  Light touch:  intact    Communication: Able to communicate wants and needs     Behavioral/Emotional/Social: Calm and cooperative     RANGE OF MOTION   Upper extremity ROM is within functional limits     STRENGTH ASSESSMENT  Upper extremity strength is within functional limits     COORDINATION  Gross Motor: WFL   Fine Motor: WFL     ACTIVITIES OF DAILY LIVING ASSESSMENT  AM-PAC ‘6-Clicks’ Inpatient Daily Activity Short Form  How much help from another person does the patient currently need…  -   Putting on and taking off regular lower body clothing?: A Little  -   Bathing (including washing, rinsing, drying)?: A Little  -   Toileting, which includes using toilet, bedpan or urinal? : A Little  -   Putting on and taking off regular upper body clothing?: None  -   Taking care of personal grooming such as brushing teeth?: None  -   Eating meals?: None    AM-PAC Score:  Score: 21  Approx Degree of Impairment: 32.79%  Standardized Score (AM-PAC Scale): 44.27  CMS Modifier (G-Code): CJ    FUNCTIONAL TRANSFER ASSESSMENT  Sit to Stand: Chair  Chair: Stand-by Assist  Toilet Transfer: Stand-by Assist    FUNCTIONAL ADL ASSESSMENT  Eating: Independent  Grooming Seated: Stand-by Assist  Bathing Seated: Stand-by  Assist  UB Dressing Seated: Independent  LB Dressing Seated: Stand-by Assist  Toileting Seated: Stand-by Assist    THERAPEUTIC EXERCISE     Skilled Therapy Provided: Patient received sitting in chair. Patient performing ADLs and functional mobility at a SBA level this session. Education provided on body mechanics and how that manifests functionally while completing ADLs and functional mobility. Patient with good return demonstration on all education.     EDUCATION PROVIDED  Patient Education : Role of Occupational Therapy; Discharge Recommendations; Plan of Care; DME Recommendations; Functional Transfer Techniques; Fall Prevention; Weight Bear Status; Surgical Precautions; Posture/Positioning; Edema Reduction; Energy Conservation; Proper Body Mechanics  Patient's Response to Education: Verbalized Understanding; Returned Demonstration    The patient's Approx Degree of Impairment: 32.79% has been calculated based on documentation in the [facility] '6 clicks' Inpatient Daily Activity Short Form.  Research supports that patients with this level of impairment may benefit from home.  Final disposition will be made by interdisciplinary medical team.    Patient End of Session: Up in chair;Needs met;Call light within reach;RN aware of session/findings;All patient questions and concerns addressed;Hospital anti-slip socks;Family present    Patient was able to achieve the following ...   Patient able to toilet transfer  safely and independently    Patient able to dress lower extremities  safely and independently    Patient/Caregiver able to demonstrate safety with ADLS  safely and independently     Patient Evaluation Complexity Level:   Occupational Profile/Medical History LOW - Brief history including review of medical or therapy records    Specific performance deficits impacting engagement in ADL/IADL LOW  1 - 3 performance deficits    Client Assessment/Performance Deficits MODERATE - Comorbidities and min to mod modifications of  tasks    Clinical Decision Making LOW - Analysis of occupational profile, problem-focused assessments, limited treatment options    Overall Complexity LOW     Self-Care Home Management: 17 minutes

## 2025-03-02 ENCOUNTER — TELEPHONE (OUTPATIENT)
Dept: ORTHOPEDICS CLINIC | Facility: CLINIC | Age: 75
End: 2025-03-02

## 2025-03-03 ENCOUNTER — TELEPHONE (OUTPATIENT)
Dept: ORTHOPEDICS CLINIC | Facility: CLINIC | Age: 75
End: 2025-03-03

## 2025-03-03 NOTE — TELEPHONE ENCOUNTER
Post-op call for Dr. GREEN    Date: 3/3/2025      Time: 12:22 PM   Patient: Avinash PERALES number: [id number]   Surgery and surgery date:     Procedure Laterality Anesthesia   Left total knee arthroplasty with MRI templated patient specific instruments Left MAC          2/28/2025  Patient unavailable.  Left message on voicemail to call clinic with questions or concerns.  Number was provided./SPOKE TO PATIENT  Patient's general feeling since discharge:Feel OK  Pain control (0-10):/ 4-5  Pain Medication:  dose/strength/  Medication Quantity Refills Start End   naproxen 250 MG Oral Tab 28 tablet 0 3/1/2025 --   Sig:   Take 1 tablet (250 mg total       Medication Quantity Refills Start End   acetaminophen 325 MG Oral Tab 60 tablet 0 2/28/2025 --   Sig:   Take 2 tablets (650 mg total) by mouth every 8 (eight) hours as needed. Maximum 4000 mg Tylenol/acetaminophen daily from all sources.  Each Norco has 325 mg of Tylenol/acetaminophen in it.     Route:   Oral       Medication Quantity Refills Start End   HYDROcodone-acetaminophen  MG Oral Tab         Medication Quantity Refills Start End   aspirin 325 MG Oral Tab EC 60 tablet 0 2/28/2025 --   Sig:   Take 1 tablet (325 mg t       Fever:  no  Chills: no  SOB:  no  Incision site appearance:  Redness  no  Drainage no  Clean/dry/Intact Yes  Home Health stated things looked good  Calf pain, redness or warmth:   no   Bowel Regimen: yes  If not, what are you taking stool softener/laxative?  Confirmed appointment date for post op:3-  Other concerns patients may ask: / Concerned about taking 650 of aspirin a day and start his plavix today. I advised to call his cardiologist if he should take both  Bathing and bandages   Patient needs to keep incision clean and dry for the first week.  Enforce rest, ice, compression elevation and use their pain medication accordingly./ DONE  We went over ice protocol and place under the knee   If the patient needs more pain  medication, please put in a communication.

## 2025-03-04 ENCOUNTER — MED REC SCAN ONLY (OUTPATIENT)
Dept: INTERNAL MEDICINE CLINIC | Facility: CLINIC | Age: 75
End: 2025-03-04

## 2025-03-06 ENCOUNTER — HOSPITAL ENCOUNTER (OUTPATIENT)
Dept: ULTRASOUND IMAGING | Facility: HOSPITAL | Age: 75
Discharge: HOME | End: 2025-03-06
Attending: ORTHOPAEDIC SURGERY
Payer: MEDICARE

## 2025-03-06 ENCOUNTER — TELEPHONE (OUTPATIENT)
Dept: INTERNAL MEDICINE CLINIC | Facility: CLINIC | Age: 75
End: 2025-03-06

## 2025-03-06 ENCOUNTER — TELEPHONE (OUTPATIENT)
Dept: ORTHOPEDICS CLINIC | Facility: CLINIC | Age: 75
End: 2025-03-06

## 2025-03-06 DIAGNOSIS — M79.89 SWELLING OF CALF: ICD-10-CM

## 2025-03-06 DIAGNOSIS — M79.605 PAIN OF LEFT LOWER EXTREMITY: ICD-10-CM

## 2025-03-06 DIAGNOSIS — M79.89 SWELLING OF CALF: Primary | ICD-10-CM

## 2025-03-06 PROCEDURE — 93971 EXTREMITY STUDY: CPT | Performed by: ORTHOPAEDIC SURGERY

## 2025-03-06 NOTE — TELEPHONE ENCOUNTER
Received a call from kya with [facility] and she is at the patients home and states his left leg where he had his total left knee replacement is very swollen and edematous. She called his surgeons office and they do not have anyone who can see him so they advised he call his primary office.     Booked an appointment in [place] to be seen.     Future Appointments   Date Time Provider Department Center   3/6/2025  4:00 PM Sushma Marsh APRN [id number] [place]   3/11/2025  8:00 AM [facility]   6/17/2025 10:15 AM [facility]

## 2025-03-06 NOTE — TELEPHONE ENCOUNTER
Called Bella back and let her know that we sent patient for STAT US. She is glad we were able to do that for patient.

## 2025-03-06 NOTE — TELEPHONE ENCOUNTER
Called JIM Blanco. Left messsage to call back on confidential voicemail.     Called patient and spoke with him and spouse. No signs/symptoms of infection. Upon further assessment, there is cause for concern for DVT.    Entered STAT US order per protocol, spoke with US ans got patient scheduled for 4 pm today. Called patient back and gave time, location and directions to appointment. Advised to cancel appointment they made with primary doctor and do ultrasound instead. Verbalized understanding. They are leaving now to get to test.     DVT Assessment    Initiate a STAT hold and call order for a venous doppler for the affected lower extremity for any   post op patient exhibiting a combination of symptoms and positive risk factors in medical   history based on nurse's phone assessment and patient reported symptoms.    If patient reports a combination of the following symptoms: STOP, send pt to the ER, and inform provider:     Panting, shortness of breath, or trouble breathing    Sharp, knife-like chest pain when you breathe in or out    Coughing or coughing up blood    A rapid heartbeat (over 110 bpm)   Foot that is cool to touch or blue  Denies   If the patient reports the following symptoms, proceed with triage:     Swelling of the leg or along a vein in the leg    Pain or tenderness in the thigh or calf, which may be constant or may occur only when the   patient is standing or walking     Drug Allergies: Allergies[1]    Current Medications: @medication@    DVT Risk Factors: recent surgery    DVT Symptoms: swelling of the leg or along a vein in the leg and pain or tenderness in the thigh or calf    Plan: Ordered a STAT Ultrasound for suspected DVT based on protocol. Called radiology department to schedule the patient’s ultrasound and informed the patient of location and time of the procedure. Provider notified.          [1] No Known Allergies

## 2025-03-10 NOTE — TELEPHONE ENCOUNTER
Per ORA Martinez-  \"Pt was negative for DVT. Please call pt and let him know.\"   Called patient and verified name and  and informed him of results. Patient has PO appointment scheduled on 3/11/25.

## 2025-03-11 ENCOUNTER — OFFICE VISIT (OUTPATIENT)
Dept: ORTHOPEDICS CLINIC | Facility: CLINIC | Age: 75
End: 2025-03-11
Payer: MEDICARE

## 2025-03-11 ENCOUNTER — HOSPITAL ENCOUNTER (OUTPATIENT)
Dept: GENERAL RADIOLOGY | Facility: HOSPITAL | Age: 75
Discharge: HOME | End: 2025-03-11
Payer: MEDICARE

## 2025-03-11 DIAGNOSIS — Z96.652 S/P TKR (TOTAL KNEE REPLACEMENT), LEFT: ICD-10-CM

## 2025-03-11 DIAGNOSIS — Z51.89 AFTERCARE: ICD-10-CM

## 2025-03-11 DIAGNOSIS — M17.12 PRIMARY OSTEOARTHRITIS OF LEFT KNEE: ICD-10-CM

## 2025-03-11 DIAGNOSIS — Z51.89 AFTERCARE: Primary | ICD-10-CM

## 2025-03-11 PROCEDURE — 73562 X-RAY EXAM OF KNEE 3: CPT

## 2025-03-11 PROCEDURE — 1125F AMNT PAIN NOTED PAIN PRSNT: CPT

## 2025-03-11 PROCEDURE — 99024 POSTOP FOLLOW-UP VISIT: CPT

## 2025-03-11 PROCEDURE — 1111F DSCHRG MED/CURRENT MED MERGE: CPT

## 2025-03-11 PROCEDURE — 1160F RVW MEDS BY RX/DR IN RCRD: CPT

## 2025-03-11 PROCEDURE — 1159F MED LIST DOCD IN RCRD: CPT

## 2025-03-11 RX ORDER — HYDROCODONE BITARTRATE AND ACETAMINOPHEN 10; 325 MG/1; MG/1
1 TABLET ORAL EVERY 6 HOURS PRN
Qty: 25 TABLET | Refills: 0 | Status: SHIPPED | OUTPATIENT
Start: 2025-03-11

## 2025-03-11 RX ORDER — NAPROXEN 250 MG/1
250 TABLET ORAL 2 TIMES DAILY WITH MEALS
Qty: 28 TABLET | Refills: 0 | Status: SHIPPED | OUTPATIENT
Start: 2025-03-11

## 2025-03-11 NOTE — PROGRESS NOTES
NURSING INTAKE COMMENTS:   Chief Complaint   Patient presents with    Post-Op     1st post op Left knee TKA was done 2/28/2025, pt states a little bit in pain 6/10. Pt states is doing home therapy, pt says its going okay. And is walking with walker        HPI: This 74 year old male presents today with his wife approximately 1.5 weeks status post left total knee replacement.  Overall the patient reports he is doing very well.  Denies any fever, chills, night sweats.  Denies any numbness, or tingling.  He recently had an ultrasound to rule out DVT.  Ultrasound was negative.  He continues to have pitting edema in his left lower extremity.  Has been wearing compression stockings during the day but not at night as well as elevating and icing.  He is ambulating with a walker today.  For pain he is taking Norco and naproxen.  Continues to take his aspirin 325 mg twice daily.    Past Medical History:    Anxiety    Arthritis    Atherosclerosis of coronary artery    Colon adenomas    Colon adenomas    x5    Coronary atherosclerosis    Depression    Diabetes (HCC)    Esophageal reflux    Essential hypertension    High blood pressure    High cholesterol    History of blood transfusion    2008, NO REACTIONS    Hyperlipidemia    Osteoarthritis    Sleep apnea    Visual impairment    glasses     Past Surgical History:   Procedure Laterality Date    Bypass surgery  05/2009    Cabg      CABG x 5    Cath drug eluting stent      Colonoscopy      Colonoscopy N/A 10/28/2019    Procedure: COLONOSCOPY;  Surgeon: Jack Son MD;  Location: [facility] ENDOSCOPY    Colonoscopy      Colonoscopy N/A 03/21/2023    Procedure: COLONOSCOPY;  Surgeon: Jack Son MD;  [facility]    Other surgical history      deviated septum repair    Other surgical history Left     Sinus surgery        Upper gi endoscopy,exam       Current Outpatient Medications   Medication Sig Dispense Refill    HYDROcodone-acetaminophen  MG Oral Tab Take  1 tablet by mouth every 6 (six) hours as needed. No alcohol or driving on this medication.  Stop if lethargic or hallucinating.  Maximum 4000 mg Tylenol/acetaminophen daily from all sources.  Recall each Norco has 325 mg of Tylenol/acetaminophen in it. 25 tablet 0    naproxen 250 MG Oral Tab Take 1 tablet (250 mg total) by mouth 2 (two) times daily with meals. 28 tablet 0    acetaminophen 325 MG Oral Tab Take 2 tablets (650 mg total) by mouth every 8 (eight) hours as needed. Maximum 4000 mg Tylenol/acetaminophen daily from all sources.  Each Norco has 325 mg of Tylenol/acetaminophen in it. 60 tablet 0    calcium carbonate-vitamin D 250-3.125 MG-MCG Oral Tab Take 1 tablet by mouth 2 (two) times daily with meals. 60 tablet 0    docusate sodium 100 MG Oral Cap Take 100 mg by mouth 2 (two) times daily. Do not crush. Stop if loose stool 20 capsule 0    aspirin 325 MG Oral Tab EC Take 1 tablet (325 mg total) by mouth in the morning and 1 tablet (325 mg total) before bedtime. Do not crush. 60 tablet 0    LORazepam 1 MG Oral Tab Take 1 tablet (1 mg total) by mouth nightly as needed. 30 tablet 0    Glucose Blood (TRUE METRIX BLOOD GLUCOSE TEST) In Vitro Strip 1 each by In Vitro route daily. 100 strip 1    metoprolol succinate ER 25 MG Oral Tablet 24 Hr Take 1 tablet (25 mg total) by mouth daily. (Patient taking differently: Take 1 tablet (25 mg total) by mouth every morning.) 90 tablet 3    nitroglycerin 0.4 MG Sublingual SL Tab       triamcinolone 0.1 % External Cream Apply 1 Application  topically 2 (two) times daily. APPLY TO AFFECTED AREA 45 g 0    glimepiride 2 MG Oral Tab Take 1 tablet (2 mg total) by mouth daily with breakfast. (Patient taking differently: Take 1 tablet (2 mg total) by mouth daily with dinner.) 90 tablet 3    SITagliptin Phosphate (JANUVIA) 100 MG Oral Tab Take 1 tablet (100 mg total) by mouth daily. 90 tablet 3    valsartan 320 MG Oral Tab Take 1 tablet (320 mg total) by mouth every morning.       amLODIPine 10 MG Oral Tab Take 1 tablet (10 mg total) by mouth daily. (Patient taking differently: Take 1 tablet (10 mg total) by mouth every morning.) 90 tablet 3    atorvastatin 40 MG Oral Tab Take 1 tablet (40 mg total) by mouth nightly. 90 tablet 3    metFORMIN HCl 1000 MG Oral Tab TAKE 1 TABLET TWICE DAILY WITH MEALS 180 tablet 3    Blood Glucose Monitoring Suppl (TRUE METRIX AIR GLUCOSE METER) w/Device Does not apply Kit 1 kit As Directed. 1 kit 0    omega-3-acid ethyl esters 1 g Oral Cap Take 1 capsule (1 g total) by mouth 3 (three) times daily. (Patient taking differently: Take 1 capsule (1 g total) by mouth 2 (two) times daily.) 270 capsule 3    Blood Glucose Calibration (TRUE METRIX LEVEL 1) Low In Vitro Solution 1 each by In Vitro route as needed. 1 each 0    Lancets 33G Does not apply Misc 1 each 2 (two) times a day. 200 each 3    ferrous sulfate 325 (65 FE) MG Oral Tab EC Take 1 tablet (325 mg total) by mouth daily with breakfast.      Multiple Vitamins-Minerals (MULTIVITAMIN ADULT OR) Take 1 tablet by mouth daily.      COENZYME Q-10 OR Take 1 tablet by mouth daily.       Allergies[1]  Family History   Problem Relation Age of Onset    Cancer Father 64        Lymph node CA    Heart Disorder Mother         MI    Hypertension Mother     Diabetes Neg     Glaucoma Neg     Macular degeneration Neg      No family Hx of DVT/PE    Social History     Occupational History    Not on file   Tobacco Use    Smoking status: Former     Current packs/day: 0.00     Average packs/day: 0.5 packs/day for 25.0 years (12.5 ttl pk-yrs)     Types: Cigarettes     Start date: 1983     Quit date: 2008     Years since quittin.2    Smokeless tobacco: Never   Vaping Use    Vaping status: Never Used   Substance and Sexual Activity    Alcohol use: Yes     Alcohol/week: 3.0 standard drinks of alcohol     Types: 3 Standard drinks or equivalent per week     Comment: 2-3 drinks/week    Drug use: No    Sexual activity: Not on  file        Review of Systems:  GENERAL: feels generally well, no significant weight loss or weight gain  SKIN: no ulcerated or worrisome skin lesions  EYES:denies blurred vision or double vision  HEENT: denies new nasal congestion, sinus pain or ST  LUNGS: denies shortness of breath  CARDIOVASCULAR: denies chest pain  GI: no hematemesis, no worsening heartburn, no diarrhea  : no dysuria, no blood in urine, no difficulty urinating, no incontinence  MUSCULOSKELETAL: no other musculoskeletal complaints other than in HPI  NEURO: no numbness or tingling, no weakness or balance disorder  PSYCHE: no depression or anxiety  HEMATOLOGIC: no hx of blood dyscrasia, no Hx DVT/PE  ENDOCRINE: no thyroid or diabetes issues  ALL/ASTHMA: no new hx of severe allergy or asthma    Physical Examination:    There were no vitals taken for this visit.  Constitutional: appears well hydrated, alert and responsive, no acute distress noted  Extremities: Lower extremity skin has 1+ pitting edema.  Calf soft and nontender today.  Incision healing well, staples removed today.  Musculoskeletal: Left knee has mild asymmetric warmth and swelling, consistent with postoperative changes.  Left knee range of motion 0 to 90 degrees, no instability.  Neurological: Motor and sensory function intact.    Imaging: X-rays taken today office show left knee implants are well-fixed and in proper alignment.      US VENOUS DOPPLER LEG LEFT - DIAG IMG (CPT=93971)    Result Date: 3/6/2025  PROCEDURE: US VENOUS DOPPLER LEG LEFT-DIAG IMG (CPT=93971)  COMPARISON: None.  INDICATIONS: M79.605 Pain of left lower extremity M79.89 Swelling of calf  TECHNIQUE: Color duplex Doppler venous ultrasound of the left lower extremity was performed in the usual manner.  FINDINGS: The femoral and popliteal veins appear normal.  Normal flow was demonstrated with color and pulsed Doppler.  Visualized portions of saphenous, and proximal calf veins appear normal.  THROMBI: None visible.  COMPRESSIBILITY: Normal. OTHER: Negative.         CONCLUSION:  1. No left lower extremity DVT.    Dictated by (CST): Barrington Liu MD on 3/06/2025 at 5:00 PM     Finalized by (CST): Barrington Liu MD on 3/06/2025 at 5:00 PM          XR KNEE (1 OR 2 VIEWS), LEFT (CPT=73560)    Result Date: 2/28/2025  PROCEDURE: XR KNEE (1 OR 2 VIEWS), LEFT (CPT=73560)  COMPARISON: [facility] 2nd Floor, XR KNEE (3 VIEWS), LEFT (CPT=73562), 7/16/2024, 12:00 PM.  INDICATIONS: Status post left total knee arthroplasty today.  TECHNIQUE: 2 views were obtained.   FINDINGS:  BONES: Status post total knee replacement. The femoral and tibial components are intact. Methylmethacrylate cement material surrounds the tibial stem. The patellar component is in place. SOFT TISSUES: Diffuse soft tissue swelling about the knee joint is present. Postoperative subcutaneous emphysema is seen. EFFUSION: Moderate, presumably postoperative in etiology. OTHER: Vertically oriented anterior staples and packing material.         CONCLUSION:   Postsurgical changes of left knee arthroplasty with radiographically uncomplicated hardware.    Dictated by (CST): Epifanio Marino MD on 2/28/2025 at 2:53 PM     Finalized by (CST): Epifanio Marino MD on 2/28/2025 at 2:53 PM             Lab Results   Component Value Date    WBC 4.6 02/04/2025    HGB 9.7 (L) 03/01/2025    .0 02/04/2025      Lab Results   Component Value Date     (H) 03/01/2025    BUN 10 03/01/2025    CREATSERUM 0.76 03/01/2025    GFRNAA 81 05/04/2022    GFRAA 94 05/04/2022        Assessment and Plan:  Diagnoses and all orders for this visit:    Aftercare  -     XR KNEE (3 VIEWS), LEFT (CPT=73562); Future  -     Physical Therapy Referral - [facility] [place]    S/P TKR (total knee replacement), left  -     Physical Therapy Referral - [facility] [place]    Primary osteoarthritis of left knee  -     HYDROcodone-acetaminophen  MG Oral Tab; Take 1 tablet by mouth every  6 (six) hours as needed. No alcohol or driving on this medication.  Stop if lethargic or hallucinating.  Maximum 4000 mg Tylenol/acetaminophen daily from all sources.  Recall each Norco has 325 mg of Tylenol/acetaminophen in it.  -     naproxen 250 MG Oral Tab; Take 1 tablet (250 mg total) by mouth 2 (two) times daily with meals.        Assessment: As above    Plan: I gave the patient a refill on both naproxen and Norco.  He will continue taking his aspirin 325 mg twice daily.    Discussed wound care with the patient.  He may place Betadine or triple antibiotic on the incision.    Gave him a prescription for outpatient physical therapy.  He will continue doing his home exercises and self rehab daily.    We will see the patient in 4-week for reevaluation.  Discussed with the patient that Dr. Stanley and are leaving this clinic, her last day being March 14.  He is more than welcome to follow-up with us at her private office or follow-up with a new clinic doctors here.    Follow Up: No follow-ups on file.    ORA Martinez       [1] No Known Allergies

## 2025-03-18 DIAGNOSIS — F41.9 ANXIETY: ICD-10-CM

## 2025-03-18 DIAGNOSIS — E11.65 TYPE 2 DIABETES MELLITUS WITH HYPERGLYCEMIA, WITHOUT LONG-TERM CURRENT USE OF INSULIN (HCC): Chronic | ICD-10-CM

## 2025-03-21 RX ORDER — LORAZEPAM 1 MG/1
1 TABLET ORAL NIGHTLY PRN
Qty: 30 TABLET | Refills: 0 | Status: SHIPPED | OUTPATIENT
Start: 2025-03-21

## 2025-03-21 NOTE — TELEPHONE ENCOUNTER
Please review. Refill failed protocol.     Recent fills each # 30 : 02/27/2025 , 01/27/2025 , 12/30/2024  Last prescription written: 02/25/2025  Last office visit:  2/4/2025    Future Appointments   Date Time Provider Department Center   6/17/2025 10:15 AM Wayne, Zoe, MD [facility]      Sent [employer] message to patient the refill request was forwarded to provider.

## 2025-03-24 DIAGNOSIS — E11.65 TYPE 2 DIABETES MELLITUS WITH HYPERGLYCEMIA, WITHOUT LONG-TERM CURRENT USE OF INSULIN (HCC): Chronic | ICD-10-CM

## 2025-03-24 DIAGNOSIS — E11.9 TYPE 2 DIABETES MELLITUS WITHOUT RETINOPATHY (HCC): ICD-10-CM

## 2025-03-25 DIAGNOSIS — M17.12 PRIMARY OSTEOARTHRITIS OF LEFT KNEE: ICD-10-CM

## 2025-03-25 RX ORDER — HYDROCODONE BITARTRATE AND ACETAMINOPHEN 10; 325 MG/1; MG/1
1 TABLET ORAL EVERY 6 HOURS PRN
Qty: 25 TABLET | Refills: 0 | Status: SHIPPED | OUTPATIENT
Start: 2025-03-25

## 2025-03-27 NOTE — TELEPHONE ENCOUNTER
Refill Per Protocol     Requested Prescriptions   Pending Prescriptions Disp Refills    METFORMIN HCL 1000 MG Oral Tab [Pharmacy Med Name: metFORMIN HCl Oral Tablet 1000 MG] 180 tablet 3     Sig: TAKE 1 TABLET TWICE DAILY WITH MEALS       Diabetes Medication Protocol Passed - 3/27/2025  3:37 PM        Passed - Last A1C < 7.5 and within past 6 months     Lab Results   Component Value Date    A1C 6.8 (A) 12/17/2024             Passed - In person appointment or virtual visit in the past 6 mos or appointment in next 3 mos     Recent Outpatient Visits              2 weeks ago [place]    [facility] [place]    Office Visit    [facility] month ago [facility]    Office Visit    3 months ago Type 2 diabetes mellitus with other specified complication, without long-term current use of insulin (HCC)    [facility] Zoe Black MD    Office Visit    4 months ago Primary osteoarthritis of left knee    [facility] Denny Stanley MD    Office Visit    4 months ago Rash    [facility] [place] Sun Vega MD    Office Visit          Future Appointments         Provider Department Appt Notes    In 2 months Zoe Black MD [facility] 6 months                    Passed - Microalbumin procedure in past 12 months or taking ACE/ARB        Passed - EGFRCR or GFRNAA > 50     GFR Evaluation  EGFRCR: 95 , resulted on 3/1/2025          Passed - GFR in the past 12 months        Passed - Medication is active on med list

## 2025-03-31 DIAGNOSIS — M17.12 PRIMARY OSTEOARTHRITIS OF LEFT KNEE: ICD-10-CM

## 2025-03-31 RX ORDER — NAPROXEN 250 MG/1
250 TABLET ORAL 2 TIMES DAILY WITH MEALS
Qty: 28 TABLET | Refills: 0 | Status: SHIPPED | OUTPATIENT
Start: 2025-03-31

## 2025-04-07 DIAGNOSIS — M17.12 PRIMARY OSTEOARTHRITIS OF LEFT KNEE: ICD-10-CM

## 2025-04-07 DIAGNOSIS — Z51.89 AFTERCARE: Primary | ICD-10-CM

## 2025-04-07 DIAGNOSIS — Z96.652 S/P TKR (TOTAL KNEE REPLACEMENT), LEFT: Primary | ICD-10-CM

## 2025-04-07 RX ORDER — HYDROCODONE BITARTRATE AND ACETAMINOPHEN 10; 325 MG/1; MG/1
1 TABLET ORAL EVERY 6 HOURS PRN
Qty: 25 TABLET | Refills: 0 | Status: SHIPPED | OUTPATIENT
Start: 2025-04-07

## 2025-04-07 RX ORDER — NAPROXEN 250 MG/1
250 TABLET ORAL 2 TIMES DAILY WITH MEALS
Qty: 28 TABLET | Refills: 0 | Status: SHIPPED | OUTPATIENT
Start: 2025-04-07

## 2025-04-14 DIAGNOSIS — E11.65 TYPE 2 DIABETES MELLITUS WITH HYPERGLYCEMIA, WITHOUT LONG-TERM CURRENT USE OF INSULIN (HCC): Chronic | ICD-10-CM

## 2025-04-14 DIAGNOSIS — F41.9 ANXIETY: ICD-10-CM

## 2025-04-17 RX ORDER — LORAZEPAM 1 MG/1
1 TABLET ORAL NIGHTLY PRN
Qty: 30 TABLET | Refills: 0 | Status: SHIPPED | OUTPATIENT
Start: 2025-04-17

## 2025-04-17 NOTE — TELEPHONE ENCOUNTER
Please review. Protocol Failed; No Protocol      Recent fills: 2/27/2025, 3/24/2025  Last Rx written: 3/21/2025  Last office visit: 2/4/2025    Future Appointments  Date Type Provider Dept   07/23/25 Appointment Sun Vega MD [facility]   Showing future appointments within next 150 days with a meds authorizing provider and meeting all other requirements

## 2025-04-19 ENCOUNTER — LAB ENCOUNTER (OUTPATIENT)
Dept: LAB | Age: 75
End: 2025-04-19
Payer: MEDICARE

## 2025-04-19 DIAGNOSIS — Z51.89 AFTERCARE: ICD-10-CM

## 2025-04-19 LAB
BASOPHILS # BLD AUTO: 0.03 X10(3) UL (ref 0–0.2)
BASOPHILS NFR BLD AUTO: 0.7 %
DEPRECATED RDW RBC AUTO: 55.3 FL (ref 35.1–46.3)
EOSINOPHIL # BLD AUTO: 0.33 X10(3) UL (ref 0–0.7)
EOSINOPHIL NFR BLD AUTO: 8.1 %
ERYTHROCYTE [DISTWIDTH] IN BLOOD BY AUTOMATED COUNT: 16.4 % (ref 11–15)
HCT VFR BLD AUTO: 34.6 % (ref 39–53)
HGB BLD-MCNC: 10.5 G/DL (ref 13–17.5)
IMM GRANULOCYTES # BLD AUTO: 0.01 X10(3) UL (ref 0–1)
IMM GRANULOCYTES NFR BLD: 0.2 %
LYMPHOCYTES # BLD AUTO: 1.61 X10(3) UL (ref 1–4)
LYMPHOCYTES NFR BLD AUTO: 39.4 %
MCH RBC QN AUTO: 27.9 PG (ref 26–34)
MCHC RBC AUTO-ENTMCNC: 30.3 G/DL (ref 31–37)
MCV RBC AUTO: 92 FL (ref 80–100)
MONOCYTES # BLD AUTO: 0.37 X10(3) UL (ref 0.1–1)
MONOCYTES NFR BLD AUTO: 9 %
NEUTROPHILS # BLD AUTO: 1.74 X10 (3) UL (ref 1.5–7.7)
NEUTROPHILS # BLD AUTO: 1.74 X10(3) UL (ref 1.5–7.7)
NEUTROPHILS NFR BLD AUTO: 42.6 %
PLATELET # BLD AUTO: 295 10(3)UL (ref 150–450)
RBC # BLD AUTO: 3.76 X10(6)UL (ref 3.8–5.8)
WBC # BLD AUTO: 4.1 X10(3) UL (ref 4–11)

## 2025-04-19 PROCEDURE — 85025 COMPLETE CBC W/AUTO DIFF WBC: CPT

## 2025-04-19 PROCEDURE — 36415 COLL VENOUS BLD VENIPUNCTURE: CPT

## 2025-04-23 ENCOUNTER — HOSPITAL ENCOUNTER (OUTPATIENT)
Dept: GENERAL RADIOLOGY | Age: 75
Discharge: HOME | End: 2025-04-23
Payer: MEDICARE

## 2025-04-23 DIAGNOSIS — Z96.652 S/P TKR (TOTAL KNEE REPLACEMENT), LEFT: ICD-10-CM

## 2025-04-23 PROCEDURE — 73562 X-RAY EXAM OF KNEE 3: CPT

## 2025-04-28 ENCOUNTER — MED REC SCAN ONLY (OUTPATIENT)
Dept: INTERNAL MEDICINE CLINIC | Facility: CLINIC | Age: 75
End: 2025-04-28

## 2025-05-20 DIAGNOSIS — E11.65 TYPE 2 DIABETES MELLITUS WITH HYPERGLYCEMIA, WITHOUT LONG-TERM CURRENT USE OF INSULIN (HCC): Chronic | ICD-10-CM

## 2025-05-20 DIAGNOSIS — F41.9 ANXIETY: ICD-10-CM

## 2025-05-21 RX ORDER — LORAZEPAM 1 MG/1
1 TABLET ORAL NIGHTLY PRN
Qty: 30 TABLET | Refills: 0 | Status: SHIPPED | OUTPATIENT
Start: 2025-05-21

## 2025-05-21 NOTE — TELEPHONE ENCOUNTER
Please review. Protocol Failed; No       Recent fills: 3/24/2025, 4/21/2025  Last Rx written: 4/17/2025  Last office visit: 2/4/2025      Future Appointments  Date Type Provider Dept   07/23/25 Appointment Sun Vega MD [facility]   Showing future appointments within next 150 days with a meds authorizing provider and meeting all other requirements

## 2025-05-23 ENCOUNTER — HOSPITAL ENCOUNTER (OUTPATIENT)
Age: 75
Discharge: HOME | End: 2025-05-23
Payer: MEDICARE

## 2025-05-23 ENCOUNTER — APPOINTMENT (OUTPATIENT)
Dept: GENERAL RADIOLOGY | Age: 75
End: 2025-05-23
Attending: NURSE PRACTITIONER
Payer: MEDICARE

## 2025-05-23 VITALS
TEMPERATURE: 98 F | HEART RATE: 70 BPM | SYSTOLIC BLOOD PRESSURE: 140 MMHG | RESPIRATION RATE: 18 BRPM | OXYGEN SATURATION: 96 % | DIASTOLIC BLOOD PRESSURE: 69 MMHG

## 2025-05-23 DIAGNOSIS — J06.9 UPPER RESPIRATORY TRACT INFECTION, UNSPECIFIED TYPE: Primary | ICD-10-CM

## 2025-05-23 LAB
POCT INFLUENZA A: NEGATIVE
POCT INFLUENZA B: NEGATIVE

## 2025-05-23 PROCEDURE — 99213 OFFICE O/P EST LOW 20 MIN: CPT | Performed by: NURSE PRACTITIONER

## 2025-05-23 PROCEDURE — 71046 X-RAY EXAM CHEST 2 VIEWS: CPT | Performed by: NURSE PRACTITIONER

## 2025-05-23 PROCEDURE — 87502 INFLUENZA DNA AMP PROBE: CPT | Performed by: NURSE PRACTITIONER

## 2025-05-23 RX ORDER — BENZONATATE 100 MG/1
100 CAPSULE ORAL 3 TIMES DAILY PRN
Qty: 30 CAPSULE | Refills: 0 | Status: SHIPPED | OUTPATIENT
Start: 2025-05-23 | End: 2025-06-22

## 2025-05-23 NOTE — ED PROVIDER NOTES
Patient Seen in: [facility]        History  Chief Complaint   Patient presents with    Cough     Stated Complaint: flu like sym    Subjective:   HPI            75-year-old male, with a history of anxiety, CAD, depression, diabetes, HTN, HLD, presents to the immediate care with nasal congestion sore throat cough for the last 2 days.  He took a COVID test at home which was negative.  No chest pain.  No difficulty breathing.  He does report mucus production with coughing.  He is eating and drinking well.  No fever.      Objective:     Past Medical History:    Anxiety    Arthritis    Atherosclerosis of coronary artery    Colon adenomas    Colon adenomas    x5    Coronary atherosclerosis    Depression    Diabetes (HCC)    Esophageal reflux    Essential hypertension    High blood pressure    High cholesterol    History of blood transfusion    , NO REACTIONS    Hyperlipidemia    Osteoarthritis    Sleep apnea    Visual impairment    glasses              Past Surgical History:   Procedure Laterality Date    Bypass surgery  2009    Cabg      CABG x 5    Cath drug eluting stent      Colonoscopy      Colonoscopy N/A 10/28/2019    Procedure: COLONOSCOPY;  Surgeon: Jack Son MD;  Location: [facility] ENDOSCOPY    Colonoscopy      Colonoscopy N/A 2023    Procedure: COLONOSCOPY;  Surgeon: Jack Son MD;  [facility]    Other surgical history      deviated septum repair    Other surgical history Left     Sinus surgery        Upper gi endoscopy,exam                  Social History     Socioeconomic History    Marital status:    Tobacco Use    Smoking status: Former     Current packs/day: 0.00     Average packs/day: 0.5 packs/day for 25.0 years (12.5 ttl pk-yrs)     Types: Cigarettes     Start date: 1983     Quit date: 2008     Years since quittin.4    Smokeless tobacco: Never   Vaping Use    Vaping status: Never Used   Substance and Sexual Activity    Alcohol use: Yes      Alcohol/week: 3.0 standard drinks of alcohol     Types: 3 Standard drinks or equivalent per week     Comment: 2-3 drinks/week    Drug use: No     Social Drivers of Health      Received from [facility]    Housing Stability              Review of Systems    Positive for stated complaint: flu like sym  Other systems are as noted in HPI.  Constitutional and vital signs reviewed.      All other systems reviewed and negative except as noted above.                  Physical Exam    ED Triage Vitals [05/23/25 1422]   /69   Pulse 70   Resp 18   Temp 98 °F (36.7 °C)   Temp src Oral   SpO2 96 %   O2 Device None (Room air)       Current Vitals:   Vital Signs  BP: 140/69  Pulse: 70  Resp: 18  Temp: 98 °F (36.7 °C)  Temp src: Oral    Oxygen Therapy  SpO2: 96 %  O2 Device: None (Room air)            Physical Exam  Vitals and nursing note reviewed.   Constitutional:       Appearance: Normal appearance.   HENT:      Right Ear: Tympanic membrane normal.      Left Ear: Tympanic membrane normal.      Nose: Congestion and rhinorrhea present.      Mouth/Throat:      Pharynx: No posterior oropharyngeal erythema.   Cardiovascular:      Rate and Rhythm: Normal rate and regular rhythm.      Pulses: Normal pulses.      Heart sounds: Normal heart sounds.   Pulmonary:      Effort: Pulmonary effort is normal.      Breath sounds: Normal breath sounds.   Musculoskeletal:      Cervical back: Normal range of motion and neck supple.   Skin:     General: Skin is warm and dry.   Neurological:      Mental Status: He is alert.                 ED Course  Labs Reviewed   POCT FLU TEST - Normal    Narrative:     This assay is a rapid molecular in vitro test utilizing nucleic acid amplification of influenza A and B viral RNA.                            MDM     Differentials include but not limited to influenza versus URI versus COVID versus pneumonia  Influenza negative  COVID home negative  Patient denies chest pain no  shortness of breath declines EKG agreeable to chest x-ray  I personally viewed the chest x-ray  Supportive care: Run humidifier, increase fluids, elevate HOB, NSAIDs, Tylenol frequent nasal clearing, saline spray/steam showers. Educated on worsening signs and symptoms of illness.   Mucinex/expectorant  Close PMD follow-up advised if sx persist  All vital signs are stable/sats appropriate on room air  Plan dc home to Follow-up with pmd/return to the immediate care for any new or worsening symptoms at any time              Medical Decision Making  Problems Addressed:  Upper respiratory tract infection, unspecified type: acute illness or injury with systemic symptoms that poses a threat to life or bodily functions    Risk  OTC drugs.        Disposition and Plan     Clinical Impression:  1. Upper respiratory tract infection, unspecified type         Disposition:  Discharge  5/23/2025  3:57 pm    Follow-up:  Sun Vega MD  [address] [place] [id number]  [phone]    Schedule an appointment as soon as possible for a visit in 3 days  If symptoms are not improving or worsening          Medications Prescribed:  Discharge Medication List as of 5/23/2025  3:58 PM        START taking these medications    Details   benzonatate 100 MG Oral Cap Take 1 capsule (100 mg total) by mouth 3 (three) times daily as needed for cough., Normal, Disp-30 capsule, R-0                   Supplementary Documentation:

## 2025-05-23 NOTE — DISCHARGE INSTRUCTIONS
Steam, humidifier, follow-up with your doctor if symptoms are not improving /for any new or worsening symptoms advised evaluation in the ER

## 2025-06-17 ENCOUNTER — OFFICE VISIT (OUTPATIENT)
Dept: ENDOCRINOLOGY CLINIC | Facility: CLINIC | Age: 75
End: 2025-06-17

## 2025-06-17 ENCOUNTER — TELEPHONE (OUTPATIENT)
Dept: INTERNAL MEDICINE CLINIC | Facility: CLINIC | Age: 75
End: 2025-06-17

## 2025-06-17 VITALS
DIASTOLIC BLOOD PRESSURE: 70 MMHG | SYSTOLIC BLOOD PRESSURE: 137 MMHG | BODY MASS INDEX: 26.83 KG/M2 | WEIGHT: 177 LBS | HEART RATE: 54 BPM | HEIGHT: 68 IN

## 2025-06-17 DIAGNOSIS — E11.69 TYPE 2 DIABETES MELLITUS WITH OTHER SPECIFIED COMPLICATION, WITHOUT LONG-TERM CURRENT USE OF INSULIN (HCC): Primary | ICD-10-CM

## 2025-06-17 DIAGNOSIS — E78.5 DYSLIPIDEMIA: ICD-10-CM

## 2025-06-17 LAB
GLUCOSE BLOOD: 217
HEMOGLOBIN A1C: 7.4 % (ref 4.3–5.6)
TEST STRIP LOT #: NORMAL NUMERIC

## 2025-06-17 PROCEDURE — 3075F SYST BP GE 130 - 139MM HG: CPT | Performed by: INTERNAL MEDICINE

## 2025-06-17 PROCEDURE — 82947 ASSAY GLUCOSE BLOOD QUANT: CPT | Performed by: INTERNAL MEDICINE

## 2025-06-17 PROCEDURE — 3078F DIAST BP <80 MM HG: CPT | Performed by: INTERNAL MEDICINE

## 2025-06-17 PROCEDURE — 3051F HG A1C>EQUAL 7.0%<8.0%: CPT | Performed by: INTERNAL MEDICINE

## 2025-06-17 PROCEDURE — 1159F MED LIST DOCD IN RCRD: CPT | Performed by: INTERNAL MEDICINE

## 2025-06-17 PROCEDURE — 83036 HEMOGLOBIN GLYCOSYLATED A1C: CPT | Performed by: INTERNAL MEDICINE

## 2025-06-17 PROCEDURE — 99213 OFFICE O/P EST LOW 20 MIN: CPT | Performed by: INTERNAL MEDICINE

## 2025-06-17 PROCEDURE — 1160F RVW MEDS BY RX/DR IN RCRD: CPT | Performed by: INTERNAL MEDICINE

## 2025-06-17 PROCEDURE — 3008F BODY MASS INDEX DOCD: CPT | Performed by: INTERNAL MEDICINE

## 2025-06-17 RX ORDER — CALCIUM CITRATE/VITAMIN D3 200MG-6.25
TABLET ORAL
Qty: 100 STRIP | Refills: 1 | Status: SHIPPED | OUTPATIENT
Start: 2025-06-17

## 2025-06-17 NOTE — PROGRESS NOTES
FU VISIT:     CHIEF COMPLAINT:    Chief Complaint   Patient presents with    Diabetes     Patient is in to follow up on diabetes, A1c check up          HISTORY OF PRESENT ILLNESS:   Avinash Dumont is a 75 year old male who is here to FU  for DM.     DM HISTORY  Diagnosed:  Around age 61      HISTORY OF DIABETES COMPLICATIONS: :  History of Retinopathy: denies - last eye exam : , Follows with Dr. Jakob Freedman. Has an apt for 2025  History of Neuropathy: denies  History of Nephropathy: denies    ASSOCIATED COMPLICATIONS:   HTN: yes  Hyperlipidemia: yes  Coronary Artery Disease:  Yes  Cerebrovascular Disease: denies      HOME GLUCOSE READINGS:   Fastin-130    CURRENT DIABETIC MEDICATIONS INCLUDE:  MTF 1000 mg BID  Januvia 100 mg daily   Amaryl 2 mg with dinner     T/f jardiance due to  SE    MEALS:  Three meals a day   Mostly eats at home   Moderate compliance    EXERCISE:  Daily       CURRENT MEDICATIONS:    Current Outpatient Medications   Medication Sig Dispense Refill    Glucose Blood (TRUE METRIX BLOOD GLUCOSE TEST) In Vitro Strip Check sugars daily 100 strip 1    benzonatate 100 MG Oral Cap Take 1 capsule (100 mg total) by mouth 3 (three) times daily as needed for cough. 30 capsule 0    LORazepam 1 MG Oral Tab Take 1 tablet (1 mg total) by mouth nightly as needed. 30 tablet 0    naproxen 250 MG Oral Tab Take 1 tablet (250 mg total) by mouth 2 (two) times daily with meals. 28 tablet 0    HYDROcodone-acetaminophen  MG Oral Tab Take 1 tablet by mouth every 6 (six) hours as needed. No alcohol or driving on this medication.  Stop if lethargic or hallucinating.  Maximum 4000 mg Tylenol/acetaminophen daily from all sources.  Recall each Norco has 325 mg of Tylenol/acetaminophen in it. Do not take w/ Lorazepam. 25 tablet 0    metFORMIN HCl 1000 MG Oral Tab TAKE 1 TABLET TWICE DAILY WITH MEALS 180 tablet 3    acetaminophen 325 MG Oral Tab Take 2 tablets (650 mg total) by mouth every 8 (eight) hours  as needed. Maximum 4000 mg Tylenol/acetaminophen daily from all sources.  Each Norco has 325 mg of Tylenol/acetaminophen in it. 60 tablet 0    calcium carbonate-vitamin D 250-3.125 MG-MCG Oral Tab Take 1 tablet by mouth 2 (two) times daily with meals. 60 tablet 0    docusate sodium 100 MG Oral Cap Take 100 mg by mouth 2 (two) times daily. Do not crush. Stop if loose stool 20 capsule 0    aspirin 325 MG Oral Tab EC Take 1 tablet (325 mg total) by mouth in the morning and 1 tablet (325 mg total) before bedtime. Do not crush. 60 tablet 0    metoprolol succinate ER 25 MG Oral Tablet 24 Hr Take 1 tablet (25 mg total) by mouth daily. (Patient taking differently: Take 1 tablet (25 mg total) by mouth every morning.) 90 tablet 3    nitroglycerin 0.4 MG Sublingual SL Tab       triamcinolone 0.1 % External Cream Apply 1 Application  topically 2 (two) times daily. APPLY TO AFFECTED AREA 45 g 0    glimepiride 2 MG Oral Tab Take 1 tablet (2 mg total) by mouth daily with breakfast. (Patient taking differently: Take 1 tablet (2 mg total) by mouth daily with dinner.) 90 tablet 3    SITagliptin Phosphate (JANUVIA) 100 MG Oral Tab Take 1 tablet (100 mg total) by mouth daily. 90 tablet 3    valsartan 320 MG Oral Tab Take 1 tablet (320 mg total) by mouth every morning.      amLODIPine 10 MG Oral Tab Take 1 tablet (10 mg total) by mouth daily. (Patient taking differently: Take 1 tablet (10 mg total) by mouth every morning.) 90 tablet 3    atorvastatin 40 MG Oral Tab Take 1 tablet (40 mg total) by mouth nightly. 90 tablet 3    Blood Glucose Monitoring Suppl (TRUE METRIX AIR GLUCOSE METER) w/Device Does not apply Kit 1 kit As Directed. 1 kit 0    omega-3-acid ethyl esters 1 g Oral Cap Take 1 capsule (1 g total) by mouth 3 (three) times daily. (Patient taking differently: Take 1 capsule (1 g total) by mouth 2 (two) times daily.) 270 capsule 3    Blood Glucose Calibration (TRUE METRIX LEVEL 1) Low In Vitro Solution 1 each by In Vitro route  as needed. 1 each 0    Lancets 33G Does not apply Misc 1 each 2 (two) times a day. 200 each 3    ferrous sulfate 325 (65 FE) MG Oral Tab EC Take 1 tablet (325 mg total) by mouth daily with breakfast.      Multiple Vitamins-Minerals (MULTIVITAMIN ADULT OR) Take 1 tablet by mouth daily.      COENZYME Q-10 OR Take 1 tablet by mouth daily.         PAST MEDICAL HISTORY:   Past Medical History:    Anxiety    Arthritis    Atherosclerosis of coronary artery    Colon adenomas    Colon adenomas    x5    Coronary atherosclerosis    Depression    Diabetes (HCC)    Esophageal reflux    Essential hypertension    High blood pressure    High cholesterol    History of blood transfusion    , NO REACTIONS    Hyperlipidemia    Osteoarthritis    Sleep apnea    Visual impairment    glasses       PAST SURGICAL HISTORY:   Past Surgical History:   Procedure Laterality Date    Bypass surgery  2009    Cabg      CABG x 5    Cath drug eluting stent      Colonoscopy      Colonoscopy N/A 10/28/2019    Procedure: COLONOSCOPY;  Surgeon: Jack Son MD;  Location: [facility] ENDOSCOPY    Colonoscopy      Colonoscopy N/A 2023    Procedure: COLONOSCOPY;  Surgeon: Jack Son MD;  Location: [facility]    Other surgical history      deviated septum repair    Other surgical history Left     Sinus surgery        Upper gi endoscopy,exam         ALLERGIES:  No Known Allergies    SOCIAL HISTORY:    Social History     Socioeconomic History    Marital status:    Tobacco Use    Smoking status: Former     Current packs/day: 0.00     Average packs/day: 0.5 packs/day for 25.0 years (12.5 ttl pk-yrs)     Types: Cigarettes     Start date: 1983     Quit date: 2008     Years since quittin.4    Smokeless tobacco: Never   Vaping Use    Vaping status: Never Used   Substance and Sexual Activity    Alcohol use: Yes     Alcohol/week: 3.0 standard drinks of alcohol     Types: 3 Standard drinks or equivalent per week     Comment: 2-3  drinks/week    Drug use: No       FAMILY HISTORY:   Family History   Problem Relation Age of Onset    Cancer Father 64        Lymph node CA    Heart Disorder Mother         MI    Hypertension Mother     Diabetes Neg     Glaucoma Neg     Macular degeneration Neg        ASSESSMENTS:        REVIEW OF SYSTEMS:  Constitutional: Negative for: weight change, fever, fatigue, cold/heat intolerance  Eyes: Negative for:  Visual changes, proptosis, blurring, floaters, poor night vision, impaired color vision  ENT: Negative for:  dysphagia, neck swelling, dysphonia  Respiratory: Negative for:  dyspnea, cough  Cardiovascular: Negative for:  chest pain, palpitations, orthopnea  GI: Negative for:  abdominal pain, nausea, vomiting, diarrhea, constipation, bleeding  Neurology: Negative for: headache, numbness, weakness,   Genito-Urinary: Negative for: dysuria, frequency  Psychiatric: Negative for:  depression, anxiety  Hematology/Lymphatics: Negative for: bruising, lower extremity edema  Endocrine: Negative for: polyuria, polydypsia  Skin: Negative for: rash, blister,      PHYSICAL EXAM:   Vitals:    06/17/25 1010   BP: 137/70   Pulse: 54   Weight: 177 lb (80.3 kg)   Height: 5' 8\" (1.727 m)     BMI: Body mass index is 26.91 kg/m².         General Appearance:  alert, well developed, in no acute distress  Head: Atraumatic  Eyes:  normal conjunctivae, sclera., normal sclera and normal pupils  Throat/Neck: normal sound to voice. Normal hearing, normal speech  Respiratory:  Speaking in full sentences, non-labored. no increased work of breathing, no audible wheezing    Neuro: motor grossly intact, moving all extremities without difficulty  Psychiatric:  oriented to time, self, and place  Extremities: 12/2024  Bilateral barefoot skin diabetic exam is normal, visualized feet and the appearance is normal.  Bilateral monofilament/sensation of both feet is normal.  Pulsation pedal pulse exam of both lower legs/feet is normal as  well.            DATA:     Pertinent data reviewed  A1c is 7.4 % ( 6/2025)     ASSESSMENT AND PLAN:    1. Type 2 DM:      Plan:  Discussed the pathogenesis, natural course of diabetes. Patient understands the importance of glycemic control and the implications of uncontrolled diabetes including Diabetic ketoacidosis and various micro vascular and macrovascular complications.        a). Medications:    A1c is slightly elevated  Reviewed age appropriate A1c and BG goals  He will work on lifestyle changes and update with BG as discussed    Metformin 1000 mg BF and dinner  Take with food  GI SE reviewed    Amaryl  2 mg with dinner  only  Counselled regarding risk of hypoglycemia associated with use.     Januvia 100 mg daily     Check BG as discussed  Alternate timings  Before BF/ before dinner    Call with sugars as discussed    b). No Nephropathy  c). Discussed importance of annual eye exams  d). Foot exam: Daily feet exam explained  e). BG log maintainence explained in great detail, to get log and glucometer on next visit.  f). Age appropriate Life style changes reviewed  g). Hypoglycemia recognition and management discussed    2. Patient’s BP is okay today  3. Dyslipidemia  A) Discussed lifestyle modifications including reductions in dietary total and saturated fat, weight loss, aerobic exercise, and eating a diet rich in fruits and vegetables.  B) Statin therapy  Discussed the potential side effects of statins including muscle and liver injury.  C) Fasting lipid panel  reviewed     Patient verbalized a complete  understanding of all of the above and did not have any further questions.   RTC in 4-5 months  Check and call with sugars as discussed    Orders Placed This Encounter   Procedures    POC HemoCue Glucose 201 (Finger stick glucose)    POC Glycohemoglobin [27810]         Zoe Black MD

## 2025-06-19 DIAGNOSIS — E11.65 TYPE 2 DIABETES MELLITUS WITH HYPERGLYCEMIA, WITHOUT LONG-TERM CURRENT USE OF INSULIN (HCC): Chronic | ICD-10-CM

## 2025-06-19 DIAGNOSIS — F41.9 ANXIETY: ICD-10-CM

## 2025-06-20 RX ORDER — LORAZEPAM 1 MG/1
1 TABLET ORAL NIGHTLY PRN
Qty: 30 TABLET | Refills: 0 | Status: SHIPPED | OUTPATIENT
Start: 2025-06-22

## 2025-06-20 NOTE — TELEPHONE ENCOUNTER
Please review. Refill failed protocol.   Patient will be due for refill on 6/22/25, changed on pended prescription.     Recent fills each # 30 : 5/23/25 , 4/21/25 , 3/24/25  Last prescription written: 5/21/25  Last office visit:  2/4/2025    Future Appointments   Date Time Provider Department Center   6/24/2025 11:40 AM Sun Vega MD [facility]   7/23/2025 11:40 AM Sun Vega MD [facility]   12/16/2025 10:30 AM Zoe Black MD [facility]      Sent [facility] message to patient the refill request was forwarded to provider.

## 2025-06-24 ENCOUNTER — HOSPITAL ENCOUNTER (OUTPATIENT)
Dept: GENERAL RADIOLOGY | Age: 75
Discharge: HOME | End: 2025-06-24
Attending: INTERNAL MEDICINE
Payer: MEDICARE

## 2025-06-24 ENCOUNTER — OFFICE VISIT (OUTPATIENT)
Dept: INTERNAL MEDICINE CLINIC | Facility: CLINIC | Age: 75
End: 2025-06-24
Payer: MEDICARE

## 2025-06-24 ENCOUNTER — LAB ENCOUNTER (OUTPATIENT)
Dept: LAB | Age: 75
End: 2025-06-24
Attending: INTERNAL MEDICINE
Payer: MEDICARE

## 2025-06-24 VITALS
OXYGEN SATURATION: 98 % | HEART RATE: 57 BPM | WEIGHT: 174 LBS | DIASTOLIC BLOOD PRESSURE: 69 MMHG | BODY MASS INDEX: 26.37 KG/M2 | TEMPERATURE: 97 F | HEIGHT: 68 IN | SYSTOLIC BLOOD PRESSURE: 126 MMHG

## 2025-06-24 DIAGNOSIS — M54.31 SCIATICA OF RIGHT SIDE: Primary | ICD-10-CM

## 2025-06-24 DIAGNOSIS — E78.00 PURE HYPERCHOLESTEROLEMIA: ICD-10-CM

## 2025-06-24 DIAGNOSIS — N18.30 TYPE 2 DIABETES MELLITUS WITH STAGE 3 CHRONIC KIDNEY DISEASE, WITHOUT LONG-TERM CURRENT USE OF INSULIN, UNSPECIFIED WHETHER STAGE 3A OR 3B CKD (HCC): ICD-10-CM

## 2025-06-24 DIAGNOSIS — E11.22 TYPE 2 DIABETES MELLITUS WITH STAGE 3 CHRONIC KIDNEY DISEASE, WITHOUT LONG-TERM CURRENT USE OF INSULIN, UNSPECIFIED WHETHER STAGE 3A OR 3B CKD (HCC): ICD-10-CM

## 2025-06-24 DIAGNOSIS — Z12.11 COLON CANCER SCREENING: ICD-10-CM

## 2025-06-24 DIAGNOSIS — M54.31 SCIATICA OF RIGHT SIDE: ICD-10-CM

## 2025-06-24 LAB
CREAT UR-SCNC: 20.4 MG/DL
MICROALBUMIN UR-MCNC: <0.3 MG/DL

## 2025-06-24 PROCEDURE — 3008F BODY MASS INDEX DOCD: CPT | Performed by: INTERNAL MEDICINE

## 2025-06-24 PROCEDURE — 1125F AMNT PAIN NOTED PAIN PRSNT: CPT | Performed by: INTERNAL MEDICINE

## 2025-06-24 PROCEDURE — 82570 ASSAY OF URINE CREATININE: CPT

## 2025-06-24 PROCEDURE — 3078F DIAST BP <80 MM HG: CPT | Performed by: INTERNAL MEDICINE

## 2025-06-24 PROCEDURE — 99214 OFFICE O/P EST MOD 30 MIN: CPT | Performed by: INTERNAL MEDICINE

## 2025-06-24 PROCEDURE — 1160F RVW MEDS BY RX/DR IN RCRD: CPT | Performed by: INTERNAL MEDICINE

## 2025-06-24 PROCEDURE — 3074F SYST BP LT 130 MM HG: CPT | Performed by: INTERNAL MEDICINE

## 2025-06-24 PROCEDURE — G2211 COMPLEX E/M VISIT ADD ON: HCPCS | Performed by: INTERNAL MEDICINE

## 2025-06-24 PROCEDURE — 1159F MED LIST DOCD IN RCRD: CPT | Performed by: INTERNAL MEDICINE

## 2025-06-24 PROCEDURE — 82043 UR ALBUMIN QUANTITATIVE: CPT

## 2025-06-24 PROCEDURE — 72110 X-RAY EXAM L-2 SPINE 4/>VWS: CPT | Performed by: INTERNAL MEDICINE

## 2025-06-24 RX ORDER — CLOPIDOGREL BISULFATE 75 MG/1
75 TABLET ORAL DAILY
COMMUNITY
Start: 2025-05-20

## 2025-06-24 RX ORDER — CYCLOBENZAPRINE HCL 10 MG
10 TABLET ORAL 2 TIMES DAILY PRN
Qty: 30 TABLET | Refills: 1 | Status: SHIPPED | OUTPATIENT
Start: 2025-06-24

## 2025-06-24 RX ORDER — ASPIRIN 81 MG/1
81 TABLET ORAL DAILY
COMMUNITY

## 2025-06-24 RX ORDER — OMEGA-3-ACID ETHYL ESTERS 1 G/1
1 CAPSULE, LIQUID FILLED ORAL 3 TIMES DAILY
Qty: 270 CAPSULE | Refills: 3 | Status: SHIPPED | OUTPATIENT
Start: 2025-06-24

## 2025-06-24 NOTE — PROGRESS NOTES
Avinash Dumont is a 75 year old male.  Chief Complaint   Patient presents with    Leg Pain     Right leg radiating down to the calf - x 2 weeks        HPI:   Urgent visit   C/c right leg pain   C/o started about 3 weeks ago but increasingly getting worse  May have started when he pulled his suitcases out of the conveyor belt  7/10 , difficulty walkking .  Took Tylenol but not too much help  Bengay helped in the beginning but not so much now  Better- ?   Worse walking   Shooting pain down the right leg          HISTORY   since last visit in 7/24 angio w/ stent to LAD by dr dudley, no hctz due to hyponatremia but now has swelling in the L LE since stopping the hydrochlorothiazide   Has knee pain used to be on the right but now both knees hurt and will have an appointment with the orthopedic doctor Dr. Stanley next week  He uses meloxicam and does not use the tramadol uses the meloxicam after meals may be maximum once a week- one tab         History 3/2024  Got His CPAP machine--using and tolerating the machine well     HISTORY    saw ortho -dr potts - was given tramadol and meloxicam   Still has to get the CPAP machine  Since last visit he did see the  endocrinologist           HISTORY  + snores as per wife , mostly sleeps on his side but he can lie flat on his back and when he does he finds himself snoring , he does not wake up short of breath from his sleep, no choking in his sleep, +EDS      HISTORY  3/2023 VISIT   knee pain is better with the physical therapy  Would like to review labs   Needs refills on the lorazepam  Thinks the A1c has increased because he had increased his hypoalert carbohydrates but he will go back to his regular diet now     HISTORY  since her last visit she has seen the eye doctor and also the Ortho doctor and he drained some fluid and injected him with Durolane and is a little bit better  Noted he has anemia and had seen hemeatologist in the past and was asked to stop the lexapro so he has  for one mn and he feels ok         HISTORY  New patient- used to see dr trujillo   C/C establish care-referred by his insurance  C/o needs refills         PMH  DM2   HTN  HL  B/l OA knee -  CAD S/P CABG -  at [place] dr arturo harden and then 2024 stent to LAD   Anxiety and depression was on lexapro -takes lorazepam   H/o tob dep and lung nodules    MILENA 9/2023      Galen jain      Lives with family -wife and 2 kids  Retired used to work in electronics   ----------------------------------------------------------------------------------------       Current Medications[1]   Past Medical History[2]   Past Surgical History[3]   Social History:  Short Social Hx on File[4]     REVIEW OF SYSTEMS:   GENERAL HEALTH: No fevers, chills, sweats, fatigue  VISION: No recent vision problems, blurry vision or double vision  RESPIRATORY: denies shortness of breath, cough, wheezing  CARDIOVASCULAR: denies chest pain on exertion, palpitations, swelling in feet  NEURO: denies headaches , anxiety, depression    EXAM:   /69   Pulse 57   Temp 97.2 °F (36.2 °C)   Ht 5' 8\" (1.727 m)   Wt 174 lb (78.9 kg)   SpO2 98%   BMI 26.46 kg/m²   GENERAL: well developed, well nourished,in no apparent distress  SKIN: no rashes,no suspicious lesions  HEENT: atraumatic, normocephalic   NECK: supple   LUNGS: clear to auscultation, no wheeze  CARDIO: RRR    GI: good BS's,no masses or tenderness  EXTREMITIES: no cyanosis, or edema  No spinal or paraspinal tenderness  Straight leg test negative bilaterally    ASSESSMENT AND PLAN:   Diagnoses and all orders for this visit:    Sciatica of right side  -     XR LUMBAR SPINE (MIN 4 VIEWS) (CPT=72110); Future  -     cyclobenzaprine 10 MG Oral Tab; Take 1 tablet (10 mg total) by mouth 2 (two) times daily as needed.  Will give cyclobenzaprine and he is aware of the side effect such as dizziness or drowsiness and he will take the first 1 at home and just avoid driving or operating machinery until he  knows how it makes him feel and he can increase it to 2 times a day and even 3 times a day  If this is not helping by the end of the week or early next week he can call back and we will give Medrol Dosepak but will hold off on that for now due to increasing blood sugars    Pure hypercholesterolemia  -     omega-3-acid ethyl esters 1 g Oral Cap; Take 1 capsule (1 g total) by mouth 3 (three) times daily.  Continue to follow low-fat low-cholesterol diet and exercise as tolerated with a goal of 30 minutes a day, continue medications    Colon cancer screening  -     Occult Blood, Fecal, Immunoassay (Blue cards) [E]; Future  FIT card test given    Type 2 diabetes mellitus with stage 3 chronic kidney disease, without long-term current use of insulin, unspecified whether stage 3a or 3b CKD (HCC)  -     Microalb/Creat Ratio, Random Urine; Future  bl sugars   Hba1c 7.5 on 6/2022 and 6.3 on 11/2022 and 7.2 on 2/2023 on 10/2023 and 7.9 on 3/2024 and 6.4 on 7/2024 and 6.8 on 12/2024 and 7.4 on 6/2025   U. Micro 11/2024   Eye exam -dr francisco but will see dr charlee mak   On Asa , on arb and  statin   Foot  11/14/2024  Diet -- advised to follow a low carb, low sugar diet , try to be consistent                Exercise 30 min a day   Sees Dr. Black           Preventive medicine  Colonoscopy-Dr. Son 2023 and rpt in one yr but was asked to hold off by cardiology as he was on Plavix, FIT neg 11/2024   Labs March April May June 2025 reviewed   Ct chest 7/2022 for lung nodule - rpt in 7/2024   Echo 7/2022 -aortic regurg moderate - mild stenosis - sees dr jain        The patient indicates understanding of these issues and agrees to the plan.  No follow-ups on file.       [1]   Current Outpatient Medications   Medication Sig Dispense Refill    omega-3-acid ethyl esters 1 g Oral Cap Take 1 capsule (1 g total) by mouth 3 (three) times daily. 270 capsule 3    clopidogrel 75 MG Oral Tab Take 1 tablet (75 mg total) by mouth  daily.      aspirin 81 MG Oral Tab EC Take 1 tablet (81 mg total) by mouth daily.      cyclobenzaprine 10 MG Oral Tab Take 1 tablet (10 mg total) by mouth 2 (two) times daily as needed. 30 tablet 1    LORazepam 1 MG Oral Tab Take 1 tablet (1 mg total) by mouth nightly as needed. 30 tablet 0    Glucose Blood (TRUE METRIX BLOOD GLUCOSE TEST) In Vitro Strip Check sugars daily 100 strip 1    metFORMIN HCl 1000 MG Oral Tab TAKE 1 TABLET TWICE DAILY WITH MEALS 180 tablet 3    acetaminophen 325 MG Oral Tab Take 2 tablets (650 mg total) by mouth every 8 (eight) hours as needed. Maximum 4000 mg Tylenol/acetaminophen daily from all sources.  Each Norco has 325 mg of Tylenol/acetaminophen in it. 60 tablet 0    calcium carbonate-vitamin D 250-3.125 MG-MCG Oral Tab Take 1 tablet by mouth 2 (two) times daily with meals. 60 tablet 0    docusate sodium 100 MG Oral Cap Take 100 mg by mouth 2 (two) times daily. Do not crush. Stop if loose stool 20 capsule 0    metoprolol succinate ER 25 MG Oral Tablet 24 Hr Take 1 tablet (25 mg total) by mouth daily. 90 tablet 3    nitroglycerin 0.4 MG Sublingual SL Tab       triamcinolone 0.1 % External Cream Apply 1 Application  topically 2 (two) times daily. APPLY TO AFFECTED AREA 45 g 0    glimepiride 2 MG Oral Tab Take 1 tablet (2 mg total) by mouth daily with breakfast. 90 tablet 3    SITagliptin Phosphate (JANUVIA) 100 MG Oral Tab Take 1 tablet (100 mg total) by mouth daily. 90 tablet 3    valsartan 320 MG Oral Tab Take 1 tablet (320 mg total) by mouth every morning.      amLODIPine 10 MG Oral Tab Take 1 tablet (10 mg total) by mouth daily. 90 tablet 3    atorvastatin 40 MG Oral Tab Take 1 tablet (40 mg total) by mouth nightly. 90 tablet 3    Blood Glucose Monitoring Suppl (TRUE METRIX AIR GLUCOSE METER) w/Device Does not apply Kit 1 kit As Directed. 1 kit 0    Blood Glucose Calibration (TRUE METRIX LEVEL 1) Low In Vitro Solution 1 each by In Vitro route as needed. 1 each 0    Lancets 33G Does  not apply Misc 1 each 2 (two) times a day. 200 each 3    ferrous sulfate 325 (65 FE) MG Oral Tab EC Take 1 tablet (325 mg total) by mouth daily with breakfast.      Multiple Vitamins-Minerals (MULTIVITAMIN ADULT OR) Take 1 tablet by mouth daily.      COENZYME Q-10 OR Take 1 tablet by mouth daily.     [2]   Past Medical History:   Anxiety    Arthritis    Atherosclerosis of coronary artery    Colon adenomas    Colon adenomas    x5    Coronary atherosclerosis    Depression    Diabetes (HCC)    Esophageal reflux    Essential hypertension    High blood pressure    High cholesterol    History of blood transfusion    , NO REACTIONS    Hyperlipidemia    Osteoarthritis    Sleep apnea    Visual impairment    glasses   [3]   Past Surgical History:  Procedure Laterality Date    Bypass surgery  2009    Cabg      CABG x 5    Cath drug eluting stent      Colonoscopy      Colonoscopy N/A 10/28/2019    Procedure: COLONOSCOPY;  Surgeon: Jack Son MD;  Location[facility]COPY    Colonoscopy      Colonoscopy N/A 2023    Procedure: COLONOSCOPY;  Surgeon: Jack Son MD;  Location: [facility] ENDO    Other surgical history      deviated septum repair    Other surgical history Left     Sinus surgery        Upper gi endoscopy,exam     [4]   Social History  Socioeconomic History    Marital status:    Tobacco Use    Smoking status: Former     Current packs/day: 0.00     Average packs/day: 0.5 packs/day for 25.0 years (12.5 ttl pk-yrs)     Types: Cigarettes     Start date: 1983     Quit date: 2008     Years since quittin.4    Smokeless tobacco: Never   Vaping Use    Vaping status: Never Used   Substance and Sexual Activity    Alcohol use: Yes     Alcohol/week: 3.0 standard drinks of alcohol     Types: 3 Standard drinks or equivalent per week     Comment: 2-3 drinks/week    Drug use: No     Social Drivers of Health     Food Insecurity: No Food Insecurity (2025)    NCSS - Food Insecurity      Worried About Running Out of Food in the Last Year: No     Ran Out of Food in the Last Year: No   Transportation Needs: No Transportation Needs (6/24/2025)    NCSS - Transportation     Lack of Transportation: No   Housing Stability: Not At Risk (6/24/2025)    NCSS - Housing/Utilities     Has Housing: Yes     Worried About Losing Housing: No     Unable to Get Utilities: No

## 2025-06-27 ENCOUNTER — LAB ENCOUNTER (OUTPATIENT)
Dept: LAB | Age: 75
End: 2025-06-27
Attending: INTERNAL MEDICINE
Payer: MEDICARE

## 2025-06-27 DIAGNOSIS — Z12.11 COLON CANCER SCREENING: ICD-10-CM

## 2025-06-27 LAB — HEMOCCULT STL QL: POSITIVE

## 2025-06-27 PROCEDURE — 82274 ASSAY TEST FOR BLOOD FECAL: CPT

## 2025-06-28 ENCOUNTER — TELEPHONE (OUTPATIENT)
Dept: INTERNAL MEDICINE CLINIC | Facility: CLINIC | Age: 75
End: 2025-06-28

## 2025-06-28 NOTE — TELEPHONE ENCOUNTER
Fit test positive-would benefit from full colonoscopy but needs clearance from cardiology   Avoid nsaids

## 2025-06-30 ENCOUNTER — TELEPHONE (OUTPATIENT)
Dept: INTERNAL MEDICINE CLINIC | Facility: CLINIC | Age: 75
End: 2025-06-30

## 2025-06-30 ENCOUNTER — TELEPHONE (OUTPATIENT)
Facility: CLINIC | Age: 75
End: 2025-06-30

## 2025-06-30 DIAGNOSIS — Z86.0100 HISTORY OF COLON POLYPS: ICD-10-CM

## 2025-06-30 DIAGNOSIS — Z12.12 SCREENING FOR COLORECTAL CANCER: Primary | ICD-10-CM

## 2025-06-30 DIAGNOSIS — Z12.11 SCREENING FOR COLORECTAL CANCER: Primary | ICD-10-CM

## 2025-06-30 NOTE — TELEPHONE ENCOUNTER
Faxed request for cardiac clearance to Dr Mccarty/[facility]    Fax confirmation received at 5:12 pm.

## 2025-06-30 NOTE — TELEPHONE ENCOUNTER
Left message for patient to call back and discuss results.     Notified via [employer].  Postponed to confirm patient has reviewed message.

## 2025-06-30 NOTE — TELEPHONE ENCOUNTER
Spoke with patient (identified name and ), results reviewed and agrees with plan.                                Dear Avinash Dumont,     Your test results are positive for blood in the stool. You would benefit from a colonoscopy but will need clearance from cardiology first prior to any procedures since you will have to hold any blood thinners .  Please follow-up with our office if you have any questions or concerns.     Thank you.     Sun Vega MD    Written by Sun Vega MD on 2025  9:44 AM CDT  Seen by patient Avinash Dumont on 2025  6:58 AM

## 2025-06-30 NOTE — TELEPHONE ENCOUNTER
Left message detailed message for patient stating we need cardiac clearance prior to rescheduling.

## 2025-06-30 NOTE — TELEPHONE ENCOUNTER
GI Rns-    Please see TE 4/29/2024, patient was cancelled due to block artery.    Please advised if there is cardiac clearance for this patient before scheduling.    Thanks!

## 2025-06-30 NOTE — TELEPHONE ENCOUNTER
Patient calling asking for xray results, advised no report yet, exam ended but no reports available.     Advised reports are taking just a bit longer than normal, asked he give this until tomorrow to show in [facility] and if not there, patient to call us back so we can follow up. Tomorrow will be one full week from the time exam was done.     States understanding.

## 2025-06-30 NOTE — TELEPHONE ENCOUNTER
Per patient his cardiologist Dr. Mccarty is requesting a cardiac clearance form to be faxed to [phone]

## 2025-07-01 NOTE — TELEPHONE ENCOUNTER
Noted patient has read the message that was sent to him.No further action is needed.  Last read by Avinash Dumont at 5:31PM on 6/30/2025.

## 2025-07-02 ENCOUNTER — MED REC SCAN ONLY (OUTPATIENT)
Dept: INTERNAL MEDICINE CLINIC | Facility: CLINIC | Age: 75
End: 2025-07-02

## 2025-07-02 ENCOUNTER — TELEPHONE (OUTPATIENT)
Dept: INTERNAL MEDICINE CLINIC | Facility: CLINIC | Age: 75
End: 2025-07-02

## 2025-07-02 NOTE — TELEPHONE ENCOUNTER
I have called radiology to see about getting a report for images done a couple times in the last couple of days.   Directed to medical records and I have left message for them to see if they can help get a read on results to facilitate this.     Awaiting call back on this    Images completed 6/24/25 - shows \"exam ended\".

## 2025-07-07 NOTE — TELEPHONE ENCOUNTER
Called patient and verified date of birth, the purpose of this call is to assist patient of scheduling his procedure for Colonoscopy with  , patient agreed to proceed with above discussion and plan,verified all his new medications that he is taking and informed him with all the new changes of the following medication that needs to be held and were still waiting for a cardiac clearance form /order from Dr.Pratik Mccarty.    Re-Scheduled for:  Colonoscopy [id number]  Provider Name:  Dr. Son   Date: from: 5/8/2024 ----- to new date: 10/3/2025  Location: From:  [facility] -------- to new location: [facility]  Sedation:  MAC  Time:  From: 9:30am ------ 2:00 Pm pt is aware endo will call with arrival time  Prep:  will resend a new order - Gol[facility]  Meds/Allergies Reconciled?:  Physician reviewed   Diagnosis with codes:  colorectal cancer screening Z12.11 and history of adenomatous colon polyps Z86.010  Was patient informed to call insurance with codes (Y/N):  yes, I confirmed MEDICARE insurance with this patient.      Referral sent?:  Referral was sent at the time of electronic surgical scheduling.   [facility] or [facility] notified?:  I send a new electronic request to Endo Scheduling and received a confirmation today.      Medication Orders:  Hold metformin, glimepiride the day before and day of the procedure   Hold januvia 4 days prior per anesthesia  Patient is currently taking Plavix , iron and multivitamins,aspirin.I inform patient that we will await from his cardiology in regards to plavix order  Misc Orders:      Further instructions given by staff:   I discussed the prep instructions with the patient which he verbally understood and is aware that I will mail the instructions today. I will send the prep instructions through my chart per patient request.  Patient was informed about the new cancellation policy for his/her procedure. Patient was also given a copy of the cancellation policy at the time of the appointment and  verbalized understanding.

## 2025-07-23 ENCOUNTER — OFFICE VISIT (OUTPATIENT)
Dept: INTERNAL MEDICINE CLINIC | Facility: CLINIC | Age: 75
End: 2025-07-23

## 2025-07-23 ENCOUNTER — SPINE CENTER NAVIGATION (OUTPATIENT)
Age: 75
End: 2025-07-23

## 2025-07-23 VITALS
DIASTOLIC BLOOD PRESSURE: 74 MMHG | HEART RATE: 70 BPM | OXYGEN SATURATION: 98 % | HEIGHT: 68 IN | TEMPERATURE: 97 F | WEIGHT: 170 LBS | BODY MASS INDEX: 25.76 KG/M2 | SYSTOLIC BLOOD PRESSURE: 150 MMHG

## 2025-07-23 DIAGNOSIS — E78.00 PURE HYPERCHOLESTEROLEMIA: ICD-10-CM

## 2025-07-23 DIAGNOSIS — E11.9 TYPE 2 DIABETES MELLITUS WITHOUT RETINOPATHY (HCC): ICD-10-CM

## 2025-07-23 DIAGNOSIS — F41.9 ANXIETY: ICD-10-CM

## 2025-07-23 DIAGNOSIS — E11.22 TYPE 2 DIABETES MELLITUS WITH STAGE 3 CHRONIC KIDNEY DISEASE, WITHOUT LONG-TERM CURRENT USE OF INSULIN, UNSPECIFIED WHETHER STAGE 3A OR 3B CKD (HCC): ICD-10-CM

## 2025-07-23 DIAGNOSIS — I25.10 CORONARY ARTERY DISEASE INVOLVING NATIVE CORONARY ARTERY OF NATIVE HEART WITHOUT ANGINA PECTORIS: ICD-10-CM

## 2025-07-23 DIAGNOSIS — M47.816 SPONDYLOSIS OF LUMBAR REGION WITHOUT MYELOPATHY OR RADICULOPATHY: ICD-10-CM

## 2025-07-23 DIAGNOSIS — Z12.5 ENCOUNTER FOR SCREENING FOR MALIGNANT NEOPLASM OF PROSTATE: ICD-10-CM

## 2025-07-23 DIAGNOSIS — F32.0 MAJOR DEPRESSIVE DISORDER, SINGLE EPISODE, MILD: ICD-10-CM

## 2025-07-23 DIAGNOSIS — Z00.00 ENCOUNTER FOR ANNUAL HEALTH EXAMINATION: Primary | ICD-10-CM

## 2025-07-23 DIAGNOSIS — E11.65 TYPE 2 DIABETES MELLITUS WITH HYPERGLYCEMIA, WITHOUT LONG-TERM CURRENT USE OF INSULIN (HCC): Chronic | ICD-10-CM

## 2025-07-23 DIAGNOSIS — I10 ESSENTIAL HYPERTENSION: ICD-10-CM

## 2025-07-23 DIAGNOSIS — F13.20 SEDATIVE, HYPNOTIC OR ANXIOLYTIC DEPENDENCE, UNCOMPLICATED (HCC): ICD-10-CM

## 2025-07-23 DIAGNOSIS — R05.1 ACUTE COUGH: ICD-10-CM

## 2025-07-23 DIAGNOSIS — N18.30 TYPE 2 DIABETES MELLITUS WITH STAGE 3 CHRONIC KIDNEY DISEASE, WITHOUT LONG-TERM CURRENT USE OF INSULIN, UNSPECIFIED WHETHER STAGE 3A OR 3B CKD (HCC): ICD-10-CM

## 2025-07-23 DIAGNOSIS — R91.1 LUNG NODULE: ICD-10-CM

## 2025-07-23 DIAGNOSIS — R09.89 CHEST CONGESTION: ICD-10-CM

## 2025-07-23 DIAGNOSIS — R31.21 ASYMPTOMATIC MICROSCOPIC HEMATURIA: ICD-10-CM

## 2025-07-23 PROBLEM — G47.33 OSA (OBSTRUCTIVE SLEEP APNEA): Status: RESOLVED | Noted: 2023-11-30 | Resolved: 2025-07-23

## 2025-07-23 PROBLEM — E78.5 HYPERLIPIDEMIA: Status: RESOLVED | Noted: 2019-01-03 | Resolved: 2025-07-23

## 2025-07-23 PROBLEM — D69.2 SENILE PURPURA: Status: RESOLVED | Noted: 2023-06-06 | Resolved: 2025-07-23

## 2025-07-23 PROBLEM — I70.0 ARTERIOSCLEROSIS OF AORTA: Status: RESOLVED | Noted: 2020-07-19 | Resolved: 2025-07-23

## 2025-07-23 PROBLEM — D69.6 THROMBOCYTOPENIA: Status: RESOLVED | Noted: 2019-01-25 | Resolved: 2025-07-23

## 2025-07-23 PROBLEM — M17.12 PRIMARY OSTEOARTHRITIS OF LEFT KNEE: Status: RESOLVED | Noted: 2025-02-28 | Resolved: 2025-07-23

## 2025-07-23 PROBLEM — M17.11 PRIMARY OSTEOARTHRITIS OF RIGHT KNEE: Status: RESOLVED | Noted: 2020-12-02 | Resolved: 2025-07-23

## 2025-07-23 PROBLEM — I35.1 NONRHEUMATIC AORTIC VALVE INSUFFICIENCY: Status: RESOLVED | Noted: 2020-12-02 | Resolved: 2025-07-23

## 2025-07-23 PROBLEM — R91.8 LUNG NODULE, MULTIPLE: Status: RESOLVED | Noted: 2022-07-13 | Resolved: 2025-07-23

## 2025-07-23 PROCEDURE — 96160 PT-FOCUSED HLTH RISK ASSMT: CPT | Performed by: INTERNAL MEDICINE

## 2025-07-23 PROCEDURE — 3061F NEG MICROALBUMINURIA REV: CPT | Performed by: INTERNAL MEDICINE

## 2025-07-23 PROCEDURE — 3008F BODY MASS INDEX DOCD: CPT | Performed by: INTERNAL MEDICINE

## 2025-07-23 PROCEDURE — 99499 UNLISTED E&M SERVICE: CPT | Performed by: INTERNAL MEDICINE

## 2025-07-23 PROCEDURE — 1159F MED LIST DOCD IN RCRD: CPT | Performed by: INTERNAL MEDICINE

## 2025-07-23 PROCEDURE — 3078F DIAST BP <80 MM HG: CPT | Performed by: INTERNAL MEDICINE

## 2025-07-23 PROCEDURE — 1160F RVW MEDS BY RX/DR IN RCRD: CPT | Performed by: INTERNAL MEDICINE

## 2025-07-23 PROCEDURE — 1126F AMNT PAIN NOTED NONE PRSNT: CPT | Performed by: INTERNAL MEDICINE

## 2025-07-23 PROCEDURE — 1170F FXNL STATUS ASSESSED: CPT | Performed by: INTERNAL MEDICINE

## 2025-07-23 PROCEDURE — 3077F SYST BP >= 140 MM HG: CPT | Performed by: INTERNAL MEDICINE

## 2025-07-23 PROCEDURE — G0439 PPPS, SUBSEQ VISIT: HCPCS | Performed by: INTERNAL MEDICINE

## 2025-07-23 RX ORDER — LORAZEPAM 1 MG/1
1 TABLET ORAL NIGHTLY PRN
Qty: 30 TABLET | Refills: 0 | Status: SHIPPED | OUTPATIENT
Start: 2025-07-23 | End: 2025-07-23

## 2025-07-23 RX ORDER — LORAZEPAM 1 MG/1
1 TABLET ORAL NIGHTLY PRN
Qty: 30 TABLET | Refills: 0 | Status: SHIPPED | OUTPATIENT
Start: 2025-07-23

## 2025-07-23 RX ORDER — AZITHROMYCIN 250 MG/1
TABLET, FILM COATED ORAL
Qty: 6 TABLET | Refills: 0 | Status: SHIPPED | OUTPATIENT
Start: 2025-07-23 | End: 2025-07-28

## 2025-07-23 NOTE — PROGRESS NOTES
Subjective:   Avinash Dumont is a 75 year old male who presents for a MA AHA (Medicare Advantage Annual Health Assessment) and scheduled follow up of multiple significant but stable problems Patient comes for his Medicare physical  Having some chest congestion and it usually goes away with Flonase and Zyrtec x one week and slightly getting better only   Changed his diet and exercising more so intentionally losing weight  Noted fit test was positive and he will go for his colonoscopy-has the appointment with Dr. Son in October        HISTORY  June 2025  started about 3 weeks ago but increasingly getting worse  May have started when he pulled his suitcases out of the conveyor belt  7/10 , difficulty walkking .  Took Tylenol but not too much help  Bengay helped in the beginning but not so much now  Better- ?   Worse walking   Shooting pain down the right leg           HISTORY   since last visit in 7/24 angio w/ stent to LAD by dr dudley, no hctz due to hyponatremia but now has swelling in the L LE since stopping the hydrochlorothiazide   Has knee pain used to be on the right but now both knees hurt and will have an appointment with the orthopedic doctor Dr. Stanley next week  He uses meloxicam and does not use the tramadol uses the meloxicam after meals may be maximum once a week- one tab         History 3/2024  Got His CPAP machine--using and tolerating the machine well     HISTORY    saw ortho -dr potts - was given tramadol and meloxicam   Still has to get the CPAP machine  Since last visit he did see the  endocrinologist           HISTORY  + snores as per wife , mostly sleeps on his side but he can lie flat on his back and when he does he finds himself snoring , he does not wake up short of breath from his sleep, no choking in his sleep, +EDS      HISTORY  3/2023 VISIT   knee pain is better with the physical therapy  Would like to review labs   Needs refills on the lorazepam  Thinks the A1c has increased  because he had increased his hypoalert carbohydrates but he will go back to his regular diet now     HISTORY  since her last visit she has seen the eye doctor and also the Ortho doctor and he drained some fluid and injected him with Durolane and is a little bit better  Noted he has anemia and had seen hemeatologist in the past and was asked to stop the lexapro so he has for one mn and he feels ok         HISTORY  New patient- used to see dr trujillo   C/C establish care-referred by his insurance  C/o needs refills         PMH  DM2   HTN  HL  B/l OA knee -  CAD S/P CABG -  at [place] dr avinash harden and then 2024 stent to LAD   Anxiety and depression was on lexapro -takes lorazepam   H/o tob dep and lung nodules    MILENA 9/2023      Galen jain      Lives with family -wife and 2 kids  Retired used to work in electronics   ----------------------------------------------------------------------------------------        History/Other:   Fall Risk Assessment:   He has been screened for Falls and is High Risk. Fall Prevention information provided to patient in After Visit Summary.    Do you feel unsteady when standing or walking?: Yes  Do you worry about falling?: Yes  Have you fallen in the past year?: No     Cognitive Assessment:   He had a completely normal cognitive assessment - see flowsheet entries     Functional Ability/Status:   Avinash Dumont has some abnormal functions as listed below:  He has Walking problems based on screening of functional status. He has problems with Daily Activities based on screening of functional status.       Depression Screening (PHQ):  PHQ-2 SCORE: 0  , done 7/23/2025             Advanced Directives:   He does NOT have a Living Will. [Do you have a living will?: No]  He does have a POA but we do NOT have it on file in Epic.    Discussed Advance Care Planning with patient (and family/surrogate if present). Standard forms made available to patient in After Visit Summary.      Patient  Active Problem List   Diagnosis    Type 2 diabetes mellitus without retinopathy (HCC)    Anxiety    Type 2 diabetes mellitus with hyperglycemia, without long-term current use of insulin (HCC)    Asymptomatic microscopic hematuria    Sedative, hypnotic or anxiolytic dependence, uncomplicated (HCC)    Major depressive disorder, single episode, mild    Type 2 diabetes mellitus with diabetic chronic kidney disease (HCC)    Spondylosis of lumbar region without myelopathy or radiculopathy    Lung nodule     Allergies:  He has no known allergies.    Current Medications:  Outpatient Medications Marked as Taking for the 7/23/25 encounter (Office Visit) with Sun Vega MD   Medication Sig    LORazepam 1 MG Oral Tab Take 1 tablet (1 mg total) by mouth nightly as needed.    azithromycin 250 MG Oral Tab Take 2 tablets (500 mg total) by mouth daily for 1 day, THEN 1 tablet (250 mg total) daily for 4 days.    omega-3-acid ethyl esters 1 g Oral Cap Take 1 capsule (1 g total) by mouth 3 (three) times daily.    clopidogrel 75 MG Oral Tab Take 1 tablet (75 mg total) by mouth daily.    aspirin 81 MG Oral Tab EC Take 1 tablet (81 mg total) by mouth daily.    cyclobenzaprine 10 MG Oral Tab Take 1 tablet (10 mg total) by mouth 2 (two) times daily as needed.    Glucose Blood (TRUE METRIX BLOOD GLUCOSE TEST) In Vitro Strip Check sugars daily    metFORMIN HCl 1000 MG Oral Tab TAKE 1 TABLET TWICE DAILY WITH MEALS    acetaminophen 325 MG Oral Tab Take 2 tablets (650 mg total) by mouth every 8 (eight) hours as needed. Maximum 4000 mg Tylenol/acetaminophen daily from all sources.  Each Norco has 325 mg of Tylenol/acetaminophen in it.    calcium carbonate-vitamin D 250-3.125 MG-MCG Oral Tab Take 1 tablet by mouth 2 (two) times daily with meals.    docusate sodium 100 MG Oral Cap Take 100 mg by mouth 2 (two) times daily. Do not crush. Stop if loose stool    metoprolol succinate ER 25 MG Oral Tablet 24 Hr Take 1 tablet (25 mg total) by mouth  daily.    nitroglycerin 0.4 MG Sublingual SL Tab     triamcinolone 0.1 % External Cream Apply 1 Application  topically 2 (two) times daily. APPLY TO AFFECTED AREA    glimepiride 2 MG Oral Tab Take 1 tablet (2 mg total) by mouth daily with breakfast.    SITagliptin Phosphate (JANUVIA) 100 MG Oral Tab Take 1 tablet (100 mg total) by mouth daily.    valsartan 320 MG Oral Tab Take 1 tablet (320 mg total) by mouth every morning.    amLODIPine 10 MG Oral Tab Take 1 tablet (10 mg total) by mouth daily.    atorvastatin 40 MG Oral Tab Take 1 tablet (40 mg total) by mouth nightly.    Blood Glucose Monitoring Suppl (TRUE METRIX AIR GLUCOSE METER) w/Device Does not apply Kit 1 kit As Directed.    Blood Glucose Calibration (TRUE METRIX LEVEL 1) Low In Vitro Solution 1 each by In Vitro route as needed.    Lancets 33G Does not apply Misc 1 each 2 (two) times a day.    ferrous sulfate 325 (65 FE) MG Oral Tab EC Take 1 tablet (325 mg total) by mouth daily with breakfast.    Multiple Vitamins-Minerals (MULTIVITAMIN ADULT OR) Take 1 tablet by mouth daily.    COENZYME Q-10 OR Take 1 tablet by mouth daily.       Medical History:  He  has a past medical history of Anxiety, Arthritis, Atherosclerosis of coronary artery, Colon adenomas (10/28/2019), Colon adenomas (03/23/2023), Coronary atherosclerosis, Depression, Diabetes (HCC), Esophageal reflux, Essential hypertension, High blood pressure, High cholesterol, History of blood transfusion, Hyperlipidemia, Osteoarthritis, Sleep apnea, and Visual impairment.  Surgical History:  He  has a past surgical history that includes bypass surgery (05/2009); colonoscopy; colonoscopy (N/A, 10/28/2019); colonoscopy; sinus surgery  ; cabg; upper gi endoscopy,exam; other surgical history; other surgical history (Left); colonoscopy (N/A, 03/21/2023); and cath drug eluting stent.   Family History:  His family history includes Cancer (age of onset: 64) in his father; Heart Disorder in his mother;  Hypertension in his mother.  Social History:  He  reports that he quit smoking about 17 years ago. His smoking use included cigarettes. He started smoking about 42 years ago. He has a 12.5 pack-year smoking history. He has never used smokeless tobacco. He reports current alcohol use of about 3.0 standard drinks of alcohol per week. He reports that he does not use drugs.    Tobacco:  He smoked tobacco in the past but quit greater than 12 months ago.  Tobacco Use[1]     CAGE Alcohol Screen:   CAGE screening score of 0 on 7/23/2025, showing low risk of alcohol abuse.      Patient Care Team:  Sun Vega MD as PCP - General (Internal Medicine)    Review of Systems  GENERAL: feels well otherwise, no fevers   SKIN: denies any unusual skin lesions  EYES: denies blurred vision or double vision  HEENT: denies nasal congestion, sinus pain or ST  LUNGS: + shortness of breath with exertion due to  + chest congestion and cough , + wheeze   CARDIOVASCULAR: denies chest pain on exertion  GI: denies abdominal pain, denies heartburn  : 1 per night nocturia, no complaint of urinary incontinence  MUSCULOSKELETAL: +  back pain  NEURO: denies headaches  PSYCHE: denies depression or anxiety  HEMATOLOGIC: denies hx of anemia  ENDOCRINE: denies thyroid history  ALL/ASTHMA: denies hx of allergy or asthma    Objective:   Physical Exam  GENERAL: well developed, well nourished,in no apparent distress  SKIN: no rashes,no suspicious lesions  HEENT: atraumatic, normocephalic,ears and throat are clear, no frontal or maxillary sinus tenderness, pupils equal reactive to light bilaterally, extraocular muscles intact  NECK: supple,no adenopathy,nontender   LUNGS: Coarse breath sounds bilaterally, no wheeze  CARDIO: RRR without murmur  GI: good BS's,no masses or tenderness  EXTREMITIES: no cyanosis, or edema  Bilateral barefoot skin diabetic exam is normal, visualized feet and the appearance is normal.  Bilateral monofilament/sensation of both  feet is normal.  Pulsation pedal pulse exam of both lower legs/feet is normal as well.       /74   Pulse 70   Temp 96.8 °F (36 °C)   Ht 5' 8\" (1.727 m)   Wt 170 lb (77.1 kg)   SpO2 98%   BMI 25.85 kg/m²  Estimated body mass index is 25.85 kg/m² as calculated from the following:    Height as of this encounter: 5' 8\" (1.727 m).    Weight as of this encounter: 170 lb (77.1 kg).    Medicare Hearing Assessment:   Hearing Screening    Screening Method: Questionnaire  I have a problem hearing over the telephone: No I have trouble following the conversations when two or more people are talking at the same time: No   I have trouble understanding things on the TV: No I have to strain to understand conversations: No   I have to worry about missing the telephone ring or doorbell: No I have trouble hearing conversations in a noisy background such as a crowded room or restaurant: Sometimes   I get confused about where sounds come from: No I misunderstand some words in a sentence and need to ask people to repeat themselves: No   I especially have trouble understanding the speech of women and children: No I have trouble understanding the speaker in a large room such as at a meeting or place of Hoahaoism: Sometimes   Many people I talk to seem to mumble (or don't speak clearly): No People get annoyed because I misunderstand what they say: No   I misunderstand what others are saying and make inappropriate responses: No I avoid social activities because I cannot hear well and fear I will reply improperly: No   Family members and friends have told me they think I may have hearing loss: No                   Assessment & Plan:   Avinash Dumont is a 75 year old male who presents for a Medicare Assessment.     1. Encounter for annual health examination (Primary)  -     Comp Metabolic Panel (14); Future; Expected date: 07/23/2025  -     Hemoglobin A1C; Future; Expected date: 07/23/2025  -     Lipid Panel; Future; Expected date:  07/23/2025  -     TSH W Reflex To Free T4; Future; Expected date: 07/23/2025  -     PSA Total, Screen; Future; Expected date: 07/23/2025  -     CBC, Platelet; No Differential; Future; Expected date: 07/23/2025  Advised patient to watch what he eats exercise, seatbelt use no texting and driving, sunscreen use advised  2. Spondylosis of lumbar region without myelopathy or radiculopathy  -     PHYSICAL THERAPY - INTERNAL  -     SPINE CENTER CENTRAL REFERRAL FOR NAVIGATION  Reviewed x-rays, will refer to physical therapy and since this is chronic we will also refer to the spine center  3. Lung nodule  -     CT CHEST (CPT=71250); Future; Expected date: 07/23/2025  Ordered  4. Asymptomatic microscopic hematuria  -     Urology Referral - [facility]  Urology in [id number] ie 3-5 yrs from 2022   5. Encounter for screening for malignant neoplasm of prostate  -     PSA Total, Screen; Future; Expected date: 07/23/2025  Recheck  6. Chest congestion  -     Azithromycin; Take 2 tablets (500 mg total) by mouth daily for 1 day, THEN 1 tablet (250 mg total) daily for 4 days.  Dispense: 6 tablet; Refill: 0  Will give antibiotics due to duration and no improvement of symptoms  7. Acute cough  -     Azithromycin; Take 2 tablets (500 mg total) by mouth daily for 1 day, THEN 1 tablet (250 mg total) daily for 4 days.  Dispense: 6 tablet; Refill: 0  Antibiotics given  8. Major depressive disorder, single episode, mild  Stable    9. Sedative, hypnotic or anxiolytic dependence, uncomplicated (HCC)  Stable on medications, he did try to wean off but he feels he needs the medicine at night  10. Anxiety  -     Discontinue: LORazepam; Take 1 tablet (1 mg total) by mouth nightly as needed.  Dispense: 30 tablet; Refill: 0  -     LORazepam; Take 1 tablet (1 mg total) by mouth nightly as needed.  Dispense: 30 tablet; Refill: 0  Stable on meds  11. Pure hypercholesterolemia  Low fat low-cholesterol diet and exercise as tolerated with a  goal of 30 minutes a day, continue medications  12. Essential hypertension  Advised pt to follow a low salt , low sodium (including fast foods and processed foods), can look up DASH diet, exercise 30 min a day , monitor bp, continue medications  Call back with blood pressure  readings from home    13. Coronary artery disease involving native coronary artery of native heart without angina pectoris  Status post stent, follow-up with cardiology, continue medications, monitor  14. Type 2 diabetes mellitus with stage 3 chronic kidney disease, without long-term current use of insulin, unspecified whether stage 3a or 3b CKD (HCC)  Monitor , sees endo ,cont meds   15. Type 2 diabetes mellitus without retinopathy (HCC)  Monitor , sees endo ,cont meds   16. Type 2 diabetes mellitus with hyperglycemia, without long-term current use of insulin (HCC)  -     Discontinue: LORazepam; Take 1 tablet (1 mg total) by mouth nightly as needed.  Dispense: 30 tablet; Refill: 0  -     LORazepam; Take 1 tablet (1 mg total) by mouth nightly as needed.  Dispense: 30 tablet; Refill: 0  -     Comp Metabolic Panel (14); Future; Expected date: 07/23/2025  -     Hemoglobin A1C; Future; Expected date: 07/23/2025  -     Lipid Panel; Future; Expected date: 07/23/2025  -     TSH W Reflex To Free T4; Future; Expected date: 07/23/2025  -     CBC, Platelet; No Differential; Future; Expected date: 07/23/2025    Hba1c 7.5 on 6/2022 and 6.3 on 11/2022 and 7.2 on 2/2023 on 10/2023 and 7.9 on 3/2024 and 6.4 on 7/2024 and 6.8 on 12/2024 and 7.4 on 6/2025   U. Micro 6/2025   Eye exam -dr francisco but will see dr charlee mak   On Asa , on arb and  statin   Foot  11/14/2024  Diet -- advised to follow a low carb, low sugar diet , try to be consistent                Exercise 30 min a day   Sees Dr. Black           Preventive medicine  Colonoscopy-Dr. Son 2023 and rpt in one yr but was asked to hold off by cardiology as he was on Plavix, FIT neg 11/2024 ,  has apt in oct   Ct chest 7/2022 for lung nodule - rpt in 7/2025   Echo 7/2022 -aortic regurg moderate - mild stenosis - sees dr jain   Labs 10/2024 , and  Feb March April May June 2025 reviewed           The patient indicates understanding of these issues and agrees to the plan.  Reinforced healthy diet, lifestyle, and exercise.      Return in 3 months (on 10/23/2025).     Sun Vega MD, 7/23/2025     Supplementary Documentation:   General Health:  In the past six months, have you lost more than 10 pounds without trying?: 2 - No  Has your appetite been poor?: No  Type of Diet: Balanced  How does the patient maintain a good energy level?: Appropriate Exercise  How would you describe your daily physical activity?: Light  How would you describe your current health state?: Fair  How do you maintain positive mental well-being?: Visiting Family  On a scale of 0 to 10, with 0 being no pain and 10 being severe pain, what is your pain level?: 0 - (None)  In the past six months, have you experienced urine leakage?: 0-No  At any time do you feel concerned for the safety/well-being of yourself and/or your children, in your home or elsewhere?: No  Have you had any immunizations at another office such as Influenza, Hepatitis B, Tetanus, or Pneumococcal?: No    Health Maintenance   Topic Date Due    COVID-19 Vaccine (4 - 2024-25 season) 09/01/2024    Annual Well Visit  01/01/2025    Colorectal Cancer Screening  06/27/2025    Zoster Vaccines (1 of 2) 12/14/2025 (Originally 3/18/2001)    Influenza Vaccine (1) 10/01/2025    Diabetes Care Foot Exam  11/14/2025    Diabetes Care A1C  12/17/2025    Diabetes Care: GFR  03/01/2026    Diabetes Care Dilated Eye Exam  03/18/2026    PSA  06/28/2026    Annual Depression Screening  Completed    Fall Risk Screening (Annual)  Completed    Diabetes Care: Microalb/Creat Ratio (Annual)  Completed    Pneumococcal Vaccine: 50+ Years  Completed    Meningococcal B Vaccine  Aged Out          [1]    Social History  Tobacco Use   Smoking Status Former    Current packs/day: 0.00    Average packs/day: 0.5 packs/day for 25.0 years (12.5 ttl pk-yrs)    Types: Cigarettes    Start date: 1983    Quit date: 2008    Years since quittin.5   Smokeless Tobacco Never

## 2025-07-23 NOTE — PROGRESS NOTES
Spine Center Referral Navigation Encounter Note    Referred by: Sun Vega MD     Imaging: XR Lumbar Spine 6/24/25   If imaging done at an external facility, instructed patient to bring disc of MRI to appointment.     Previously Seen Spine Care Providers: None    Referred to: Nigel Izaguirre MD in Physiatry     Information below is patient reported.     [facility]  Are you currently experiencing any of the following symptoms?      No    Is your condition due to an injury that occurred at work or is your care for this condition being coordinated by Worker's Compensation?      No    Are you looking for a second surgical opinion?      No    Have you had surgery on your spine (neck or back) in the last 12 months?      No    In the last several weeks, have you experienced weakness or balance issues that have caused falls or difficulty lifting your legs or feet (lower body) and/or weakness that has caused you to drop items or caused changes in handwriting (upper body)?      No    In the last 3 months, have you had spine injections AND physical therapy for your back or neck that did not improve your symptoms?      No    : Patient needs to be seen by non-surgical team. Does patient require a new visit or established visit?      New patient visit    Are you closer to [place] or [facility]?      [facility]         7/23- Lvm for patient requesting call back to discuss.    7/24- Spoke to patient and was able to schedule for  7/28 5:00 pm.

## 2025-07-28 ENCOUNTER — OFFICE VISIT (OUTPATIENT)
Dept: PHYSICAL MEDICINE AND REHAB | Facility: CLINIC | Age: 75
End: 2025-07-28

## 2025-07-28 ENCOUNTER — HOSPITAL ENCOUNTER (OUTPATIENT)
Dept: GENERAL RADIOLOGY | Facility: HOSPITAL | Age: 75
Discharge: HOME | End: 2025-07-28
Attending: PHYSICAL MEDICINE & REHABILITATION
Payer: MEDICARE

## 2025-07-28 VITALS — BODY MASS INDEX: 25.01 KG/M2 | WEIGHT: 165 LBS | RESPIRATION RATE: 14 BRPM | HEIGHT: 68 IN

## 2025-07-28 DIAGNOSIS — M54.41 ACUTE BILATERAL LOW BACK PAIN WITH BILATERAL SCIATICA: ICD-10-CM

## 2025-07-28 DIAGNOSIS — M54.42 ACUTE BILATERAL LOW BACK PAIN WITH BILATERAL SCIATICA: ICD-10-CM

## 2025-07-28 DIAGNOSIS — E11.65 TYPE 2 DIABETES MELLITUS WITH HYPERGLYCEMIA, WITHOUT LONG-TERM CURRENT USE OF INSULIN (HCC): Chronic | ICD-10-CM

## 2025-07-28 DIAGNOSIS — F41.9 ANXIETY: ICD-10-CM

## 2025-07-28 DIAGNOSIS — M43.16 SPONDYLOLISTHESIS OF LUMBAR REGION: ICD-10-CM

## 2025-07-28 DIAGNOSIS — M54.16 LUMBAR RADICULOPATHY: Primary | ICD-10-CM

## 2025-07-28 PROCEDURE — 3008F BODY MASS INDEX DOCD: CPT | Performed by: PHYSICAL MEDICINE & REHABILITATION

## 2025-07-28 PROCEDURE — 1125F AMNT PAIN NOTED PAIN PRSNT: CPT | Performed by: PHYSICAL MEDICINE & REHABILITATION

## 2025-07-28 PROCEDURE — 1160F RVW MEDS BY RX/DR IN RCRD: CPT | Performed by: PHYSICAL MEDICINE & REHABILITATION

## 2025-07-28 PROCEDURE — 72100 X-RAY EXAM L-S SPINE 2/3 VWS: CPT | Performed by: PHYSICAL MEDICINE & REHABILITATION

## 2025-07-28 PROCEDURE — 1159F MED LIST DOCD IN RCRD: CPT | Performed by: PHYSICAL MEDICINE & REHABILITATION

## 2025-07-28 PROCEDURE — 99204 OFFICE O/P NEW MOD 45 MIN: CPT | Performed by: PHYSICAL MEDICINE & REHABILITATION

## 2025-07-28 RX ORDER — GABAPENTIN 100 MG/1
100 CAPSULE ORAL 3 TIMES DAILY
Qty: 90 CAPSULE | Refills: 5 | Status: SHIPPED | OUTPATIENT
Start: 2025-07-28 | End: 2026-07-28

## 2025-07-29 RX ORDER — CALCIUM CITRATE/VITAMIN D3 200MG-6.25
1 TABLET ORAL DAILY
Qty: 100 STRIP | Refills: 1 | Status: SHIPPED | OUTPATIENT
Start: 2025-07-29

## 2025-08-04 ENCOUNTER — TELEPHONE (OUTPATIENT)
Dept: INTERNAL MEDICINE CLINIC | Facility: CLINIC | Age: 75
End: 2025-08-04

## 2025-08-04 RX ORDER — BENZONATATE 100 MG/1
100 CAPSULE ORAL 2 TIMES DAILY PRN
Qty: 20 CAPSULE | Refills: 0 | Status: SHIPPED | OUTPATIENT
Start: 2025-08-04

## 2025-08-05 ENCOUNTER — HOSPITAL ENCOUNTER (OUTPATIENT)
Dept: CT IMAGING | Facility: HOSPITAL | Age: 75
Discharge: HOME | End: 2025-08-05
Attending: INTERNAL MEDICINE

## 2025-08-05 DIAGNOSIS — R91.1 LUNG NODULE: ICD-10-CM

## 2025-08-05 PROCEDURE — 71250 CT THORAX DX C-: CPT | Performed by: INTERNAL MEDICINE

## 2025-08-06 DIAGNOSIS — I10 ESSENTIAL HYPERTENSION: ICD-10-CM

## 2025-08-07 RX ORDER — AMLODIPINE BESYLATE 10 MG/1
10 TABLET ORAL DAILY
Qty: 90 TABLET | Refills: 3 | Status: SHIPPED | OUTPATIENT
Start: 2025-08-07

## 2025-08-11 ENCOUNTER — TELEPHONE (OUTPATIENT)
Dept: PHYSICAL THERAPY | Facility: HOSPITAL | Age: 75
End: 2025-08-11

## 2025-08-12 ENCOUNTER — OFFICE VISIT (OUTPATIENT)
Dept: PHYSICAL THERAPY | Age: 75
End: 2025-08-12
Attending: PHYSICAL MEDICINE & REHABILITATION

## 2025-08-12 DIAGNOSIS — M43.16 SPONDYLOLISTHESIS OF LUMBAR REGION: Primary | ICD-10-CM

## 2025-08-12 PROCEDURE — 97110 THERAPEUTIC EXERCISES: CPT

## 2025-08-12 PROCEDURE — 97530 THERAPEUTIC ACTIVITIES: CPT

## 2025-08-12 PROCEDURE — 97161 PT EVAL LOW COMPLEX 20 MIN: CPT

## 2025-08-13 ENCOUNTER — TELEPHONE (OUTPATIENT)
Dept: PHYSICAL MEDICINE AND REHAB | Facility: CLINIC | Age: 75
End: 2025-08-13

## 2025-08-18 DIAGNOSIS — F41.9 ANXIETY: ICD-10-CM

## 2025-08-18 DIAGNOSIS — E11.65 TYPE 2 DIABETES MELLITUS WITH HYPERGLYCEMIA, WITHOUT LONG-TERM CURRENT USE OF INSULIN (HCC): Chronic | ICD-10-CM

## 2025-08-19 ENCOUNTER — OFFICE VISIT (OUTPATIENT)
Dept: PHYSICAL THERAPY | Age: 75
End: 2025-08-19
Attending: PHYSICAL MEDICINE & REHABILITATION

## 2025-08-19 DIAGNOSIS — M43.16 SPONDYLOLISTHESIS OF LUMBAR REGION: Primary | ICD-10-CM

## 2025-08-19 PROCEDURE — 97112 NEUROMUSCULAR REEDUCATION: CPT

## 2025-08-19 PROCEDURE — 97110 THERAPEUTIC EXERCISES: CPT

## 2025-08-21 RX ORDER — LORAZEPAM 1 MG/1
1 TABLET ORAL NIGHTLY PRN
Qty: 30 TABLET | Refills: 0 | Status: SHIPPED | OUTPATIENT
Start: 2025-08-21

## 2025-08-22 ENCOUNTER — TELEPHONE (OUTPATIENT)
Dept: INTERNAL MEDICINE CLINIC | Facility: CLINIC | Age: 75
End: 2025-08-22

## 2025-08-26 ENCOUNTER — TELEPHONE (OUTPATIENT)
Dept: INTERNAL MEDICINE CLINIC | Facility: CLINIC | Age: 75
End: 2025-08-26

## 2025-08-26 ENCOUNTER — LAB ENCOUNTER (OUTPATIENT)
Dept: LAB | Age: 75
End: 2025-08-26
Attending: INTERNAL MEDICINE

## 2025-08-26 DIAGNOSIS — E11.65 TYPE 2 DIABETES MELLITUS WITH HYPERGLYCEMIA, WITHOUT LONG-TERM CURRENT USE OF INSULIN (HCC): Chronic | ICD-10-CM

## 2025-08-26 DIAGNOSIS — Z12.5 ENCOUNTER FOR SCREENING FOR MALIGNANT NEOPLASM OF PROSTATE: ICD-10-CM

## 2025-08-26 DIAGNOSIS — Z00.00 ENCOUNTER FOR ANNUAL HEALTH EXAMINATION: ICD-10-CM

## 2025-08-26 LAB
ALBUMIN SERPL-MCNC: 4.5 G/DL (ref 3.2–4.8)
ALBUMIN/GLOB SERPL: 2.1 (ref 1–2)
ALP LIVER SERPL-CCNC: 54 U/L (ref 45–117)
ALT SERPL-CCNC: 28 U/L (ref 10–49)
ANION GAP SERPL CALC-SCNC: 7 MMOL/L (ref 0–18)
AST SERPL-CCNC: 22 U/L (ref ?–34)
BILIRUB SERPL-MCNC: 0.9 MG/DL (ref 0.2–1.1)
BUN BLD-MCNC: 9 MG/DL (ref 9–23)
BUN/CREAT SERPL: 10.7 (ref 10–20)
CALCIUM BLD-MCNC: 9.3 MG/DL (ref 8.7–10.4)
CHLORIDE SERPL-SCNC: 103 MMOL/L (ref 98–112)
CHOLEST SERPL-MCNC: 122 MG/DL (ref ?–200)
CO2 SERPL-SCNC: 29 MMOL/L (ref 21–32)
COMPLEXED PSA SERPL-MCNC: 1.62 NG/ML (ref ?–4)
CREAT BLD-MCNC: 0.84 MG/DL (ref 0.7–1.3)
DEPRECATED RDW RBC AUTO: 57.8 FL (ref 35.1–46.3)
EGFRCR SERPLBLD CKD-EPI 2021: 92 ML/MIN/1.73M2 (ref 60–?)
ERYTHROCYTE [DISTWIDTH] IN BLOOD BY AUTOMATED COUNT: 17.8 % (ref 11–15)
EST. AVERAGE GLUCOSE BLD GHB EST-MCNC: 143 MG/DL (ref 68–126)
FASTING PATIENT LIPID ANSWER: YES
FASTING STATUS PATIENT QL REPORTED: YES
GLOBULIN PLAS-MCNC: 2.1 G/DL (ref 2–3.5)
GLUCOSE BLD-MCNC: 129 MG/DL (ref 70–99)
HBA1C MFR BLD: 6.6 % (ref ?–5.7)
HCT VFR BLD AUTO: 35 % (ref 39–53)
HDLC SERPL-MCNC: 59 MG/DL (ref 40–59)
HGB BLD-MCNC: 11 G/DL (ref 13–17.5)
LDLC SERPL CALC-MCNC: 41 MG/DL (ref ?–100)
MCH RBC QN AUTO: 28.6 PG (ref 26–34)
MCHC RBC AUTO-ENTMCNC: 31.4 G/DL (ref 31–37)
MCV RBC AUTO: 90.9 FL (ref 80–100)
NONHDLC SERPL-MCNC: 63 MG/DL (ref ?–130)
OSMOLALITY SERPL CALC.SUM OF ELEC: 288 MOSM/KG (ref 275–295)
PLATELET # BLD AUTO: 152 10(3)UL (ref 150–450)
POTASSIUM SERPL-SCNC: 4.2 MMOL/L (ref 3.5–5.1)
PROT SERPL-MCNC: 6.6 G/DL (ref 5.7–8.2)
RBC # BLD AUTO: 3.85 X10(6)UL (ref 3.8–5.8)
SODIUM SERPL-SCNC: 139 MMOL/L (ref 136–145)
TRIGL SERPL-MCNC: 128 MG/DL (ref 30–149)
TSI SER-ACNC: 2.07 UIU/ML (ref 0.55–4.78)
VLDLC SERPL CALC-MCNC: 18 MG/DL (ref 0–30)
WBC # BLD AUTO: 3.4 X10(3) UL (ref 4–11)

## 2025-08-26 PROCEDURE — 85027 COMPLETE CBC AUTOMATED: CPT

## 2025-08-26 PROCEDURE — 83036 HEMOGLOBIN GLYCOSYLATED A1C: CPT

## 2025-08-26 PROCEDURE — 80061 LIPID PANEL: CPT

## 2025-08-26 PROCEDURE — 80053 COMPREHEN METABOLIC PANEL: CPT

## 2025-08-26 PROCEDURE — 84443 ASSAY THYROID STIM HORMONE: CPT

## 2025-08-26 PROCEDURE — 36415 COLL VENOUS BLD VENIPUNCTURE: CPT

## 2025-08-27 ENCOUNTER — OFFICE VISIT (OUTPATIENT)
Dept: INTERNAL MEDICINE CLINIC | Facility: CLINIC | Age: 75
End: 2025-08-27

## 2025-08-27 VITALS
HEIGHT: 68 IN | HEART RATE: 57 BPM | BODY MASS INDEX: 26.52 KG/M2 | WEIGHT: 175 LBS | DIASTOLIC BLOOD PRESSURE: 70 MMHG | OXYGEN SATURATION: 99 % | SYSTOLIC BLOOD PRESSURE: 123 MMHG

## 2025-08-27 DIAGNOSIS — R06.2 WHEEZING: Primary | ICD-10-CM

## 2025-08-27 DIAGNOSIS — E11.9 TYPE 2 DIABETES MELLITUS WITHOUT RETINOPATHY (HCC): ICD-10-CM

## 2025-08-27 DIAGNOSIS — R05.3 CHRONIC COUGH: ICD-10-CM

## 2025-08-27 DIAGNOSIS — R09.82 PND (POST-NASAL DRIP): ICD-10-CM

## 2025-08-27 DIAGNOSIS — R91.8 LUNG NODULES: ICD-10-CM

## 2025-08-27 PROCEDURE — G2211 COMPLEX E/M VISIT ADD ON: HCPCS | Performed by: INTERNAL MEDICINE

## 2025-08-27 PROCEDURE — 3008F BODY MASS INDEX DOCD: CPT | Performed by: INTERNAL MEDICINE

## 2025-08-27 PROCEDURE — 3074F SYST BP LT 130 MM HG: CPT | Performed by: INTERNAL MEDICINE

## 2025-08-27 PROCEDURE — 3078F DIAST BP <80 MM HG: CPT | Performed by: INTERNAL MEDICINE

## 2025-08-27 PROCEDURE — 1160F RVW MEDS BY RX/DR IN RCRD: CPT | Performed by: INTERNAL MEDICINE

## 2025-08-27 PROCEDURE — 3044F HG A1C LEVEL LT 7.0%: CPT | Performed by: INTERNAL MEDICINE

## 2025-08-27 PROCEDURE — 1159F MED LIST DOCD IN RCRD: CPT | Performed by: INTERNAL MEDICINE

## 2025-08-27 PROCEDURE — 99214 OFFICE O/P EST MOD 30 MIN: CPT | Performed by: INTERNAL MEDICINE

## 2025-08-27 RX ORDER — ALBUTEROL SULFATE 90 UG/1
2 INHALANT RESPIRATORY (INHALATION) EVERY 6 HOURS PRN
Qty: 1 EACH | Refills: 0 | Status: SHIPPED | OUTPATIENT
Start: 2025-08-27

## 2025-08-27 RX ORDER — FLUTICASONE FUROATE AND VILANTEROL 100; 25 UG/1; UG/1
1 POWDER RESPIRATORY (INHALATION) DAILY
Qty: 1 EACH | Refills: 0 | Status: SHIPPED | OUTPATIENT
Start: 2025-08-27

## 2025-08-28 ENCOUNTER — OFFICE VISIT (OUTPATIENT)
Dept: PHYSICAL THERAPY | Age: 75
End: 2025-08-28
Attending: PHYSICAL MEDICINE & REHABILITATION

## 2025-08-28 ENCOUNTER — TELEPHONE (OUTPATIENT)
Dept: PULMONOLOGY | Facility: CLINIC | Age: 75
End: 2025-08-28

## 2025-08-28 DIAGNOSIS — M43.16 SPONDYLOLISTHESIS OF LUMBAR REGION: Primary | ICD-10-CM

## 2025-08-28 PROCEDURE — 97112 NEUROMUSCULAR REEDUCATION: CPT

## 2025-08-28 PROCEDURE — 97110 THERAPEUTIC EXERCISES: CPT

## (undated) DIAGNOSIS — E11.9 TYPE 2 DIABETES MELLITUS WITHOUT COMPLICATION, WITHOUT LONG-TERM CURRENT USE OF INSULIN (HCC): ICD-10-CM

## (undated) DEVICE — ZZ-CONVERTED-TO-522442- SPONGE 4X4 10PK

## (undated) DEVICE — SCREW FIX 3.5X48MM HEX HD

## (undated) DEVICE — NEEDLE HPO 18GA 1.5IN ECLPS

## (undated) DEVICE — TUBING SET, GRAVITY, 4-SPIKE

## (undated) DEVICE — KIT ENDO ORCAPOD 160/180/190

## (undated) DEVICE — SLIM BODY SKIN STAPLER: Brand: APPOSE ULC

## (undated) DEVICE — SUTURE VICRYL 4-0 J494G

## (undated) DEVICE — HOOD: Brand: FLYTE

## (undated) DEVICE — 3M™ STERI-STRIP™ REINFORCED ADHESIVE SKIN CLOSURES, R1547, 1/2 IN X 4 IN (12 MM X 100 MM), 6 STRIPS/ENVELOPE: Brand: 3M™ STERI-STRIP™

## (undated) DEVICE — SOL  .9 3000ML

## (undated) DEVICE — GAMMEX® NON-LATEX PI ORTHO SIZE 8, STERILE POLYISOPRENE POWDER-FREE SURGICAL GLOVE: Brand: GAMMEX

## (undated) DEVICE — 60 ML SYRINGE REGULAR TIP: Brand: MONOJECT

## (undated) DEVICE — ENCORE® LATEX ACCLAIM SIZE 8, STERILE LATEX POWDER-FREE SURGICAL GLOVE: Brand: ENCORE

## (undated) DEVICE — 12 ML SYRINGE LUER-LOCK TIP: Brand: MONOJECT

## (undated) DEVICE — DRAPE C-ARM UNIVERSAL

## (undated) DEVICE — SUT VCRL 1-0 36IN CTX ABSRB UD L48MM 1/2 CIR

## (undated) DEVICE — Device: Brand: STABLECUT®

## (undated) DEVICE — SCREW ORTH 2.5X25MM FEM HEX PERSONA

## (undated) DEVICE — CAPSULE ENDO PILL CAM SB3

## (undated) DEVICE — NEEDLE SPNL 18GA L3.5IN W/ QNCKE SHARPER BVL

## (undated) DEVICE — TRAP 4 CPTR CHMBR N EZ INLN

## (undated) DEVICE — LINE MNTR ADLT SET O2 INTMD

## (undated) DEVICE — GAMMEX® PI HYBRID SIZE 7.5, STERILE POWDER-FREE SURGICAL GLOVE, POLYISOPRENE AND NEOPRENE BLEND: Brand: GAMMEX

## (undated) DEVICE — Device: Brand: CUSTOM PROCEDURE KIT

## (undated) DEVICE — CEMENT MIXING SYSTEM WITH FEMORAL BREAKWAY NOZZLE: Brand: REVOLUTION

## (undated) DEVICE — GAMMEX® NON-LATEX PI ORTHO SIZE 7, STERILE POLYISOPRENE POWDER-FREE SURGICAL GLOVE: Brand: GAMMEX

## (undated) DEVICE — KIT CLEAN ENDOKIT 1.1OZ GOWNX2

## (undated) DEVICE — APPLICATOR PREP 26ML CHG 2% ISO ALC 70%

## (undated) DEVICE — 3M™ COBAN™ NL STERILE NON-LATEX SELF-ADHERENT WRAP, 2084S, 4 IN X 5 YD (10 CM X 4,5 M), 18 ROLLS/CASE: Brand: 3M™ COBAN™

## (undated) DEVICE — FORCEP RADIAL JAW 4

## (undated) DEVICE — 35 ML SYRINGE REGULAR TIP: Brand: MONOJECT

## (undated) DEVICE — 3M™ STERI-STRIP™ COMPOUND BENZOIN TINCTURE 40 BAGS/CARTON 4 CARTONS/CASE C1544: Brand: 3M™ STERI-STRIP™

## (undated) DEVICE — GUIDEPIN SUR FEM TIB CR PT SPEC PERSONA

## (undated) DEVICE — 35 ML SYRINGE LUER-LOCK TIP: Brand: MONOJECT

## (undated) DEVICE — GOWN SURG AERO BLUE PERF LG

## (undated) DEVICE — BLADE SHVR 13CM 4MM EXCLBR

## (undated) DEVICE — COTTON UNDERCAST PADDING,REGULAR FINISH: Brand: WEBRIL

## (undated) DEVICE — GAMMEX® PI HYBRID SIZE 6.5, STERILE POWDER-FREE SURGICAL GLOVE, POLYISOPRENE AND NEOPRENE BLEND: Brand: GAMMEX

## (undated) DEVICE — ABDOMINAL PAD: Brand: CURITY

## (undated) DEVICE — CONMED SCOPE SAVER BITE BLOCK, 20X27 MM: Brand: SCOPE SAVER

## (undated) DEVICE — 6 ML SYRINGE LUER-LOCK TIP: Brand: MONOJECT

## (undated) DEVICE — SKIN PREP TRAY 4 COMPARTM TRAY: Brand: MEDLINE INDUSTRIES, INC.

## (undated) DEVICE — SNARE ENDOSCOPIC 10MM ROUND

## (undated) DEVICE — SOLUTION IRRIG 1000ML 0.9% NACL USP BTL

## (undated) DEVICE — DISPOSABLE TOURNIQUET CUFF SINGLE BLADDER, DUAL PORT AND QUICK CONNECT CONNECTOR: Brand: COLOR CUFF

## (undated) DEVICE — ZIMMER® STERILE DISPOSABLE TOURNIQUET CUFF WITH PLC, DUAL PORT, SINGLE BLADDER, 30 IN. (76 CM)

## (undated) DEVICE — SOLUTION IRRIG 3000ML 0.9% NACL FLX CONT

## (undated) DEVICE — AMBIENT SUPER TURBOVAC 90 IFS: Brand: COBLATION

## (undated) DEVICE — ANTIBACTERIAL UNDYED BRAIDED (POLYGLACTIN 910), SYNTHETIC ABSORBABLE SUTURE: Brand: COATED VICRYL

## (undated) DEVICE — ADHESIVE SKIN TOP FOR WND CLSR DERMBND ADV

## (undated) DEVICE — SYRINGE MNJCT 10ML LF STRL REG

## (undated) DEVICE — PACK CDS TOTAL KNEE

## (undated) DEVICE — Device: Brand: DEFENDO AIR/WATER/SUCTION AND BIOPSY VALVE

## (undated) DEVICE — BLADE RMR 46MM PAT SS W/ PILOT H

## (undated) DEVICE — SHEET,DRAPE,53X77,STERILE: Brand: MEDLINE

## (undated) DEVICE — PIN DRL 75MM HDLSS TRCR NXGN

## (undated) DEVICE — SUCTION CANISTER, 3000CC,SAFELINER: Brand: DEROYAL

## (undated) DEVICE — ISLAND DRESSING 4IN X 11IN: Brand: SILVERLON ANTIMICROBIAL WOUND DRESSING

## (undated) DEVICE — WRAP COOLING KNEE W/ICE PILLOW

## (undated) DEVICE — FILTER NEEDLE: Brand: MONOJECT

## (undated) DEVICE — ARTHROSCOPY: Brand: MEDLINE INDUSTRIES, INC.

## (undated) DEVICE — SNARE OPTMZ PLPCTM TRP

## (undated) DEVICE — 3M™ IOBAN™ 2 ANTIMICROBIAL INCISE DRAPE 6640EZ: Brand: IOBAN™ 2

## (undated) NOTE — LETTER
April 13, 2021    [facility] Bias, [address] [address]     Patient: Jamie Rea   YOB: 1950   Date of Visit: 4/13/2021       Dear Dr. Juvenal Vincent MD:    Thank you for referring Jamie Rea to me for evaluation.  Here i Use      Smoking status: Former Smoker        Packs/day: 0.50        Years: 25.00        Pack years: 12.5        Types: Cigarettes        Quit date: 2000        Years since quittin.2      Smokeless tobacco: Never Used    Vaping Use      Vaping Use: PHYSICAL EXAM:     Base Eye Exam     Visual Acuity (Snellen - Linear)       Right Left    Dist cc 20/25 +2 20/25    Near cc 20/20 20/20    Correction: Glasses          Tonometry ([facility], 3:36 PM)       Right Left    Pressure 10 10          Pupils       P and treatment discussed in detail with patient. Discussed with patient that hemoglobin A1C of 8.6 is too high. To consider possible referral to endocrinologist in the future for glycemic control. Patient should discuss with their primary care physician.

## (undated) NOTE — LETTER
AUTHORIZATION FOR SURGICAL OPERATION OR OTHER PROCEDURE    1. I hereby authorize Dr. Stanley, and [facility] staff assigned to my case to perform the following operation and/or procedure at the [facility] site:    _______________________________________________________________________________________________    Left knee aspiration and cortisone injection  _______________________________________________________________________________________________    2.  My physician has explained the nature and purpose of the operation or other procedure, possible alternative methods of treatment, the risks involved, and the possibility of complication to me.  I acknowledge that no guarantee has been made as to the result that may be obtained.  3.  I recognize that, during the course of this operation, or other procedure, unforseen conditions may necessitate additional or different procedure than those listed above.  I, therefore, further authorize and request that the above named physician, his/her physician assistants or designees perform such procedures as are, in his/her professional opinion, necessary and desirable.  4.  Any tissue or organs removed in the operation or other procedure may be disposed of by and at the discretion of the [facility] and [facility].  5.  I understand that in the event of a medical emergency, I will be transported by local paramedics to [facility] or other hospital emergency department.  6.  I certify that I have read and fully understand the above consent to operation and/or other procedure.    7.  I acknowledge that my physician has explained sedation/analgesia administration to me including the risks and benefits.  I consent to the administration of sedation/analgesia as may be necessary or desirable in the judgement of my physician.    Witness signature: ___________________________________________________ Date:   ______/______/_____                    Time:  ________ A.M.  P.M.       Patient Name:  ______________________________________________________  (please print)      Patient signature:  ___________________________________________________             Relationship to Patient:           []  Parent    Responsible person                          []  Spouse  In case of minor or                    [] Other  _____________   Incompetent name:  __________________________________________________                               (please print)      _____________      Responsible person  In case of minor or  Incompetent signature:  _______________________________________________    Statement of Physician  My signature below affirms that prior to the time of the procedure, I have explained to the patient and/or his/her guardian, the risks and benefits involved in the proposed treatment and any reasonable alternative to the proposed treatment.  I have also explained the risks and benefits involved in the refusal of the proposed treatment and have answered the patient's questions.                        Date:  ______/______/_______  Provider                      Signature:  __________________________________________________________       Time:  ___________ A.M    P.M.

## (undated) NOTE — LETTER
05/05/20        Adair Hwang  [address] [id number]      Dear Bala Cristobal,    [facility] records indicate that you have outstanding lab work and or testing that was ordered for you and has not yet been completed:     CBC WITH DIFFERENTIAL WITH PLATELET

## (undated) NOTE — LETTER
12/15/2023    Avinash Dumont        [address] [place] [address]            Dear Avinash Dumont,      Our records indicate that you are due for an appointment for a Colonoscopy with Jack Son MD. Our doctors are booking out about 3-6 months in advance for procedures.     Please call our office to schedule a phone screening appointment to plan for the procedure(s).   Your medical well-being is important to us.    If your insurance requires a referral, please call your primary care office to request one.      Thank you,      The Physicians and Staff at [facility]

## (undated) NOTE — LETTER
[facility], [address] [place] [address]  [address]  [facility] [address]  [phone]                10/29/19      [place] [address]    Dear Arron Lawrence,    I reviewed the pathology report from the polyp

## (undated) NOTE — LETTER
03/07/19        Madina Leiva  [address] [facility] [address]      Dear Ladonna Moore,    [facility] records indicate that you have outstanding lab work and or testing that was ordered for you and has not yet been completed:  Orders Placed This Encounter      C

## (undated) NOTE — LETTER
Eulalio Shin, [address]  [place], [place]       03/28/19        Patient: Sandra Ware   YOB: 1950   Date of Visit: 3/28/2019       Dear  Dr. Kimani Webster MD,      Thank you for referring Sandra Ware to my practice.   Precious

## (undated) NOTE — Clinical Note
Thank you for the consult. I saw Mr Cyr Staff in  the endocrine/diabetes clinic today. Please see attached my note. Please feel free to contact me with any questions. Thanks!

## (undated) NOTE — LETTER
3/5/2020          To Whom It May Concern:    Davion Phan is currently under my medical care and may not return to work at this time. He may return to work on Monday, March 9, 2020. Activity is restricted as follows: sedentary work only.     If you r

## (undated) NOTE — LETTER
AUTHORIZATION FOR SURGICAL OPERATION OR OTHER PROCEDURE    1.  I hereby authorize Dr. Sasha Pineda, and [address] [place] [address] staff assigned to my case to perform the following operation and/or procedure at the [address] [place] [address]:    _____________________Right knee Patient Name:  ______________________________________________________  (please print)      Patient signature:  ___________________________________________________             Relationship to Patient:           []  Parent    Responsible person

## (undated) NOTE — LETTER
AUTHORIZATION FOR SURGICAL OPERATION OR OTHER PROCEDURE    1. I hereby authorize Dr. Gabriel Gonzalez, and [place] [employer] [facility] staff assigned to my case to perform the following operation and/or procedure at the [facility], [facility]:    _______________________________________________________________________________________________    Right Knee durolane injection   _______________________________________________________________________________________________    2. My physician has explained the nature and purpose of the operation or other procedure, possible alternative methods of treatment, the risks involved, and the possibility of complication to me. I acknowledge that no guarantee has been made as to the result that may be obtained. 3.  I recognize that, during the course of this operation, or other procedure, unforseen conditions may necessitate additional or different procedure than those listed above. I, therefore, further authorize and request that the above named physician, his/her physician assistants or designees perform such procedures as are, in his/her professional opinion, necessary and desirable. 4.  Any tissue or organs removed in the operation or other procedure may be disposed of by and at the discretion of the [facility], [facility] and [facility]. 5.  I understand that in the event of a medical emergency, I will be transported by local paramedics to [facility] or other hospital emergency department. 6.  I certify that I have read and fully understand the above consent to operation and/or other procedure. 7.  I acknowledge that my physician has explained sedation/analgesia administration to me including the risks and benefits. I consent to the administration of sedation/analgesia as may be necessary or desirable in the judgement of my physician.     Witness signature: ___________________________________________________ Date:  ______/______/_____                    Time: ________ A. M.  P.M. Patient Name:  ______________________________________________________  (please print)      Patient signature:  ___________________________________________________             Relationship to Patient:           []  Parent    Responsible person                          []  Spouse  In case of minor or                    [] Other  _____________   Incompetent name:  __________________________________________________                               (please print)      _____________      Responsible person  In case of minor or  Incompetent signature:  _______________________________________________    Statement of Physician  My signature below affirms that prior to the time of the procedure, I have explained to the patient and/or his/her guardian, the risks and benefits involved in the proposed treatment and any reasonable alternative to the proposed treatment. I have also explained the risks and benefits involved in the refusal of the proposed treatment and have answered the patient's questions.                         Date:  ______/______/_______  Provider                      Signature:  __________________________________________________________       Time:  ___________ A.M    P.M.